# Patient Record
Sex: FEMALE | Race: WHITE | NOT HISPANIC OR LATINO | Employment: FULL TIME | URBAN - METROPOLITAN AREA
[De-identification: names, ages, dates, MRNs, and addresses within clinical notes are randomized per-mention and may not be internally consistent; named-entity substitution may affect disease eponyms.]

---

## 2017-02-02 ENCOUNTER — COMMUNICATION - HEALTHEAST (OUTPATIENT)
Dept: FAMILY MEDICINE | Facility: CLINIC | Age: 57
End: 2017-02-02

## 2017-02-02 DIAGNOSIS — E11.9 TYPE II DIABETES MELLITUS (H): ICD-10-CM

## 2017-11-06 ENCOUNTER — OFFICE VISIT - HEALTHEAST (OUTPATIENT)
Dept: INTERNAL MEDICINE | Facility: CLINIC | Age: 57
End: 2017-11-06

## 2017-11-06 DIAGNOSIS — Z76.89 ENCOUNTER TO ESTABLISH CARE: ICD-10-CM

## 2017-11-06 DIAGNOSIS — E11.9 DM (DIABETES MELLITUS) (H): ICD-10-CM

## 2017-11-06 DIAGNOSIS — E78.5 HYPERLIPIDEMIA: ICD-10-CM

## 2017-11-06 LAB
CHOLEST SERPL-MCNC: 227 MG/DL
FASTING STATUS PATIENT QL REPORTED: YES
HBA1C MFR BLD: >14 % (ref 3.5–6)
HDLC SERPL-MCNC: 46 MG/DL
LDLC SERPL CALC-MCNC: 150 MG/DL
TRIGL SERPL-MCNC: 156 MG/DL

## 2017-11-06 ASSESSMENT — MIFFLIN-ST. JEOR: SCORE: 1389.05

## 2017-12-29 ENCOUNTER — OFFICE VISIT - HEALTHEAST (OUTPATIENT)
Dept: ENDOCRINOLOGY | Facility: CLINIC | Age: 57
End: 2017-12-29

## 2017-12-29 DIAGNOSIS — E11.9 TYPE 2 DIABETES MELLITUS WITHOUT COMPLICATION (H): ICD-10-CM

## 2017-12-29 ASSESSMENT — MIFFLIN-ST. JEOR: SCORE: 1432.6

## 2018-01-03 ENCOUNTER — COMMUNICATION - HEALTHEAST (OUTPATIENT)
Dept: ADMINISTRATIVE | Facility: CLINIC | Age: 58
End: 2018-01-03

## 2018-01-03 DIAGNOSIS — E11.9 TYPE II DIABETES MELLITUS (H): ICD-10-CM

## 2018-01-03 DIAGNOSIS — E11.9 DM (DIABETES MELLITUS) (H): ICD-10-CM

## 2018-01-04 ENCOUNTER — AMBULATORY - HEALTHEAST (OUTPATIENT)
Dept: ENDOCRINOLOGY | Facility: CLINIC | Age: 58
End: 2018-01-04

## 2018-01-04 DIAGNOSIS — E11.9 TYPE 2 DIABETES MELLITUS WITHOUT COMPLICATION (H): ICD-10-CM

## 2018-01-18 ENCOUNTER — COMMUNICATION - HEALTHEAST (OUTPATIENT)
Dept: ENDOCRINOLOGY | Facility: CLINIC | Age: 58
End: 2018-01-18

## 2018-02-01 ENCOUNTER — COMMUNICATION - HEALTHEAST (OUTPATIENT)
Dept: INTERNAL MEDICINE | Facility: CLINIC | Age: 58
End: 2018-02-01

## 2018-02-01 ENCOUNTER — AMBULATORY - HEALTHEAST (OUTPATIENT)
Dept: ENDOCRINOLOGY | Facility: CLINIC | Age: 58
End: 2018-02-01

## 2018-02-01 ENCOUNTER — OFFICE VISIT - HEALTHEAST (OUTPATIENT)
Dept: INTERNAL MEDICINE | Facility: CLINIC | Age: 58
End: 2018-02-01

## 2018-02-01 DIAGNOSIS — Z12.31 VISIT FOR SCREENING MAMMOGRAM: ICD-10-CM

## 2018-02-01 DIAGNOSIS — M54.41 ACUTE RIGHT-SIDED LOW BACK PAIN WITH RIGHT-SIDED SCIATICA: ICD-10-CM

## 2018-02-01 DIAGNOSIS — E11.9 TYPE 2 DIABETES MELLITUS WITHOUT COMPLICATION (H): ICD-10-CM

## 2018-02-01 ASSESSMENT — MIFFLIN-ST. JEOR: SCORE: 1398.12

## 2018-02-05 ENCOUNTER — HOSPITAL ENCOUNTER (OUTPATIENT)
Dept: MAMMOGRAPHY | Facility: HOSPITAL | Age: 58
Discharge: HOME | End: 2018-02-05

## 2018-02-05 DIAGNOSIS — Z12.31 VISIT FOR SCREENING MAMMOGRAM: ICD-10-CM

## 2018-02-20 ENCOUNTER — OFFICE VISIT - HEALTHEAST (OUTPATIENT)
Dept: FAMILY MEDICINE | Facility: CLINIC | Age: 58
End: 2018-02-20

## 2018-02-20 DIAGNOSIS — J06.9 VIRAL URI WITH COUGH: ICD-10-CM

## 2018-02-20 DIAGNOSIS — J02.9 SORE THROAT: ICD-10-CM

## 2018-02-20 DIAGNOSIS — R05.9 COUGH: ICD-10-CM

## 2018-02-20 LAB — DEPRECATED S PYO AG THROAT QL EIA: NORMAL

## 2018-02-21 LAB — GROUP A STREP BY PCR: NORMAL

## 2018-03-07 ENCOUNTER — COMMUNICATION - HEALTHEAST (OUTPATIENT)
Dept: ENDOCRINOLOGY | Facility: CLINIC | Age: 58
End: 2018-03-07

## 2018-03-07 DIAGNOSIS — E11.9 TYPE 2 DIABETES MELLITUS WITHOUT COMPLICATION (H): ICD-10-CM

## 2018-03-12 ENCOUNTER — COMMUNICATION - HEALTHEAST (OUTPATIENT)
Dept: ENDOCRINOLOGY | Facility: CLINIC | Age: 58
End: 2018-03-12

## 2018-03-12 ENCOUNTER — OFFICE VISIT - HEALTHEAST (OUTPATIENT)
Dept: FAMILY MEDICINE | Facility: CLINIC | Age: 58
End: 2018-03-12

## 2018-03-12 DIAGNOSIS — E11.9 TYPE 2 DIABETES MELLITUS WITHOUT COMPLICATION (H): ICD-10-CM

## 2018-03-12 DIAGNOSIS — N30.00 ACUTE CYSTITIS WITHOUT HEMATURIA: ICD-10-CM

## 2018-03-12 DIAGNOSIS — R39.89 URINARY PROBLEM: ICD-10-CM

## 2018-03-12 LAB
ALBUMIN UR-MCNC: NEGATIVE MG/DL
APPEARANCE UR: ABNORMAL
BACTERIA #/AREA URNS HPF: ABNORMAL HPF
BILIRUB UR QL STRIP: NEGATIVE
COLOR UR AUTO: YELLOW
GLUCOSE UR STRIP-MCNC: NEGATIVE MG/DL
HGB UR QL STRIP: NEGATIVE
KETONES UR STRIP-MCNC: NEGATIVE MG/DL
LEUKOCYTE ESTERASE UR QL STRIP: ABNORMAL
NITRATE UR QL: NEGATIVE
PH UR STRIP: 5.5 [PH] (ref 5–8)
RBC #/AREA URNS AUTO: ABNORMAL HPF
SP GR UR STRIP: 1.02 (ref 1–1.03)
SQUAMOUS #/AREA URNS AUTO: ABNORMAL LPF
UROBILINOGEN UR STRIP-ACNC: ABNORMAL
WBC #/AREA URNS AUTO: ABNORMAL HPF

## 2018-03-14 LAB — BACTERIA SPEC CULT: ABNORMAL

## 2018-03-15 ENCOUNTER — AMBULATORY - HEALTHEAST (OUTPATIENT)
Dept: ENDOCRINOLOGY | Facility: CLINIC | Age: 58
End: 2018-03-15

## 2018-03-15 DIAGNOSIS — E11.9 TYPE 2 DIABETES MELLITUS WITHOUT COMPLICATION (H): ICD-10-CM

## 2018-04-12 ENCOUNTER — AMBULATORY - HEALTHEAST (OUTPATIENT)
Dept: LAB | Facility: CLINIC | Age: 58
End: 2018-04-12

## 2018-04-12 DIAGNOSIS — E11.9 TYPE 2 DIABETES MELLITUS WITHOUT COMPLICATION (H): ICD-10-CM

## 2018-04-12 LAB — HBA1C MFR BLD: 8.1 % (ref 3.5–6)

## 2018-04-19 ENCOUNTER — OFFICE VISIT - HEALTHEAST (OUTPATIENT)
Dept: ENDOCRINOLOGY | Facility: CLINIC | Age: 58
End: 2018-04-19

## 2018-04-19 ENCOUNTER — RECORDS - HEALTHEAST (OUTPATIENT)
Dept: ADMINISTRATIVE | Facility: OTHER | Age: 58
End: 2018-04-19

## 2018-04-19 DIAGNOSIS — E11.9 TYPE 2 DIABETES MELLITUS WITHOUT COMPLICATION (H): ICD-10-CM

## 2018-04-19 ASSESSMENT — MIFFLIN-ST. JEOR: SCORE: 1409.92

## 2018-06-06 ENCOUNTER — COMMUNICATION - HEALTHEAST (OUTPATIENT)
Dept: ENDOCRINOLOGY | Facility: CLINIC | Age: 58
End: 2018-06-06

## 2018-06-06 DIAGNOSIS — E11.9 TYPE 2 DIABETES MELLITUS WITHOUT COMPLICATION (H): ICD-10-CM

## 2018-06-15 ENCOUNTER — AMBULATORY - HEALTHEAST (OUTPATIENT)
Dept: ENDOCRINOLOGY | Facility: CLINIC | Age: 58
End: 2018-06-15

## 2018-06-15 DIAGNOSIS — E11.9 TYPE 2 DIABETES MELLITUS WITHOUT COMPLICATION (H): ICD-10-CM

## 2018-07-26 ENCOUNTER — COMMUNICATION - HEALTHEAST (OUTPATIENT)
Dept: TELEHEALTH | Facility: CLINIC | Age: 58
End: 2018-07-26

## 2018-07-26 ENCOUNTER — AMBULATORY - HEALTHEAST (OUTPATIENT)
Dept: LAB | Facility: CLINIC | Age: 58
End: 2018-07-26

## 2018-07-26 DIAGNOSIS — E11.9 TYPE 2 DIABETES MELLITUS WITHOUT COMPLICATION (H): ICD-10-CM

## 2018-07-26 LAB
ANION GAP SERPL CALCULATED.3IONS-SCNC: 7 MMOL/L (ref 5–18)
BUN SERPL-MCNC: 19 MG/DL (ref 8–22)
CALCIUM SERPL-MCNC: 9.5 MG/DL (ref 8.5–10.5)
CHLORIDE BLD-SCNC: 105 MMOL/L (ref 98–107)
CO2 SERPL-SCNC: 27 MMOL/L (ref 22–31)
CREAT SERPL-MCNC: 0.82 MG/DL (ref 0.6–1.1)
GFR SERPL CREATININE-BSD FRML MDRD: >60 ML/MIN/1.73M2
GLUCOSE BLD-MCNC: 110 MG/DL (ref 70–125)
HBA1C MFR BLD: 7.9 % (ref 3.5–6)
POTASSIUM BLD-SCNC: 4.7 MMOL/L (ref 3.5–5)
SODIUM SERPL-SCNC: 139 MMOL/L (ref 136–145)

## 2018-07-30 ENCOUNTER — OFFICE VISIT - HEALTHEAST (OUTPATIENT)
Dept: PODIATRY | Facility: CLINIC | Age: 58
End: 2018-07-30

## 2018-07-30 DIAGNOSIS — G58.9 MONONEURITIS: ICD-10-CM

## 2018-07-30 DIAGNOSIS — G58.9 COMPRESSION NEUROPATHY: ICD-10-CM

## 2018-07-30 DIAGNOSIS — G57.50 TARSAL TUNNEL SYNDROME, UNSPECIFIED LATERALITY: ICD-10-CM

## 2018-07-30 DIAGNOSIS — M72.2 PLANTAR FASCIAL FIBROMATOSIS OF LEFT FOOT: ICD-10-CM

## 2018-08-01 ENCOUNTER — OFFICE VISIT - HEALTHEAST (OUTPATIENT)
Dept: ENDOCRINOLOGY | Facility: CLINIC | Age: 58
End: 2018-08-01

## 2018-08-01 ENCOUNTER — RECORDS - HEALTHEAST (OUTPATIENT)
Dept: ADMINISTRATIVE | Facility: OTHER | Age: 58
End: 2018-08-01

## 2018-08-01 ENCOUNTER — AMBULATORY - HEALTHEAST (OUTPATIENT)
Dept: ENDOCRINOLOGY | Facility: CLINIC | Age: 58
End: 2018-08-01

## 2018-08-01 DIAGNOSIS — E11.9 TYPE 2 DIABETES MELLITUS WITHOUT COMPLICATION (H): ICD-10-CM

## 2018-08-01 ASSESSMENT — MIFFLIN-ST. JEOR: SCORE: 1405.38

## 2018-08-10 ENCOUNTER — RECORDS - HEALTHEAST (OUTPATIENT)
Dept: ADMINISTRATIVE | Facility: OTHER | Age: 58
End: 2018-08-10

## 2018-08-10 ENCOUNTER — HOSPITAL ENCOUNTER (OUTPATIENT)
Dept: PHYSICAL MEDICINE AND REHAB | Facility: CLINIC | Age: 58
Discharge: HOME | End: 2018-08-10
Attending: PODIATRIST

## 2018-08-10 DIAGNOSIS — G58.9 COMPRESSION NEUROPATHY: ICD-10-CM

## 2018-08-10 DIAGNOSIS — G58.9 MONONEURITIS: ICD-10-CM

## 2018-08-10 DIAGNOSIS — G57.50 TARSAL TUNNEL SYNDROME, UNSPECIFIED LATERALITY: ICD-10-CM

## 2018-08-21 ENCOUNTER — RECORDS - HEALTHEAST (OUTPATIENT)
Dept: ADMINISTRATIVE | Facility: OTHER | Age: 58
End: 2018-08-21

## 2018-08-21 ENCOUNTER — OFFICE VISIT - HEALTHEAST (OUTPATIENT)
Dept: PODIATRY | Facility: CLINIC | Age: 58
End: 2018-08-21

## 2018-08-21 DIAGNOSIS — G57.52 TARSAL TUNNEL SYNDROME OF LEFT SIDE: ICD-10-CM

## 2018-08-21 DIAGNOSIS — G58.9 COMPRESSION NEUROPATHY: ICD-10-CM

## 2018-08-28 ENCOUNTER — COMMUNICATION - HEALTHEAST (OUTPATIENT)
Dept: ENDOCRINOLOGY | Facility: CLINIC | Age: 58
End: 2018-08-28

## 2018-08-28 DIAGNOSIS — E11.9 TYPE 2 DIABETES MELLITUS WITHOUT COMPLICATION (H): ICD-10-CM

## 2018-08-29 ENCOUNTER — OFFICE VISIT - HEALTHEAST (OUTPATIENT)
Dept: FAMILY MEDICINE | Facility: CLINIC | Age: 58
End: 2018-08-29

## 2018-08-29 DIAGNOSIS — G62.9 POLYNEUROPATHY: ICD-10-CM

## 2018-08-29 DIAGNOSIS — Z01.818 PRE-OP EVALUATION: ICD-10-CM

## 2018-08-29 DIAGNOSIS — E11.9 TYPE 2 DIABETES MELLITUS WITHOUT COMPLICATION (H): ICD-10-CM

## 2018-08-29 LAB
ANION GAP SERPL CALCULATED.3IONS-SCNC: 10 MMOL/L (ref 5–18)
ATRIAL RATE - MUSE: 87 BPM
BASOPHILS # BLD AUTO: 0.1 THOU/UL (ref 0–0.2)
BASOPHILS NFR BLD AUTO: 1 % (ref 0–2)
BUN SERPL-MCNC: 23 MG/DL (ref 8–22)
CALCIUM SERPL-MCNC: 10.1 MG/DL (ref 8.5–10.5)
CHLORIDE BLD-SCNC: 104 MMOL/L (ref 98–107)
CO2 SERPL-SCNC: 26 MMOL/L (ref 22–31)
CREAT SERPL-MCNC: 0.85 MG/DL (ref 0.6–1.1)
DIASTOLIC BLOOD PRESSURE - MUSE: NORMAL MMHG
EOSINOPHIL # BLD AUTO: 0.3 THOU/UL (ref 0–0.4)
EOSINOPHIL NFR BLD AUTO: 3 % (ref 0–6)
ERYTHROCYTE [DISTWIDTH] IN BLOOD BY AUTOMATED COUNT: 11.1 % (ref 11–14.5)
GFR SERPL CREATININE-BSD FRML MDRD: >60 ML/MIN/1.73M2
GLUCOSE BLD-MCNC: 142 MG/DL (ref 70–125)
HCT VFR BLD AUTO: 36.2 % (ref 35–47)
HGB BLD-MCNC: 12.5 G/DL (ref 12–16)
INTERPRETATION ECG - MUSE: NORMAL
LYMPHOCYTES # BLD AUTO: 3.1 THOU/UL (ref 0.8–4.4)
LYMPHOCYTES NFR BLD AUTO: 32 % (ref 20–40)
MCH RBC QN AUTO: 30.2 PG (ref 27–34)
MCHC RBC AUTO-ENTMCNC: 34.6 G/DL (ref 32–36)
MCV RBC AUTO: 87 FL (ref 80–100)
MONOCYTES # BLD AUTO: 0.6 THOU/UL (ref 0–0.9)
MONOCYTES NFR BLD AUTO: 6 % (ref 2–10)
NEUTROPHILS # BLD AUTO: 5.6 THOU/UL (ref 2–7.7)
NEUTROPHILS NFR BLD AUTO: 58 % (ref 50–70)
P AXIS - MUSE: 59 DEGREES
PLATELET # BLD AUTO: 195 THOU/UL (ref 140–440)
PMV BLD AUTO: 9 FL (ref 7–10)
POTASSIUM BLD-SCNC: 4.3 MMOL/L (ref 3.5–5)
PR INTERVAL - MUSE: 148 MS
QRS DURATION - MUSE: 88 MS
QT - MUSE: 370 MS
QTC - MUSE: 445 MS
R AXIS - MUSE: 62 DEGREES
RBC # BLD AUTO: 4.15 MILL/UL (ref 3.8–5.4)
SODIUM SERPL-SCNC: 140 MMOL/L (ref 136–145)
SYSTOLIC BLOOD PRESSURE - MUSE: NORMAL MMHG
T AXIS - MUSE: 44 DEGREES
VENTRICULAR RATE- MUSE: 87 BPM
WBC: 9.6 THOU/UL (ref 4–11)

## 2018-09-03 ENCOUNTER — COMMUNICATION - HEALTHEAST (OUTPATIENT)
Dept: ENDOCRINOLOGY | Facility: CLINIC | Age: 58
End: 2018-09-03

## 2018-09-03 DIAGNOSIS — E11.9 TYPE 2 DIABETES MELLITUS WITHOUT COMPLICATION (H): ICD-10-CM

## 2018-09-18 ASSESSMENT — MIFFLIN-ST. JEOR: SCORE: 1394.15

## 2018-09-19 ENCOUNTER — ANESTHESIA - HEALTHEAST (OUTPATIENT)
Dept: SURGERY | Facility: AMBULATORY SURGERY CENTER | Age: 58
End: 2018-09-19

## 2018-09-21 ENCOUNTER — SURGERY - HEALTHEAST (OUTPATIENT)
Dept: SURGERY | Facility: AMBULATORY SURGERY CENTER | Age: 58
End: 2018-09-21

## 2018-09-21 ASSESSMENT — MIFFLIN-ST. JEOR: SCORE: 1394.15

## 2018-09-24 ENCOUNTER — COMMUNICATION - HEALTHEAST (OUTPATIENT)
Dept: ADMINISTRATIVE | Facility: CLINIC | Age: 58
End: 2018-09-24

## 2018-09-26 ENCOUNTER — OFFICE VISIT - HEALTHEAST (OUTPATIENT)
Dept: PODIATRY | Facility: CLINIC | Age: 58
End: 2018-09-26

## 2018-09-26 DIAGNOSIS — G58.9 MONONEURITIS: ICD-10-CM

## 2018-09-26 DIAGNOSIS — G58.9 COMPRESSION NEUROPATHY: ICD-10-CM

## 2018-09-26 DIAGNOSIS — G57.52 TARSAL TUNNEL SYNDROME OF LEFT SIDE: ICD-10-CM

## 2018-10-03 ENCOUNTER — OFFICE VISIT - HEALTHEAST (OUTPATIENT)
Dept: PODIATRY | Facility: CLINIC | Age: 58
End: 2018-10-03

## 2018-10-03 ENCOUNTER — RECORDS - HEALTHEAST (OUTPATIENT)
Dept: ADMINISTRATIVE | Facility: OTHER | Age: 58
End: 2018-10-03

## 2018-10-03 DIAGNOSIS — G57.52 TARSAL TUNNEL SYNDROME OF LEFT SIDE: ICD-10-CM

## 2018-10-03 DIAGNOSIS — G58.9 MONONEURITIS: ICD-10-CM

## 2018-10-03 DIAGNOSIS — G58.9 COMPRESSION NEUROPATHY: ICD-10-CM

## 2018-10-04 ENCOUNTER — COMMUNICATION - HEALTHEAST (OUTPATIENT)
Dept: VASCULAR SURGERY | Facility: CLINIC | Age: 58
End: 2018-10-04

## 2018-10-08 ENCOUNTER — AMBULATORY - HEALTHEAST (OUTPATIENT)
Dept: VASCULAR SURGERY | Facility: CLINIC | Age: 58
End: 2018-10-08

## 2018-10-10 ENCOUNTER — COMMUNICATION - HEALTHEAST (OUTPATIENT)
Dept: FAMILY MEDICINE | Facility: CLINIC | Age: 58
End: 2018-10-10

## 2018-10-10 ENCOUNTER — COMMUNICATION - HEALTHEAST (OUTPATIENT)
Dept: ADMINISTRATIVE | Facility: CLINIC | Age: 58
End: 2018-10-10

## 2018-10-10 ENCOUNTER — OFFICE VISIT - HEALTHEAST (OUTPATIENT)
Dept: PODIATRY | Facility: CLINIC | Age: 58
End: 2018-10-10

## 2018-10-10 DIAGNOSIS — G57.52 TARSAL TUNNEL SYNDROME OF LEFT SIDE: ICD-10-CM

## 2018-10-10 DIAGNOSIS — G57.91 MONONEURITIS OF LOWER LIMB, RIGHT: ICD-10-CM

## 2018-10-10 DIAGNOSIS — G58.9 COMPRESSION NEUROPATHY: ICD-10-CM

## 2018-10-15 ENCOUNTER — AMBULATORY - HEALTHEAST (OUTPATIENT)
Dept: PODIATRY | Facility: CLINIC | Age: 58
End: 2018-10-15

## 2018-10-22 ENCOUNTER — AMBULATORY - HEALTHEAST (OUTPATIENT)
Dept: PEDIATRICS | Facility: CLINIC | Age: 58
End: 2018-10-22

## 2018-10-23 ENCOUNTER — OFFICE VISIT - HEALTHEAST (OUTPATIENT)
Dept: FAMILY MEDICINE | Facility: CLINIC | Age: 58
End: 2018-10-23

## 2018-10-23 DIAGNOSIS — E11.40 DIABETIC NEUROPATHY (H): ICD-10-CM

## 2018-10-23 DIAGNOSIS — Z01.818 PRE-OP EXAM: ICD-10-CM

## 2018-10-23 LAB
ANION GAP SERPL CALCULATED.3IONS-SCNC: 10 MMOL/L (ref 5–18)
BASOPHILS # BLD AUTO: 0.1 THOU/UL (ref 0–0.2)
BASOPHILS NFR BLD AUTO: 1 % (ref 0–2)
BUN SERPL-MCNC: 18 MG/DL (ref 8–22)
CALCIUM SERPL-MCNC: 10.3 MG/DL (ref 8.5–10.5)
CHLORIDE BLD-SCNC: 99 MMOL/L (ref 98–107)
CO2 SERPL-SCNC: 27 MMOL/L (ref 22–31)
CREAT SERPL-MCNC: 0.86 MG/DL (ref 0.6–1.1)
EOSINOPHIL # BLD AUTO: 0.3 THOU/UL (ref 0–0.4)
EOSINOPHIL NFR BLD AUTO: 4 % (ref 0–6)
ERYTHROCYTE [DISTWIDTH] IN BLOOD BY AUTOMATED COUNT: 11.1 % (ref 11–14.5)
GFR SERPL CREATININE-BSD FRML MDRD: >60 ML/MIN/1.73M2
GLUCOSE BLD-MCNC: 329 MG/DL (ref 70–125)
HCT VFR BLD AUTO: 41.3 % (ref 35–47)
HGB BLD-MCNC: 14 G/DL (ref 12–16)
LYMPHOCYTES # BLD AUTO: 2.8 THOU/UL (ref 0.8–4.4)
LYMPHOCYTES NFR BLD AUTO: 35 % (ref 20–40)
MCH RBC QN AUTO: 29.9 PG (ref 27–34)
MCHC RBC AUTO-ENTMCNC: 33.9 G/DL (ref 32–36)
MCV RBC AUTO: 88 FL (ref 80–100)
MONOCYTES # BLD AUTO: 0.4 THOU/UL (ref 0–0.9)
MONOCYTES NFR BLD AUTO: 5 % (ref 2–10)
NEUTROPHILS # BLD AUTO: 4.5 THOU/UL (ref 2–7.7)
NEUTROPHILS NFR BLD AUTO: 56 % (ref 50–70)
PLATELET # BLD AUTO: 188 THOU/UL (ref 140–440)
PMV BLD AUTO: 8.8 FL (ref 7–10)
POTASSIUM BLD-SCNC: 5 MMOL/L (ref 3.5–5)
RBC # BLD AUTO: 4.68 MILL/UL (ref 3.8–5.4)
SODIUM SERPL-SCNC: 136 MMOL/L (ref 136–145)
WBC: 8.1 THOU/UL (ref 4–11)

## 2018-10-23 ASSESSMENT — MIFFLIN-ST. JEOR: SCORE: 1388.03

## 2018-10-25 ENCOUNTER — COMMUNICATION - HEALTHEAST (OUTPATIENT)
Dept: FAMILY MEDICINE | Facility: CLINIC | Age: 58
End: 2018-10-25

## 2018-10-26 ENCOUNTER — RECORDS - HEALTHEAST (OUTPATIENT)
Dept: ADMINISTRATIVE | Facility: OTHER | Age: 58
End: 2018-10-26

## 2018-10-30 ENCOUNTER — RECORDS - HEALTHEAST (OUTPATIENT)
Dept: ADMINISTRATIVE | Facility: OTHER | Age: 58
End: 2018-10-30

## 2018-11-02 ENCOUNTER — SURGERY - HEALTHEAST (OUTPATIENT)
Dept: SURGERY | Facility: CLINIC | Age: 58
End: 2018-11-02

## 2018-11-02 ENCOUNTER — ANESTHESIA - HEALTHEAST (OUTPATIENT)
Dept: SURGERY | Facility: CLINIC | Age: 58
End: 2018-11-02

## 2018-11-07 ENCOUNTER — AMBULATORY - HEALTHEAST (OUTPATIENT)
Dept: LAB | Facility: CLINIC | Age: 58
End: 2018-11-07

## 2018-11-07 DIAGNOSIS — E11.9 TYPE 2 DIABETES MELLITUS WITHOUT COMPLICATION (H): ICD-10-CM

## 2018-11-07 LAB
CREAT UR-MCNC: 94 MG/DL
HBA1C MFR BLD: 7.7 % (ref 3.5–6)
LDLC SERPL CALC-MCNC: 144 MG/DL
MICROALBUMIN UR-MCNC: <0.5 MG/DL (ref 0–1.99)
MICROALBUMIN/CREAT UR: NORMAL MG/G

## 2018-11-10 ENCOUNTER — RECORDS - HEALTHEAST (OUTPATIENT)
Dept: ADMINISTRATIVE | Facility: OTHER | Age: 58
End: 2018-11-10

## 2018-11-13 ENCOUNTER — OFFICE VISIT - HEALTHEAST (OUTPATIENT)
Dept: PODIATRY | Facility: CLINIC | Age: 58
End: 2018-11-13

## 2018-11-13 DIAGNOSIS — G58.9 COMPRESSION NEUROPATHY: ICD-10-CM

## 2018-11-13 DIAGNOSIS — G57.91 MONONEURITIS OF LOWER LIMB, RIGHT: ICD-10-CM

## 2018-11-13 DIAGNOSIS — G57.52 TARSAL TUNNEL SYNDROME OF LEFT SIDE: ICD-10-CM

## 2018-11-14 ENCOUNTER — OFFICE VISIT - HEALTHEAST (OUTPATIENT)
Dept: ENDOCRINOLOGY | Facility: CLINIC | Age: 58
End: 2018-11-14

## 2018-11-14 ENCOUNTER — AMBULATORY - HEALTHEAST (OUTPATIENT)
Dept: ENDOCRINOLOGY | Facility: CLINIC | Age: 58
End: 2018-11-14

## 2018-11-14 DIAGNOSIS — E11.9 TYPE 2 DIABETES MELLITUS WITHOUT COMPLICATION, WITH LONG-TERM CURRENT USE OF INSULIN (H): ICD-10-CM

## 2018-11-14 DIAGNOSIS — E11.9 TYPE 2 DIABETES MELLITUS WITHOUT COMPLICATION (H): ICD-10-CM

## 2018-11-14 DIAGNOSIS — Z79.4 TYPE 2 DIABETES MELLITUS WITHOUT COMPLICATION, WITH LONG-TERM CURRENT USE OF INSULIN (H): ICD-10-CM

## 2018-11-14 DIAGNOSIS — B00.9 HERPES SIMPLEX VIRUS INFECTION: ICD-10-CM

## 2018-11-14 ASSESSMENT — MIFFLIN-ST. JEOR: SCORE: 1391.66

## 2018-11-15 ENCOUNTER — COMMUNICATION - HEALTHEAST (OUTPATIENT)
Dept: ENDOCRINOLOGY | Facility: CLINIC | Age: 58
End: 2018-11-15

## 2018-11-15 DIAGNOSIS — E11.9 TYPE 2 DIABETES MELLITUS WITHOUT COMPLICATION (H): ICD-10-CM

## 2018-11-20 ENCOUNTER — OFFICE VISIT - HEALTHEAST (OUTPATIENT)
Dept: PODIATRY | Facility: CLINIC | Age: 58
End: 2018-11-20

## 2018-11-20 DIAGNOSIS — G58.9 COMPRESSION NEUROPATHY: ICD-10-CM

## 2018-11-20 DIAGNOSIS — G57.91 MONONEURITIS OF LOWER LIMB, RIGHT: ICD-10-CM

## 2018-11-20 DIAGNOSIS — G57.50 TARSAL TUNNEL SYNDROME, UNSPECIFIED LATERALITY: ICD-10-CM

## 2018-11-20 ASSESSMENT — MIFFLIN-ST. JEOR: SCORE: 1388.49

## 2018-11-27 ENCOUNTER — OFFICE VISIT - HEALTHEAST (OUTPATIENT)
Dept: PODIATRY | Facility: CLINIC | Age: 58
End: 2018-11-27

## 2018-11-27 DIAGNOSIS — G58.9 COMPRESSION NEUROPATHY: ICD-10-CM

## 2018-11-27 DIAGNOSIS — G57.91 MONONEURITIS OF LOWER LIMB, RIGHT: ICD-10-CM

## 2018-11-27 DIAGNOSIS — G57.50 TARSAL TUNNEL SYNDROME, UNSPECIFIED LATERALITY: ICD-10-CM

## 2018-11-27 ASSESSMENT — MIFFLIN-ST. JEOR: SCORE: 1388.49

## 2019-01-22 ENCOUNTER — COMMUNICATION - HEALTHEAST (OUTPATIENT)
Dept: ENDOCRINOLOGY | Facility: CLINIC | Age: 59
End: 2019-01-22

## 2019-01-22 DIAGNOSIS — E11.9 TYPE 2 DIABETES MELLITUS WITHOUT COMPLICATION (H): ICD-10-CM

## 2019-01-28 ENCOUNTER — COMMUNICATION - HEALTHEAST (OUTPATIENT)
Dept: ENDOCRINOLOGY | Facility: CLINIC | Age: 59
End: 2019-01-28

## 2019-01-28 DIAGNOSIS — B00.9 HERPES SIMPLEX VIRUS INFECTION: ICD-10-CM

## 2019-01-31 ENCOUNTER — COMMUNICATION - HEALTHEAST (OUTPATIENT)
Dept: ALLERGY | Facility: CLINIC | Age: 59
End: 2019-01-31

## 2019-02-04 ENCOUNTER — OFFICE VISIT - HEALTHEAST (OUTPATIENT)
Dept: FAMILY MEDICINE | Facility: CLINIC | Age: 59
End: 2019-02-04

## 2019-02-04 DIAGNOSIS — M54.40 ACUTE RIGHT-SIDED LOW BACK PAIN WITH SCIATICA, SCIATICA LATERALITY UNSPECIFIED: ICD-10-CM

## 2019-02-06 ENCOUNTER — OFFICE VISIT - HEALTHEAST (OUTPATIENT)
Dept: FAMILY MEDICINE | Facility: CLINIC | Age: 59
End: 2019-02-06

## 2019-02-06 DIAGNOSIS — M54.50 ACUTE RIGHT-SIDED LOW BACK PAIN WITHOUT SCIATICA: ICD-10-CM

## 2019-03-02 ENCOUNTER — RECORDS - HEALTHEAST (OUTPATIENT)
Dept: ADMINISTRATIVE | Facility: OTHER | Age: 59
End: 2019-03-02

## 2019-03-05 ENCOUNTER — COMMUNICATION - HEALTHEAST (OUTPATIENT)
Dept: ENDOCRINOLOGY | Facility: CLINIC | Age: 59
End: 2019-03-05

## 2019-04-01 ENCOUNTER — COMMUNICATION - HEALTHEAST (OUTPATIENT)
Dept: ENDOCRINOLOGY | Facility: CLINIC | Age: 59
End: 2019-04-01

## 2019-04-01 DIAGNOSIS — E11.9 TYPE 2 DIABETES MELLITUS WITHOUT COMPLICATION (H): ICD-10-CM

## 2019-04-08 ENCOUNTER — COMMUNICATION - HEALTHEAST (OUTPATIENT)
Dept: ENDOCRINOLOGY | Facility: CLINIC | Age: 59
End: 2019-04-08

## 2019-04-08 DIAGNOSIS — E11.9 TYPE 2 DIABETES MELLITUS WITHOUT COMPLICATION (H): ICD-10-CM

## 2019-04-09 ENCOUNTER — COMMUNICATION - HEALTHEAST (OUTPATIENT)
Dept: ENDOCRINOLOGY | Facility: CLINIC | Age: 59
End: 2019-04-09

## 2019-04-09 DIAGNOSIS — E11.9 TYPE 2 DIABETES MELLITUS WITHOUT COMPLICATION (H): ICD-10-CM

## 2019-05-15 ENCOUNTER — AMBULATORY - HEALTHEAST (OUTPATIENT)
Dept: LAB | Facility: CLINIC | Age: 59
End: 2019-05-15

## 2019-05-15 DIAGNOSIS — E11.9 TYPE 2 DIABETES MELLITUS WITHOUT COMPLICATION (H): ICD-10-CM

## 2019-05-15 LAB — HBA1C MFR BLD: 8.4 % (ref 3.5–6)

## 2019-05-22 ENCOUNTER — RECORDS - HEALTHEAST (OUTPATIENT)
Dept: ADMINISTRATIVE | Facility: OTHER | Age: 59
End: 2019-05-22

## 2019-05-28 ENCOUNTER — AMBULATORY - HEALTHEAST (OUTPATIENT)
Dept: ENDOCRINOLOGY | Facility: CLINIC | Age: 59
End: 2019-05-28

## 2019-05-28 ENCOUNTER — OFFICE VISIT - HEALTHEAST (OUTPATIENT)
Dept: ENDOCRINOLOGY | Facility: CLINIC | Age: 59
End: 2019-05-28

## 2019-05-28 DIAGNOSIS — R60.0 BILATERAL LOWER EXTREMITY EDEMA: ICD-10-CM

## 2019-05-28 DIAGNOSIS — Z79.4 TYPE 2 DIABETES MELLITUS WITHOUT COMPLICATION, WITH LONG-TERM CURRENT USE OF INSULIN (H): ICD-10-CM

## 2019-05-28 DIAGNOSIS — E11.9 TYPE 2 DIABETES MELLITUS WITHOUT COMPLICATION (H): ICD-10-CM

## 2019-05-28 DIAGNOSIS — E11.9 TYPE 2 DIABETES MELLITUS WITHOUT COMPLICATION, WITH LONG-TERM CURRENT USE OF INSULIN (H): ICD-10-CM

## 2019-05-28 ASSESSMENT — MIFFLIN-ST. JEOR: SCORE: 1421.15

## 2019-05-31 ENCOUNTER — OFFICE VISIT - HEALTHEAST (OUTPATIENT)
Dept: FAMILY MEDICINE | Facility: CLINIC | Age: 59
End: 2019-05-31

## 2019-05-31 DIAGNOSIS — S93.602A FOOT SPRAIN, LEFT, INITIAL ENCOUNTER: ICD-10-CM

## 2019-05-31 DIAGNOSIS — S99.922A FOOT INJURY, LEFT, INITIAL ENCOUNTER: ICD-10-CM

## 2019-06-04 ENCOUNTER — AMBULATORY - HEALTHEAST (OUTPATIENT)
Dept: PODIATRY | Facility: CLINIC | Age: 59
End: 2019-06-04

## 2019-06-04 ENCOUNTER — OFFICE VISIT - HEALTHEAST (OUTPATIENT)
Dept: PODIATRY | Facility: CLINIC | Age: 59
End: 2019-06-04

## 2019-06-04 ENCOUNTER — COMMUNICATION - HEALTHEAST (OUTPATIENT)
Dept: ADMINISTRATIVE | Facility: CLINIC | Age: 59
End: 2019-06-04

## 2019-06-04 DIAGNOSIS — Q74.2 ACCESSORY NAVICULAR BONE OF LEFT FOOT: ICD-10-CM

## 2019-06-04 DIAGNOSIS — M79.672 LEFT FOOT PAIN: ICD-10-CM

## 2019-06-18 ENCOUNTER — COMMUNICATION - HEALTHEAST (OUTPATIENT)
Dept: ENDOCRINOLOGY | Facility: CLINIC | Age: 59
End: 2019-06-18

## 2019-06-18 DIAGNOSIS — E11.9 TYPE 2 DIABETES MELLITUS WITHOUT COMPLICATION (H): ICD-10-CM

## 2019-07-01 ENCOUNTER — COMMUNICATION - HEALTHEAST (OUTPATIENT)
Dept: ENDOCRINOLOGY | Facility: CLINIC | Age: 59
End: 2019-07-01

## 2019-07-01 DIAGNOSIS — E11.9 TYPE 2 DIABETES MELLITUS WITHOUT COMPLICATION (H): ICD-10-CM

## 2019-09-14 ENCOUNTER — COMMUNICATION - HEALTHEAST (OUTPATIENT)
Dept: ENDOCRINOLOGY | Facility: CLINIC | Age: 59
End: 2019-09-14

## 2019-09-14 DIAGNOSIS — Z79.4 TYPE 2 DIABETES MELLITUS WITHOUT COMPLICATION, WITH LONG-TERM CURRENT USE OF INSULIN (H): ICD-10-CM

## 2019-09-14 DIAGNOSIS — E11.9 TYPE 2 DIABETES MELLITUS WITHOUT COMPLICATION, WITH LONG-TERM CURRENT USE OF INSULIN (H): ICD-10-CM

## 2019-09-24 ENCOUNTER — AMBULATORY - HEALTHEAST (OUTPATIENT)
Dept: LAB | Facility: CLINIC | Age: 59
End: 2019-09-24

## 2019-09-24 ENCOUNTER — HOSPITAL ENCOUNTER (OUTPATIENT)
Dept: LAB | Age: 59
Setting detail: SPECIMEN
Discharge: HOME | End: 2019-09-24

## 2019-09-24 DIAGNOSIS — Z79.4 TYPE 2 DIABETES MELLITUS WITHOUT COMPLICATION, WITH LONG-TERM CURRENT USE OF INSULIN (H): ICD-10-CM

## 2019-09-24 DIAGNOSIS — E11.9 TYPE 2 DIABETES MELLITUS WITHOUT COMPLICATION, WITH LONG-TERM CURRENT USE OF INSULIN (H): ICD-10-CM

## 2019-09-24 LAB
ANION GAP SERPL CALCULATED.3IONS-SCNC: 9 MMOL/L (ref 5–18)
BUN SERPL-MCNC: 16 MG/DL (ref 8–22)
CALCIUM SERPL-MCNC: 9.5 MG/DL (ref 8.5–10.5)
CHLORIDE BLD-SCNC: 103 MMOL/L (ref 98–107)
CO2 SERPL-SCNC: 24 MMOL/L (ref 22–31)
CREAT SERPL-MCNC: 1 MG/DL (ref 0.6–1.1)
GFR SERPL CREATININE-BSD FRML MDRD: 57 ML/MIN/1.73M2
GLUCOSE BLD-MCNC: 312 MG/DL (ref 70–125)
HBA1C MFR BLD: 8.6 % (ref 3.5–6)
POTASSIUM BLD-SCNC: 4.1 MMOL/L (ref 3.5–5)
SODIUM SERPL-SCNC: 136 MMOL/L (ref 136–145)

## 2019-10-01 ENCOUNTER — OFFICE VISIT - HEALTHEAST (OUTPATIENT)
Dept: ENDOCRINOLOGY | Facility: CLINIC | Age: 59
End: 2019-10-01

## 2019-10-01 DIAGNOSIS — Z79.4 TYPE 2 DIABETES MELLITUS WITHOUT COMPLICATION, WITH LONG-TERM CURRENT USE OF INSULIN (H): ICD-10-CM

## 2019-10-01 DIAGNOSIS — E11.9 TYPE 2 DIABETES MELLITUS WITHOUT COMPLICATION (H): ICD-10-CM

## 2019-10-01 DIAGNOSIS — E11.9 TYPE 2 DIABETES MELLITUS WITHOUT COMPLICATION, WITH LONG-TERM CURRENT USE OF INSULIN (H): ICD-10-CM

## 2019-10-01 ASSESSMENT — MIFFLIN-ST. JEOR: SCORE: 1421.15

## 2019-10-07 ENCOUNTER — COMMUNICATION - HEALTHEAST (OUTPATIENT)
Dept: ENDOCRINOLOGY | Facility: CLINIC | Age: 59
End: 2019-10-07

## 2019-10-07 DIAGNOSIS — E11.9 TYPE 2 DIABETES MELLITUS WITHOUT COMPLICATION (H): ICD-10-CM

## 2019-10-08 ENCOUNTER — RECORDS - HEALTHEAST (OUTPATIENT)
Dept: ADMINISTRATIVE | Facility: OTHER | Age: 59
End: 2019-10-08

## 2019-11-09 ENCOUNTER — COMMUNICATION - HEALTHEAST (OUTPATIENT)
Dept: ENDOCRINOLOGY | Facility: CLINIC | Age: 59
End: 2019-11-09

## 2019-11-09 DIAGNOSIS — E11.9 DM (DIABETES MELLITUS) (H): ICD-10-CM

## 2019-11-11 ENCOUNTER — COMMUNICATION - HEALTHEAST (OUTPATIENT)
Dept: ENDOCRINOLOGY | Facility: CLINIC | Age: 59
End: 2019-11-11

## 2019-11-11 DIAGNOSIS — B00.9 HERPES SIMPLEX VIRUS INFECTION: ICD-10-CM

## 2019-11-13 ENCOUNTER — COMMUNICATION - HEALTHEAST (OUTPATIENT)
Dept: ENDOCRINOLOGY | Facility: CLINIC | Age: 59
End: 2019-11-13

## 2019-11-13 DIAGNOSIS — B00.9 HERPES SIMPLEX VIRUS INFECTION: ICD-10-CM

## 2019-12-24 ENCOUNTER — COMMUNICATION - HEALTHEAST (OUTPATIENT)
Dept: FAMILY MEDICINE | Facility: CLINIC | Age: 59
End: 2019-12-24

## 2019-12-24 DIAGNOSIS — E11.9 TYPE 2 DIABETES MELLITUS WITHOUT COMPLICATION (H): ICD-10-CM

## 2019-12-27 ENCOUNTER — COMMUNICATION - HEALTHEAST (OUTPATIENT)
Dept: FAMILY MEDICINE | Facility: CLINIC | Age: 59
End: 2019-12-27

## 2020-01-27 ENCOUNTER — COMMUNICATION - HEALTHEAST (OUTPATIENT)
Dept: SCHEDULING | Facility: CLINIC | Age: 60
End: 2020-01-27

## 2020-01-31 ENCOUNTER — OFFICE VISIT - HEALTHEAST (OUTPATIENT)
Dept: FAMILY MEDICINE | Facility: CLINIC | Age: 60
End: 2020-01-31

## 2020-01-31 DIAGNOSIS — E11.9 TYPE II DIABETES MELLITUS (H): ICD-10-CM

## 2020-01-31 DIAGNOSIS — J18.1 LOBAR PNEUMONIA (H): ICD-10-CM

## 2020-01-31 DIAGNOSIS — R30.0 DYSURIA: ICD-10-CM

## 2020-01-31 DIAGNOSIS — E11.9 TYPE 2 DIABETES MELLITUS WITHOUT COMPLICATION (H): ICD-10-CM

## 2020-01-31 LAB
ALBUMIN UR-MCNC: NEGATIVE MG/DL
APPEARANCE UR: CLEAR
BACTERIA #/AREA URNS HPF: ABNORMAL HPF
BILIRUB UR QL STRIP: NEGATIVE
COLOR UR AUTO: YELLOW
GLUCOSE UR STRIP-MCNC: NEGATIVE MG/DL
HGB UR QL STRIP: ABNORMAL
KETONES UR STRIP-MCNC: NEGATIVE MG/DL
LEUKOCYTE ESTERASE UR QL STRIP: ABNORMAL
NITRATE UR QL: NEGATIVE
PH UR STRIP: 6.5 [PH] (ref 5–8)
RBC #/AREA URNS AUTO: ABNORMAL HPF
SP GR UR STRIP: 1.01 (ref 1–1.03)
SQUAMOUS #/AREA URNS AUTO: ABNORMAL LPF
UROBILINOGEN UR STRIP-ACNC: ABNORMAL
WBC #/AREA URNS AUTO: ABNORMAL HPF

## 2020-01-31 ASSESSMENT — MIFFLIN-ST. JEOR: SCORE: 1424.77

## 2020-02-03 LAB
BACTERIA SPEC CULT: ABNORMAL
BACTERIA SPEC CULT: ABNORMAL

## 2020-02-04 ENCOUNTER — OFFICE VISIT - HEALTHEAST (OUTPATIENT)
Dept: FAMILY MEDICINE | Facility: CLINIC | Age: 60
End: 2020-02-04

## 2020-02-04 DIAGNOSIS — N39.0 URINARY TRACT INFECTION WITH HEMATURIA, SITE UNSPECIFIED: ICD-10-CM

## 2020-02-04 DIAGNOSIS — R31.9 URINARY TRACT INFECTION WITH HEMATURIA, SITE UNSPECIFIED: ICD-10-CM

## 2020-02-04 DIAGNOSIS — R05.8 SPASMODIC COUGH: ICD-10-CM

## 2020-02-04 ASSESSMENT — MIFFLIN-ST. JEOR: SCORE: 1442.92

## 2020-02-19 ENCOUNTER — AMBULATORY - HEALTHEAST (OUTPATIENT)
Dept: ENDOCRINOLOGY | Facility: CLINIC | Age: 60
End: 2020-02-19

## 2020-02-19 DIAGNOSIS — E11.9 TYPE 2 DIABETES MELLITUS WITHOUT COMPLICATION (H): ICD-10-CM

## 2020-04-03 ENCOUNTER — COMMUNICATION - HEALTHEAST (OUTPATIENT)
Dept: ADMINISTRATIVE | Facility: CLINIC | Age: 60
End: 2020-04-03

## 2020-04-07 ENCOUNTER — AMBULATORY - HEALTHEAST (OUTPATIENT)
Dept: LAB | Facility: CLINIC | Age: 60
End: 2020-04-07

## 2020-04-07 DIAGNOSIS — E11.9 TYPE 2 DIABETES MELLITUS WITHOUT COMPLICATION (H): ICD-10-CM

## 2020-04-07 LAB
ANION GAP SERPL CALCULATED.3IONS-SCNC: 11 MMOL/L (ref 5–18)
BUN SERPL-MCNC: 20 MG/DL (ref 8–22)
CALCIUM SERPL-MCNC: 9 MG/DL (ref 8.5–10.5)
CHLORIDE BLD-SCNC: 101 MMOL/L (ref 98–107)
CO2 SERPL-SCNC: 25 MMOL/L (ref 22–31)
CREAT SERPL-MCNC: 0.94 MG/DL (ref 0.6–1.1)
CREAT UR-MCNC: 179.1 MG/DL
GFR SERPL CREATININE-BSD FRML MDRD: >60 ML/MIN/1.73M2
GLUCOSE BLD-MCNC: 244 MG/DL (ref 70–125)
HBA1C MFR BLD: 10.8 % (ref 3.5–6)
LDLC SERPL CALC-MCNC: 121 MG/DL
MICROALBUMIN UR-MCNC: 1.18 MG/DL (ref 0–1.99)
MICROALBUMIN/CREAT UR: 6.6 MG/G
POTASSIUM BLD-SCNC: 4.3 MMOL/L (ref 3.5–5)
SODIUM SERPL-SCNC: 137 MMOL/L (ref 136–145)

## 2020-04-14 ENCOUNTER — OFFICE VISIT - HEALTHEAST (OUTPATIENT)
Dept: ENDOCRINOLOGY | Facility: CLINIC | Age: 60
End: 2020-04-14

## 2020-04-14 DIAGNOSIS — Z79.4 TYPE 2 DIABETES MELLITUS WITHOUT COMPLICATION, WITH LONG-TERM CURRENT USE OF INSULIN (H): ICD-10-CM

## 2020-04-14 DIAGNOSIS — E11.9 TYPE 2 DIABETES MELLITUS WITHOUT COMPLICATION, WITH LONG-TERM CURRENT USE OF INSULIN (H): ICD-10-CM

## 2020-04-14 DIAGNOSIS — E11.9 TYPE 2 DIABETES MELLITUS WITHOUT COMPLICATION (H): ICD-10-CM

## 2020-04-15 ENCOUNTER — COMMUNICATION - HEALTHEAST (OUTPATIENT)
Dept: ENDOCRINOLOGY | Facility: CLINIC | Age: 60
End: 2020-04-15

## 2020-05-20 ENCOUNTER — COMMUNICATION - HEALTHEAST (OUTPATIENT)
Dept: ENDOCRINOLOGY | Facility: CLINIC | Age: 60
End: 2020-05-20

## 2020-08-07 ENCOUNTER — COMMUNICATION - HEALTHEAST (OUTPATIENT)
Dept: ENDOCRINOLOGY | Facility: CLINIC | Age: 60
End: 2020-08-07

## 2020-08-07 DIAGNOSIS — Z79.4 TYPE 2 DIABETES MELLITUS WITHOUT COMPLICATION, WITH LONG-TERM CURRENT USE OF INSULIN (H): ICD-10-CM

## 2020-08-07 DIAGNOSIS — E11.9 TYPE 2 DIABETES MELLITUS WITHOUT COMPLICATION, WITH LONG-TERM CURRENT USE OF INSULIN (H): ICD-10-CM

## 2020-08-10 RX ORDER — FLUCONAZOLE 150 MG/1
TABLET ORAL
Qty: 2 TABLET | Refills: 3 | Status: SHIPPED | OUTPATIENT
Start: 2020-08-10 | End: 2021-07-27

## 2020-08-17 ENCOUNTER — COMMUNICATION - HEALTHEAST (OUTPATIENT)
Dept: ENDOCRINOLOGY | Facility: CLINIC | Age: 60
End: 2020-08-17

## 2020-08-17 DIAGNOSIS — E11.9 TYPE 2 DIABETES MELLITUS WITHOUT COMPLICATION (H): ICD-10-CM

## 2020-08-18 ENCOUNTER — AMBULATORY - HEALTHEAST (OUTPATIENT)
Dept: ENDOCRINOLOGY | Facility: CLINIC | Age: 60
End: 2020-08-18

## 2020-08-18 DIAGNOSIS — Z79.4 TYPE 2 DIABETES MELLITUS WITHOUT COMPLICATION, WITH LONG-TERM CURRENT USE OF INSULIN (H): ICD-10-CM

## 2020-08-18 DIAGNOSIS — E11.9 TYPE 2 DIABETES MELLITUS WITHOUT COMPLICATION, WITH LONG-TERM CURRENT USE OF INSULIN (H): ICD-10-CM

## 2020-08-21 ENCOUNTER — HOSPITAL ENCOUNTER (OUTPATIENT)
Dept: MAMMOGRAPHY | Facility: CLINIC | Age: 60
Discharge: HOME | End: 2020-08-21

## 2020-08-21 DIAGNOSIS — Z12.31 VISIT FOR SCREENING MAMMOGRAM: ICD-10-CM

## 2020-08-23 ENCOUNTER — VIRTUAL VISIT (OUTPATIENT)
Dept: FAMILY MEDICINE | Facility: OTHER | Age: 60
End: 2020-08-23
Payer: COMMERCIAL

## 2020-08-23 PROCEDURE — 99421 OL DIG E/M SVC 5-10 MIN: CPT | Performed by: FAMILY MEDICINE

## 2020-08-24 ENCOUNTER — AMBULATORY - HEALTHEAST (OUTPATIENT)
Dept: FAMILY MEDICINE | Facility: CLINIC | Age: 60
End: 2020-08-24

## 2020-08-24 DIAGNOSIS — Z20.822 SUSPECTED COVID-19 VIRUS INFECTION: ICD-10-CM

## 2020-08-24 NOTE — PROGRESS NOTES
"Date: 2020 19:52:29  Clinician: Manolo Bond  Clinician NPI: [id number]  Patient: Zoe Alvarez  Patient : 1960  Patient Address: [address] [place] [address]  Patient Phone: [phone]  Visit Protocol: URI  Patient Summary:  Zoe is a 59 year old ( : 1960 ) female who initiated a Visit for COVID-19 (Coronavirus) evaluation and screening. When asked the question \"Please sign me up to receive news, health information and promotions from [employer].\", Zoe responded \"Yes\".    Zoe states her symptoms started today.   Her symptoms consist of diarrhea and vomiting. Zoe also feels feverish.   Symptom details   Temperature: Her current temperature is 99.9 degrees Fahrenheit.    Zoe denies having ear pain, headache, chills, malaise, wheezing, sore throat, teeth pain, ageusia, cough, nasal congestion, rhinitis, nausea, myalgias, anosmia, and facial pain or pressure. She also denies having recent facial or sinus surgery in the past 60 days and taking antibiotic medication in the past month. She is not experiencing dyspnea.    Pertinent COVID-19 (Coronavirus) information  In the past 14 days, Zoe has worked in a congregate living setting.   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 She does not live with a healthcare worker.   Zoe has not had a close contact with a laboratory-confirmed COVID-19 patient within 14 days of symptom onset.   Since 2019, Zoe and has not had upper respiratory infection or influenza-like illness. Has not been diagnosed with lab-confirmed COVID-19 test   Pertinent medical history  Zoe typically gets a yeast infection when she " takes antibiotics. She has used fluconazole (Diflucan) to treat previous yeast infections. 1 dose of fluconazole (Diflucan) has typically been sufficient for symptoms to resolve in the past.   Zoe needs a return to work/school note.   Weight: 195 lbs   Zoe does not smoke or use smokeless tobacco.   Weight: 195 lbs    MEDICATIONS: Lantus U-100 Insulin subcutaneous, Jardiance oral, ALLERGIES: amoxicillin, metformin  Clinician Response:  Dear Zoe,      Please treat with clear liquids, Tylenol - like a stomach ailment. If getting worse, go to an E R.     We can test to check for Covid.     Your symptoms show that you may have coronavirus (COVID-19).&nbsp;     This illness can cause fever, cough and trouble breathing. Many people get a mild case and get better on their own. Some people can get very sick.     What should I do?     We would like to test you for this virus.   1. Please call [phone] to schedule your visit. Explain that you were referred by [facility] to have a COVID-19 test. Be ready to share your [facility] visit ID number.  The following will serve as your written order for this COVID Test, ordered by me, for the indication of suspected COVID [Z20.828]: The test will be ordered in [employer], our electronic health record, after you are scheduled. It will show as ordered and authorized by Lorenzo Lopez MD.  Order: COVID-19 (Coronavirus) PCR for SYMPTOMATIC testing from [facility].      2. When it's time for your COVID test:  Stay at least 6 feet away from others. (If someone will drive you to your test, stay in the backseat, as far away from the  as you can.)   Cover your mouth and nose with a mask, tissue or washcloth.  Go straight to the testing site. Don't make any stops on the way there or back.      3.Starting now: Stay home and away from others (self-isolate) until:   You've had no fever---and no medicine that reduces fever---for one full day (24 hours). And...   Your other symptoms have gotten  "better. For example, your cough or breathing has improved. And...   At least 10 days have passed since your symptoms started.       During this time, don't leave the house except for testing or medical care.   Stay in your own room, even for meals. Use your own bathroom if you can.   Stay away from others in your home. No hugging, kissing or shaking hands. No visitors.  Don't go to work, school or anywhere else.    Clean \"high touch\" surfaces often (doorknobs, counters, handles, etc.). Use a household cleaning spray or wipes. You'll find a full list of  on the EPA website: www.epa.gov/pesticide-registration/list-n-disinfectants-use-against-sars-cov-2.   Cover your mouth and nose with a mask, tissue or washcloth to avoid spreading germs.  Wash your hands and face often. Use soap and water.  Caregivers in these groups are at risk for severe illness due to COVID-19:  o People 65 years and older  o People who live in a nursing home or long-term care facility  o People with chronic disease (lung, heart, cancer, diabetes, kidney, liver, immunologic)  o People who have a weakened immune system, including those who:   Are in cancer treatment  Take medicine that weakens the immune system, such as corticosteroids  Had a bone marrow or organ transplant  Have an immune deficiency  Have poorly controlled HIV or AIDS  Are obese (body mass index of 40 or higher)  Smoke regularly   o Caregivers should wear gloves while washing dishes, handling laundry and cleaning bedrooms and bathrooms.  o Use caution when washing and drying laundry: Don't shake dirty laundry, and use the warmest water setting that you can.  o For more tips, go to www.cdc.gov/coronavirus/2019-ncov/downloads/10Things.pdf.    4.Sign up for [employer]. We know it's scary to hear that you might have COVID-19. We want to track your symptoms to make sure you're okay over the next 2 weeks. Please look for an email from [employer]---this is a free, online " program that we'll use to keep in touch. To sign up, follow the link in the email. Learn more at [email or web address]  How can I take care of myself?   Get lots of rest. Drink extra fluids (unless a doctor has told you not to).   Take Tylenol (acetaminophen) for fever or pain. If you have liver or kidney problems, ask your family doctor if it's okay to take Tylenol.   Adults can take either:    650 mg (two 325 mg pills) every 4 to 6 hours, or...   1,000 mg (two 500 mg pills) every 8 hours as needed.    Note: Don't take more than 3,000 mg in one day. Acetaminophen is found in many medicines (both prescribed and over-the-counter medicines). Read all labels to be sure you don't take too much.   For children, check the Tylenol bottle for the right dose. The dose is based on the child's age or weight.    If you have other health problems (like cancer, heart failure, an organ transplant or severe kidney disease): Call your specialty clinic if you don't feel better in the next 2 days.       Know when to call 911. Emergency warning signs include:    Trouble breathing or shortness of breath Pain or pressure in the chest that doesn't go away Feeling confused like you haven't felt before, or not being able to wake up Bluish-colored lips or face.  Where can I get more information?   [facility] -- About COVID-19: [email or web address]   CDC -- What to Do If You're Sick: www.cdc.gov/coronavirus/2019-ncov/about/steps-when-sick.html   CDC -- Ending Home Isolation: www.cdc.gov/coronavirus/2019-ncov/hcp/disposition-in-home-patients.html   CDC -- Caring for Someone: www.cdc.gov/coronavirus/2019-ncov/if-you-are-sick/care-for-someone.html   [facility] -- Interim Guidance for Hospital Discharge to Home: www.health.[facility].mn.us/diseases/coronavirus/hcp/hospdischarge.pdf   [facility] clinical trials (COVID-19 research studies): clinicalaffairs.[facility]-clinical-trials    Below are the COVID-19  hotlines at the [place] Department of Health ([facility]). Interpreters are available.    For health questions: Call 561-003-9812 or 1-147.123.7577 (7 a.m. to 7 p.m.) For questions about schools and childcare: Call 132-323-0113 or 1-317.101.2670 (7 a.m. to 7 p.m.)    Diagnosis: Diarrhea, unspecified  Diagnosis ICD: R19.7

## 2020-08-25 ENCOUNTER — COMMUNICATION - HEALTHEAST (OUTPATIENT)
Dept: FAMILY MEDICINE | Facility: CLINIC | Age: 60
End: 2020-08-25

## 2020-08-26 ENCOUNTER — COMMUNICATION - HEALTHEAST (OUTPATIENT)
Dept: SCHEDULING | Facility: CLINIC | Age: 60
End: 2020-08-26

## 2020-09-11 ENCOUNTER — COMMUNICATION - HEALTHEAST (OUTPATIENT)
Dept: ENDOCRINOLOGY | Facility: CLINIC | Age: 60
End: 2020-09-11

## 2020-09-11 DIAGNOSIS — E11.9 TYPE 2 DIABETES MELLITUS WITHOUT COMPLICATION (H): ICD-10-CM

## 2020-10-13 ENCOUNTER — AMBULATORY - HEALTHEAST (OUTPATIENT)
Dept: LAB | Facility: CLINIC | Age: 60
End: 2020-10-13

## 2020-10-13 DIAGNOSIS — Z79.4 TYPE 2 DIABETES MELLITUS WITHOUT COMPLICATION, WITH LONG-TERM CURRENT USE OF INSULIN (H): ICD-10-CM

## 2020-10-13 DIAGNOSIS — E11.9 TYPE 2 DIABETES MELLITUS WITHOUT COMPLICATION, WITH LONG-TERM CURRENT USE OF INSULIN (H): ICD-10-CM

## 2020-10-13 LAB
ANION GAP SERPL CALCULATED.3IONS-SCNC: 8 MMOL/L (ref 5–18)
BUN SERPL-MCNC: 23 MG/DL (ref 8–22)
CALCIUM SERPL-MCNC: 9.1 MG/DL (ref 8.5–10.5)
CHLORIDE BLD-SCNC: 102 MMOL/L (ref 98–107)
CO2 SERPL-SCNC: 27 MMOL/L (ref 22–31)
CREAT SERPL-MCNC: 1.22 MG/DL (ref 0.6–1.1)
GFR SERPL CREATININE-BSD FRML MDRD: 45 ML/MIN/1.73M2
GLUCOSE BLD-MCNC: 297 MG/DL (ref 70–125)
HBA1C MFR BLD: 9.1 %
POTASSIUM BLD-SCNC: 4.5 MMOL/L (ref 3.5–5)
SODIUM SERPL-SCNC: 137 MMOL/L (ref 136–145)

## 2020-10-20 ENCOUNTER — OFFICE VISIT - HEALTHEAST (OUTPATIENT)
Dept: ENDOCRINOLOGY | Facility: CLINIC | Age: 60
End: 2020-10-20

## 2020-10-20 DIAGNOSIS — Z79.4 TYPE 2 DIABETES MELLITUS WITH OTHER DIABETIC KIDNEY COMPLICATION, WITH LONG-TERM CURRENT USE OF INSULIN (H): ICD-10-CM

## 2020-10-20 DIAGNOSIS — E11.29 TYPE 2 DIABETES MELLITUS WITH OTHER DIABETIC KIDNEY COMPLICATION, WITH LONG-TERM CURRENT USE OF INSULIN (H): ICD-10-CM

## 2020-10-21 ENCOUNTER — COMMUNICATION - HEALTHEAST (OUTPATIENT)
Dept: ENDOCRINOLOGY | Facility: CLINIC | Age: 60
End: 2020-10-21

## 2020-10-21 DIAGNOSIS — E11.9 TYPE 2 DIABETES MELLITUS WITHOUT COMPLICATION (H): ICD-10-CM

## 2020-11-20 ENCOUNTER — AMBULATORY - HEALTHEAST (OUTPATIENT)
Dept: LAB | Facility: CLINIC | Age: 60
End: 2020-11-20

## 2020-11-20 DIAGNOSIS — Z79.4 TYPE 2 DIABETES MELLITUS WITH OTHER DIABETIC KIDNEY COMPLICATION, WITH LONG-TERM CURRENT USE OF INSULIN (H): ICD-10-CM

## 2020-11-20 DIAGNOSIS — E11.29 TYPE 2 DIABETES MELLITUS WITH OTHER DIABETIC KIDNEY COMPLICATION, WITH LONG-TERM CURRENT USE OF INSULIN (H): ICD-10-CM

## 2020-11-20 LAB
ANION GAP SERPL CALCULATED.3IONS-SCNC: 9 MMOL/L (ref 5–18)
BUN SERPL-MCNC: 24 MG/DL (ref 8–22)
CALCIUM SERPL-MCNC: 9.8 MG/DL (ref 8.5–10.5)
CHLORIDE BLD-SCNC: 103 MMOL/L (ref 98–107)
CO2 SERPL-SCNC: 28 MMOL/L (ref 22–31)
CREAT SERPL-MCNC: 0.97 MG/DL (ref 0.6–1.1)
GFR SERPL CREATININE-BSD FRML MDRD: 59 ML/MIN/1.73M2
GLUCOSE BLD-MCNC: 245 MG/DL (ref 70–125)
POTASSIUM BLD-SCNC: 4.4 MMOL/L (ref 3.5–5)
SODIUM SERPL-SCNC: 140 MMOL/L (ref 136–145)

## 2020-11-24 ENCOUNTER — COMMUNICATION - HEALTHEAST (OUTPATIENT)
Dept: ENDOCRINOLOGY | Facility: CLINIC | Age: 60
End: 2020-11-24

## 2020-11-24 ENCOUNTER — OFFICE VISIT - HEALTHEAST (OUTPATIENT)
Dept: PODIATRY | Facility: CLINIC | Age: 60
End: 2020-11-24

## 2020-11-24 DIAGNOSIS — B00.9 HERPES SIMPLEX VIRUS INFECTION: ICD-10-CM

## 2020-11-24 DIAGNOSIS — G60.9 IDIOPATHIC PERIPHERAL NEUROPATHY: ICD-10-CM

## 2020-11-24 DIAGNOSIS — M79.671 PAIN IN BOTH FEET: ICD-10-CM

## 2020-11-24 DIAGNOSIS — M79.672 PAIN IN BOTH FEET: ICD-10-CM

## 2020-11-24 RX ORDER — VALACYCLOVIR HYDROCHLORIDE 1 G/1
TABLET, FILM COATED ORAL
Qty: 4 TABLET | Refills: 3 | Status: SHIPPED | OUTPATIENT
Start: 2020-11-24 | End: 2023-02-21

## 2020-11-24 ASSESSMENT — MIFFLIN-ST. JEOR: SCORE: 1442.92

## 2020-11-27 ENCOUNTER — HOSPITAL ENCOUNTER (OUTPATIENT)
Dept: MRI IMAGING | Facility: CLINIC | Age: 60
Discharge: HOME | End: 2020-11-27
Attending: PODIATRIST

## 2020-11-27 DIAGNOSIS — M79.671 PAIN IN BOTH FEET: ICD-10-CM

## 2020-11-27 DIAGNOSIS — M79.672 PAIN IN BOTH FEET: ICD-10-CM

## 2020-12-06 ENCOUNTER — VIRTUAL VISIT (OUTPATIENT)
Dept: FAMILY MEDICINE | Facility: OTHER | Age: 60
End: 2020-12-06

## 2020-12-06 ENCOUNTER — AMBULATORY - HEALTHEAST (OUTPATIENT)
Dept: FAMILY MEDICINE | Facility: CLINIC | Age: 60
End: 2020-12-06

## 2020-12-06 DIAGNOSIS — Z20.822 SUSPECTED 2019 NOVEL CORONAVIRUS INFECTION: ICD-10-CM

## 2020-12-06 NOTE — PROGRESS NOTES
"Date: 2020 07:55:54  Clinician: Radha Cavazos  Clinician NPI: [id number]  Patient: Zoe Alvarez  Patient : 1960  Patient Address: [address] [place] [id number]  Patient Phone: [phone]  Visit Protocol: URI  Patient Summary:  Zoe is a 60 year old ( : 1960 ) female who initiated a [facility] Visit for COVID-19 (Coronavirus) evaluation and screening. When asked the question \"Please sign me up to receive news, health information and promotions from [facility].\", Zoe responded \"No\".    Zoe states her symptoms started 1-2 days ago.   Her symptoms consist of a headache, myalgia, chills, and malaise. Zoe also feels feverish.   Symptom details     Temperature: Her current temperature is 101.0 degrees Fahrenheit. Zoe has had a temperature over 100 degrees Fahrenheit for 1-2 days.     Headache: She states the headache is moderate (4-6 on a 10 point pain scale).      Zoe denies having ear pain, wheezing, cough, nasal congestion, nausea, vomiting, rhinitis, facial pain or pressure, sore throat, teeth pain, ageusia, diarrhea, and anosmia. She also denies taking antibiotic medication in the past month, having recent facial or sinus surgery in the past 60 days, and having a sinus infection within the past year. She is not experiencing dyspnea.   Precipitating events  She has not recently been exposed to someone with influenza. Zoe has not been in close contact with any high risk individuals.   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 Additional job details as reported by the patient (free text): [place]   In the past 14 days, she has not lived in a congregate living setting.   Zoe has had a close contact with a laboratory-confirmed COVID-19 patient within 14 days of symptom " onset. She was not exposed at her work. She does not know when she was exposed to the laboratory-confirmed COVID-19 patient.   Additional information about contact with COVID-19 (Coronavirus) patient as reported by the patient (free text): My son at home    Since December 2019, Zoe has been tested for COVID-19 and has had upper respiratory infection (URI) or influenza-like illness.      Result of COVID-19 test: Negative     Date of her COVID-19 test: 06/06/2020     Date(s) of previous URI or influenza-like illness (free-text): January     Symptoms Zoe experienced during previous URI or influenza-like illness as reported by the patient (free-text): Cough trouble breathing        Pertinent medical history  Zoe has diabetes. She is not sure if her diabetes is in control.   Zoe typically gets a yeast infection when she takes antibiotics. She has used fluconazole (Diflucan) to treat previous yeast infections. 2 doses of fluconazole (Diflucan) has typically been needed for symptoms to resolve in the past.  She has not been told by her provider to avoid NSAIDs.   She denies having immunosuppressive conditions (e.g., chemotherapy, HIV, organ transplant, long-term use of steroids or other immunosuppressive medications, splenectomy). She does not have severe COPD and congestive heart failure. She does not have asthma.   Zoe needs a return to work/school note.   Weight: 198 lbs   Zoe does not smoke or use smokeless tobacco.   Weight: 198 lbs    MEDICATIONS: Lantus U-100 Insulin subcutaneous, Jardiance oral, ALLERGIES: metformin, amoxicillin  Clinician Response:  Dear Zoe,   Your symptoms show that you may have coronavirus (COVID-19). This illness can cause fever, cough and trouble breathing. Many people get a mild case and get better on their own. Some people can get very sick.  What should I do?  We would like to test you for this virus.   1. Please call [phone] to schedule your visit.  "Explain that [facility] were [facility] to have a COVID-19 test. Be ready to share your [facility] visit ID number.  * If you need to schedule in [facility] please call [phone] or for [place] employees please call [phone].  * If you need to schedule in the [place] area please call [phone]. [place] employees call [phone].  The following will serve as your written order for this COVID Test, ordered by me, for the indication of suspected COVID [Z20.828]: The test will be ordered in [employer], our electronic health record, after you are scheduled. It will show as ordered and authorized by Lorenzo Loepz MD.  Order: COVID-19 (Coronavirus) PCR for SYMPTOMATIC testing from [facility].   2. When it's time for your COVID test:  Stay at least 6 feet away from others. (If someone will drive you to your test, stay in the backseat, as far away from the  as you can.)   Cover your mouth and nose with a mask, tissue or washcloth.  Go straight to the testing site. Don't make any stops on the way there or back.      3.Starting now: Stay home and away from others (self-isolate) until:   You've had no fever---and no medicine that reduces fever---for one full day (24 hours). And...   Your other symptoms have gotten better. For example, your cough or breathing has improved. And...   At least 10 days have passed since your symptoms started.       During this time, don't leave the house except for testing or medical care.   Stay in your own room, even for meals. Use your own bathroom if you can.   Stay away from others in your home. No hugging, kissing or shaking hands. No visitors.  Don't go to work, school or anywhere else.    Clean \"high touch\" surfaces often (doorknobs, counters, handles, etc.). Use a household cleaning spray or wipes. You'll find a full list of  on the EPA website: www.epa.gov/pesticide-registration/list-n-disinfectants-use-against-sars-cov-2.   Cover your mouth and nose with a mask, tissue or " washcloth to avoid spreading germs.  Wash your hands and face often. Use soap and water.  Caregivers in these groups are at risk for severe illness due to COVID-19:  o People 65 years and older  o People who live in a nursing home or long-term care facility  o People with chronic disease (lung, heart, cancer, diabetes, kidney, liver, immunologic)  o People who have a weakened immune system, including those who:   Are in cancer treatment  Take medicine that weakens the immune system, such as corticosteroids  Had a bone marrow or organ transplant  Have an immune deficiency  Have poorly controlled HIV or AIDS  Are obese (body mass index of 40 or higher)  Smoke regularly   o Caregivers should wear gloves while washing dishes, handling laundry and cleaning bedrooms and bathrooms.  o Use caution when washing and drying laundry: Don't shake dirty laundry, and use the warmest water setting that you can.  o For more tips, go to www.cdc.gov/coronavirus/2019-ncov/downloads/10Things.pdf.    4.Sign up for [employer]. We know it's scary to hear that you might have COVID-19. We want to track your symptoms to make sure you're okay over the next 2 weeks. Please look for an email from [employer]---this is a free, online program that we'll use to keep in touch. To sign up, follow the link in the email. Learn more at [email or web address]  How can I take care of myself?   Get lots of rest. Drink extra fluids (unless a doctor has told you not to).   Take Tylenol (acetaminophen) for fever or pain. If you have liver or kidney problems, ask your family doctor if it's okay to take Tylenol.   Adults can take either:    650 mg (two 325 mg pills) every 4 to 6 hours, or...   1,000 mg (two 500 mg pills) every 8 hours as needed.    Note: Don't take more than 3,000 mg in one day. Acetaminophen is found in many medicines (both prescribed and over-the-counter medicines). Read all labels to be sure you don't take too much.   For  children, check the Tylenol bottle for the right dose. The dose is based on the child's age or weight.    If you have other health problems (like cancer, heart failure, an organ transplant or severe kidney disease): Call your specialty clinic if you don't feel better in the next 2 days.       Know when to call 911. Emergency warning signs include:    Trouble breathing or shortness of breath Pain or pressure in the chest that doesn't go away Feeling confused like you haven't felt before, or not being able to wake up Bluish-colored lips or face.  Where can I get more information?   [facility] -- About COVID-19: [email or web address]   CDC -- What to Do If You're Sick: www.cdc.gov/coronavirus/2019-ncov/about/steps-when-sick.html   [facility] -- Ending Home Isolation: www.cdc.gov/coronavirus/2019-ncov/hcp/disposition-in-home-patients.html   [facility] -- Caring for Someone: www.cdc.gov/coronavirus/2019-ncov/if-you-are-sick/care-for-someone.html   [facility] -- Interim Guidance for Hospital Discharge to Home: www.[facility].mn.us/diseases/coronavirus/hcp/hospdischarge.pdf   [facility] clinical trials (COVID-19 research studies): clinicalaffairs.[facility]-clinical-trials    Below are the COVID-19 hotlines at the [place] Department of Health ([facility]). Interpreters are available.    For health questions: Call 007-585-5074 or 1-527.653.3905 (7 a.m. to 7 p.m.) For questions about schools and childcare: Call 091-669-9697 or 1-734.486.8221 (7 a.m. to 7 p.m.)    COVID-19 (Coronavirus) General Information  Because there is currently no vaccine to prevent infection, the best way to protect yourself is to avoid being exposed to this virus. Common symptoms of COVID-19 include but are not limited to fever, cough, and shortness of breath. These symptoms appear 2-14 days after you are exposed to the virus that causes COVID-19. Click here for more information from the CDC on how to protect yourself.  If you are sick with  COVID-19 or suspect you are infected with the virus that causes COVID-19, follow the steps here from the CDC to help prevent the disease from spreading to people in your home and community.  Click here for general information from the CDC on testing.  If you develop any of these emergency warning signs for COVID-19, get medical attention immediately:     Trouble breathing    Persistent pain or pressure in the chest    New confusion or inability to arouse    Bluish lips or face      Call your doctor or clinic before going in. Call 911 if you have a medical emergency and notify the  you have or think you may have COVID-19.  For more detailed and up to date information on COVID-19 (Coronavirus), please visit the CDC website.   Diagnosis: Myalgia, unspecified site  Diagnosis ICD: M79.10

## 2020-12-07 ENCOUNTER — COMMUNICATION - HEALTHEAST (OUTPATIENT)
Dept: SCHEDULING | Facility: CLINIC | Age: 60
End: 2020-12-07

## 2020-12-11 ENCOUNTER — COMMUNICATION - HEALTHEAST (OUTPATIENT)
Dept: SCHEDULING | Facility: CLINIC | Age: 60
End: 2020-12-11

## 2020-12-11 ENCOUNTER — OFFICE VISIT - HEALTHEAST (OUTPATIENT)
Dept: FAMILY MEDICINE | Facility: CLINIC | Age: 60
End: 2020-12-11

## 2020-12-11 DIAGNOSIS — U07.1 INFECTION DUE TO 2019 NOVEL CORONAVIRUS: ICD-10-CM

## 2021-01-11 ENCOUNTER — OFFICE VISIT - HEALTHEAST (OUTPATIENT)
Dept: PODIATRY | Facility: CLINIC | Age: 61
End: 2021-01-11

## 2021-01-11 DIAGNOSIS — M76.72 PERONEAL TENDONITIS, LEFT: ICD-10-CM

## 2021-01-11 ASSESSMENT — MIFFLIN-ST. JEOR: SCORE: 1442.92

## 2021-01-25 ENCOUNTER — OFFICE VISIT - HEALTHEAST (OUTPATIENT)
Dept: FAMILY MEDICINE | Facility: CLINIC | Age: 61
End: 2021-01-25

## 2021-01-25 DIAGNOSIS — N30.00 ACUTE CYSTITIS WITHOUT HEMATURIA: ICD-10-CM

## 2021-03-12 ENCOUNTER — COMMUNICATION - HEALTHEAST (OUTPATIENT)
Dept: ENDOCRINOLOGY | Facility: CLINIC | Age: 61
End: 2021-03-12

## 2021-03-12 DIAGNOSIS — E11.9 TYPE 2 DIABETES MELLITUS WITHOUT COMPLICATION (H): ICD-10-CM

## 2021-03-12 DIAGNOSIS — E11.9 TYPE 2 DIABETES MELLITUS WITHOUT COMPLICATION, WITH LONG-TERM CURRENT USE OF INSULIN (H): ICD-10-CM

## 2021-03-12 DIAGNOSIS — Z79.4 TYPE 2 DIABETES MELLITUS WITHOUT COMPLICATION, WITH LONG-TERM CURRENT USE OF INSULIN (H): ICD-10-CM

## 2021-03-31 ENCOUNTER — COMMUNICATION - HEALTHEAST (OUTPATIENT)
Dept: FAMILY MEDICINE | Facility: CLINIC | Age: 61
End: 2021-03-31

## 2021-03-31 ENCOUNTER — OFFICE VISIT - HEALTHEAST (OUTPATIENT)
Dept: FAMILY MEDICINE | Facility: CLINIC | Age: 61
End: 2021-03-31

## 2021-03-31 DIAGNOSIS — M25.431 PAIN AND SWELLING OF RIGHT WRIST: ICD-10-CM

## 2021-03-31 DIAGNOSIS — M25.531 RIGHT WRIST PAIN: ICD-10-CM

## 2021-03-31 DIAGNOSIS — M25.531 PAIN AND SWELLING OF RIGHT WRIST: ICD-10-CM

## 2021-03-31 DIAGNOSIS — N18.31 STAGE 3A CHRONIC KIDNEY DISEASE (H): ICD-10-CM

## 2021-03-31 DIAGNOSIS — L03.90 CELLULITIS, UNSPECIFIED CELLULITIS SITE: ICD-10-CM

## 2021-03-31 DIAGNOSIS — E11.9 TYPE 2 DIABETES MELLITUS WITHOUT COMPLICATION, WITH LONG-TERM CURRENT USE OF INSULIN (H): ICD-10-CM

## 2021-03-31 DIAGNOSIS — Z79.4 TYPE 2 DIABETES MELLITUS WITHOUT COMPLICATION, WITH LONG-TERM CURRENT USE OF INSULIN (H): ICD-10-CM

## 2021-03-31 LAB
ANION GAP SERPL CALCULATED.3IONS-SCNC: 15 MMOL/L (ref 5–18)
BUN SERPL-MCNC: 18 MG/DL (ref 8–22)
C REACTIVE PROTEIN LHE: 1.4 MG/DL (ref 0–0.8)
CALCIUM SERPL-MCNC: 9.3 MG/DL (ref 8.5–10.5)
CHLORIDE BLD-SCNC: 102 MMOL/L (ref 98–107)
CO2 SERPL-SCNC: 22 MMOL/L (ref 22–31)
CREAT SERPL-MCNC: 0.77 MG/DL (ref 0.6–1.1)
ERYTHROCYTE [DISTWIDTH] IN BLOOD BY AUTOMATED COUNT: 12.4 % (ref 11–14.5)
ERYTHROCYTE [SEDIMENTATION RATE] IN BLOOD BY WESTERGREN METHOD: 23 MM/HR (ref 0–20)
GFR SERPL CREATININE-BSD FRML MDRD: >60 ML/MIN/1.73M2
GLUCOSE BLD-MCNC: 130 MG/DL (ref 70–125)
HBA1C MFR BLD: 9.7 %
HCT VFR BLD AUTO: 41.5 % (ref 35–47)
HGB BLD-MCNC: 13.9 G/DL (ref 12–16)
MCH RBC QN AUTO: 29.2 PG (ref 27–34)
MCHC RBC AUTO-ENTMCNC: 33.5 G/DL (ref 32–36)
MCV RBC AUTO: 87 FL (ref 80–100)
PLATELET # BLD AUTO: 208 THOU/UL (ref 140–440)
PMV BLD AUTO: 11.1 FL (ref 7–10)
POTASSIUM BLD-SCNC: 4 MMOL/L (ref 3.5–5)
RBC # BLD AUTO: 4.76 MILL/UL (ref 3.8–5.4)
RHEUMATOID FACT SERPL-ACNC: <15 IU/ML (ref 0–30)
SODIUM SERPL-SCNC: 139 MMOL/L (ref 136–145)
URATE SERPL-MCNC: 2.8 MG/DL (ref 2–7.5)
WBC: 8.3 THOU/UL (ref 4–11)

## 2021-04-01 ENCOUNTER — AMBULATORY - HEALTHEAST (OUTPATIENT)
Dept: ENDOCRINOLOGY | Facility: CLINIC | Age: 61
End: 2021-04-01

## 2021-04-01 ENCOUNTER — COMMUNICATION - HEALTHEAST (OUTPATIENT)
Dept: INTERNAL MEDICINE | Facility: CLINIC | Age: 61
End: 2021-04-01

## 2021-04-01 DIAGNOSIS — E11.9 TYPE 2 DIABETES MELLITUS WITHOUT COMPLICATION, WITH LONG-TERM CURRENT USE OF INSULIN (H): ICD-10-CM

## 2021-04-01 DIAGNOSIS — L03.113 CELLULITIS OF RIGHT UPPER EXTREMITY: ICD-10-CM

## 2021-04-01 DIAGNOSIS — Z79.4 TYPE 2 DIABETES MELLITUS WITHOUT COMPLICATION, WITH LONG-TERM CURRENT USE OF INSULIN (H): ICD-10-CM

## 2021-04-06 ENCOUNTER — COMMUNICATION - HEALTHEAST (OUTPATIENT)
Dept: FAMILY MEDICINE | Facility: CLINIC | Age: 61
End: 2021-04-06

## 2021-04-14 ENCOUNTER — AMBULATORY - HEALTHEAST (OUTPATIENT)
Dept: LAB | Facility: CLINIC | Age: 61
End: 2021-04-14

## 2021-04-14 DIAGNOSIS — Z79.4 TYPE 2 DIABETES MELLITUS WITHOUT COMPLICATION, WITH LONG-TERM CURRENT USE OF INSULIN (H): ICD-10-CM

## 2021-04-14 DIAGNOSIS — E11.9 TYPE 2 DIABETES MELLITUS WITHOUT COMPLICATION, WITH LONG-TERM CURRENT USE OF INSULIN (H): ICD-10-CM

## 2021-04-14 LAB
CREAT UR-MCNC: 43.4 MG/DL
LDLC SERPL CALC-MCNC: 112 MG/DL
MICROALBUMIN UR-MCNC: <0.5 MG/DL (ref 0–1.99)
MICROALBUMIN/CREAT UR: NORMAL MG/G{CREAT}

## 2021-04-21 ENCOUNTER — OFFICE VISIT - HEALTHEAST (OUTPATIENT)
Dept: ENDOCRINOLOGY | Facility: CLINIC | Age: 61
End: 2021-04-21

## 2021-04-21 DIAGNOSIS — E11.9 TYPE 2 DIABETES MELLITUS WITHOUT COMPLICATION, WITH LONG-TERM CURRENT USE OF INSULIN (H): ICD-10-CM

## 2021-04-21 DIAGNOSIS — Z79.4 TYPE 2 DIABETES MELLITUS WITHOUT COMPLICATION, WITH LONG-TERM CURRENT USE OF INSULIN (H): ICD-10-CM

## 2021-04-29 ENCOUNTER — COMMUNICATION - HEALTHEAST (OUTPATIENT)
Dept: ENDOCRINOLOGY | Facility: CLINIC | Age: 61
End: 2021-04-29

## 2021-04-29 ENCOUNTER — COMMUNICATION - HEALTHEAST (OUTPATIENT)
Dept: INFECTIOUS DISEASES | Facility: CLINIC | Age: 61
End: 2021-04-29

## 2021-04-29 ENCOUNTER — AMBULATORY - HEALTHEAST (OUTPATIENT)
Dept: ENDOCRINOLOGY | Facility: CLINIC | Age: 61
End: 2021-04-29

## 2021-04-29 DIAGNOSIS — Z79.4 TYPE 2 DIABETES MELLITUS WITHOUT COMPLICATION, WITH LONG-TERM CURRENT USE OF INSULIN (H): ICD-10-CM

## 2021-04-29 DIAGNOSIS — E11.9 TYPE 2 DIABETES MELLITUS WITHOUT COMPLICATION, WITH LONG-TERM CURRENT USE OF INSULIN (H): ICD-10-CM

## 2021-05-05 ENCOUNTER — COMMUNICATION - HEALTHEAST (OUTPATIENT)
Dept: ENDOCRINOLOGY | Facility: CLINIC | Age: 61
End: 2021-05-05

## 2021-05-05 DIAGNOSIS — E11.9 TYPE 2 DIABETES MELLITUS WITHOUT COMPLICATION (H): ICD-10-CM

## 2021-05-05 RX ORDER — INSULIN GLARGINE 100 [IU]/ML
INJECTION, SOLUTION SUBCUTANEOUS
Qty: 75 ML | Refills: 1 | Status: SHIPPED | OUTPATIENT
Start: 2021-05-05 | End: 2021-07-30

## 2021-05-29 ENCOUNTER — RECORDS - HEALTHEAST (OUTPATIENT)
Dept: ADMINISTRATIVE | Facility: CLINIC | Age: 61
End: 2021-05-29

## 2021-05-29 NOTE — PROGRESS NOTES
FOOT AND ANKLE SURGERY/PODIATRY Progress Note        ASSESSMENT:   Accessory bone left foot    HPI: Zoe Alvarez was seen again today complaining of pain on the outer portion of her left foot.  The patient stated that approximately 2 weeks ago she felt a pop on the outer portion of her left foot.  Since that time she has had severe pain and some mild swelling.  The patient stated it is very difficult for her to weight-bear.  She was seen by her primary care physician.  An x-ray was taken and she was told that she had an extra bone near the outer portion of her left foot.  This is the area of pain.  The pain is relieved with nonweightbearing.    Past Medical History:   Diagnosis Date     Arthritis      Diabetes mellitus (H)      History of transfusion     AFTER A MISCARRIAGE     Migraine      Neuropathy (H)        Past Surgical History:   Procedure Laterality Date     BLADDER SUSPENSION  2007     DILATION AND CURETTAGE OF UTERUS      SUCTION POST MISCARRIAGE     HYSTERECTOMY      1999     KNEE SURGERY       LAPAROSCOPIC APPENDECTOMY N/A 7/10/2014    Procedure: Laparoscopic Appendectomy;  Surgeon: Germain Howe MD;  Location: [facility];  Service:      GA APPENDECTOMY      Description: Appendectomy;  Recorded: 07/17/2014;  Comments: JULY 2014     GA TARSAL TUNNEL RELEASE Left 9/21/2018    Procedure: DELLON QUADRUPLE NERVE DECOMPRESSION EXCISION PLANTAR FIBROMA ALL LEFT FOOT;  Surgeon: Jules Mao DPM;  Location: [facility];  Service: Podiatry     GA TOTAL ABDOM HYSTERECTOMY      Description: Hysterectomy;  Recorded: 01/11/2012;       Allergies   Allergen Reactions     Amoxicillin Swelling and Itching     Metformin Other (See Comments)     GI Upset         Current Outpatient Medications:      blood glucose test strips, Use 1 each As Directed as needed. Test 2-3 daily (Patient taking differently: Use 1 each As Directed as needed. Test 1-2 daily   ), Disp: 100 strip, Rfl: 1     generic lancets  "(FINGERSTIX LANCETS), Use to check blood sugar three times daily. Dispense brand per patient's insurance at pharmacy discretion. (Patient taking differently: Use to check blood sugar 1-2x daily. Dispense brand per patient's insurance at pharmacy discretion.   ), Disp: 100 each, Rfl: 11     hydroCHLOROthiazide (HYDRODIURIL) 12.5 MG tablet, Take 1 tablet (12.5 mg total) by mouth daily., Disp: 30 tablet, Rfl: 5     HYDROcodone-acetaminophen 5-325 mg per tablet, Take 1-2 tablets by mouth every 6 (six) hours as needed for pain., Disp: 12 tablet, Rfl: 0     insulin glargine (BASAGLAR KWIKPEN U-100 INSULIN) 100 unit/mL (3 mL) pen, INJECT 35 units in the morning and 35 units in the evening., Disp: 75 mL, Rfl: 3     miscellaneous medical supply Misc, Trilock ankle support worn on left foot and ankle. ICD code S93.602A, Disp: 1 each, Rfl: 0     pen needle, diabetic (BD ULTRA-FINE TJ PEN NEEDLE) 32 gauge x 5/32\" Ndle, Use daily with insulin, Disp: 100 each, Rfl: 5     TRULICITY 0.75 mg/0.5 mL PnIj, INJECT 0.5 ML SUBCUTANEOUSLY EVERY 7 DAYS, Disp: 6 Syringe, Rfl: 0     valACYclovir (VALTREX) 1000 MG tablet, TAKE 2 TABS NOW AND 2 TABS IN 12 HOURS., Disp: 4 tablet, Rfl: 3    Family History   Problem Relation Age of Onset     Diabetes Mother      Breast cancer Maternal Grandmother        Social History     Socioeconomic History     Marital status:      Spouse name: Not on file     Number of children: 2     Years of education: Not on file     Highest education level: Not on file   Occupational History     Occupation: Retail store employee   Social Needs     Financial resource strain: Not on file     Food insecurity:     Worry: Not on file     Inability: Not on file     Transportation needs:     Medical: Not on file     Non-medical: Not on file   Tobacco Use     Smoking status: Former Smoker     Smokeless tobacco: Never Used   Substance and Sexual Activity     Alcohol use: Yes     Alcohol/week: 0.6 oz     Types: 1 Shots of " liquor per week     Comment: <1 per wk     Drug use: No     Sexual activity: Yes     Partners: Male     Birth control/protection: Surgical   Lifestyle     Physical activity:     Days per week: Not on file     Minutes per session: Not on file     Stress: Not on file   Relationships     Social connections:     Talks on phone: Not on file     Gets together: Not on file     Attends Faith service: Not on file     Active member of club or organization: Not on file     Attends meetings of clubs or organizations: Not on file     Relationship status: Not on file     Intimate partner violence:     Fear of current or ex partner: Not on file     Emotionally abused: Not on file     Physically abused: Not on file     Forced sexual activity: Not on file   Other Topics Concern     Not on file   Social History Narrative     Not on file       10 point Review of Systems is negative       Vitals:    06/04/19 1313   BP: 102/64   Pulse: 88   Temp: 98.5  F (36.9  C)       BMI= Body mass index is 31.94 kg/m .    OBJECTIVE:  General appearance: Patient is alert and fully cooperative with history & exam.  No sign of distress is noted during the visit.  Vascular: Dorsalis pedis and posterior tibial pulses are palpable. There is pedal hair growth bilaterally.  CFT < 3 sec from anterior tibial surface to distal digits bilaterally. There is no appreciable edema noted.  Dermatologic: Turgor and texture are within normal limits. No coloration or temperature changes. No primary or secondary lesions noted.  Neurologic: All epicritic and proprioceptive sensations are grossly intact bilaterally.  Musculoskeletal: All active and passive ankle, subtalar, midtarsal, and 1st MPJ range of motion are grossly intact without pain or crepitus, with the exception of the calcaneocuboid joint left foot. Manual muscle strength is within normal limits bilaterally. All dorsiflexors, plantarflexors, invertors, evertors are intact bilaterally. Tenderness present  to calcaneocuboid joint left foot on palpation. Tenderness to the calcaneal  cuboid joint left foot with range of motion. Calf is soft/non-tender without warmth/induration    Imaging:     Xr Foot Left 3 Or More Vws    Result Date: 5/31/2019  EXAM DATE:         05/31/2019 EXAM: X-RAY FOOT LEFT, MINIMUM 3 VIEWS LOCATION: [facility] DATE/TIME: 5/31/2019 1:30 PM INDICATION: Pain after injury COMPARISON: None. FINDINGS: Achilles calcaneal spurring. No evidence for acute fracture. Tiny accessory ossicle adjacent to the cuboid. Mild degenerative change first MTP joint.            TREATMENT:  I reviewed the patient's x-rays and inform the patient she has a symptomatic accessory ossicle just inferior to the cuboid.  I told the patient this may have been disrupted by some mild injury.  The patient was placed in a cam boot x3 weeks.  She was placed on ibuprofen 600 mg 1 tab 3 times daily.  The patient is to return to the clinic if her pain persist at which time I would recommend need new x-ray to be taken.        Jules Mao; KATIE  [facility] Foot & Ankle Surgery/Podiatry

## 2021-05-29 NOTE — PATIENT INSTRUCTIONS - HE
Split your Basaglar dosage and take 35 units in the morning and 35 units at night    Set an alarm so you remember to take your evening dosage.    Small high protein snack at bedtime

## 2021-05-29 NOTE — PROGRESS NOTES
[facility]  ENDOCRINOLOGY    Diabetes Note 5/29/2019    Zoe Alvarez, 1960, [id number]          Reason for visit      1. Type 2 diabetes mellitus without complication, with long-term current use of insulin (H)    2. Bilateral lower extremity edema    3. Type 2 diabetes mellitus without complication (H)        HPI     Zoe Alvarez is a very pleasant 58 y.o. old female who presents for follow up.  SUMMARY:  Zoe returns today in f/u for DM 2. She is apologetic for missing her last appointment. She reports that there has been a major upheaval at her job and that her actual position was eliminated, and that she was moved elsewhere.  This has been very stressful for her. Her A1c is now 8.4 and reflects some of the stress.  Her glucometer download shows an ave BG of 190 over the last 30 days. She has had no hypoglycemia.  68% of her readings were above range and 32% in range.  She has been testing daily.  Over the last 90 days, she was in range 48% of the time. The job change happened just about 30 days ago. She is taking Basaglar, 60 units daily, and Trulicity, 0.75 mg weekly.          Past Medical History     Patient Active Problem List   Diagnosis     Obesity (BMI 30-39.9)     Chronic venous insufficiency     Epidermal Inclusion Cyst     Hyperlipidemia     Vertigo     Ingrowing Toenail     Nicotine Dependence     Type 2 diabetes mellitus without complication, with long-term current use of insulin (H)     Eczema     RLQ abdominal pain     Tarsal tunnel syndrome of left side     Compression neuropathy of right lower extremity     Mononeuritis of left lower extremity     Plantar fascial fibromatosis of left foot     Mononeuritis of right lower extremity     Tarsal tunnel syndrome of right side     Foot pain, left     Bilateral lower extremity edema        Family History       family history includes Breast cancer in her maternal grandmother; Diabetes in her mother.    Social History      reports that  "she has quit smoking. She has never used smokeless tobacco. She reports that she drinks about 0.6 oz of alcohol per week. She reports that she does not use drugs.      Review of Systems     Patient has no polyuria or polydipsia, no chest pain, dyspnea or TIA's, no numbness, tingling or pain in extremities  Remainder negative except as noted in HPI.    Vital Signs     /70 (Patient Site: Right Arm, Patient Position: Sitting, Cuff Size: Adult Regular)   Pulse 84   Ht 5' 4.5\" (1.638 m)   Wt 189 lb 3.2 oz (85.8 kg)   BMI 31.97 kg/m    Wt Readings from Last 3 Encounters:   05/28/19 189 lb 3.2 oz (85.8 kg)   02/06/19 185 lb 6.4 oz (84.1 kg)   02/04/19 184 lb 8 oz (83.7 kg)       Physical Exam     Constitutional:  Well developed, Well nourished  HENT:  Normocephalic,   Neck: Thyroid normal, No lymph nodes, Supple  Eyes:  PERRL, Conjunctiva pink  Respiratory:  Normal breath sounds, No respiratory distress  Cardiovascular:  Normal heart rate, Normal rhythm, No murmurs  GI:  Bowel sounds normal, Soft, No tenderness  Musculoskeletal:  No gross deformity or lesions, normal dorsalis pedis pulses  Skin: No acanthosis nigricans, lipoatrophy or lipodystrophy  Neurologic:  Alert & oriented x 3, nonfocal  Psychiatric:  Affect, Mood, Insight appropriate  Diabetic foot exam: no ulcers, charcot's or high risk calluses, Normal monofilament exam          Assessment     1. Type 2 diabetes mellitus without complication, with long-term current use of insulin (H)    2. Bilateral lower extremity edema    3. Type 2 diabetes mellitus without complication (H)        Plan     We will split her Basaglar dosage to 35 units every 12 hours. She will set an alarm on her phone to help her remember.  She will continue on the Trulicity.  She is going to have a high protein snack before bed to help with her FBS.  She will f/u with me in 3 months. Time spent with pt today: 25 min with >50% spent in counseling and coordination of care.      Jayla" "LYNN Verababatunde HOLM Endocrinology  5/29/2019  7:31 AM        Lab Results     Hemoglobin A1c   Date Value Ref Range Status   05/15/2019 8.4 (H) 3.5 - 6.0 % Final   11/07/2018 7.7 (H) 3.5 - 6.0 % Final   07/26/2018 7.9 (H) 3.5 - 6.0 % Final   04/12/2018 8.1 (H) 3.5 - 6.0 % Final   11/06/2017 >14.0 (H) 3.5 - 6.0 % Final     Creatinine   Date Value Ref Range Status   10/23/2018 0.86 0.60 - 1.10 mg/dL Final   08/29/2018 0.85 0.60 - 1.10 mg/dL Final   07/26/2018 0.82 0.60 - 1.10 mg/dL Final     Microalbumin, Random Urine   Date Value Ref Range Status   11/07/2018 <0.50 0.00 - 1.99 mg/dL Final       Cholesterol   Date Value Ref Range Status   11/06/2017 227 (H) <=199 mg/dL Final     HDL Cholesterol   Date Value Ref Range Status   11/06/2017 46 (L) >=50 mg/dL Final     LDL Calculated   Date Value Ref Range Status   11/06/2017 150 (H) <=129 mg/dL Final     Triglycerides   Date Value Ref Range Status   11/06/2017 156 (H) <=149 mg/dL Final       Lab Results   Component Value Date    ALT 29 11/06/2017    AST 16 11/06/2017    ALKPHOS 100 11/06/2017    BILITOT 0.8 11/06/2017         Current Medications     Outpatient Medications Prior to Visit   Medication Sig Dispense Refill     blood glucose test strips Use 1 each As Directed as needed. Test 2-3 daily (Patient taking differently: Use 1 each As Directed as needed. Test 1-2 daily      ) 100 strip 1     generic lancets (FINGERSTIX LANCETS) Use to check blood sugar three times daily. Dispense brand per patient's insurance at pharmacy discretion. (Patient taking differently: Use to check blood sugar 1-2x daily. Dispense brand per patient's insurance at pharmacy discretion.      ) 100 each 11     pen needle, diabetic (BD ULTRA-FINE TJ PEN NEEDLE) 32 gauge x 5/32\" Ndle Use daily with insulin 100 each 5     TRULICITY 0.75 mg/0.5 mL PnIj INJECT 0.5 ML SUBCUTANEOUSLY EVERY 7 DAYS 6 Syringe 0     valACYclovir (VALTREX) 1000 MG tablet TAKE 2 TABS NOW AND 2 TABS IN 12 HOURS. 4 tablet 3     blood " glucose meter (GLUCOMETER) Use 1 each As Directed as needed. Dispense glucometer brand per patient's insurance at pharmacy discretion. 1 each 0     cyclobenzaprine (FLEXERIL) 10 MG tablet Take 1 tablet (10 mg total) by mouth 2 (two) times a day as needed for muscle spasms. 30 tablet 1     HYDROcodone-acetaminophen (NORCO )  mg per tablet Take 1 tablet by mouth every 6 (six) hours as needed for pain. 20 tablet 0     insulin glargine (BASAGLAR KWIKPEN U-100 INSULIN) 100 unit/mL (3 mL) pen INJECT 60 UNITS ONCE NIGHTLY AT BED TIME 60 mL 0     insulin syringe-needle U-100 1/2 mL 30 gauge x 5/16 Syrg Use twice daily with insulin as directed. 100 each 1     TRULICITY 0.75 mg/0.5 mL PnIj INJECT 0.75 MG UNDER THE SKIN EVERY 7 DAYS 6 Syringe 0     No facility-administered medications prior to visit.

## 2021-05-29 NOTE — TELEPHONE ENCOUNTER
Patient is calling about her RX that was ordered, her pharmacy doesn't have it at this time. Please send RX to [facility] in [place]

## 2021-05-31 ENCOUNTER — RECORDS - HEALTHEAST (OUTPATIENT)
Dept: ADMINISTRATIVE | Facility: CLINIC | Age: 61
End: 2021-05-31

## 2021-05-31 VITALS — HEIGHT: 64 IN | BODY MASS INDEX: 32.88 KG/M2 | WEIGHT: 192.6 LBS

## 2021-05-31 VITALS — HEIGHT: 64 IN | WEIGHT: 183 LBS | BODY MASS INDEX: 31.24 KG/M2

## 2021-06-01 VITALS — WEIGHT: 187.6 LBS | HEIGHT: 64 IN | BODY MASS INDEX: 32.03 KG/M2

## 2021-06-01 VITALS — BODY MASS INDEX: 31.92 KG/M2 | WEIGHT: 187.4 LBS

## 2021-06-01 VITALS — HEIGHT: 64 IN | WEIGHT: 185 LBS | BODY MASS INDEX: 31.58 KG/M2

## 2021-06-01 VITALS — BODY MASS INDEX: 31.51 KG/M2 | WEIGHT: 185 LBS

## 2021-06-01 VITALS — WEIGHT: 186.6 LBS | BODY MASS INDEX: 31.86 KG/M2 | HEIGHT: 64 IN

## 2021-06-01 NOTE — PROGRESS NOTES
[facility]  ENDOCRINOLOGY    Diabetes Note 10/2/2019    Zoe Alvarez, 1960, [id number]          Reason for visit      1. Type 2 diabetes mellitus without complication, with long-term current use of insulin (H)    2. Type 2 diabetes mellitus without complication (H)        HPI     Zoe Alvarez is a very pleasant 59 y.o. old female who presents for follow up.  SUMMARY:  Zoe returns today in f/u for DM 2.  Her current A1c is 8.6 and up from her last of 8.4.  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 She states that he likes to go out to eat a lot and that it is difficult because he doesn't like to eat alone. She is in a tough situation with her insulin right now because she is in between insurances.  She is almost out of Basaglar/Lantus. She has been taking 35 units twice daily.  She does have Trulicity, enough for the next two months, and she is pretty sure she will have her insurance by then. She will also need another Glucometer and strips when she gets her new insurance.  Her Glucometer download shows an Ave BG of 236 over the last 30 days, which demonstrates a significant loss of control for her. She spent 85% of the time above range and 15 % in range.     Blood glucose data:  Ave: 236, SD: 77    Past Medical History     Patient Active Problem List   Diagnosis     Obesity (BMI 30-39.9)     Chronic venous insufficiency     Epidermal Inclusion Cyst     Hyperlipidemia     Vertigo     Ingrowing Toenail     Nicotine Dependence     Type 2 diabetes mellitus without complication, with long-term current use of insulin (H)     Eczema     RLQ abdominal pain     Tarsal tunnel syndrome of left side     Compression neuropathy of right lower extremity     Mononeuritis of left lower extremity     Plantar fascial fibromatosis of left foot     Mononeuritis of right lower extremity     Tarsal tunnel syndrome of right side     Foot pain, left     Bilateral lower extremity edema     "    Family History       family history includes Breast cancer in her maternal grandmother; Diabetes in her mother.    Social History      reports that she has quit smoking. She has never used smokeless tobacco. She reports current alcohol use of about 1.0 standard drinks of alcohol per week. She reports that she does not use drugs.      Review of Systems     Patient has no polyuria or polydipsia, no chest pain, dyspnea or TIA's, no numbness, tingling or pain in extremities  Remainder negative except as noted in HPI.    Vital Signs     /70   Ht 5' 4.5\" (1.638 m)   Wt 189 lb 3.2 oz (85.8 kg)   BMI 31.97 kg/m    Wt Readings from Last 3 Encounters:   10/01/19 189 lb 3.2 oz (85.8 kg)   06/04/19 189 lb (85.7 kg)   05/31/19 189 lb (85.7 kg)       Physical Exam     Constitutional:  Well developed, Well nourished  HENT:  Normocephalic,   Neck: Thyroid normal, No lymph nodes, Supple  Eyes:  PERRL, Conjunctiva pink  Respiratory:  Normal breath sounds, No respiratory distress  Cardiovascular:  Normal heart rate, Normal rhythm, No murmurs  GI:  Bowel sounds normal, Soft, No tenderness  Musculoskeletal:  No gross deformity or lesions, normal dorsalis pedis pulses  Skin: No acanthosis nigricans, lipoatrophy or lipodystrophy  Neurologic:  Alert & oriented x 3, nonfocal  Psychiatric:  Affect, Mood, Insight appropriate      Assessment     1. Type 2 diabetes mellitus without complication, with long-term current use of insulin (H)    2. Type 2 diabetes mellitus without complication (H)        Plan     Zoe's control has worsened.  She will increase her Lantus dosage to 38 units twice daily once she gets her insurance back. I did give her a Savings Card to help with cost if she can use it.  She is going to get some NPH from [employer] to have on hand to use if she cannot get the Basaglar/Lantus.  She will let me know when her Insurance is valid and we will make some adjustments. She will f/u with me in 6 months in Clinic. " Time spent with pt today: 25 min with >50% spent in counseling and coordination of care.        Jayla HOLM Endocrinology  10/2/2019  12:25 PM        Lab Results     Hemoglobin A1c   Date Value Ref Range Status   09/24/2019 8.6 (H) 3.5 - 6.0 % Final   05/15/2019 8.4 (H) 3.5 - 6.0 % Final   11/07/2018 7.7 (H) 3.5 - 6.0 % Final   07/26/2018 7.9 (H) 3.5 - 6.0 % Final   04/12/2018 8.1 (H) 3.5 - 6.0 % Final     Creatinine   Date Value Ref Range Status   09/24/2019 1.00 0.60 - 1.10 mg/dL Final   10/23/2018 0.86 0.60 - 1.10 mg/dL Final   08/29/2018 0.85 0.60 - 1.10 mg/dL Final     Microalbumin, Random Urine   Date Value Ref Range Status   11/07/2018 <0.50 0.00 - 1.99 mg/dL Final       Cholesterol   Date Value Ref Range Status   11/06/2017 227 (H) <=199 mg/dL Final     HDL Cholesterol   Date Value Ref Range Status   11/06/2017 46 (L) >=50 mg/dL Final     LDL Calculated   Date Value Ref Range Status   11/06/2017 150 (H) <=129 mg/dL Final     Triglycerides   Date Value Ref Range Status   11/06/2017 156 (H) <=149 mg/dL Final       Lab Results   Component Value Date    ALT 29 11/06/2017    AST 16 11/06/2017    ALKPHOS 100 11/06/2017    BILITOT 0.8 11/06/2017         Current Medications     Outpatient Medications Prior to Visit   Medication Sig Dispense Refill     BASAGLAR KWIKPEN U-100 INSULIN 100 unit/mL (3 mL) pen INJECT 60 UNITS ONCE NIGHTLY AT BED TIME (Patient taking differently: Inject 35 units in the morning and 35 units in the evening.      ) 54 mL 0     blood glucose test strips Use 1 each As Directed as needed. Test 2-3 daily (Patient taking differently: Use 1 each As Directed as needed. Test 1-2 daily      ) 100 strip 1     generic lancets (FINGERSTIX LANCETS) Use to check blood sugar three times daily. Dispense brand per patient's insurance at pharmacy discretion. (Patient taking differently: Use to check blood sugar 1-2x daily. Dispense brand per patient's insurance at pharmacy discretion.      ) 100 each  "11     miscellaneous medical supply Misc Trilock ankle support worn on left foot and ankle. ICD code S93.602A 1 each 0     valACYclovir (VALTREX) 1000 MG tablet TAKE 2 TABS NOW AND 2 TABS IN 12 HOURS. 4 tablet 3     insulin glargine (BASAGLAR KWIKPEN U-100 INSULIN) 100 unit/mL (3 mL) pen INJECT 35 units in the morning and 35 units in the evening. 75 mL 3     pen needle, diabetic (BD ULTRA-FINE TJ PEN NEEDLE) 32 gauge x 5/32\" Ndle USE two times a day WITH INSULIN 200 each 0     TRULICITY 0.75 mg/0.5 mL PnIj INJECT 0.5 ML SUBCUTANEOUSLY EVERY 7 DAYS 6 Syringe 0     hydroCHLOROthiazide (HYDRODIURIL) 12.5 MG tablet TAKE 1 TABLET BY MOUTH EVERY DAY 90 tablet 0     HYDROcodone-acetaminophen 5-325 mg per tablet Take 1-2 tablets by mouth every 6 (six) hours as needed for pain. 12 tablet 0     TRULICITY 0.75 mg/0.5 mL PnIj INJECT 0.75 MG UNDER THE SKIN EVERY 7 DAYS 6 mL 0     No facility-administered medications prior to visit.            "

## 2021-06-02 VITALS — BODY MASS INDEX: 30.74 KG/M2 | HEIGHT: 65 IN | HEIGHT: 65 IN | BODY MASS INDEX: 30.74 KG/M2

## 2021-06-02 VITALS — BODY MASS INDEX: 31.18 KG/M2 | WEIGHT: 184.5 LBS

## 2021-06-02 VITALS — WEIGHT: 182 LBS | HEIGHT: 65 IN | BODY MASS INDEX: 30.32 KG/M2

## 2021-06-02 VITALS — HEIGHT: 65 IN | WEIGHT: 182.7 LBS | BODY MASS INDEX: 30.44 KG/M2

## 2021-06-02 VITALS — BODY MASS INDEX: 31.76 KG/M2 | WEIGHT: 185 LBS

## 2021-06-02 VITALS — WEIGHT: 185.9 LBS | BODY MASS INDEX: 31.66 KG/M2

## 2021-06-02 VITALS — WEIGHT: 181.9 LBS | BODY MASS INDEX: 30.31 KG/M2 | HEIGHT: 65 IN

## 2021-06-02 VITALS — HEIGHT: 65 IN | WEIGHT: 182 LBS | BODY MASS INDEX: 30.32 KG/M2

## 2021-06-02 VITALS — WEIGHT: 185 LBS | HEIGHT: 64 IN | BODY MASS INDEX: 31.58 KG/M2

## 2021-06-02 VITALS — WEIGHT: 185.4 LBS | BODY MASS INDEX: 31.33 KG/M2

## 2021-06-02 VITALS — BODY MASS INDEX: 31.05 KG/M2 | WEIGHT: 183.7 LBS

## 2021-06-03 VITALS
DIASTOLIC BLOOD PRESSURE: 70 MMHG | BODY MASS INDEX: 31.52 KG/M2 | WEIGHT: 189.2 LBS | SYSTOLIC BLOOD PRESSURE: 130 MMHG | HEIGHT: 65 IN

## 2021-06-03 VITALS — WEIGHT: 189.2 LBS | BODY MASS INDEX: 31.52 KG/M2 | HEIGHT: 65 IN

## 2021-06-03 VITALS — WEIGHT: 189 LBS | BODY MASS INDEX: 31.94 KG/M2

## 2021-06-04 VITALS
WEIGHT: 194 LBS | OXYGEN SATURATION: 98 % | DIASTOLIC BLOOD PRESSURE: 60 MMHG | BODY MASS INDEX: 32.32 KG/M2 | SYSTOLIC BLOOD PRESSURE: 120 MMHG | HEART RATE: 102 BPM | HEIGHT: 65 IN

## 2021-06-04 VITALS
DIASTOLIC BLOOD PRESSURE: 70 MMHG | SYSTOLIC BLOOD PRESSURE: 120 MMHG | BODY MASS INDEX: 31.65 KG/M2 | WEIGHT: 190 LBS | HEIGHT: 65 IN | TEMPERATURE: 98.2 F

## 2021-06-04 NOTE — TELEPHONE ENCOUNTER
Medication Question or Clarification  Who is calling: Pharmacy: [facility] [id number]  What medication are you calling about? (include dose and sig)   TRULICITY 0.75 mg/0.5 mL PnIj 6 Syringe 0 10/8/2019     Sig: INJECT 0.75 MG UNDER THE SKIN EVERY 7 DAYS    Sent to pharmacy as: Trulicity 0.75 mg/0.5 mL subcutaneous pen injector (dulaglutide)      Who prescribed the medication?: Jayla Hurst  What is your question/concern?: Pharmacy requesting alternative patient has new insurance they want a PA for this [facility] , [phone] ID # [id number] patient needs before 12/27 . Please advise   Pharmacy: [facility] [id number]  Okay to leave a detailed message?: No  Site CMT - Please call the pharmacy to obtain any additional needed information.

## 2021-06-04 NOTE — TELEPHONE ENCOUNTER
Prior Authorization Request  Who s requesting:  Pharmacy     Pharmacy Name and Location:   [facility] [id number] [facility] - [place], [place] - [address]    Medication Name:   dulaglutide (TRULICITY) 0.75 mg/0.5 mL PnIj 6 Syringe 2 12/24/2019     Sig - Route: Inject 0.75 mg under the skin every 7 days. - Subcutaneous      Insurance Plan: Pref 1  Insurance Member ID Number:  [id number]  Informed patient that prior authorizations can take up to 10 business days for response:   No  Okay to leave a detailed message: Yes

## 2021-06-04 NOTE — TELEPHONE ENCOUNTER
Central PA team  [phone]  Pool: [facility] ([id number])          PA has been initiated.       Thank you, your pharmacy medication authorization has been successfully submitted.    Your Tracking Number is [id number]    Check the status of past requests.   Response will be received via fax and may take up to 5-10 business days depending on plan

## 2021-06-04 NOTE — TELEPHONE ENCOUNTER
PA APPROVED:      [employer] Request #: [id number]  [employer] Tracking Number: [id number] Patient Name: Zoe Alvarez  Practitioner: Jayla Hermosillo NP  Contact Name: Cecy  Contact Phone: [phone]  Auth Status: Approved  Comments: [id number] sent to review Your request for authorization of Trulicity 0.75mg/0.5ml has been approved for 12 months effective 12/27/2019 through 12/27/2020. If continued use is required, please submit a new request for prior authorization with current clinical documentation prior to the end of this authorization.

## 2021-06-05 VITALS — OXYGEN SATURATION: 95 % | HEART RATE: 107 BPM | BODY MASS INDEX: 32.32 KG/M2 | WEIGHT: 194 LBS | HEIGHT: 65 IN

## 2021-06-05 VITALS — OXYGEN SATURATION: 97 % | HEIGHT: 65 IN | BODY MASS INDEX: 32.32 KG/M2 | HEART RATE: 98 BPM | WEIGHT: 194 LBS

## 2021-06-05 VITALS
BODY MASS INDEX: 32.92 KG/M2 | RESPIRATION RATE: 16 BRPM | WEIGHT: 194.8 LBS | SYSTOLIC BLOOD PRESSURE: 128 MMHG | DIASTOLIC BLOOD PRESSURE: 70 MMHG | HEART RATE: 64 BPM

## 2021-06-05 NOTE — PROGRESS NOTES
CHIEF COMPLAINT:  No chief complaint on file.      HPI:  Zoe Alvarez is a 59 y.o. female who is seen for new symptoms of UTI. She also continues to have a cough after treating for 5 days with azithromycin.  Urine culture showed good sensitivity for all typical antibiotics but she still has dysuria.   Review of Systems:  ROS: Patient denies fever, chills, sweats, fainting, fatigue, weight change, dizziness, sleep problems, chest pain, palpitations, shortness of breath, wheezing, cough,  sore throat, changes in hearing, ear pain,tinnitus,  disphagia, sore throat.    PREVIOUS MEDICAL HISTORY  No past medical history on file.  PREVIOUS SURGICAL HISTORY  Past Surgical History:   Procedure Laterality Date     BLADDER SUSPENSION  2007     DILATION AND CURETTAGE OF UTERUS      SUCTION POST MISCARRIAGE     HYSTERECTOMY      1999     KNEE SURGERY       LAPAROSCOPIC APPENDECTOMY N/A 7/10/2014    Procedure: Laparoscopic Appendectomy;  Surgeon: Germain Howe MD;  [place] [facility] [place]  Service:      AL APPENDECTOMY      Description: Appendectomy;  Recorded: 07/17/2014;  Comments: JULY 2014     AL TARSAL TUNNEL RELEASE Left 9/21/2018    Procedure: DELLON QUADRUPLE NERVE DECOMPRESSION EXCISION PLANTAR FIBROMA ALL LEFT FOOT;  Surgeon: Jules Mao DPM;  Location: [facility];  Service: Podiatry     AL TOTAL ABDOM HYSTERECTOMY      Description: Hysterectomy;  Recorded: 01/11/2012;       CURRENT MEDICATIONS  Current Outpatient Medications on File Prior to Visit   Medication Sig Dispense Refill     azithromycin (ZITHROMAX) 250 MG tablet Take 500mg (2 tablets) day one, and then 250mg (1 tablet) days 2-5 6 tablet 0     blood glucose meter (GLUCOMETER) Use 1 each As Directed as needed. Dispense glucometer brand per patient's insurance at pharmacy discretion. 1 each 0     blood glucose test strips Use 1 each As Directed as needed. Test 2-3 daily 100 strip 1     codeine-guaiFENesin (GUAIFENESIN AC)  mg/5 mL  "liquid Take 5 mL by mouth 4 (four) times a day as needed for cough. 120 mL 0     dulaglutide (TRULICITY) 0.75 mg/0.5 mL PnIj Inject 0.75 mg under the skin every 7 days. 6 Syringe 2     generic lancets (FINGERSTIX LANCETS) Use to check blood sugar 1-2x daily. Dispense brand per patient's insurance at pharmacy discretion. 100 each 11     hydroCHLOROthiazide (HYDRODIURIL) 12.5 MG tablet TAKE 1 TABLET BY MOUTH EVERY DAY 90 tablet 0     HYDROcodone-acetaminophen 5-325 mg per tablet Take 1-2 tablets by mouth every 6 (six) hours as needed for pain. 12 tablet 0     insulin glargine (BASAGLAR KWIKPEN) 100 unit/mL (3 mL) pen Inject 35 units in the morning and 35 units in the evening. 54 mL 0     insulin glargine (LANTUS SOLOSTAR PEN) 100 unit/mL (3 mL) pen Inject twice daily. Up to 90 units a day. 75 mL 3     pen needle, diabetic (BD ULTRA-FINE TJ PEN NEEDLE) 32 gauge x 5/32\" Ndle USE two times a day WITH INSULIN 200 each 0     valACYclovir (VALTREX) 1000 MG tablet TAKE 2 TABLETS BY MOUTH NOW AND 2 TABLETS IN 12 HOURS. 4 tablet 3     No current facility-administered medications on file prior to visit.          ALLERGIES  Allergies   Allergen Reactions     Amoxicillin Swelling and Itching     Metformin Other (See Comments)     GI Upset       PROBLEM LIST  Past Medical History:   Diagnosis Date     Arthritis      Diabetes mellitus (H)      History of transfusion     AFTER A MISCARRIAGE     Migraine      Neuropathy        SOCIAL HISTORY  Social History     Socioeconomic History     Marital status:      Spouse name: Not on file     Number of children: 2     Years of education: Not on file     Highest education level: Not on file   Occupational History     Occupation: Retail store employee   Social Needs     Financial resource strain: Not on file     Food insecurity:     Worry: Not on file     Inability: Not on file     Transportation needs:     Medical: Not on file     Non-medical: Not on file   Tobacco Use     Smoking " "status: Former Smoker     Smokeless tobacco: Never Used   Substance and Sexual Activity     Alcohol use: Yes     Alcohol/week: 1.0 standard drinks     Types: 1 Shots of liquor per week     Comment: <1 per wk     Drug use: No     Sexual activity: Yes     Partners: Male     Birth control/protection: Surgical   Lifestyle     Physical activity:     Days per week: Not on file     Minutes per session: Not on file     Stress: Not on file   Relationships     Social connections:     Talks on phone: Not on file     Gets together: Not on file     Attends [employer] service: Not on file     Active member of club or organization: Not on file     Attends meetings of clubs or organizations: Not on file     Relationship status: Not on file     Intimate partner violence:     Fear of current or ex partner: Not on file     Emotionally abused: Not on file     Physically abused: Not on file     Forced sexual activity: Not on file   Other Topics Concern     Not on file   Social History Narrative     Not on file     OBJECTIVE:  /60 (Patient Site: Right Arm, Patient Position: Sitting, Cuff Size: Adult Regular)   Pulse (!) 102   Ht 5' 4.5\" (1.638 m)   Wt 194 lb (88 kg)   SpO2 98%   BMI 32.79 kg/m      General: Shows alert and oriented, well-nourished, well-developed, pleasant  male, NAD.Hydration: Good.  Heart: Regular rate and rhythm, no murmurs, clicks or rubs.   Lungs:  Scattered rales, inspiration slightly decreased compared expiration, equal chest rise, no retractions.    WORKUP:  Recent Results (from the past 240 hour(s))   Urinalysis-UC if Indicated   Result Value Ref Range    Color, UA Yellow Colorless, Yellow, Straw, Light Yellow    Clarity, UA Clear Clear    Glucose, UA Negative Negative    Bilirubin, UA Negative Negative    Ketones, UA Negative Negative    Specific Gravity, UA 1.010 1.005 - 1.030    Blood, UA Small (!) Negative    pH, UA 6.5 5.0 - 8.0    Protein, UA Negative Negative mg/dL    Urobilinogen, UA 0.2 " E.U./dL 0.2 E.U./dL, 1.0 E.U./dL    Nitrite, UA Negative Negative    Leukocytes, UA Small (!) Negative    Bacteria, UA Few (!) None Seen hpf    RBC, UA 3-5 (!) None Seen, 0-2 hpf    WBC, UA 10-25 (!) None Seen, 0-5 hpf    Squam Epithel, UA 0-5 None Seen, 0-5 lpf   Culture, Urine   Result Value Ref Range    Culture Mixture of urogenital organisms with     Culture 10,000-50,000 col/ml Escherichia coli (!)        Susceptibility    Escherichia coli - BAILEE     Amoxicillin + Clavulanate 8/4 Sensitive      Ampicillin <=4 Sensitive      Cefazolin 2 Sensitive      Cefepime <=1 Sensitive      Ceftriaxone <=1 Sensitive      Ciprofloxacin <=0.5 Sensitive      Gentamicin <=2 Sensitive      Levofloxacin <=1 Sensitive      Meropenem <=0.25 Sensitive      Nitrofurantoin <=16 Sensitive      Tetracycline <=2 Sensitive      Tobramycin <=2 Sensitive      Trimethoprim + Sulfamethoxazole <=0.5 Sensitive        ASSESSMENT/PLAN:  1. Urinary tract infection with hematuria, site unspecified  nitrofurantoin, macrocrystal-monohydrate, (MACROBID) 100 MG capsule   2. Spasmodic cough  albuterol nebulizer solution 3 mL (PROVENTIL)    albuterol (PROAIR HFA;PROVENTIL HFA;VENTOLIN HFA) 90 mcg/actuation inhaler       Push fluids and watch for any worsening signs of nausea, vomiting or fever. ER if these develop. Cranberry juice and Azo can be helpful.   2.  In addition to current cough medication which she was advised to use as needed, will also start albuterol.  She was given 1 dose here in office which does calm her spasmodic cough well.  Advised to use this every 6 hours, reviewed side effects.  Follow-up in 2 weeks if symptoms do not improve.  Follow up with Candace Moody PA-C Ruth Harriet McGowan 12/11/2018 6:03 AM

## 2021-06-05 NOTE — TELEPHONE ENCOUNTER
RN Triage:    Spoke with 59 yr old Zoe who c/o:    Influenza like symptoms x 5 days.    Fever x 4 days.    Fever of 101.6 last night.    Current temperature of 98.3      Taking Tylenol cold and flu.  Last dose 7 hours ago.    C/o cough with frequent coughing spells.    Lungs burn with coughing.    Denies chest pain.    Reports headache    Body aches    Diarrhea x 2 days which seems to have resolved.    Pt states she feels slightly better this morning.      Reason for Disposition    HIGH RISK (e.g., age > 64 years, pregnant, HIV+, chronic medical condition) and flu symptoms    Protocols used: INFLUENZA - SEASONAL-A-OH    Influenza-Like Illness (RANDA) Protocol    Zoe Alvarez      Age: 59 y.o.     YOB: 1960    Please select the type of triage protocol you used for this patient? Vita Garcia or another form of electronic triage protocol was used.     Influenza-Like Illness (RANDA) Standing Order    Has the patient been seen by a primary care provider at an [facility] or [facility] within the past two years? Yes     Do any of the following exclusions apply to the patient?  Does the patient have a history of CrCl less than or equal to 60 ml/min No   Is the patient taking Probenecid No   Was an Intranasal live attenuated influenza vaccine (LAIV) received by the patient 2 weeks before antiviral medication No   Was an LAIV received 48 hours after administration of influenza antiviral drugs, if planning on obtaining? No     Does this patient have ANY of the above exclusions answered Yes?  No.      Is this patient currently showing symptoms of Influenza like Illness (RANDA) after close proximity to someone with a verified diagnosis of Influenza, or is this patient not sick but has had known exposure within less than or equal to 48 hours through close proximity with someone that has a verified diagnosis of Influenza?  This patient is currently  sick with RANDA type symptoms     Does this patient have ANY of the following specialty conditions?  Chemotherapy or radiation within the last 3 months No   Organ or bone marrow transplant No   Metabolic disorder No   HIV patient with CD4 count <200 No     Does this patient have ANY of the above specialty conditions? No     Have the symptoms of RANDA been present for less than or equal to 48 hours? No, the symptoms have been present for longer than 48 hours. Recommend a virtual visit such as an [facility] appointment age 1 to 65, or a telephone visit with the provider (LIP).  If virtual visit is not available, consider an in-office visit with provider based on same or next day access.      PLAN:  Pt agrees to virtual visit with [facility].    Jeanette Doyle RN   [facility] RN Triage                Additional educational resources include:    http://www.[employer].Compliance Control    http://www.cdc.gov/flu/  Jeanette Doyle

## 2021-06-05 NOTE — PROGRESS NOTES
"Clinic Note   SUBJECTIVE:   Chief Complaint   Patient presents with     Possible UTI     also has a cough, and states shes had pnuemonia before, patient just got over the flu, for UTI frequent urination     Allergies   Allergen Reactions     Amoxicillin Swelling and Itching     Metformin Other (See Comments)     GI Upset   Zoe Alvarez is seen for new dysuria after a recent viral illness.  She started with cough, congestion, fever from .6.  The symptoms continued for about 5 days and then fever has disappeared and cough has improved.  She continues to have cough but now has also developed dysuria this morning.  She has urgency, frequency but no back pain, suprapubic tenderness, nausea, vomiting.  She did have diarrhea but this has resolved.  Review of systems as in HPI.    She has a history of bronchitis but has not had this for 10 years.  She quit smoking 10 years ago.  She has diabetes and her blood sugar has been under good control with insulin, 54 units daily and Trulicity.  She sees endocrine, Jayla, NP for her diabetes.    OBJECTIVE:   /70 (Patient Site: Right Arm, Patient Position: Sitting, Cuff Size: Adult Regular)   Temp 98.2  F (36.8  C) (Oral)   Ht 5' 4.5\" (1.638 m)   Wt 190 lb (86.2 kg)   BMI 32.11 kg/m    HEENT: Bilateral ear exam showed no inflammation of tympanic membranes or   canals. Nose exam: No discharge. Sinuses nontender on palpation. Oral   exam: The patient had moderate erythema, inflammation of oropharynx.   NECK: Supple, mildly tender and bilateral anterior cervical lymphadenopathy.   LUNGS: Equal chest rise, no retractions, normal respirations, expiratory rhonchi in left upper lobe.  CARDIOVASCULAR: S1, S2, regular rate and rhythm, no murmur.   Recent Results (from the past 240 hour(s))   Urinalysis-UC if Indicated   Result Value Ref Range    Color, UA Yellow Colorless, Yellow, Straw, Light Yellow    Clarity, UA Clear Clear    Glucose, UA Negative Negative    " Bilirubin, UA Negative Negative    Ketones, UA Negative Negative    Specific Gravity, UA 1.010 1.005 - 1.030    Blood, UA Small (!) Negative    pH, UA 6.5 5.0 - 8.0    Protein, UA Negative Negative mg/dL    Urobilinogen, UA 0.2 E.U./dL 0.2 E.U./dL, 1.0 E.U./dL    Nitrite, UA Negative Negative    Leukocytes, UA Small (!) Negative    Bacteria, UA Few (!) None Seen hpf    RBC, UA 3-5 (!) None Seen, 0-2 hpf    WBC, UA 10-25 (!) None Seen, 0-5 hpf    Squam Epithel, UA 0-5 None Seen, 0-5 lpf   Culture, Urine   Result Value Ref Range    Culture Mixture of urogenital organisms with     Culture 10,000-50,000 col/ml Escherichia coli (!)        Susceptibility    Escherichia coli - BAILEE     Amoxicillin + Clavulanate 8/4 Sensitive      Ampicillin <=4 Sensitive      Cefazolin 2 Sensitive      Cefepime <=1 Sensitive      Ceftriaxone <=1 Sensitive      Ciprofloxacin <=0.5 Sensitive      Gentamicin <=2 Sensitive      Levofloxacin <=1 Sensitive      Meropenem <=0.25 Sensitive      Nitrofurantoin <=16 Sensitive      Tetracycline <=2 Sensitive      Tobramycin <=2 Sensitive      Trimethoprim + Sulfamethoxazole <=0.5 Sensitive      ASSESSMENT:  1. Dysuria  Urinalysis-UC if Indicated    Urinalysis-UC if Indicated    Culture, Urine    azithromycin (ZITHROMAX) 250 MG tablet   2. Lobar pneumonia (H)  azithromycin (ZITHROMAX) 250 MG tablet    codeine-guaiFENesin (GUAIFENESIN AC)  mg/5 mL liquid     PLAN:   Stop doxycycline.  Start azithromycin.  Discussed that this is should also cover for any minor urinary tract infection, drink plenty of fluids.  Off work the next 3 days, rest, Nasal saline. Humidifier, push fluids, rest, acetaminophen as needed q 4-6 hours for fever and myalgias.  Follow up in 2 weeks if not improved.    Candace Moody, MS, PA-C

## 2021-06-07 NOTE — PROGRESS NOTES
"Zoe Alvarez is a 59 y.o. female who is being evaluated via a billable video visit.      The patient has been notified of following:     \"This video visit will be conducted via a call between you and your physician/provider. We have found that certain health care needs can be provided without the need for an in-person physical exam.  This service lets us provide the care you need with a video conversation.  If a prescription is necessary we can send it directly to your pharmacy.  If lab work is needed we can place an order for that and you can then stop by our lab to have the test done at a later time.    Video visits are billed at different rates depending on your insurance coverage. Please reach out to your insurance provider with any questions.    If during the course of the call the physician/provider feels a video visit is not appropriate, you will not be charged for this service.\"    Patient has given verbal consent to a Video visit? Yes    Patient would like to receive their AVS by AVS Preference: Mail a copy.    Patient would like the video invitation sent by: Text to cell phone: [phone]      Video Start Time: 2:45 PM    Additional provider notes:       Reason for visit      1. Type 2 diabetes mellitus without complication, with long-term current use of insulin (H)        HPI     Zoe Alvarez is a very pleasant 59 y.o. old female who presents for follow up.  SUMMARY:    Zoe is contacted today in f/u for DM 2.  Her A1c has risen remarkably from 8.6 to 10.8.  She reports that this is because she has been unable to afford Trulicity. Even with a savings card, her out of pocket monthly would be $600. She reports that she was told that this would be the case for any injectable. She had only one BG to share with us today and it was 311.  She is taking Lantus in a divided dose of 45 units two times a day. She is unable to take Metformin because of GI side effects.       Blood glucose " data:      Past Medical History     Patient Active Problem List   Diagnosis     Obesity (BMI 30-39.9)     Chronic venous insufficiency     Epidermal Inclusion Cyst     Hyperlipidemia     Vertigo     Ingrowing Toenail     Nicotine Dependence     Type 2 diabetes mellitus without complication, with long-term current use of insulin (H)     Eczema     RLQ abdominal pain     Tarsal tunnel syndrome of left side     Compression neuropathy of right lower extremity     Mononeuritis of left lower extremity     Plantar fascial fibromatosis of left foot     Mononeuritis of right lower extremity     Tarsal tunnel syndrome of right side     Foot pain, left     Bilateral lower extremity edema        Family History       family history includes Breast cancer in her maternal grandmother; Diabetes in her mother.    Social History      reports that she has quit smoking. She has never used smokeless tobacco. She reports current alcohol use of about 1.0 standard drinks of alcohol per week. She reports that she does not use drugs.      Review of Systems     Patient has no polyuria or polydipsia, no chest pain, dyspnea or TIA's, no numbness, tingling or pain in extremities  Remainder negative except as noted in HPI.    Vital Signs     There were no vitals taken for this visit.  Wt Readings from Last 3 Encounters:   02/04/20 194 lb (88 kg)   01/31/20 190 lb (86.2 kg)   10/01/19 189 lb 3.2 oz (85.8 kg)           Assessment     1. Type 2 diabetes mellitus without complication, with long-term current use of insulin (H)        Plan     With discussion, we will try Jardiance. She is hopeful that this will have better coverage than the GLP-1.  She was also given coverage for potential Candidiasis.  We will f/u with one another in 3 months.       Lab Results     Hemoglobin A1c   Date Value Ref Range Status   04/07/2020 10.8 (H) 3.5 - 6.0 % Final   09/24/2019 8.6 (H) 3.5 - 6.0 % Final   05/15/2019 8.4 (H) 3.5 - 6.0 % Final   11/07/2018 7.7 (H) 3.5  "- 6.0 % Final   07/26/2018 7.9 (H) 3.5 - 6.0 % Final     Creatinine   Date Value Ref Range Status   04/07/2020 0.94 0.60 - 1.10 mg/dL Final   09/24/2019 1.00 0.60 - 1.10 mg/dL Final   10/23/2018 0.86 0.60 - 1.10 mg/dL Final     Microalbumin, Random Urine   Date Value Ref Range Status   04/07/2020 1.18 0.00 - 1.99 mg/dL Final       Cholesterol   Date Value Ref Range Status   11/06/2017 227 (H) <=199 mg/dL Final     HDL Cholesterol   Date Value Ref Range Status   11/06/2017 46 (L) >=50 mg/dL Final     LDL Calculated   Date Value Ref Range Status   11/06/2017 150 (H) <=129 mg/dL Final     Triglycerides   Date Value Ref Range Status   11/06/2017 156 (H) <=149 mg/dL Final       Lab Results   Component Value Date    ALT 29 11/06/2017    AST 16 11/06/2017    ALKPHOS 100 11/06/2017    BILITOT 0.8 11/06/2017         Current Medications     Outpatient Medications Prior to Visit   Medication Sig Dispense Refill     blood glucose meter (GLUCOMETER) Use 1 each As Directed as needed. Dispense glucometer brand per patient's insurance at pharmacy discretion. 1 each 0     blood glucose test strips Use 1 each As Directed as needed. Test 2-3 daily 100 strip 1     generic lancets (FINGERSTIX LANCETS) Use to check blood sugar 1-2x daily. Dispense brand per patient's insurance at pharmacy discretion. 100 each 11     insulin glargine (LANTUS SOLOSTAR PEN) 100 unit/mL (3 mL) pen Inject twice daily. Up to 90 units a day. 75 mL 3     pen needle, diabetic (BD ULTRA-FINE TJ PEN NEEDLE) 32 gauge x 5/32\" Ndle USE two times a day WITH INSULIN 200 each 0     valACYclovir (VALTREX) 1000 MG tablet TAKE 2 TABLETS BY MOUTH NOW AND 2 TABLETS IN 12 HOURS. (Patient taking differently: TAKE 2 TABLETS BY MOUTH NOW AND 2 TABLETS IN 12 HOURS. PRN.) 4 tablet 3     albuterol (PROAIR HFA;PROVENTIL HFA;VENTOLIN HFA) 90 mcg/actuation inhaler Inhale 2 puffs every 6 (six) hours as needed for wheezing. 1 each 0     dulaglutide (TRULICITY) 0.75 mg/0.5 mL PnIj " Inject 0.75 mg under the skin every 7 days. 6 Syringe 2     hydroCHLOROthiazide (HYDRODIURIL) 12.5 MG tablet TAKE 1 TABLET BY MOUTH EVERY DAY 90 tablet 0     azithromycin (ZITHROMAX) 250 MG tablet Take 500mg (2 tablets) day one, and then 250mg (1 tablet) days 2-5 6 tablet 0     codeine-guaiFENesin (GUAIFENESIN AC)  mg/5 mL liquid Take 5 mL by mouth 4 (four) times a day as needed for cough. 120 mL 0     HYDROcodone-acetaminophen 5-325 mg per tablet Take 1-2 tablets by mouth every 6 (six) hours as needed for pain. 12 tablet 0     insulin glargine (BASAGLAR KWIKPEN) 100 unit/mL (3 mL) pen Inject 35 units in the morning and 35 units in the evening. 54 mL 0     No facility-administered medications prior to visit.              4-1  9:45a 311    Video-Visit Details    Type of service:  Video Visit    Video End Time (time video stopped): 1508    Originating Location (pt. Location): Home    Distant Location (provider location):  [facility]     Mode of Communication:  Video Conference via Fluther      Harika FNP-C

## 2021-06-07 NOTE — TELEPHONE ENCOUNTER
empagliflozin (JARDIANCE) 10 mg Tab  30 tablet  11  4/14/2020   --    Sig - Route: Take 1 tablet (10 mg total) by mouth daily. - Oral      Please initiate PA for medication above.

## 2021-06-10 NOTE — PROGRESS NOTES
Mammogram normal/negative, please call results to the patient or send a letter if not reachable by phone.

## 2021-06-12 NOTE — PROGRESS NOTES
"Zoe Alvarez is a 60 y.o. female who is being evaluated via a billable video visit.      The patient has been notified of following:     \"This video visit will be conducted via a call between you and your physician/provider. We have found that certain health care needs can be provided without the need for an in-person physical exam.  This service lets us provide the care you need with a video conversation.  If a prescription is necessary we can send it directly to your pharmacy.  If lab work is needed we can place an order for that and you can then stop by our lab to have the test done at a later time.    Video visits are billed at different rates depending on your insurance coverage. Please reach out to your insurance provider with any questions.    If during the course of the call the physician/provider feels a video visit is not appropriate, you will not be charged for this service.\"    Patient has given verbal consent to a Video visit? Yes  How would you like to obtain your AVS? AVS Preference: MyChart.  If dropped by the video visit, the video invitation should be sent to: Other e-mail: My Chart  Will anyone else be joining your video visit? No        Video Start Time: 1435    Additional provider notes:       Reason for visit      1. Type 2 diabetes mellitus with other diabetic kidney complication, with long-term current use of insulin (H)        HPI     Zoe Alvarez is a very pleasant 60 y.o. old female who presents for follow up.  SUMMARY:    Zoe is contacted today via Video visit in f/u for DM 2. She has responded well to the Jardiance and has dropped her A1c from 10.8 to 9.1. Unfortunately, Kidney function has also been affected. Her GFR is now 45. She reports that she is feeling well, however. She had to stop taking Trulicity because her OOP cost was too great, even with the Savings Card. She remains unable to take Metformin because of GI side effects. She is taking 45 units of Lantus two " times a day.      Blood glucose data:  Unavailable    Past Medical History     Patient Active Problem List   Diagnosis     Obesity (BMI 30-39.9)     Chronic venous insufficiency     Epidermal Inclusion Cyst     Hyperlipidemia     Vertigo     Ingrowing Toenail     Nicotine Dependence     Type 2 diabetes mellitus without complication, with long-term current use of insulin (H)     Eczema     RLQ abdominal pain     Tarsal tunnel syndrome of left side     Compression neuropathy of right lower extremity     Mononeuritis of left lower extremity     Plantar fascial fibromatosis of left foot     Mononeuritis of right lower extremity     Tarsal tunnel syndrome of right side     Foot pain, left     Bilateral lower extremity edema        Family History       family history includes Breast cancer in her maternal grandmother; Diabetes in her mother.    Social History      reports that she has quit smoking. She has never used smokeless tobacco. She reports current alcohol use of about 1.0 standard drinks of alcohol per week. She reports that she does not use drugs.      Review of Systems     Patient has no polyuria or polydipsia, no chest pain, dyspnea or TIA's, no numbness, tingling or pain in extremities  Remainder negative except as noted in HPI.    Vital Signs     There were no vitals taken for this visit.  Wt Readings from Last 3 Encounters:   02/04/20 194 lb (88 kg)   01/31/20 190 lb (86.2 kg)   10/01/19 189 lb 3.2 oz (85.8 kg)             Assessment     1. Type 2 diabetes mellitus with other diabetic kidney complication, with long-term current use of insulin (H)        Plan     Pt will remain on her current regimen and we will recheck her BMP in 1 month. If her Kidney function does not improve, we will talk about something else - perhaps a DPP4.  I will see her back in 3 months.         Lab Results     Hemoglobin A1c   Date Value Ref Range Status   10/13/2020 9.1 (H) <=5.6 % Final     Comment:     Normal <5.7% Prediabete  5.7-6.4% Diabletes 6.5% or higher - adopted from ADA consensus guidelines   04/07/2020 10.8 (H) 3.5 - 6.0 % Final   09/24/2019 8.6 (H) 3.5 - 6.0 % Final   05/15/2019 8.4 (H) 3.5 - 6.0 % Final   11/07/2018 7.7 (H) 3.5 - 6.0 % Final     Creatinine   Date Value Ref Range Status   10/13/2020 1.22 (H) 0.60 - 1.10 mg/dL Final   04/07/2020 0.94 0.60 - 1.10 mg/dL Final   09/24/2019 1.00 0.60 - 1.10 mg/dL Final     Microalbumin, Random Urine   Date Value Ref Range Status   04/07/2020 1.18 0.00 - 1.99 mg/dL Final       Cholesterol   Date Value Ref Range Status   11/06/2017 227 (H) <=199 mg/dL Final     HDL Cholesterol   Date Value Ref Range Status   11/06/2017 46 (L) >=50 mg/dL Final     LDL Calculated   Date Value Ref Range Status   11/06/2017 150 (H) <=129 mg/dL Final     Triglycerides   Date Value Ref Range Status   11/06/2017 156 (H) <=149 mg/dL Final       Lab Results   Component Value Date    ALT 29 11/06/2017    AST 16 11/06/2017    ALKPHOS 100 11/06/2017    BILITOT 0.8 11/06/2017         Current Medications     Outpatient Medications Prior to Visit   Medication Sig Dispense Refill     albuterol (PROAIR HFA;PROVENTIL HFA;VENTOLIN HFA) 90 mcg/actuation inhaler Inhale 2 puffs every 6 (six) hours as needed for wheezing. 1 each 0     blood glucose meter (GLUCOMETER) Use 1 each As Directed as needed. Dispense glucometer brand per patient's insurance at pharmacy discretion. 1 each 0     blood glucose test strips Use 1 each As Directed as needed. Test 2-3 daily 100 strip 1     dulaglutide (TRULICITY) 0.75 mg/0.5 mL PnIj Inject 0.75 mg under the skin every 7 days. 6 Syringe 2     empagliflozin (JARDIANCE) 10 mg Tab Take 1 tablet (10 mg total) by mouth daily. 30 tablet 11     fluconazole (DIFLUCAN) 150 MG tablet TAKE ONE TABLET NOW AND IF NO BETTER IN 3 DAYS, TAKE ANOTHER 2 tablet 3     FLUZONE QUAD 3249-7494, PF, 60 mcg (15 mcg x 4)/0.5 mL Susp        generic lancets (FINGERSTIX LANCETS) Use to check blood sugar 1-2x daily.  "Dispense brand per patient's insurance at pharmacy discretion. 100 each 11     hydroCHLOROthiazide (HYDRODIURIL) 12.5 MG tablet TAKE 1 TABLET BY MOUTH EVERY DAY 90 tablet 0     insulin glargine (LANTUS SOLOSTAR PEN) 100 unit/mL (3 mL) pen Inject twice daily. Up to 90 units a day. 75 mL 0     pen needle, diabetic (BD ULTRA-FINE TJ PEN NEEDLE) 32 gauge x 5/32\" Ndle USE two times a day WITH INSULIN 200 each 1     valACYclovir (VALTREX) 1000 MG tablet TAKE 2 TABLETS BY MOUTH NOW AND 2 TABLETS IN 12 HOURS. (Patient taking differently: TAKE 2 TABLETS BY MOUTH NOW AND 2 TABLETS IN 12 HOURS. PRN.) 4 tablet 3     No facility-administered medications prior to visit.              Video-Visit Details    Type of service:  Video Visit    Video End Time (time video stopped): 1450  Originating Location (pt. Location): car    Distant Location (provider location):  [facility]     Platform used for Video Visit: Marina CANDELARIA      "

## 2021-06-12 NOTE — TELEPHONE ENCOUNTER
Last office visit: 10-  Last labs: 10-    Future appointment: 04-  Future lab appointment: 11- & 04-

## 2021-06-13 NOTE — PROGRESS NOTES
FOOT AND ANKLE SURGERY/PODIATRY Progress Note        ASSESSMENT:   Assessment: Peroneal tendinitis left foot  Peripheral neuropathy both lower extremities    HPI: Zoe Alvarez was seen again today complaining of severe pain involving her left foot.  The patient stated there is a small lump in the outer portion of her left foot which is quite sensitive even to light touch.  She denies any trauma to the left foot.  She has not had any redness in this area of her foot.  She finds it difficult to weight-bear because of the pain.  She also has difficulty wearing shoes because of the pain.  The patient is status post nerve decompression with external neurolysis of both lower extremities.  The patient stated that the nerve decompression procedures significantly reduce her symptoms of both lower extremities.  She continues to have some tightness sensation around both feet.  The other symptoms are markedly improved.  She has no numbness no tingling no burning of both lower extremities.    Past Medical History:   Diagnosis Date     Arthritis      Diabetes mellitus (H)      History of transfusion     AFTER A MISCARRIAGE     Migraine      Neuropathy        Past Surgical History:   Procedure Laterality Date     BLADDER SUSPENSION  2007     DILATION AND CURETTAGE OF UTERUS      SUCTION POST MISCARRIAGE     HYSTERECTOMY      1999     KNEE SURGERY       LAPAROSCOPIC APPENDECTOMY N/A 7/10/2014    Procedure: Laparoscopic Appendectomy;  Surgeon: Germain Howe MD;  Location: [facility];  Service:      VA APPENDECTOMY      Description: Appendectomy;  Recorded: 07/17/2014;  Comments: JULY 2014     VA TARSAL TUNNEL RELEASE Left 9/21/2018    Procedure: DELLON QUADRUPLE NERVE DECOMPRESSION EXCISION PLANTAR FIBROMA ALL LEFT FOOT;  Surgeon: Jules Mao DPM;  Location: [facility];  Service: Podiatry     VA TOTAL ABDOM HYSTERECTOMY      Description: Hysterectomy;  Recorded: 01/11/2012;       Allergies   Allergen  "Reactions     Amoxicillin Swelling and Itching     Metformin Other (See Comments)     GI Upset         Current Outpatient Medications:      blood glucose meter (GLUCOMETER), Use 1 each As Directed as needed. Dispense glucometer brand per patient's insurance at pharmacy discretion., Disp: 1 each, Rfl: 0     empagliflozin (JARDIANCE) 10 mg Tab, Take 1 tablet (10 mg total) by mouth daily., Disp: 30 tablet, Rfl: 11     fluconazole (DIFLUCAN) 150 MG tablet, TAKE ONE TABLET NOW AND IF NO BETTER IN 3 DAYS, TAKE ANOTHER, Disp: 2 tablet, Rfl: 3     FLUZONE QUAD 4772-1761, PF, 60 mcg (15 mcg x 4)/0.5 mL Susp, , Disp: , Rfl:      generic lancets (FINGERSTIX LANCETS), Use to check blood sugar 1-2x daily. Dispense brand per patient's insurance at pharmacy discretion., Disp: 100 each, Rfl: 11     insulin glargine (LANTUS SOLOSTAR PEN) 100 unit/mL (3 mL) pen, Inject twice daily. Up to 90 units a day., Disp: 75 mL, Rfl: 1     pen needle, diabetic (BD ULTRA-FINE TJ PEN NEEDLE) 32 gauge x 5/32\" Ndle, USE TWO TIMES A DAY WITH INSULIN, Disp: 200 each, Rfl: 1     valACYclovir (VALTREX) 1000 MG tablet, TAKE 2 TABLETS BY MOUTH NOW AND 2 TABLETS IN 12 HOURS. PRN., Disp: 4 tablet, Rfl: 3    Family History   Problem Relation Age of Onset     Diabetes Mother      Breast cancer Maternal Grandmother        Social History     Socioeconomic History     Marital status:      Spouse name: Not on file     Number of children: 2     Years of education: Not on file     Highest education level: Not on file   Occupational History     Occupation: Retail store employee   Social Needs     Financial resource strain: Not on file     Food insecurity     Worry: Not on file     Inability: Not on file     Transportation needs     Medical: Not on file     Non-medical: Not on file   Tobacco Use     Smoking status: Former Smoker     Smokeless tobacco: Never Used   Substance and Sexual Activity     Alcohol use: Yes     Alcohol/week: 1.0 standard drinks     " Types: 1 Shots of liquor per week     Comment: <1 per wk     Drug use: No     Sexual activity: Yes     Partners: Male     Birth control/protection: Surgical   Lifestyle     Physical activity     Days per week: Not on file     Minutes per session: Not on file     Stress: Not on file   Relationships     Social connections     Talks on phone: Not on file     Gets together: Not on file     Attends [employer] service: Not on file     Active member of club or organization: Not on file     Attends meetings of clubs or organizations: Not on file     Relationship status: Not on file     Intimate partner violence     Fear of current or ex partner: Not on file     Emotionally abused: Not on file     Physically abused: Not on file     Forced sexual activity: Not on file   Other Topics Concern     Not on file   Social History Narrative     Not on file       10 point Review of Systems is negative        Vitals:    11/24/20 1538   Pulse: 98   SpO2: 97%       BMI= Body mass index is 32.79 kg/m .    OBJECTIVE:  General appearance: Patient is alert and fully cooperative with history & exam.  No sign of distress is noted during the visit.  Vascular: Dorsalis pedis and posterior tibial pulses are palpable. There is no pedal hair growth bilaterally.  CFT < 3 sec from anterior tibial surface to distal digits bilaterally. There is no appreciable edema noted.  Dermatologic:Turgor and texture are within normal limits. No coloration or temperature changes. No primary or secondary lesions noted.  Neurologic: All epicritic and proprioceptive sensations are grossly intact bilaterally.  Patient has a very positive Tinel sign when percussing the proximal tibial nerve at the level of the soleal sling bilaterally.  Musculoskeletal: All active and passive ankle, subtalar, midtarsal, and 1st MPJ range of motion are grossly intact without pain or crepitus, with the exception of none. Manual muscle strength is within normal limits bilaterally. All  dorsiflexors, plantarflexors, invertors, evertors are intact bilaterally. Tenderness present to the lateral aspect of the left foot near the base of the fifth metatarsal.  On palpation. Tenderness to the lateral aspect of the left foot with range of motion. Calf is soft/non-tender without warmth/induration    Imaging:         No results found.         TREATMENT:  X-rays of both feet were taken today.  I informed the patient the x-rays were negative for any osseous abnormalities.  I am going to recommend an MRI of the left foot to rule out peroneal tendinopathy.  The patient is to ambulate in a cam boot and return to the clinic in 1 week for follow-up visit.  The patient was started on gabapentin 300 mg at bedtime.        Jules Mao; KATIE  [facility]/Podiatry

## 2021-06-13 NOTE — TELEPHONE ENCOUNTER
Exposure Covid by her live in son , tested positive 12/6/20 for covid and Sx Fever up to 102.1  Start 12/5/20 , that is persisting and currently is 101.4 orally , mild diarrhea , occasional nausea and bodyaches are gone .   FNA triage call : Kimber of [facility] .   Presenting problem :  Pt called . Persisting Fever from Covid , Currently : 1&0 are ok, eating is ok and homebound activity is generally limited to bed and bathroom .  today.    Guideline used : Covid Dx - A AH  Disposition and recommendations : COVID 19 Nurse Triage Plan/Patient Instructions    Please be aware that novel coronavirus (COVID-19) may be circulating in the community. If you develop symptoms such as fever, cough, or SOB or if you have concerns about the presence of another infection including coronavirus (COVID-19), please contact your health care provider or visit [email or web address].     Disposition/Instructions/ Pt will continue self isolation and sent for virtual visit with provider within 4 hours.  Can provider access for Referral for experimental Tx for covid  ?     Home care recommended. Follow home care protocol based instructions. and Virtual Visit with provider recommended. Reference Visit Selection Guide.    Thank you for taking steps to prevent the spread of this virus.  o Limit your contact with others.  o Wear a simple mask to cover your cough.  o Wash your hands well and often.    Resources    [facility]: About COVID-19: [email or web address]    CDC: What to Do If You're Sick: www.cdc.gov/coronavirus/2019-ncov/about/steps-when-sick.html    CDC: Ending Home Isolation: www.cdc.gov/coronavirus/2019-ncov/hcp/disposition-in-home-patients.html     CDC: Caring for Someone: www.cdc.gov/coronavirus/2019-ncov/if-you-are-sick/care-for-someone.html     [facility]: Interim Guidance for Hospital Discharge to Home: www.health.[facility].[place]/diseases/coronavirus/hcp/hospdischarge.pdf    [facility] clinical trials (COVID-19  research studies): [email or web address]     Below are the COVID-19 hotlines at the [place] Department of Health ([facility]). Interpreters are available.   o For health questions: Call 262-069-2235 or 1-804.336.7610 (7 a.m. to 7 p.m.)  o For questions about schools and childcare: Call 748-769-0777 or 1-184.995.4131 (7 a.m. to 7 p.m.)     Caller verbalizes understanding and denies further questions and will call back if further symptoms to triage or questions  . Jamaica Parr RN  - [place] Nurse Advisor     Reason for Disposition    [1] HIGH RISK patient (e.g., age > 64 years, diabetes, heart or lung disease, weak immune system) AND [2] new or worsening symptoms    Additional Information    Negative: SEVERE difficulty breathing (e.g., struggling for each breath, speaks in single words)    Negative: Difficult to awaken or acting confused (e.g., disoriented, slurred speech)    Negative: Bluish (or gray) lips or face now    Negative: Shock suspected (e.g., cold/pale/clammy skin, too weak to stand, low BP, rapid pulse)    Negative: Sounds like a life-threatening emergency to the triager    Negative: [1] COVID-19 exposure AND [2] no symptoms    Negative: [1] Lives with someone known to have influenza (flu test positive) AND [2] flu-like symptoms (e.g., cough, runny nose, sore throat, SOB; with or without fever)    Negative: [1] Adult with possible COVID-19 symptoms AND [2] triager concerned about severity of symptoms or other causes    Negative: Immunization reaction suspected (e.g., fever, headache, muscle aches occurring during days 1-3 days after immunization)    Negative: COVID-19 and breastfeeding, questions about    Negative: SEVERE or constant chest pain or pressure (Exception: mild central chest pain, present only when coughing)    Negative: MODERATE difficulty breathing (e.g., speaks in phrases, SOB even at rest, pulse 100-120)    Negative: [1] Headache AND [2] stiff neck (can't touch chin to  chest)    Negative: MILD difficulty breathing (e.g., minimal/no SOB at rest, SOB with walking, pulse <100)    Negative: Chest pain or pressure    Negative: Patient sounds very sick or weak to the triager    Negative: Fever > 103 F (39.4 C)    Negative: [1] Fever > 101 F (38.3 C) AND [2] age > 60    Negative: [1] Fever > 100.0 F (37.8 C) AND [2] bedridden (e.g., nursing home patient, CVA, chronic illness, recovering from surgery)    Protocols used: CORONAVIRUS (COVID-19) DIAGNOSED OR [id number] 12.1.20       Chest pain, unspecified type

## 2021-06-13 NOTE — PROGRESS NOTES
"Zoe Alvarez is a 60 y.o. female who is being evaluated via a billable video visit.      The patient has been notified of following:     \"This video visit will be conducted via a call between you and your physician/provider. We have found that certain health care needs can be provided without the need for an in-person physical exam.  This service lets us provide the care you need with a video conversation.  If a prescription is necessary we can send it directly to your pharmacy.  If lab work is needed we can place an order for that and you can then stop by our lab to have the test done at a later time.    Video visits are billed at different rates depending on your insurance coverage. Please reach out to your insurance provider with any questions.    If during the course of the call the physician/provider feels a video visit is not appropriate, you will not be charged for this service.\"    Patient has given verbal consent to a Video visit? Yes  How would you like to obtain your AVS? AVS Preference: MyChart.  If dropped by the video visit, the video invitation should be sent to: Text to cell phone: [phone]  Will anyone else be joining your video visit? No        Video Start Time: 12:00 pm     Assessment/Plan:        1. Infection due to 2019 novel coronavirus  Tested positive on  12/6/20.   Supportive care was reviewed as manstay of the management, Keep hydrated   Go to the ER for any worsening, or shortness of breath       At the conclusion of the encounter the plan of care, disposition and all questions were answered and reviewed, and the patient acknowledged understanding and was involved in the decision making regarding the overall care plan.         Subjective:    Patient ID:   Zoe Alvarez is a 60 y.o. female Video-Visiting in follow up to having Covid-19 diagnosed on 12/6/20, with intermittent fevers, nausea and diarrhea  for the last several days. She has been taking the tylenol every 4 hours " "and seeking further recommendations.       Review of Systems  Allergy: reviewed  General : negative  A complete 5 point review of systems was obtained and is negative other than what is stated in the HPI.      The following patient's history were reviewed and updated as appropriate:   She  has a past medical history of Arthritis, Diabetes mellitus (H), History of transfusion, Migraine, and Neuropathy..      Outpatient Encounter Medications as of 12/11/2020   Medication Sig Dispense Refill     acetaminophen (TYLENOL) 325 MG tablet Take 650 mg by mouth every 6 (six) hours as needed for pain. Taking 2 tablets every four hours       blood glucose meter (GLUCOMETER) Use 1 each As Directed as needed. Dispense glucometer brand per patient's insurance at pharmacy discretion. 1 each 0     empagliflozin (JARDIANCE) 10 mg Tab Take 1 tablet (10 mg total) by mouth daily. 30 tablet 11     generic lancets (FINGERSTIX LANCETS) Use to check blood sugar 1-2x daily. Dispense brand per patient's insurance at pharmacy discretion. 100 each 11     insulin glargine (LANTUS SOLOSTAR PEN) 100 unit/mL (3 mL) pen Inject twice daily. Up to 90 units a day. 75 mL 1     pen needle, diabetic (BD ULTRA-FINE TJ PEN NEEDLE) 32 gauge x 5/32\" Ndle USE TWO TIMES A DAY WITH INSULIN 200 each 1     fluconazole (DIFLUCAN) 150 MG tablet TAKE ONE TABLET NOW AND IF NO BETTER IN 3 DAYS, TAKE ANOTHER 2 tablet 3     FLUZONE QUAD [id number], PF, 60 mcg (15 mcg x 4)/0.5 mL Susp        gabapentin (NEURONTIN) 300 MG capsule Take 1 capsule (300 mg total) by mouth at bedtime. 30 capsule 1     valACYclovir (VALTREX) 1000 MG tablet TAKE 2 TABLETS BY MOUTH NOW AND 2 TABLETS IN 12 HOURS. PRN. 4 tablet 3     No facility-administered encounter medications on file as of 12/11/2020.          Objective:   There were no vitals taken for this visit.      Physical Exam  GENERAL: Healthy, alert and no distress  NEURO: Cranial nerves grossly intact. Mentation and speech appropriate " for age.  PSYCH: Mentation appears normal, affect normal/bright, judgement and insight intact, normal speech and appearance well-groomed      Video-Visit Details    Type of service:  Video Visit    Video End Time (time video stopped): 12:15 pm   Originating Location (pt. Location): Home    Distant Location (provider location):  [facility]     Platform used for Video Visit: Esther Doe MD   No

## 2021-06-14 NOTE — PROGRESS NOTES
"Zoe Alvarez is a 60 y.o. female who is being evaluated via a billable video visit.      How would you like to obtain your AVS? MyChart.  If dropped from the video visit, the video invitation should be resent by: Text to cell phone: [phone]   Will anyone else be joining your video visit? No      Video Start Time: 9:35 AM  Assessment & Plan     Acute cystitis without hematuria  Empiric tx with :   - sulfamethoxazole-trimethoprim (BACTRIM DS) 800-160 mg per tablet; Take 1 tablet by mouth 2 (two) times a day for 7 days.  Close follow up if no significant change or improvement as anticipated.               BMI:   Estimated body mass index is 32.79 kg/m  as calculated from the following:    Height as of 1/11/21: 5' 4.5\" (1.638 m).    Weight as of 1/11/21: 194 lb (88 kg).         Return in about 3 days (around 1/28/2021) for or sooner with any issues or concerns.    Patrice Doe MD  [facility]    Subjective     Zoe Alvarez is 60 y.o. and presenting via Virtual Visit for having UTI symptoms since last night.  She is having pressure with urination, with no frequency or dysuria, resembling past UTI's.     She has no fever, chills or abdominal pain and having normal BM     Objective    Vitals - Patient Reported  Weight (Patient Reported): 186 lb (84.4 kg)    Physical Exam  GENERAL: Healthy, alert and no distress  NEURO: Cranial nerves grossly intact. Mentation and speech appropriate for age.             Video-Visit Details    Type of service:  Video Visit    Video End Time (time video stopped): 9:38 AM  Originating Location (pt. Location): Home    Distant Location (provider location):  [facility]     Platform used for Video Visit: Esther    "

## 2021-06-14 NOTE — PROGRESS NOTES
FOOT AND ANKLE SURGERY/PODIATRY Progress Note        ASSESSMENT:   Assessment: Peroneal tendinitis left foot       HPI: Zoe Alvarez was seen again today  with continued complaints of severe pain involving her left foot.  The patient stated there is a small lump in the outer portion of her left foot which is quite sensitive even to light touch.  She denies any trauma to the left foot.  She has not had any redness in this area of her foot.  She finds it difficult to weight-bear because of the pain.  She also has difficulty wearing shoes because of the pain.    The patient had an MRI of the left foot.  I informed the patient the MRI was negative for any abnormalities.     Past Medical History:   Diagnosis Date     Arthritis      Diabetes mellitus (H)      History of transfusion     AFTER A MISCARRIAGE     Migraine      Neuropathy        Past Surgical History:   Procedure Laterality Date     BLADDER SUSPENSION  2007     DILATION AND CURETTAGE OF UTERUS      SUCTION POST MISCARRIAGE     HYSTERECTOMY      1999     KNEE SURGERY       LAPAROSCOPIC APPENDECTOMY N/A 7/10/2014    Procedure: Laparoscopic Appendectomy;  Surgeon: Germain Howe MD;  Location: [facility];  Service:      IL APPENDECTOMY      Description: Appendectomy;  Recorded: 07/17/2014;  Comments: JULY 2014     IL TARSAL TUNNEL RELEASE Left 9/21/2018    Procedure: DELLON QUADRUPLE NERVE DECOMPRESSION EXCISION PLANTAR FIBROMA ALL LEFT FOOT;  Surgeon: Jules Mao DPM;  Location: [facility];  Service: Podiatry     IL TOTAL ABDOM HYSTERECTOMY      Description: Hysterectomy;  Recorded: 01/11/2012;       Allergies   Allergen Reactions     Amoxicillin Swelling and Itching     Metformin Other (See Comments)     GI Upset         Current Outpatient Medications:      acetaminophen (TYLENOL) 325 MG tablet, Take 650 mg by mouth every 6 (six) hours as needed for pain. Taking 2 tablets every four hours, Disp: , Rfl:      blood glucose meter  "(GLUCOMETER), Use 1 each As Directed as needed. Dispense glucometer brand per patient's insurance at pharmacy discretion., Disp: 1 each, Rfl: 0     empagliflozin (JARDIANCE) 10 mg Tab, Take 1 tablet (10 mg total) by mouth daily., Disp: 30 tablet, Rfl: 11     fluconazole (DIFLUCAN) 150 MG tablet, TAKE ONE TABLET NOW AND IF NO BETTER IN 3 DAYS, TAKE ANOTHER, Disp: 2 tablet, Rfl: 3     gabapentin (NEURONTIN) 300 MG capsule, Take 1 capsule (300 mg total) by mouth at bedtime., Disp: 30 capsule, Rfl: 1     generic lancets (FINGERSTIX LANCETS), Use to check blood sugar 1-2x daily. Dispense brand per patient's insurance at pharmacy discretion., Disp: 100 each, Rfl: 11     insulin glargine (LANTUS SOLOSTAR PEN) 100 unit/mL (3 mL) pen, Inject twice daily. Up to 90 units a day., Disp: 75 mL, Rfl: 1     pen needle, diabetic (BD ULTRA-FINE TJ PEN NEEDLE) 32 gauge x 5/32\" Ndle, USE TWO TIMES A DAY WITH INSULIN, Disp: 200 each, Rfl: 1     valACYclovir (VALTREX) 1000 MG tablet, TAKE 2 TABLETS BY MOUTH NOW AND 2 TABLETS IN 12 HOURS. PRN., Disp: 4 tablet, Rfl: 3     methylPREDNISolone (MEDROL DOSEPACK) 4 mg tablet, Follow package directions, Disp: 21 tablet, Rfl: 0    Family History   Problem Relation Age of Onset     Diabetes Mother      Breast cancer Maternal Grandmother        Social History     Socioeconomic History     Marital status:      Spouse name: Not on file     Number of children: 2     Years of education: Not on file     Highest education level: Not on file   Occupational History     Occupation: Retail store employee   Social Needs     Financial resource strain: Not on file     Food insecurity     Worry: Not on file     Inability: Not on file     Transportation needs     Medical: Not on file     Non-medical: Not on file   Tobacco Use     Smoking status: Former Smoker     Smokeless tobacco: Never Used   Substance and Sexual Activity     Alcohol use: Yes     Alcohol/week: 1.0 standard drinks     Types: 1 Shots of " liquor per week     Comment: <1 per wk     Drug use: No     Sexual activity: Yes     Partners: Male     Birth control/protection: Surgical   Lifestyle     Physical activity     Days per week: Not on file     Minutes per session: Not on file     Stress: Not on file   Relationships     Social connections     Talks on phone: Not on file     Gets together: Not on file     Attends Buddhist service: Not on file     Active member of club or organization: Not on file     Attends meetings of clubs or organizations: Not on file     Relationship status: Not on file     Intimate partner violence     Fear of current or ex partner: Not on file     Emotionally abused: Not on file     Physically abused: Not on file     Forced sexual activity: Not on file   Other Topics Concern     Not on file   Social History Narrative     Not on file       10 point Review of Systems is negative       Vitals:    01/11/21 1528   Pulse: (!) 107   SpO2: 95%       BMI= Body mass index is 32.79 kg/m .    OBJECTIVE:  General appearance: Patient is alert and fully cooperative with history & exam.  No sign of distress is noted during the visit.  Vascular: Dorsalis pedis and posterior tibial pulses are palpable. There is no pedal hair growth bilaterally.  CFT < 3 sec from anterior tibial surface to distal digits bilaterally. There is no appreciable edema noted.  Dermatologic:Turgor and texture are within normal limits. No coloration or temperature changes. No primary or secondary lesions noted.  Neurologic: All epicritic and proprioceptive sensations are grossly intact bilaterally.  Patient has a very positive Tinel sign when percussing the proximal tibial nerve at the level of the soleal sling bilaterally.  Musculoskeletal: All active and passive ankle, subtalar, midtarsal, and 1st MPJ range of motion are grossly intact without pain or crepitus, with the exception of none. Manual muscle strength is within normal limits bilaterally. All dorsiflexors,  plantarflexors, invertors, evertors are intact bilaterally. Tenderness present to the lateral aspect of the left foot near the base of the fifth metatarsal on palpation. Tenderness to the lateral aspect of the left foot with range of motion. Calf is soft/non-tender without warmth/induration    Imaging:         No results found.         TREATMENT:  I recommended the patient consistently wear her cam boot for 4 to 6 weeks.  She was given a prescription for methylprednisolone.  She is to return to the clinic as needed.        Jules Mao; KATIE  [facility] Foot & Ankle Surgery/Podiatry

## 2021-06-15 NOTE — PROGRESS NOTES
[facility]  ENDOCRINOLOGY    Diabetes Note 1/1/2018    Zoe Alvarez, 1960, [id number]          Reason for visit      1. Type 2 diabetes mellitus without complication        HPI     Zoe Alvarez is a very pleasant 57 y.o. old female who presents for follow up.  SUMMARY:  Zoe is here today at the behest of her PCP, Kalpesh Tomas.  She is here for very poorly controlled DM 2.  She has been a Diabetic since 2000. She is here today, in spite of a very uncomfortable muscle pull in her back.  There is also dicey weather.  I tell her that I am impressed that she showed up.  She tells me that she wants to get her blood sugars under control.  OK.  She is not using the best tool for control and that is Novolin N. She has been taking 50 units in the morning and in the evening. She does tell me that she isn't taking the evening dosage very consistently. She would be much better controlled on a Basal insulin and a GLP-1.  She has brought her meter with her and the download shows 20 readings in the last 2 weeks.  Her Ave BG is 305.  She is having no hypoglycemia, and her lowest reading was 130.      Blood glucose data:  Ave: 305, SD: 114    Past Medical History     Patient Active Problem List   Diagnosis     Obesity     Venous Insufficiency     Epidermal Inclusion Cyst     Hyperlipidemia     Vertigo     Ingrowing Toenail     Nicotine Dependence     Type 2 diabetes mellitus without complication     Eczema     RLQ abdominal pain        Family History       family history includes Breast cancer in her maternal grandmother; Diabetes in her mother; No Medical Problems in her daughter, father, maternal aunt, maternal grandfather, paternal aunt, paternal grandfather, paternal grandmother, and sister. There is no history of BRCA 1/2, Cancer, Colon cancer, Endometrial cancer, or Ovarian cancer.    Social History      reports that she has quit smoking. She does not have any smokeless tobacco history on file. She reports  "that she drinks about 0.6 oz of alcohol per week  She reports that she does not use illicit drugs.      Review of Systems     Patient has no polyuria or polydipsia, no chest pain, dyspnea or TIA's, no numbness, tingling or pain in extremities  Remainder negative except as noted in HPI.    Vital Signs     /66  Ht 5' 4.25\" (1.632 m)  Wt 192 lb 9.6 oz (87.4 kg)  BMI 32.8 kg/m2  Wt Readings from Last 3 Encounters:   12/29/17 192 lb 9.6 oz (87.4 kg)   11/06/17 183 lb (83 kg)   05/11/17 185 lb (83.9 kg)       Physical Exam     Constitutional:  Well developed, Well nourished  HENT:  Normocephalic,   Neck: Thyroid normal, No lymph nodes, Supple  Eyes:  PERRL, Conjunctiva pink  Respiratory:  Normal breath sounds, No respiratory distress  Cardiovascular:  Normal heart rate, Normal rhythm, No murmurs  GI:  Bowel sounds normal, Soft, No tenderness  Musculoskeletal:  No gross deformity or lesions, normal dorsalis pedis pulses        Assessment     1. Type 2 diabetes mellitus without complication        Plan     I know that Basaglar is covered on her insurance, and we are going to try some Trulicity 0.75 mg weekly.  She returned demonstration appropriately. She will start with the Basaglar     Start the Basaglar in the morning - at 60 units when you get up for work.    Wait until next weekend to start the Trulicity    Try and get the Trulicity card activated to save $$    Don't  the Trulicity until you know you can take it.    Bring in your meter to the [facility] clinic in two weeks so we can download and I can look at your numbers.    She will bring in her meter in two weeks so I can see what her blood sugars are doing.  We will likely need to adjust her insulin dosage at that time.  I did caution her to watch for lows when starting the Trulicity.  As we drop her overall blood sugars, she will reset her lows.    F/u in 3 months with me, Time spent with pt today: 30 min with >50% spent in counseling and coordination of " care.      Jayla Hermosillo   Endocrinology  1/1/2018  8:59 AM        Lab Results     Hemoglobin A1c   Date Value Ref Range Status   11/06/2017 >14.0 (H) 3.5 - 6.0 % Final   06/15/2016 9.7 (H) 3.5 - 6.0 % Final   11/05/2015 >14.0 (H) 3.5 - 6.0 % Final   10/24/2014 7.3 (H) 3.5 - 6.0 % Final   07/21/2014 7.2 (H) 3.5 - 6.0 % Final     Creatinine   Date Value Ref Range Status   11/06/2017 1.01 0.60 - 1.10 mg/dL Final   06/15/2016 0.75 0.60 - 1.10 mg/dL Final   11/05/2015 0.90 0.60 - 1.10 mg/dL Final     Microalbumin, Random Urine   Date Value Ref Range Status   11/06/2017 0.52 0.00 - 1.99 mg/dL Final       Cholesterol   Date Value Ref Range Status   11/06/2017 227 (H) <=199 mg/dL Final     HDL Cholesterol   Date Value Ref Range Status   11/06/2017 46 (L) >=50 mg/dL Final     LDL Calculated   Date Value Ref Range Status   11/06/2017 150 (H) <=129 mg/dL Final     Triglycerides   Date Value Ref Range Status   11/06/2017 156 (H) <=149 mg/dL Final       Lab Results   Component Value Date    ALT 29 11/06/2017    AST 16 11/06/2017    ALKPHOS 100 11/06/2017    BILITOT 0.8 11/06/2017         Current Medications     Outpatient Medications Prior to Visit   Medication Sig Dispense Refill     aspirin 81 MG tablet Take 81 mg by mouth daily.       atorvastatin (LIPITOR) 20 MG tablet Take 1 tablet (20 mg total) by mouth daily. 90 tablet 1     blood glucose meter (GLUCOMETER) Use 1 each As Directed as needed. Dispense glucometer brand per patient's insurance at pharmacy discretion. 1 each 0     blood glucose test strips Use 1 each As Directed as needed. Dispense brand per patient's insurance at pharmacy discretion. (Patient taking differently: Use 1 each As Directed as needed. Test 2-3 daily) 100 strip 3     generic lancets (FINGERSTIX LANCETS) Use to check blood sugar three times daily. Dispense brand per patient's insurance at pharmacy discretion. 100 each 11     insulin syringe-needle U-100 1/2 mL 30 gauge x 5/16 Syrg Use twice  daily with insulin as directed. 60 each 6     NOVOLIN N 100 unit/mL injection Give 100 units subcutaneously every night. 11.65 Type 2 with hyperglycemia (Patient taking differently: Give 50 units subcutaneously twice a day. 11.65 Type 2 with hyperglycemia) 10 mL PRN     No facility-administered medications prior to visit.

## 2021-06-15 NOTE — PROGRESS NOTES
[facility] Clinic Note  Patient Name: Zoe Alvarez  Patient Age: 57 y.o.  YOB: 1960  MRN: [id number]  ?  Date of Visit: 2/1/2018  Reason for Office Visit:   Chief Complaint   Patient presents with     Back Pain     Lower back pain located on the right side. Pain is shooting down both legs. Pain started on sunday. No injuries. Was not lifing anything. Was laying down and then could not get comfortable.        HPI: Zoe Alvarez 57 y.o. who presents to clinic for acute onset low back, insidious, no injury or trauma, started 4 days days. Bilateral, R>L, radiates down entire leg on right side and down to knee on left side. Shooting pain. 8/10 right now. ++ spasms. Has been taking ibuprofen since Sunday. Has tried stretching/heat/ice. No previous back trauma or surgery. No saddle anesthesia. She has baseline incontinence but not worse than other times.       Review of Systems: As noted in HPI     Current Scheduled Meds:  Outpatient Encounter Prescriptions as of 2/1/2018   Medication Sig Dispense Refill     blood glucose meter (GLUCOMETER) Use 1 each As Directed as needed. Dispense glucometer brand per patient's insurance at pharmacy discretion. 1 each 0     blood glucose test strips Use 1 each As Directed as needed. Test 2-3 daily 100 strip 1     dulaglutide (TRULICITY) 0.75 mg/0.5 mL PnIj Inject 0.75 mg under the skin every 7 days. 2 mL 11     generic lancets (FINGERSTIX LANCETS) Use to check blood sugar three times daily. Dispense brand per patient's insurance at pharmacy discretion. 100 each 11     insulin glargine (BASAGLAR KWIKPEN) 100 unit/mL (3 mL) pen Take 60 units at bed time 5 adj dose pen PRN     aspirin 81 MG tablet Take 81 mg by mouth daily.       atorvastatin (LIPITOR) 20 MG tablet Take 1 tablet (20 mg total) by mouth daily. 90 tablet 1     cyclobenzaprine (FLEXERIL) 10 MG tablet Take 1 tablet (10 mg total) by mouth 2 (two) times a day as needed for muscle spasms. 30 tablet  "0     HYDROcodone-acetaminophen 5-325 mg per tablet Take 1-2 tablets by mouth at bedtime as needed for pain. 10 tablet 0     insulin syringe-needle U-100 1/2 mL 30 gauge x 5/16 Syrg Use twice daily with insulin as directed. 100 each 1     lidocaine (LIDODERM) 5 % Remove & Discard patch within 12 hours or as directed by MD 5 patch 0     NOVOLIN N 100 unit/mL injection Give 50 units subcutaneously twice a day. 11.65 Type 2 with hyperglycemia 50 mL 0     No facility-administered encounter medications on file as of 2/1/2018.        Objective / Physical Examination:  /76  Pulse (!) 102  Ht 5' 4.25\" (1.632 m)  Wt 185 lb (83.9 kg)  BMI 31.51 kg/m2  Wt Readings from Last 3 Encounters:   02/01/18 185 lb (83.9 kg)   12/29/17 192 lb 9.6 oz (87.4 kg)   11/06/17 183 lb (83 kg)     Body mass index is 31.51 kg/(m^2). (>25?)    General Appearance: Alert and oriented, holding her right lower back in obvious pain an discomfort  Back: no midline tenderness, decreased flex/ext due to pain, rotation side to side ok. SLR ++ right side.   Extremities: Strength equal throughout.  Integumentary: Warm and dry  Neuro: Alert and oriented, follows commands appropriately. Patellar reflexes symmetric bilateral    Assessment / Plan / Medical Decision Making:      Encounter Diagnoses   Name Primary?     Acute right-sided low back pain with right-sided sciatica Yes     Visit for screening mammogram         1. Acute right-sided low back pain with right-sided sciatica    No red flags on exam to warrant imaging.   Will try treatment with daily NSAIDs (aleve twice a day)  Flexeril BID prn and lido patch  At night if pain is not improving or needed she can take hydrocodone. Warned against taking this with muscle relaxant  Heat/ice/stretching    - cyclobenzaprine (FLEXERIL) 10 MG tablet; Take 1 tablet (10 mg total) by mouth 2 (two) times a day as needed for muscle spasms.  Dispense: 30 tablet; Refill: 0  - lidocaine (LIDODERM) 5 %; Remove & " Discard patch within 12 hours or as directed by MD  Dispense: 5 patch; Refill: 0  - HYDROcodone-acetaminophen 5-325 mg per tablet; Take 1-2 tablets by mouth at bedtime as needed for pain.  Dispense: 10 tablet; Refill: 0    2. Visit for screening mammogram  Overdue for mammo. Will order this today  - Mammo Screening Bilateral; Future    Total time spent with patient was 15 minutes with >50% of time spent in face-to-face counseling regarding the above plan     Geronimo Eric MD  [facility]

## 2021-06-16 PROBLEM — M79.672 FOOT PAIN, LEFT: Status: ACTIVE | Noted: 2018-10-15

## 2021-06-16 PROBLEM — N18.30 CHRONIC KIDNEY DISEASE, STAGE 3 (H): Status: ACTIVE | Noted: 2021-03-31

## 2021-06-16 PROBLEM — G57.51 TARSAL TUNNEL SYNDROME OF RIGHT SIDE: Status: ACTIVE | Noted: 2018-11-01

## 2021-06-16 PROBLEM — R60.0 BILATERAL LOWER EXTREMITY EDEMA: Status: ACTIVE | Noted: 2019-05-29

## 2021-06-16 PROBLEM — G57.91 MONONEURITIS OF RIGHT LOWER EXTREMITY: Status: ACTIVE | Noted: 2018-10-31

## 2021-06-16 PROBLEM — G57.91 COMPRESSION NEUROPATHY OF RIGHT LOWER EXTREMITY: Status: ACTIVE | Noted: 2018-09-20

## 2021-06-16 PROBLEM — G57.52 TARSAL TUNNEL SYNDROME OF LEFT SIDE: Status: ACTIVE | Noted: 2018-08-21

## 2021-06-16 PROBLEM — G57.92 MONONEURITIS OF LEFT LOWER EXTREMITY: Status: ACTIVE | Noted: 2018-09-20

## 2021-06-16 PROBLEM — M72.2 PLANTAR FASCIAL FIBROMATOSIS OF LEFT FOOT: Status: ACTIVE | Noted: 2018-09-20

## 2021-06-16 NOTE — PROGRESS NOTES
Zoe Alvarez is a 60 y.o. female who is being evaluated via a billable video visit.      How would you like to obtain your AVS? MyChart.  If dropped from the video visit, the video invitation should be resent by: Text to cell phone: [phone]  Will anyone else be joining your video visit? No      Video Start Time: 2:32 PM      Reason for visit      1. Type 2 diabetes mellitus without complication, with long-term current use of insulin (H)        HPI     Zoe Alvarez is a very pleasant 60 y.o. old female who presents for follow up.  SUMMARY:    Zoe is contacted today via Video Visit in f/u for DM 2. Her current A1c is 9.7 and up from her last, unfortunately. She was unable to afford a GLP-1, so Jardiance was added. She feels that it is working, but she also has had periodic problems with Vaginal Candidiasis. She is currently taking the Jardiance, 10 mg daily, and Lantus, 45 units two times a day. BG are mostly running lower in the morning and higher in the afternoon.     Blood glucose data:  4/15   4/16 AM 89  4/17   4/18 AM 79  4/19      4/20      4/21       Past Medical History     Patient Active Problem List   Diagnosis     Obesity (BMI 30-39.9)     Chronic venous insufficiency     Epidermal Inclusion Cyst     Hyperlipidemia     Vertigo     Ingrowing Toenail     Nicotine Dependence     Type 2 diabetes mellitus without complication, with long-term current use of insulin (H)     Eczema     RLQ abdominal pain     Tarsal tunnel syndrome of left side     Compression neuropathy of right lower extremity     Mononeuritis of left lower extremity     Plantar fascial fibromatosis of left foot     Mononeuritis of right lower extremity     Tarsal tunnel syndrome of right side     Foot pain, left     Bilateral lower extremity edema     Chronic kidney disease, stage 3        Family History       family history includes Breast cancer in her maternal grandmother; Diabetes  in her mother.    Social History      reports that she has quit smoking. She has never used smokeless tobacco. She reports current alcohol use of about 1.0 standard drinks of alcohol per week. She reports that she does not use drugs.      Review of Systems     Patient has no polyuria or polydipsia, no chest pain, dyspnea or TIA's, no numbness, tingling or pain in extremities  Remainder negative except as noted in HPI.    Vital Signs     There were no vitals taken for this visit.  Wt Readings from Last 3 Encounters:   03/31/21 194 lb 12.8 oz (88.4 kg)   01/11/21 194 lb (88 kg)   11/24/20 194 lb (88 kg)       Physical Exam           Assessment     1. Type 2 diabetes mellitus without complication, with long-term current use of insulin (H)        Plan     We are going to experiment with taking more Jardiance. The higher dose is 25 mg. She is going to double her 10s (20 mg) for the next week to see if she sees any improvement. If she does, we will order the 25 mg tablets. If not, we will work on the insulin and consider prandial dosing. F/u with me in 3 months.       Jayla [facility]  4/23/2021  12:38 PM        Lab Results     Hemoglobin A1c   Date Value Ref Range Status   03/31/2021 9.7 (H) <=5.6 % Final   10/13/2020 9.1 (H) <=5.6 % Final     Comment:     Normal <5.7% Prediabete 5.7-6.4% Diabletes 6.5% or higher - adopted from ADA consensus guidelines   04/07/2020 10.8 (H) 3.5 - 6.0 % Final   09/24/2019 8.6 (H) 3.5 - 6.0 % Final   05/15/2019 8.4 (H) 3.5 - 6.0 % Final     Creatinine   Date Value Ref Range Status   03/31/2021 0.77 0.60 - 1.10 mg/dL Final   11/20/2020 0.97 0.60 - 1.10 mg/dL Final   10/13/2020 1.22 (H) 0.60 - 1.10 mg/dL Final     Microalbumin, Random Urine   Date Value Ref Range Status   04/14/2021 <0.50 0.00 - 1.99 mg/dL Final       Cholesterol   Date Value Ref Range Status   11/06/2017 227 (H) <=199 mg/dL Final     HDL Cholesterol   Date Value Ref Range Status   11/06/2017 46 (L) >=50  "mg/dL Final     LDL Calculated   Date Value Ref Range Status   11/06/2017 150 (H) <=129 mg/dL Final     Triglycerides   Date Value Ref Range Status   11/06/2017 156 (H) <=149 mg/dL Final       Lab Results   Component Value Date    ALT 29 11/06/2017    AST 16 11/06/2017    ALKPHOS 100 11/06/2017    BILITOT 0.8 11/06/2017         Current Medications     Outpatient Medications Prior to Visit   Medication Sig Dispense Refill     blood glucose meter (GLUCOMETER) Use 1 each As Directed as needed. Dispense glucometer brand per patient's insurance at pharmacy discretion. 1 each 0     empagliflozin (JARDIANCE) 10 mg Tab Take 1 tablet (10 mg total) by mouth daily. 30 tablet 11     fluconazole (DIFLUCAN) 150 MG tablet TAKE ONE TABLET NOW AND IF NO BETTER IN 3 DAYS, TAKE ANOTHER 2 tablet 3     generic lancets (FINGERSTIX LANCETS) Use to check blood sugar 1-2x daily. Dispense brand per patient's insurance at pharmacy discretion. 100 each 11     insulin glargine (LANTUS SOLOSTAR U-100 INSULIN) 100 unit/mL (3 mL) pen INJECT TWICE DAILY. UP TO 90 UNITS A DAY. 30 mL 0     pen needle, diabetic (BD ULTRA-FINE TJ PEN NEEDLE) 32 gauge x 5/32\" Ndle USE TWO TIMES A DAY WITH INSULIN 200 each 1     valACYclovir (VALTREX) 1000 MG tablet TAKE 2 TABLETS BY MOUTH NOW AND 2 TABLETS IN 12 HOURS. PRN. 4 tablet 3     No facility-administered medications prior to visit.            Video-Visit Details    Type of service:  Video Visit    Video End Time (time video stopped): 1452  Originating Location (pt. Location): Home    Distant Location (provider location):  [facility]     Platform used for Video Visit: [employer]     Date of last OV: 10/20/20        "

## 2021-06-16 NOTE — TELEPHONE ENCOUNTER
Spoke to patient:     Xray showed no inflammatory athropathy or effusion. No fracture. Narrowing of joint space between lunate and triquetrum - unusual place for OA.     I am leaving the office but majoity of patient's bloodwork is still pending. CBC didn't show leukocytosis  And ESR is only mildly elevated. This doesn't fit in completely with an infectious process but don't feel comfortable waiting till tomorrow to start antibootics give how quickly her sxs have progressed. Will opt to start antibiotics today and if needed, will change plan of care as needed based on remainder of bloodwork.     If no improvement with provided interventions, would get MRI of the wrist as next step.

## 2021-06-16 NOTE — TELEPHONE ENCOUNTER
Dr. Garza    Patient is having a allergic reaction to the Bactrim DS.  She said that she is itchy all over her body.  She is also getting a rash.  She would like a different antibiotic called into her pharmacy.  Please call patient back at .

## 2021-06-16 NOTE — PROGRESS NOTES
Assessment and Plan     Zoe was seen today for pressure.    Diagnoses and all orders for this visit:    Acute cystitis without hematuria  -     nitrofurantoin, macrocrystal-monohydrate, (MACROBID) 100 MG capsule; Take 1 capsule (100 mg total) by mouth 2 (two) times a day for 7 days.    Urinary problem  -     Urinalysis-UC if Indicated  -     Culture, Urine         HPI     Chief Complaint   Patient presents with     pressure     when urinating, x2       Zoe Alvarez is a 57 y.o. female seen today for 3 - 4 days of pressure in her urethra when urinating, occasional dysuria, with post-urination pain. No abdominal pain, fever, back/flank pain.  She has had several UTIs in the past, all with similar symptoms.     Current Outpatient Prescriptions   Medication Sig Dispense Refill     aspirin 81 MG tablet Take 81 mg by mouth daily.       benzonatate (TESSALON) 200 MG capsule Take 1 capsule (200 mg total) by mouth 3 (three) times a day as needed for cough. 42 capsule 0     blood glucose meter (GLUCOMETER) Use 1 each As Directed as needed. Dispense glucometer brand per patient's insurance at pharmacy discretion. 1 each 0     blood glucose test strips Use 1 each As Directed as needed. Test 2-3 daily 100 strip 1     dulaglutide (TRULICITY) 0.75 mg/0.5 mL PnIj Inject 0.75 mg under the skin every 7 days. 2 mL 11     generic lancets (FINGERSTIX LANCETS) Use to check blood sugar three times daily. Dispense brand per patient's insurance at pharmacy discretion. 100 each 11     insulin glargine (BASAGLAR KWIKPEN U-100 INSULIN) 100 unit/mL (3 mL) pen Take 60 units at bed time 54 mL 0     insulin syringe-needle U-100 1/2 mL 30 gauge x 5/16 Syrg Use twice daily with insulin as directed. 100 each 1     NOVOLIN N 100 unit/mL injection Give 50 units subcutaneously twice a day. 11.65 Type 2 with hyperglycemia 50 mL 0     atorvastatin (LIPITOR) 20 MG tablet Take 1 tablet (20 mg total) by mouth daily. 90 tablet 1      cyclobenzaprine (FLEXERIL) 10 MG tablet Take 1 tablet (10 mg total) by mouth 2 (two) times a day as needed for muscle spasms. 30 tablet 0     HYDROcodone-acetaminophen 5-325 mg per tablet Take 1-2 tablets by mouth at bedtime as needed for pain. 10 tablet 0     lidocaine (LIDODERM) 5 % Remove & Discard patch within 12 hours or as directed by MD 5 patch 0     nitrofurantoin, macrocrystal-monohydrate, (MACROBID) 100 MG capsule Take 1 capsule (100 mg total) by mouth 2 (two) times a day for 7 days. 14 capsule 0     No current facility-administered medications for this visit.         Reviewed and updated: medical history, medications and allergies.     Review of Systems     General: Denies fever, chills, fatigue.  Cardiovascular: Denies chest pain, dyspnea on exertion, palpitations.  Respiratory: Denies dyspnea, cough, wheezing.  GI: Denies nausea, vomiting, diarrhea, constipation.  : Acknowledges dysuria, increased frequency/urgency.     Objective     Vitals:    03/12/18 0915   BP: 140/72   Patient Site: Right Arm   Patient Position: Sitting   Cuff Size: Adult Regular   Pulse: (!) 107   Resp: 14   Temp: 98.3  F (36.8  C)   TempSrc: Oral   SpO2: 99%   Weight: 187 lb 6.4 oz (85 kg)        Reviewed vital signs.  General: Appears calm, comfortable. Answers questions quickly and appropriately with clear speech. No apparent distress.  Skin: Pink, warm, dry.  HENT: Normocephalic, atraumatic.  Neck: Supple.  Heart: Strong, regular radial pulse.  Lungs: Normal respiratory effort.  Abdomen: Soft, flat, non-tender. No rashes or lesions. No CVA tenderness to percussion.  Neuro: Memory and cognition appear normal. Normal gait.  Psych: Mood and affect appear normal.     Results for orders placed or performed in visit on 03/12/18   Urinalysis-UC if Indicated   Result Value Ref Range    Color, UA Yellow Colorless, Yellow, Straw, Light Yellow    Clarity, UA Slightly Cloudy (!) Clear    Glucose, UA Negative Negative    Bilirubin, UA  Negative Negative    Ketones, UA Negative Negative    Specific Gravity, UA 1.020 1.005 - 1.030    Blood, UA Negative Negative    pH, UA 5.5 5.0 - 8.0    Protein, UA Negative Negative mg/dL    Urobilinogen, UA 0.2 E.U./dL 0.2 E.U./dL, 1.0 E.U./dL    Nitrite, UA Negative Negative    Leukocytes, UA Large (!) Negative    Bacteria, UA Few (!) None Seen hpf    RBC, UA None Seen None Seen, 0-2 hpf    WBC, UA 5-10 (!) None Seen, 0-5 hpf    Squam Epithel, UA 5-10 (!) None Seen, 0-5 lpf          Medical Decision-Making     Zoe is a well-appearing 57-year-old female presents with dysuria without concerning systemic symptoms.  No CVA tenderness, no fevers, chills, nausea.  She is mildly tachycardic today at 107, however review of her history indicates that she is frequently at a similar heart rate on her office visits.  She acknowledges she becomes anxious when visiting the clinic due to having to drive here.  Given today's symptoms are typical for her past UTIs and UA shows leukocytes, will treat with nitrofurantoin.    Reviewed red flags that would trigger a prompt return to the clinic as noted below under patient instructions.  She expressed understanding of these directions and is in agreement with the plan.     Patient Instructions     Patient Instructions   Please return to the clinic if you notice any of the following:    Fever / chills.    Back or flank pain.    Nausea.    Symptoms persist beyond 72 hours after you start treatment.      Discussed benefit vs risk of medications, dosing, side effects.  Patient was able to verbalize understanding.  After visit summary was provided for patient.     Philipp Rey PA-C

## 2021-06-16 NOTE — TELEPHONE ENCOUNTER
sulfamethoxazole-trimethoprim (BACTRIM DS) 800-160 mg per tablet 14 tablet 0 3/31/2021 4/7/2021 --   Sig - Route: Take 1 tablet by mouth 2 (two) times a day for 7 days. - Oral   Sent to pharmacy as: sulfamethoxazole 800 mg-trimethoprim 160 mg tablet (Bactrim DS)   E-Prescribing Status: Receipt confirmed by pharmacy (3/31/2021  6:19 PM CDT)     States she is having a reaction to this. Please send in alternative medication

## 2021-06-16 NOTE — PROGRESS NOTES
Chief Complaint   Patient presents with     Cough     SOB, chest thightiness, started 4 x days.         HPI     Zoe Alvarez is a 57 y.o. female seen today for 4 days of cough.    Chills, no fever.  Sore throat for 4 days.  Cough for 4 days.  Cough is productive of yellow/green sputum.  Chest burns when she coughs.  Runny nose and nasal congestion present.  No myalgias.  No headache.     Current Outpatient Prescriptions   Medication Sig Dispense Refill     blood glucose meter (GLUCOMETER) Use 1 each As Directed as needed. Dispense glucometer brand per patient's insurance at pharmacy discretion. 1 each 0     blood glucose test strips Use 1 each As Directed as needed. Test 2-3 daily 100 strip 1     dulaglutide (TRULICITY) 0.75 mg/0.5 mL PnIj Inject 0.75 mg under the skin every 7 days. 2 mL 11     generic lancets (FINGERSTIX LANCETS) Use to check blood sugar three times daily. Dispense brand per patient's insurance at pharmacy discretion. 100 each 11     insulin glargine (BASAGLAR KWIKPEN) 100 unit/mL (3 mL) pen Take 60 units at bed time 5 adj dose pen PRN     insulin syringe-needle U-100 1/2 mL 30 gauge x 5/16 Syrg Use twice daily with insulin as directed. 100 each 1     aspirin 81 MG tablet Take 81 mg by mouth daily.       atorvastatin (LIPITOR) 20 MG tablet Take 1 tablet (20 mg total) by mouth daily. 90 tablet 1     benzonatate (TESSALON) 200 MG capsule Take 1 capsule (200 mg total) by mouth 3 (three) times a day as needed for cough. 42 capsule 0     cyclobenzaprine (FLEXERIL) 10 MG tablet Take 1 tablet (10 mg total) by mouth 2 (two) times a day as needed for muscle spasms. 30 tablet 0     HYDROcodone-acetaminophen 5-325 mg per tablet Take 1-2 tablets by mouth at bedtime as needed for pain. 10 tablet 0     lidocaine (LIDODERM) 5 % Remove & Discard patch within 12 hours or as directed by MD 5 patch 0     NOVOLIN N 100 unit/mL injection Give 50 units subcutaneously twice a day. 11.65 Type 2 with hyperglycemia  50 mL 0     No current facility-administered medications for this visit.         Reviewed and updated: medical history, medications and allergies.     Review of Systems     General: Subjective fever and chills.  Cardiovascular: Denies dyspnea on exertion, palpitations.  Respiratory: 4 days of cough.  Denies dyspnea, wheezing.  GI: Denies nausea, vomiting, diarrhea, constipation.  : Denies dysuria, polyuria.     Objective     Vitals:    02/20/18 0947   BP: 132/78   Pulse: (!) 113   Resp: 14   Temp: 98.5  F (36.9  C)   TempSrc: Oral   SpO2: 96%   Weight: 185 lb (83.9 kg)        Reviewed vital signs.  General: Appears calm, comfortable. Answers questions quickly and appropriately with clear speech. No apparent distress.  Skin: Pink, warm, dry.  HENT: Normocephalic, atraumatic. TMs clear bilaterally. No lymphadenitis.  Posterior oropharynx is mildly erythematous, without exudate.  Tonsils 2+ bilaterally.  Uvula midline.  Neck: Supple, without lymphadenitis or thyromegaly.  Heart: Regular rate and rhythm, clear S1/S2 without murmur, rub, or gallop.  Lungs: Clear and equal bilaterally. Normal respiratory effort.  Neuro: Memory and cognition appear normal. Normal gait.  Psych: Mood and affect appear normal.    Results for orders placed or performed in visit on 02/20/18   Rapid Strep A Screen-Throat   Result Value Ref Range    Rapid Strep A Antigen No Group A Strep detected, presumptive negative No Group A Strep detected, presumptive negative          Medical Decision-Making     Zoe is a well-appearing 57-year-old female presents with 4 days of cough, subjective fever and chills.  No headache, high fevers, or myalgias.  Lung exam is benign.  No increased work of breathing.  She is not febrile.  Appearance is nontoxic.  No history or exam findings concerning for pneumonia or PE.  Reviewed symptomatic management of URI including over-the-counter management of nasal congestion.  Prescribed benzonatate for  cough.    Reviewed red flags that would trigger a prompt return to the clinic as noted below under patient instructions.  She expressed understanding of these directions and is in agreement with the plan.     Assessment and Plan     Zoe was seen today for cough.    Diagnoses and all orders for this visit:    Viral URI with cough    Sore throat  -     Rapid Strep A Screen-Throat  -     Group A Strep, RNA Direct Detection, Throat    Cough  -     benzonatate (TESSALON) 200 MG capsule; Take 1 capsule (200 mg total) by mouth 3 (three) times a day as needed for cough.        Patient Instructions   Please return to the clinic if you notice any of the following:    Fever / chills.    Difficulty breathing.    Cough that gets suddenly worse.      A humidifier in your bedroom can be very helpful in reducing the morning nasal pain and severe congestion.    You can use saline nasal spray throughout the day for comfort and to help relieve nasal congestion and dryness.    Fluticasone nasal spray (Flonase) or other steroid nasal sprays can be very helpful with congestion. Use up to twice per day, 1 spray in each nostril. Before using the steroid nasal spray, clean your nose by using the nasal saline spray, waiting 30 - 60 seconds, and blowing your nose. Repeat if needed. Then use the steroid spray. This will help remove as much mucus as possible, maximizing the effectiveness of the steroid spray.        Discussed benefit vs risk of medications, dosing, side effects.  Patient was able to verbalize understanding.  After visit summary was provided for patient.     Philipp Rey PA-C

## 2021-06-16 NOTE — PROGRESS NOTES
Progress Note     ASSESSMENT/PLAN:    1. Right wrist pain  XR Wrist Right 3 or More VWS    XR Wrist Right 3 or More VWS   2. Pain and swelling of right wrist  HM2(CBC w/o Differential)    C-Reactive Protein (CRP)    Sedimentation Rate    Rheumatoid Factor Quant    Basic Metabolic Panel    Uric Acid    XR Wrist Right 3 or More VWS    XR Wrist Right 3 or More VWS   3. Stage 3a chronic kidney disease     4. Type 2 diabetes mellitus without complication, with long-term current use of insulin (H)  Glycosylated Hemoglobin A1c     On exam, patient has exquisite tenderness at the right ventral ulnar aspect of the wrist with light palpation.  The area is erythematous, swollen and warm to the touch.  There is no streaking of the skin.  Patient has decreased  strength of the right hand 2/2 pain.  There is no pain of the forearm or the olecranon.  Patient's pain does not radiate anywhere else.  DDx: Cellulitis vs gout (curious place for gout flare) vs autoimmune process vs RF (although would be bilateral) vs fracture.  We will get some baseline blood work to rule out inflammatory etiology and gout.  We will get baseline CBC and BMP.  We will also get an x-ray to rule out acute bony fracture.  If blood work and x-ray are negative, should treat with antibiotics for presumed cellulitis.  We will follow up closely in 2 weeks.  If patient starts to have fevers, chills, rapid spreading of the erythema/swelling, decreased sensation/numbness in the hand, she should go to the ED.  - XR Wrist Right 3 or More VWS; Future  - HM2(CBC w/o Differential)  - C-Reactive Protein (CRP)  - Sedimentation Rate  - Rheumatoid Factor Quant  - Basic Metabolic Panel  - Uric Acid     Type 2 diabetes mellitus without complication, with long-term current use of insulin (H)  Lab Results   Component Value Date    HGBA1C 9.1 (H) 10/13/2020   Due for recheck of her HbA1c.  Has been taking her insulin inconsistently at home.  She is not sure what the blood  sugars have been doing over the last 2 days.  - Glycosylated Hemoglobin A1c      There are no discontinued medications.        Return in about 2 weeks (around 4/14/2021) for Wrist pain follow up .    CHIEF COMPLAINT:  Chief Complaint   Patient presents with     Wrist Pain     right wrist pain x 2 days. She states no injury that she is aware of. Pain started in the wrist joint and has worsened with pain/edema since onset. She was given a wrist brace by her daughter in law and she states that has helped a little       HISTORY OF PRESENT ILLNESS:  Zoe Alvarez is a 60 y.o. female w/PMHx of HLD, DM 2, obesity, vertigo who presents today for right-sided wrist pain.    About 2 weeks ago, patient woke up with a painful right wrist.  Pain is mostly along the ulnar aspect of the wrist.  Has since then gotten progressively worse.  There is no redness, swelling and pain with movement.  If she tries to move her pinky finger, the pain is a 10/10 in her wrist.  At rest, the pain is 6/10.  She denies any trauma to the area, did not sustain any cuts/bumps, no pet scratches and no previous similar episodes.  Patient denies any inherent muscle weakness.  Has tried Tylenol, ibuprofen and ice for the wrist without any improvement.  Has never had anything like this before.  No history of gout.  Not a very heavy drinker.    REVIEW OF SYSTEMS:   All other systems are negative.      TOBACCO USE:  Social History     Tobacco Use   Smoking Status Former Smoker   Smokeless Tobacco Never Used       MEDICATIONS:  Current Outpatient Medications   Medication Sig Dispense Refill     blood glucose meter (GLUCOMETER) Use 1 each As Directed as needed. Dispense glucometer brand per patient's insurance at pharmacy discretion. 1 each 0     empagliflozin (JARDIANCE) 10 mg Tab Take 1 tablet (10 mg total) by mouth daily. 30 tablet 11     fluconazole (DIFLUCAN) 150 MG tablet TAKE ONE TABLET NOW AND IF NO BETTER IN 3 DAYS, TAKE ANOTHER 2 tablet 3      "generic lancets (FINGERSTIX LANCETS) Use to check blood sugar 1-2x daily. Dispense brand per patient's insurance at pharmacy discretion. 100 each 11     insulin glargine (LANTUS SOLOSTAR U-100 INSULIN) 100 unit/mL (3 mL) pen INJECT TWICE DAILY. UP TO 90 UNITS A DAY. 30 mL 0     pen needle, diabetic (BD ULTRA-FINE TJ PEN NEEDLE) 32 gauge x 5/32\" Ndle USE TWO TIMES A DAY WITH INSULIN 200 each 1     valACYclovir (VALTREX) 1000 MG tablet TAKE 2 TABLETS BY MOUTH NOW AND 2 TABLETS IN 12 HOURS. PRN. 4 tablet 3     No current facility-administered medications for this visit.          Immunization History   Administered Date(s) Administered     INFLUENZA,SEASONAL QUAD, PF, =/> 6months 11/04/2019, 09/24/2020     Pneumo Conj 13-V (2010&after) 07/09/2011     Pneumo Polysac 23-V 04/06/2007     Td, adult adsorbed, PF 06/20/2005     Tdap 09/13/2011         VITALS:  Vitals:    03/31/21 1408   BP: 128/70   Patient Site: Left Arm   Patient Position: Sitting   Cuff Size: Adult Regular   Pulse: 64   Resp: 16   Weight: 194 lb 12.8 oz (88.4 kg)     Wt Readings from Last 3 Encounters:   03/31/21 194 lb 12.8 oz (88.4 kg)   01/11/21 194 lb (88 kg)   11/24/20 194 lb (88 kg)     Body mass index is 32.92 kg/m .      PHYSICAL EXAM:  Constitutional:  Reveals a pleasant female, NAD at rest.  Vitals:  Per nursing notes.  MSK/skin:  On inspection, patient has erythema, swelling of the ulnar aspect of the wrist ventrally but spreads a little bit to the thenar eminence.  Active wrist ROM is intact the patient is hesitant to go through ROM 2/2 pain.  Sensation is intact in all of her fingers.   strength of the right hand is mildly decreased 2/2 pain.  Patient has reproduction of symptoms in her wrist with active use of her pinky finger.  Exquisite pinpoint tenderness with light palpation of the medial aspect of the wrist.  The area is warm and nonfluctuant.  There is no drainage or evidence of abscess or cyst.  There is no streaking of the skin. "  There is no pain with axial loading of the fingers.  Unable to perform Phalen's 2/2 pain.  Psychiatric:  Mood appropriate, memory intact.      No

## 2021-06-16 NOTE — TELEPHONE ENCOUNTER
----- Message from Val Garza DO sent at 4/5/2021  9:21 PM CDT -----  Please call patient rather than sending message via Comic Reply    - How is her hand feeling? Is she still having pain and redness or did the antibiotics help?     - no evidence of autoimmune process on the rest of her bloodwork     - her diabetes has gotten a bit worse, she needs to remember to take her insulin regularly.

## 2021-06-16 NOTE — PATIENT INSTRUCTIONS - HE
Double your Jardiance for the next week    We will see what your BG are looking like and either increase it to 25 mg daily, or decide to do something else    Keep your Insulin doses as they are.    We will consider Januvia if we can't improve your BG with the Jardiance.

## 2021-06-16 NOTE — TELEPHONE ENCOUNTER
Patient calling back in regards to message below.    She reports hand is doing well. No redness, no swelling, no pain.  Antibiotics seem to be working well.    She's glad there's no autoimmune issues in the bloodwork.    She reports she is seeing her endocrinologist on 4/21/21.  She will continue taking her insulin regularly and discuss control with endo when she sees them.

## 2021-06-16 NOTE — PROGRESS NOTES
Date of last OV: 10/20/20  Reason for Visit: DM    Lab 3/31/21  A1C: 9.7  CREATININE: 0.77  Lab 4/14/21  LDL: 112  Microalbumin: <0.50    Blood Glucose Log:

## 2021-06-17 NOTE — PATIENT INSTRUCTIONS - HE
Patient Instructions by Isidro Douglas DO at 5/31/2019 12:50 PM     Author: Isidro Douglas DO Service: -- Author Type: Physician    Filed: 5/31/2019  2:01 PM Encounter Date: 5/31/2019 Status: Addendum    : Isidro Douglas DO (Physician)    Related Notes: Original Note by Isidro Douglsa DO (Physician) filed at 5/31/2019  2:01 PM       See hand out for advice also. Your podiatry appointment is for 1 PM next Tuesday, 06/04/19 in [place].     Patient Education     Foot Sprain    A sprain is a stretching or tearing of the ligaments that hold a joint together. There are no broken bones. Sprains generally take from 3-6 weeks to heal. A sprain may be treated with a splint, walking cast, or special boot. Mild sprains may not need any additional support.  Home care  The following guidelines will help you care for your injury at home:    Keep your leg elevated when sitting or lying down. This is very important during the first 48 hours to reduce swelling. Stay off the injured foot as much as possible until you can walk on it without pain. If needed, you may use crutches during the first week for this purpose. Crutches can be rented at many pharmacies or surgical/orthopedic supply stores.    You may be given a cast shoe to wear to prevent movement in your foot. If not, you can use a sandal or any shoe that does not put pressure on the injured area until the swelling and pain go away. If using a sandal, be careful not to hit your foot against anything, since another injury could make the sprain worse.    Apply an ice pack over the injured area for 15 to 20 minutes every 3 to 6 hours. You should do this for the first 24 to 48 hours. You can make an ice pack by filling a plastic bag that seals at the top with ice cubes and then wrapping it with a thin towel. Continue to use ice packs for relief of pain and swelling as needed. As the ice melts, avoid getting any wrap, splint, or cast wet. After 48 hours, apply heat from  a warm shower or bath for 20 minutes several times daily. Alternating ice and heat may also be helpful.    You may use over-the-counter pain medicine to control pain, unless another medicine was prescribed. If you have chronic liver or kidney disease or ever had a stomach ulcer or GI bleeding, talk with your healthcare provider before using these medicines.    If you were given a splint or cast, keep it dry. Bathe with your splint or cast well out of the water, protected with 2 large plastic bags, rubber-banded at the top end. If a fiberglass splint or cast gets wet, you can dry it with a hair dryer.    You may return to sports after healing, when you can run without pain.  Follow-up care  Follow up with your healthcare provider as directed. Sometimes fractures dont show up on the first X-ray. Bruises and sprains can sometimes hurt as much as a fracture. These injuries can take time to heal completely. If your symptoms dont improve or they get worse, talk with your healthcare provider. You may need a repeat X-ray.  When to seek medical advice  Call your healthcare provider right away if any of these occur:    The plaster cast or splint gets wet or soft    The fiberglass cast or splint gets wet and does not dry for 24 hours    Pain or swelling increases, or redness appears    A bad odor comes from within the cast    Fever of 100.4 F (38 C) or above lasting for 24 to 48 hours    Toes on the injured foot become cold, blue, numb, or tingly  Date Last Reviewed: 11/20/2015 2000-2017 [employer]. [address] [place] [address], [place] [id number]. All rights reserved. This information is not intended as a substitute for professional medical care. Always follow your healthcare professional's instructions.

## 2021-06-17 NOTE — TELEPHONE ENCOUNTER
----- Message from Jayla Hermosillo NP sent at 4/21/2021  2:54 PM CDT -----  Please call pt in 1 week for BG - we are experimenting with doubling her Jardiance.

## 2021-06-17 NOTE — TELEPHONE ENCOUNTER
Telephone Encounter by Latonia Aguirre at 4/17/2020  7:15 AM     Author: Latonia Aguirre Service: -- Author Type: --    Filed: 4/17/2020  7:17 AM Encounter Date: 4/15/2020 Status: Signed    : Latonia Aguirre APPROVED:    Approval start date: 04/14/2020  Approval end date:  04/14/2021    Pharmacy has been notified of approval and will contact patient when medication is ready for pickup.

## 2021-06-17 NOTE — TELEPHONE ENCOUNTER
5/14: 157 a.m,  5/13: 176 am  5/12: 416 p. (does not recall what she ate, but was not feeling well that day).203 a.m.  5/11: 124 a.m.   5/10: 188 p.m. 167 a.m.  5/9: 196 p.m.  5/8: 188 p.m., 120 a.m,  5/7: 177 p.m. 228 afternoon, 154 a.m.   The pt started Jardiance 25 mg 1 week ago. I informed that these numbers look pretty good and I will call if any changed need to be made. Pt understands.

## 2021-06-17 NOTE — TELEPHONE ENCOUNTER
I think that the experiment worked mostly - there are two 300+ readings that are just too high. Perhaps with the extra 5 mg things will be better. We can also consider adding insulin at dinner. I will send in the Jardiance and please have her get us BG in another two weeks.

## 2021-06-17 NOTE — TELEPHONE ENCOUNTER
BG Lo/22: 92 am, 143 L, 288 D,  : 178 am, 216 D  : 151 a.m., 330 L, 221 D  : 204 a.m., 343 D  : 203 a.m, 117 D  : 135 a.m., 124 L, 224 D  : 148 a.m., 168 L  Pt does not have any more test strips so she does not have any more numbers. The test strips will be delivered to her home today. I informed the pt I will review with Annelise and contact her back.

## 2021-06-17 NOTE — PROGRESS NOTES
"[facility]  ENDOCRINOLOGY    Diabetes Note 4/22/2018    Zoe Alvarez, 1960, [id number]          Reason for visit      1. Type 2 diabetes mellitus without complication        HPI     Zoe Alvarez is a very pleasant 57 y.o. old female who presents for follow up.  SUMMARY:  Zoe returns today in f/u for DM 2.  Her last A1c was > 14, and has dropped now, to 8.1.  She is over the moon with happiness about it.  She is taking Trulicity, 0.75 mg weekly, and Basaglar 60 units daily.  She notes that she has been eating smaller portions and more veggies. She works at [employer] and walks for 7 hours a day.  She does endorse some neuropathy, but doesn't want to take anything for it.  She states that her  takes it for his neuropathy and that it makes him crabby (?).      Blood glucose data:  Unavailable    Past Medical History     Patient Active Problem List   Diagnosis     Obesity     Venous Insufficiency     Epidermal Inclusion Cyst     Hyperlipidemia     Vertigo     Ingrowing Toenail     Nicotine Dependence     Type 2 diabetes mellitus without complication     Eczema     RLQ abdominal pain        Family History       family history includes Breast cancer in her maternal grandmother; Diabetes in her mother.    Social History      reports that she has quit smoking. She has never used smokeless tobacco. She reports that she drinks about 0.6 oz of alcohol per week  She reports that she does not use illicit drugs.      Review of Systems     Patient has no polyuria or polydipsia, no chest pain, dyspnea or TIA's, no numbness, tingling or pain in extremities  Remainder negative except as noted in HPI.    Vital Signs     /54  Ht 5' 4.25\" (1.632 m)  Wt 187 lb 9.6 oz (85.1 kg)  BMI 31.95 kg/m2  Wt Readings from Last 3 Encounters:   04/19/18 187 lb 9.6 oz (85.1 kg)   03/12/18 187 lb 6.4 oz (85 kg)   02/20/18 185 lb (83.9 kg)       Physical Exam     Constitutional:  Well developed, Well nourished  HENT:  " Normocephalic,   Neck: Thyroid normal, No lymph nodes, Supple  Eyes:  PERRL, Conjunctiva pink  Respiratory:  Normal breath sounds, No respiratory distress  Cardiovascular:  Normal heart rate, Normal rhythm, No murmurs  GI:  Bowel sounds normal, Soft, No tenderness  Musculoskeletal:  No gross deformity or lesions, normal dorsalis pedis pulses  Skin: No acanthosis nigricans, lipoatrophy or lipodystrophy  Neurologic:  Alert & oriented x 3, nonfocal  Psychiatric:  Affect, Mood, Insight appropriate      Assessment     1. Type 2 diabetes mellitus without complication        Plan       Clearly the current regimen is working.  She will continue with the Trulicity and the Basaglar.  She will also continue with her new eating pattern.  She will f/u with me in 3 months.  Time spent with pt today: 25 min with >50% spent in counseling and coordination of care.      Jayla HOLM Endocrinology  4/22/2018  1:58 PM        Lab Results     Hemoglobin A1c   Date Value Ref Range Status   04/12/2018 8.1 (H) 3.5 - 6.0 % Final   11/06/2017 >14.0 (H) 3.5 - 6.0 % Final   06/15/2016 9.7 (H) 3.5 - 6.0 % Final   11/05/2015 >14.0 (H) 3.5 - 6.0 % Final   10/24/2014 7.3 (H) 3.5 - 6.0 % Final     Creatinine   Date Value Ref Range Status   11/06/2017 1.01 0.60 - 1.10 mg/dL Final   06/15/2016 0.75 0.60 - 1.10 mg/dL Final   11/05/2015 0.90 0.60 - 1.10 mg/dL Final     Microalbumin, Random Urine   Date Value Ref Range Status   11/06/2017 0.52 0.00 - 1.99 mg/dL Final       Cholesterol   Date Value Ref Range Status   11/06/2017 227 (H) <=199 mg/dL Final     HDL Cholesterol   Date Value Ref Range Status   11/06/2017 46 (L) >=50 mg/dL Final     LDL Calculated   Date Value Ref Range Status   11/06/2017 150 (H) <=129 mg/dL Final     Triglycerides   Date Value Ref Range Status   11/06/2017 156 (H) <=149 mg/dL Final       Lab Results   Component Value Date    ALT 29 11/06/2017    AST 16 11/06/2017    ALKPHOS 100 11/06/2017    BILITOT 0.8 11/06/2017          Current Medications     Outpatient Medications Prior to Visit   Medication Sig Dispense Refill     blood glucose meter (GLUCOMETER) Use 1 each As Directed as needed. Dispense glucometer brand per patient's insurance at pharmacy discretion. 1 each 0     blood glucose test strips Use 1 each As Directed as needed. Test 2-3 daily 100 strip 1     dulaglutide (TRULICITY) 0.75 mg/0.5 mL PnIj Inject 0.75 mg under the skin every 7 days. 12 Syringe 0     generic lancets (FINGERSTIX LANCETS) Use to check blood sugar three times daily. Dispense brand per patient's insurance at pharmacy discretion. 100 each 11     insulin glargine (BASAGLAR KWIKPEN U-100 INSULIN) 100 unit/mL (3 mL) pen Take 60 units at bed time (Patient taking differently: Take 60 units in the morning) 54 mL 0     insulin syringe-needle U-100 1/2 mL 30 gauge x 5/16 Syrg Use twice daily with insulin as directed. 100 each 1     lidocaine (LIDODERM) 5 % Remove & Discard patch within 12 hours or as directed by MD 5 patch 0     aspirin 81 MG tablet Take 81 mg by mouth daily.       atorvastatin (LIPITOR) 20 MG tablet Take 1 tablet (20 mg total) by mouth daily. 90 tablet 1     benzonatate (TESSALON) 200 MG capsule Take 1 capsule (200 mg total) by mouth 3 (three) times a day as needed for cough. 42 capsule 0     cyclobenzaprine (FLEXERIL) 10 MG tablet Take 1 tablet (10 mg total) by mouth 2 (two) times a day as needed for muscle spasms. 30 tablet 0     HYDROcodone-acetaminophen 5-325 mg per tablet Take 1-2 tablets by mouth at bedtime as needed for pain. 10 tablet 0     NOVOLIN N 100 unit/mL injection Give 50 units subcutaneously twice a day. 11.65 Type 2 with hyperglycemia 50 mL 0     No facility-administered medications prior to visit.

## 2021-06-17 NOTE — TELEPHONE ENCOUNTER
empagliflozin (JARDIANCE) 25 mg Tab tablet 90 tablet 3 4/29/2021  --   Sig - Route: Take 1 tablet (25 mg total) by mouth daily. - Oral   Sent to pharmacy as: empagliflozin 25 mg tablet (Jardiance)

## 2021-06-17 NOTE — TELEPHONE ENCOUNTER
Disregard the below. The medication dose was changed to Jardiance 25 mg daily. I have contacted the pharmacy and the 25 mg dose requires a prior authorization. Please submit.

## 2021-06-17 NOTE — TELEPHONE ENCOUNTER
Telephone Encounter by Amber Beth at 5/3/2021 10:33 AM     Author: Amber Beth Service: -- Author Type: --    Filed: 5/3/2021 10:34 AM Encounter Date: 4/29/2021 Status: Signed    : Amber Beth       [facility] team  [phone]  Pool: [facility] ([id number])          PA has been initiated.           Response will be received via fax and may take up to 5-10 business days depending on plan

## 2021-06-17 NOTE — TELEPHONE ENCOUNTER
Telephone Encounter by Amber Beth at 5/5/2021  9:26 AM     Author: Amber Beth Service: -- Author Type: --    Filed: 5/5/2021  9:27 AM Encounter Date: 4/29/2021 Status: Signed    : Amber Beth APPROVED:    Approval start date: 5/1/2021  Approval end date:  5/1/2022    Pharmacy has been notified of approval and will contact patient when medication is ready for pickup.

## 2021-06-17 NOTE — TELEPHONE ENCOUNTER
Telephone Encounter by Latonia Aguirre at 4/15/2020  9:57 AM     Author: Latonia Aguirre Service: -- Author Type: --    Filed: 4/15/2020  9:59 AM Encounter Date: 4/15/2020 Status: Signed    : Latonia Aguirre       [facility] team  [phone]  Pool: [facility] ([id number])          PA has been initiated.       PA form completed and faxed insurance via Cover My Meds     Key:  [id number]     Medication:  JARDIANCE 10 MG    Insurance:  PREFERRED ONE        Response will be received via fax and may take up to 5-10 business days depending on plan

## 2021-06-18 NOTE — LETTER
Letter by Patrice Doe MD at      Author: Patrice Doe MD Service: -- Author Type: --    Filed:  Encounter Date: 2/6/2019 Status: (Other)       February 6, 2019     Patient: Zoe Alvarez   YOB: 1960   Date of Visit: 2/6/2019       To Whom It May Concern:      It is my medical opinion that Zoe Alvarez may return to work on 2/11/19, given her back pain has been resolved.       If you have any questions or concerns, please don't hesitate to call.    Sincerely,        Electronically signed by Patrice Doe MD

## 2021-06-18 NOTE — LETTER
Letter by Candace Moody PA-C at      Author: Candace Moody PA-C Service: -- Author Type: --    Filed:  Encounter Date: 2/4/2019 Status: (Other)       February 4, 2019     Patient: Zoe Alvarez   YOB: 1960   Date of Visit: 2/4/2019       To Whom it May Concern:    Zoe Alvarez was seen in my clinic on 2/4/2019.   She will need to be off work from 2/4/19 to 2/6/19.      If you have any questions or concerns, please don't hesitate to call.    Sincerely,         Electronically signed by Candace Moody PA-C

## 2021-06-19 NOTE — LETTER
Letter by Isidro Douglas DO at      Author: Isidro Douglas DO Service: -- Author Type: --    Filed:  Encounter Date: 5/31/2019 Status: (Other)         May 31, 2019     Patient: Zoe Alvarez   YOB: 1960   Date of Visit: 5/31/2019       Date of Injury:  05/31/2019  Employer:  [employer]    WORK STATUS  Work Related:  Yes  Work Status:  Unable to return to work from 05/31/2019 through 06/04/2019.    Other restrictions:  Minimize weight bearing on left foot until seen by podiatry.    DISCHARGE  Diagnosis: Left foot sprain  Medications:  OTC naproxen, Norco 5/325 1-2 orally every 6 hours as needed for pain relief.  Follow-Up Appt:  With podiatry on 06/04/2019    Narcotic medication may cause drowsiness      TREATMENT PLAN  See clinic after visit summary for treatment advice for foot sprain.      Expected Full Duty Date:  To be determined    I understand my treatment and care plan.      Patient Signature:  _________________________________      Provider Signature:  ________________________________  Date:  05/31/2019  Isidro Douglas DO

## 2021-06-19 NOTE — LETTER
Letter by Jules Mao DPM at      Author: Jules Mao DPM Service: -- Author Type: --    Filed:  Encounter Date: 6/4/2019 Status: (Other)         June 4, 2019     Patient: Zoe Alvarez   YOB: 1960   Date of Visit: 6/4/2019       To Whom It May Concern:    It is my medical opinion that Zoe Alvarez may return to work on June 6,2019.    If you have any questions or concerns, please don't hesitate to call.    Sincerely,        Electronically signed by Jules Mao DPM

## 2021-06-19 NOTE — PROGRESS NOTES
Patient presents at the request of Dr. Kapoor for bilateral lower extremity EMG.  She has a history of diabetes mellitus.  She is lower extremity numbness tingling pain with burning sensation in the toes up to the ankles right equal to left.  She also has a cyst in the left lower extremity in the plantar aspect of the foot surgery pending.    EMG/NCS  results: Please see scanned full report.    Comment NCS: Abnormal study  1.  Low amplitude bilateral lower extremity sural SNAPs absent right superficial peroneal SNAP.  2.  Generalized borderline low amplitude of the lower extremity CMAPs with the exception of the right peroneal CMAP which has normal amplitude.  Borderline/slow conduction velocities the lower extremity CMAPs.  3.  Preferentially slowed bilateral tibial CMAP conduction velocities with borderline amplitudes.  3.  Normal right radial SNAP and ulnar CMAP.    Comment EMG: Normal study  1.  Normal needle EMG bilateral lower extremities.    Interpretation: Abnormal study    1.  Electrodiagnostic findings are consistent with a length dependent sensorimotor polyneuropathy, predominantly axonal.      2.  I cannot completely exclude a concomitant mild bilateral tibial neuropathy at the tarsal tunnel.  Difficult to confirm or exclude given the polyneuropathy findings.    3.  There is no electrodiagnostic evidence of lumbosacral radiculopathy  or peroneal neuropathy in the lateral lower extremities.    The testing was completed in its entirety by the physician.      It was our pleasure caring for your patient today, if there any questions or concerns please do not hesitate to contact us.

## 2021-06-19 NOTE — PROGRESS NOTES
[facility]  ENDOCRINOLOGY    Diabetes Note 8/3/2018    Zoe Alvarez, 1960, [id number]          Reason for visit      1. Type 2 diabetes mellitus without complication (H)        HPI     Zoe Alvarez is a very pleasant 57 y.o. old female who presents for follow up.  SUMMARY:  Zoe is here today in f/u for DM 2.  Her recent A1c has improved from 8.1 to 7.9.  She works at [employer] for a living and states that the recent remodeling has been very stressful for her.  They have also recently put a new roof on their house and that was stressful also.  She is going on vacation to [facility] soon though, and is looking forward to it.  She is supposed to have foot surgery soon and was told that she would have improvement in her neuropathy afterwards.  She is currently taking Basaglar, 60 units daily, and Trulicity 0.75 mg weekly.  She has brought in her meter download and it shows an ave BG of 162 over the last 30 days.  61% of her readings were within range.  She has had some hypoglycemia, but did not need any assistance for this.  The preponderance of these episodes happened while at work. She walks a lot there.      Blood glucose data:  Ave: 162, SD: 75    Past Medical History     Patient Active Problem List   Diagnosis     Obesity     Venous Insufficiency     Epidermal Inclusion Cyst     Hyperlipidemia     Vertigo     Ingrowing Toenail     Nicotine Dependence     Type 2 diabetes mellitus without complication (H)     Eczema     RLQ abdominal pain        Family History       family history includes Breast cancer in her maternal grandmother; Diabetes in her mother.    Social History      reports that she has quit smoking. She has never used smokeless tobacco. She reports that she drinks about 0.6 oz of alcohol per week  She reports that she does not use illicit drugs.      Review of Systems     Patient has no polyuria or polydipsia, no chest pain, dyspnea or TIA's, no numbness, tingling or pain in  "extremities  Remainder negative except as noted in HPI.    Vital Signs     /66 (Patient Site: Right Arm, Patient Position: Sitting, Cuff Size: Adult Regular)  Pulse 80  Ht 5' 4.25\" (1.632 m)  Wt 186 lb 9.6 oz (84.6 kg)  BMI 31.78 kg/m2  Wt Readings from Last 3 Encounters:   08/01/18 186 lb 9.6 oz (84.6 kg)   04/19/18 187 lb 9.6 oz (85.1 kg)   03/12/18 187 lb 6.4 oz (85 kg)       Physical Exam     Constitutional:  Well developed, Well nourished  HENT:  Normocephalic,   Neck: Thyroid normal, No lymph nodes, Supple  Eyes:  PERRL, Conjunctiva pink  Respiratory:  Normal breath sounds, No respiratory distress  Cardiovascular:  Normal heart rate, Normal rhythm, No murmurs  GI:  Bowel sounds normal, Soft, No tenderness  Musculoskeletal:  No gross deformity or lesions, normal dorsalis pedis pulses  Skin: No acanthosis nigricans, lipoatrophy or lipodystrophy  Neurologic:  Alert & oriented x 3, nonfocal  Psychiatric:  Affect, Mood, Insight appropriate  Diabetic foot exam: Being followed by Podiatry    Assessment     1. Type 2 diabetes mellitus without complication (H)        Plan       Zoe has been doing a good job with her blood sugars.  She has a goal to get them lower, and will no doubt achieve this because she wants to do so.  No refills needed today.  She will f/u with me in 3 months. Time spent with pt today: 25 min with >50% spent in counseling and coordination of care.      Jayla HOLM Endocrinology  8/3/2018  10:20 AM      Lab Results     Hemoglobin A1c   Date Value Ref Range Status   07/26/2018 7.9 (H) 3.5 - 6.0 % Final   04/12/2018 8.1 (H) 3.5 - 6.0 % Final   11/06/2017 >14.0 (H) 3.5 - 6.0 % Final   06/15/2016 9.7 (H) 3.5 - 6.0 % Final   11/05/2015 >14.0 (H) 3.5 - 6.0 % Final     Creatinine   Date Value Ref Range Status   07/26/2018 0.82 0.60 - 1.10 mg/dL Final   11/06/2017 1.01 0.60 - 1.10 mg/dL Final   06/15/2016 0.75 0.60 - 1.10 mg/dL Final     Microalbumin, Random Urine   Date Value Ref " "Range Status   11/06/2017 0.52 0.00 - 1.99 mg/dL Final       Cholesterol   Date Value Ref Range Status   11/06/2017 227 (H) <=199 mg/dL Final     HDL Cholesterol   Date Value Ref Range Status   11/06/2017 46 (L) >=50 mg/dL Final     LDL Calculated   Date Value Ref Range Status   11/06/2017 150 (H) <=129 mg/dL Final     Triglycerides   Date Value Ref Range Status   11/06/2017 156 (H) <=149 mg/dL Final       Lab Results   Component Value Date    ALT 29 11/06/2017    AST 16 11/06/2017    ALKPHOS 100 11/06/2017    BILITOT 0.8 11/06/2017         Current Medications     Outpatient Medications Prior to Visit   Medication Sig Dispense Refill     BASAGLAR KWIKPEN U-100 INSULIN 100 unit/mL (3 mL) pen INJECT 60 UNITS ONCE NIGHTY AT BED TIME 60 mL 0     blood glucose meter (GLUCOMETER) Use 1 each As Directed as needed. Dispense glucometer brand per patient's insurance at pharmacy discretion. 1 each 0     blood glucose test strips Use 1 each As Directed as needed. Test 2-3 daily 100 strip 1     generic lancets (FINGERSTIX LANCETS) Use to check blood sugar three times daily. Dispense brand per patient's insurance at pharmacy discretion. 100 each 11     insulin syringe-needle U-100 1/2 mL 30 gauge x 5/16 Syrg Use twice daily with insulin as directed. 100 each 1     lidocaine (LIDODERM) 5 % Remove & Discard patch within 12 hours or as directed by MD 5 patch 0     pen needle, diabetic (BD ULTRA-FINE TJ PEN NEEDLE) 32 gauge x 5/32\" Ndle Use daily with insulin 100 each 5     TRULICITY 0.75 mg/0.5 mL PnIj INJECT 0.5 ML SUBCUTANEOUSLY EVERY 7 DAYS 6 Syringe 0     No facility-administered medications prior to visit.            "

## 2021-06-19 NOTE — PROGRESS NOTES
Admission History & Physical  Zoe Alvarez, 1960, [id number]    [facility]  Geronimo Eric MD, [phone]    Extended Emergency Contact Information  Primary Emergency Contact: Kyaw Alvarez  Address: [address] [id number] [place]  Home Phone: [phone]  Work Phone: [phone]  Relation: Spouse     Assessment and Plan:   Assessment: Compression neuropathy, tarsal tunnel, mononeuritis, plantar fibroma left foot  Plan: I have recommended surgical excision of the plantar fibroma and a Dellon quadruple nerve decompression of both lower extremities.  Active Problems:    * No active hospital problems. *      Chief Complaint:  Painful lump left foot.  Severe numbness, tingling, shooting pains both lower extremities     HPI:    Zoe Alvarez is a 57 y.o. old female presented to the clinic today complaining of a severely painful lump on the bottom of her left foot.  She has had this for several months.  The patient stated she finds it difficult to weight-bear because of the pain.  She also complained of severe numbness, burning, shooting pains both lower extremities.  She stated that the symptoms get worse at night. She finds it difficult to sleep.  Her primary care physician had recommended gabapentin but the patient refused to take any additional medications.  She is very frustrated at the inability to alleviate the symptoms.  History is provided by patient    Medical History  Active Ambulatory (Non-Hospital) Problems    Diagnosis     RLQ abdominal pain     Obesity     Venous Insufficiency     Epidermal Inclusion Cyst     Hyperlipidemia     Vertigo     Ingrowing Toenail     Nicotine Dependence     Type 2 diabetes mellitus without complication (H)     Eczema     Past Medical History:   Diagnosis Date     Arthritis      Diabetes mellitus (H)      Migraine      Neuropathy (H)      Patient Active Problem List    Diagnosis Date Noted     RLQ abdominal  pain 07/09/2014     Obesity      Venous Insufficiency      Epidermal Inclusion Cyst      Hyperlipidemia      Vertigo      Ingrowing Toenail      Nicotine Dependence      Type 2 diabetes mellitus without complication (H)      Eczema      Surgical History  She  has a past surgical history that includes pr total abdom hysterectomy; Hysterectomy; Bladder suspension (2007); Laparoscopic appendectomy (N/A, 7/10/2014); and pr appendectomy.   Past Surgical History:   Procedure Laterality Date     BLADDER SUSPENSION  2007     HYSTERECTOMY      1999     LAPAROSCOPIC APPENDECTOMY N/A 7/10/2014    Procedure: Laparoscopic Appendectomy;  Surgeon: Germain Howe MD;  Location: [facility] [place];  Service:      RI APPENDECTOMY      Description: Appendectomy;  Recorded: 07/17/2014;  Comments: JULY 2014     RI TOTAL ABDOM HYSTERECTOMY      Description: Hysterectomy;  Recorded: 01/11/2012;    Social History  Reviewed, and she  reports that she has quit smoking. She has never used smokeless tobacco. She reports that she drinks about 0.6 oz of alcohol per week  She reports that she does not use illicit drugs.  Social History   Substance Use Topics     Smoking status: Former Smoker     Smokeless tobacco: Never Used     Alcohol use 0.6 oz/week     1 Shots of liquor per week      Comment: <1 per wk      Allergies  Allergies   Allergen Reactions     Amoxicillin Swelling and Itching     Metformin     Family History  Reviewed, and family history includes Breast cancer in her maternal grandmother; Diabetes in her mother.   Psychosocial Needs  Social History     Social History Narrative     Additional psychosocial needs reviewed per nursing assessment.       Prior to Admission Medications     (Not in a hospital admission)        Review of Systems - Negative     /68  Pulse (!) 58  Temp 98.6  F (37  C)  SpO2 96%    Objective findings: General: The patient is alert and in no acute distress      Integument: Nails bilateral feet are normal  growth and color.  Skin  bilateral feet warm and intact.        Vascular: DP and PT pulses +2/4 bilateral feet.  Capillary refill less than 2 seconds bilateral feet.     Neurologic: Negative clonus, negative Babinski bilaterally.  There is a very pronounced positive Tinel sign when percussing the common, superficial, deep peroneal nerves as well as the tarsal tunnel bilateral lower extremities.  The patient also had an extremely positive Tinel sign when percussing the posterior tibial nerve at the level of the soleal sling.      Musculoskeletal: Range of motion within normal limits bilateral feet.  Muscle power +5/5 bilaterally in all compartments.  There is a large firm palpable subcutaneous mass within the central band of the plantar fascia left foot.  There is pain on palpation of this lesion.        Assessment: Plantar fibroma left foot, compression neuropathy, tarsal tunnel, mononeuritis both lower extremities        Plan: I informed the patient that she would require surgical excision of the painful plantar fibroma left foot.  I also mentioned to the patient she would be a good candidate for nerve decompression with external neural lysis of multiple nerves of the both lower extremities in an attempt to alleviate her symptoms of her severe neuropathy.  She was told these procedures would be done on an outpatient basis.  The patient has been sent to neurology for EMGs and nerve conduction studies.  Patient is to return to the clinic in 1 week to discuss the findings of the EMGs.

## 2021-06-20 NOTE — ANESTHESIA CARE TRANSFER NOTE
Last vitals:   Vitals:    09/21/18 1046   BP: 157/78   Pulse: 92   Resp: 16   Temp: 37.1  C (98.7  F)   SpO2: 100%     Patient's level of consciousness is drowsy  Spontaneous respirations: yes  Maintains airway independently: yes  Dentition unchanged: yes  Oropharynx: oropharynx clear of all foreign objects    QCDR Measures:  ASA# 20 - Surgical Safety Checklist: WHO surgical safety checklist completed prior to induction  PQRS# 430 - Adult PONV Prevention: 4558F - Pt received => 2 anti-emetic agents (different classes) preop & intraop  ASA# 8 - Peds PONV Prevention: NA - Not pediatric patient, not GA or 2 or more risk factors NOT present  PQRS# 424 - Tiffany-op Temp Management: 4559F - At least one body temp DOCUMENTED => 35.5C or 95.9F within required timeframe  PQRS# 426 - PACU Transfer Protocol: - Transfer of care checklist used  ASA# 14 - Acute Post-op Pain: ASA14B - Patient did NOT experience pain >= 7 out of 10

## 2021-06-20 NOTE — PROGRESS NOTES
Subjective findings: The patient return to the clinic today for postop visit #1, 1 week status post Dellon quadruple nerve decompression of the left lower extremity.  The patient is in good spirits she had no complaints.      Objective findings: The dressings were removed and the wound margins are well coaptated and maintained.  There is moderate edema noted left foot.  There is no erythema or cellulitis noted.  Neurovascular status is intact.  Vital signs are stable.  Muscle power and range of motion within normal limits left lower extremity      Assessment: Compression  neuropathy, tarsal tunnel, mononeuritis all left lower extremity      Plan: A sterile dressing was applied to the left lower extremity today.  The patient was placed in a postop shoe today.  She is to return to the clinic in 1 week for postop visit #2

## 2021-06-20 NOTE — PROGRESS NOTES
Subjective findings: The patient return to the clinic today for postop visit #3, 3 weeks status post Dellon quadruple nerve decompression of the left lower extremity.  The patient is in good spirits she had no complaints.  The patient stated she feels markedly improved.  Much less pain.  Much less numbness noted.      Objective findings: The dressings were removed and the wound margins are well healed.  There is moderate edema noted left foot.  There is no erythema or cellulitis noted.  Neurovascular status is intact.  Vital signs are stable.  Muscle power and range of motion within normal limits left lower extremity      Assessment: Compression neuropathy, tarsal tunnel, mononeuritis all left lower extremity       Plan: All remaining sutures were removed today.  I informed the patient that she is not improving without any signs of complication.  She may now begin wearing normal shoe gear and bathing in a normal manner.  She has been referred to physical therapy for aqua therapy.

## 2021-06-20 NOTE — PROGRESS NOTES
Subjective findings: The patient return to the clinic today for postop visit #2, 2 weeks status post Dellon quadruple nerve decompression of the left lower extremity.  The patient is in good spirits she had no complaints.       Objective findings: The dressings were removed and the wound margins are well coaptated and maintained.  There is moderate edema noted left foot.  There is no erythema or cellulitis noted.  Neurovascular status is intact.  Vital signs are stable.  Muscle power and range of motion within normal limits left lower extremity      Assessment: Compression neuropathy, tarsal tunnel, mononeuritis all left lower extremity       Plan: A sterile dressing was applied to the left lower extremity today.  Some Steri-Strips were removed today. She is to return to the clinic in 1 week for postop visit #3 at which time the sutures will be removed.

## 2021-06-20 NOTE — PROGRESS NOTES
Subjective findings: The patient return to the clinic today to discuss the findings of her EMGs.  I informed the patient that she does have an abnormal study.  The findings were consistent with length dependent sensorimotor polyneuropathy both lower extremities.  I informed the patient when I correlate her clinical findings with her EMGs I feel she would benefit from a nerve decompression with external neurolysis of multiple nerves of both lower extremities.  I also informed the patient she would need nerve decompression with external neurolysis of the proximal posterior tibial nerve at the level of the soleal sling.  The patient was told that these procedures are performed on an outpatient basis under general anesthesia. I informed the patient that she would require surgical excision of the painful plantar fibroma left foot as well.  Patient was told the procedures would be done on an outpatient basis under local anesthesia with IV sedation.  She was told the procedure will take approximately 90 minutes to perform.  She would then be discharged and able to weight-bear and ambulate.  The patient was asked to go n.p.o. at midnight prior to the procedure and she was asked to see her primary care physician for preoperative consultation.  The patient was given a written list of potential postoperative complications with the procedure.

## 2021-06-20 NOTE — LETTER
Letter by Candace Moody PA-C at      Author: Candace Moody PA-C Service: -- Author Type: --    Filed:  Encounter Date: 8/25/2020 Status: (Other)         Zoe Alvarez  [address] [facility] [address]             August 25, 2020         Dear Ms. Alvarez,    Below are the results from your recent visit:    Resulted Orders   Mammo Screening Bilateral    Narrative    BILATERAL FULL FIELD DIGITAL SCREENING MAMMOGRAM    Performed on: 8/21/20.      Compared to: 02/05/2018 Mammo Screening Bilateral, 07/02/2014 Mammo   Screening Bilateral, 10/18/2011 Mammo Screening Bilateral, 04/20/2007   Mammo Screening Bilateral, and 07/18/2005 Mammo Screening Bilateral      Findings: The breasts have scattered areas of fibroglandular densities.   There is no radiographic evidence of malignancy. This study was evaluated   with the assistance of Computer-Aided Detection. Continue routine   screening mammogram as recommended.    ACR BI-RADS Category 1: Negative        Mammogram normal/negative    Please call with questions or contact us using [employer].    Sincerely,        Electronically signed by Candace Moody PA-C

## 2021-06-20 NOTE — PROGRESS NOTES
Surgery/Procedure: nerve decompression with external neuolysis of the poroximal posterior tibila nerve at the level of the soleal sling left lower extremity    Special Equipment: to be determined     Location: [facility]     Date: 11/2/2018    Time: 10am     Surgeon: Dr. Mao    Assist: no    Length of Surgery: 60 min    OR Confirmed/ :  Yes amanda rice on 10/08/18    Orders In:  Yes    Provider Team Notified:  Yes    Entered on [email or web address] / [employer] Calendar:  Yes    Post Op: 11/14 and 11/20/18    Pre-op Instructions Reviewed with Nurse:  Rosemary GOLDEN

## 2021-06-20 NOTE — ANESTHESIA PREPROCEDURE EVALUATION
Anesthesia Evaluation      Patient summary reviewed   No history of anesthetic complications     Airway   Mallampati: II  Neck ROM: full   Pulmonary     breath sounds clear to auscultation  (+) a smoker (former)  (-) COPD, asthma, sleep apnea, rhonchi, wheezes, rales, stridor                         Cardiovascular   Exercise tolerance: > or = 4 METS  (+) , hypercholesterolemia,     (-) past MI, CAD, murmur  ECG reviewed (8/29/18: NSR, 87 bpm)  Rhythm: regular  Rate: normal,    no murmur      Neuro/Psych    (+) neuromuscular disease (peripheral neuropathy),      Comments: Migraines      Endo/Other    (+) diabetes mellitus type 2 poorly controlled, arthritis, obesity (BMI 31.74),      GI/Hepatic/Renal    (-) GERD, impaired hepatic function, renal disease     Other findings: Labs 8/29/18:  Na 140, K 4.3, Cl 104, BUN 23, Cr 0.85  Hgb A1c 7.9      Dental    (+) poor dentition    Comment: Multiple chipped and broken teeth, but no loose, partial, removable or dentures.                       Anesthesia Plan  Planned anesthetic: general LMA  Increased risk of PONV and will plan for dexamethasone, zofran and low-dose propofol gtt (25-50 mcg/kg/min).    ASA 3   Induction: intravenous   Anesthetic plan and risks discussed with: patient and spouse  Anesthesia plan special considerations: antiemetics,   Post-op plan: routine recovery

## 2021-06-20 NOTE — ANESTHESIA POSTPROCEDURE EVALUATION
Patient: Zoe RAHMAN QUADRUPLE NERVE DECOMPRESSION EXCISION PLANTAR FIBROMA ALL LEFT FOOT  Anesthesia type: general    Patient location: PACU  Last vitals:   Vitals:    09/21/18 1150   BP:    Pulse: 77   Resp:    Temp:    SpO2: 98%     Post vital signs: stable  Level of consciousness: awake and responds to simple questions  Post-anesthesia pain: pain controlled  Post-anesthesia nausea and vomiting: no  Pulmonary: unassisted, return to baseline  Cardiovascular: stable and blood pressure at baseline  Hydration: adequate  Anesthetic events: no    QCDR Measures:  ASA# 11 - Tiffany-op Cardiac Arrest: ASA11B - Patient did NOT experience unanticipated cardiac arrest  ASA# 12 - Tiffany-op Mortality Rate: ASA12B - Patient did NOT die  ASA# 13 - PACU Re-Intubation Rate: ASA13B - Patient did NOT require a new airway mgmt  ASA# 10 - Composite Anes Safety: ASA10A - No serious adverse event    Additional Notes:

## 2021-06-20 NOTE — PROGRESS NOTES
Preoperative Exam    Scheduled Procedure:  nerve decompression with external neurolysis of multiple nerves   Surgery Date:  9/21  Surgery Location: [facility], fax [phone]    Surgeon:  Dr. Mao    Assessment/Plan:     1. Pre-op evaluation  - Electrocardiogram Perform and Read  - Basic Metabolic Panel  - HM1(CBC and Differential)  - HM1 (CBC with Diff)    2. Polyneuropathy (H)    3. Type 2 diabetes mellitus without complication (H)      Surgical Procedure Risk: Low (reported cardiac risk generally < 1%)  Have you had prior anesthesia?: Yes  Have you or any family members had a previous anesthesia reaction:  No  Do you or any family members have a history of a clotting or bleeding disorder?: No  Cardiac Risk Assessment: no increased risk for major cardiac complications    Patient approved for surgery with general or local anesthesia.        Functional Status: Independent  Patient plans to recover at home with family.     Subjective:      Zoe Alvarez is a 57 y.o. female presenting for a preoperative consultation, with a history of severely painful lump on the bottom of her feet for the past several months, finding it difficult to weight-bear because of the pain, with associated numbness, burning and shooting pain sensation which tends to be worse at night; found to have positive findings on her EMG consistent with sensorimotor polyneuropathy; undergoing deformation surgery/procedure      All other systems reviewed and are negative, other than those listed in the HPI.    Pertinent History  Do you have difficulty breathing or chest pain after walking up a flight of stairs: No  History of obstructive sleep apnea: No  Steroid use in the last 6 months: No  Frequent Aspirin/NSAID use: No  Prior Blood Transfusion: around 35 years ago when had a miscarriage   Prior Blood Transfusion Reaction: No  If for some reason prior to, during or after the procedure, if it is medically indicated, would you be  "willing to have a blood transfusion?:  There is no transfusion refusal.    Current Outpatient Prescriptions   Medication Sig Dispense Refill     BASAGLAR KWIKPEN U-100 INSULIN 100 unit/mL (3 mL) pen INJECT 60 UNITS ONCE NIGHTY AT BED TIME 60 mL 0     blood glucose meter (GLUCOMETER) Use 1 each As Directed as needed. Dispense glucometer brand per patient's insurance at pharmacy discretion. 1 each 0     blood glucose test strips Use 1 each As Directed as needed. Test 2-3 daily 100 strip 1     generic lancets (FINGERSTIX LANCETS) Use to check blood sugar three times daily. Dispense brand per patient's insurance at pharmacy discretion. 100 each 11     pen needle, diabetic (BD ULTRA-FINE TJ PEN NEEDLE) 32 gauge x 5/32\" Ndle Use daily with insulin 100 each 5     TRULICITY 0.75 mg/0.5 mL PnIj INJECT 0.5 ML SUBCUTANEOUSLY EVERY 7 DAYS 6 Syringe 0     insulin syringe-needle U-100 1/2 mL 30 gauge x 5/16 Syrg Use twice daily with insulin as directed. 100 each 1     lidocaine (LIDODERM) 5 % Remove & Discard patch within 12 hours or as directed by MD 5 patch 0     No current facility-administered medications for this visit.         Allergies   Allergen Reactions     Amoxicillin Swelling and Itching     Metformin        Patient Active Problem List   Diagnosis     Obesity     Venous Insufficiency     Epidermal Inclusion Cyst     Hyperlipidemia     Vertigo     Ingrowing Toenail     Nicotine Dependence     Type 2 diabetes mellitus without complication (H)     Eczema     RLQ abdominal pain     Compression neuropathy     Tarsal tunnel syndrome of left side       Past Medical History:   Diagnosis Date     Arthritis      Diabetes mellitus (H)      Migraine      Neuropathy (H)        Past Surgical History:   Procedure Laterality Date     BLADDER SUSPENSION  2007     HYSTERECTOMY      1999     KNEE SURGERY       LAPAROSCOPIC APPENDECTOMY N/A 7/10/2014    Procedure: Laparoscopic Appendectomy;  Surgeon: Germain Howe MD;  Location: [facility];  Service:      OH APPENDECTOMY      Description: Appendectomy;  Recorded: 07/17/2014;  Comments: JULY 2014     OH TOTAL ABDOM HYSTERECTOMY      Description: Hysterectomy;  Recorded: 01/11/2012;       Social History     Social History     Marital status:      Spouse name: N/A     Number of children: N/A     Years of education: N/A     Occupational History     Not on file.     Social History Main Topics     Smoking status: Former Smoker     Smokeless tobacco: Never Used     Alcohol use 0.6 oz/week     1 Shots of liquor per week      Comment: <1 per wk     Drug use: No     Sexual activity: Yes     Birth control/ protection: Surgical     Other Topics Concern     Not on file     Social History Narrative             Objective:     Vitals:    08/29/18 1424   BP: 130/72   Pulse: 93   Temp: 98.5  F (36.9  C)   TempSrc: Oral   SpO2: 98%   Weight: 185 lb 14.4 oz (84.3 kg)         Physical Exam:  General Appearance:    Alert, well hydrated, no distress,    Eyes:    PERRL, conjunctiva/corneas clear,    Throat:   Lips, mucosa, and tongue normal; teeth and gums normal   Neck:   Supple, symmetrical, trachea midline, no adenopathy;        thyroid:  No enlargement/tenderness/nodules; no carotid    bruit or JVD   Lungs:     Clear to auscultation bilaterally, respirations unlabored   Heart:    Regular rate and rhythm, S1 and S2 normal, no murmur, rub   or gallop   Abdomen:     Soft, non-tender, normal bowel sounds, no rebound or guarding, no masses, no organomegaly   Extremities:   Extremities normal, atraumatic, no cyanosis or edema   Skin:   Skin color, texture, turgor normal, no rashes or lesions        There are no Patient Instructions on file for this visit.      Labs:  Recent Results (from the past 168 hour(s))   Electrocardiogram Perform and Read    Collection Time: 08/29/18  2:39 PM   Result Value Ref Range    SYSTOLIC BLOOD PRESSURE  mmHg    DIASTOLIC BLOOD PRESSURE  mmHg    VENTRICULAR RATE 87 BPM     ATRIAL RATE 87 BPM    P-R INTERVAL 148 ms    QRS DURATION 88 ms    Q-T INTERVAL 370 ms    QTC CALCULATION (BEZET) 445 ms    P Axis 59 degrees    R AXIS 62 degrees    T AXIS 44 degrees    MUSE DIAGNOSIS       Normal sinus rhythm  Normal ECG  When compared with ECG of 24-MAY-2014 22:41,  No significant change was found  Confirmed by STEPHANIE SMITH MD LOC: ([id number]) on 8/29/2018 3:41:41 PM     Basic Metabolic Panel    Collection Time: 08/29/18  2:50 PM   Result Value Ref Range    Sodium 140 136 - 145 mmol/L    Potassium 4.3 3.5 - 5.0 mmol/L    Chloride 104 98 - 107 mmol/L    CO2 26 22 - 31 mmol/L    Anion Gap, Calculation 10 5 - 18 mmol/L    Glucose 142 (H) 70 - 125 mg/dL    Calcium 10.1 8.5 - 10.5 mg/dL    BUN 23 (H) 8 - 22 mg/dL    Creatinine 0.85 0.60 - 1.10 mg/dL    GFR MDRD Af Amer >60 >60 mL/min/1.73m2    GFR MDRD Non Af Amer >60 >60 mL/min/1.73m2   HM1 (CBC with Diff)    Collection Time: 08/29/18  2:50 PM   Result Value Ref Range    WBC 9.6 4.0 - 11.0 thou/uL    RBC 4.15 3.80 - 5.40 mill/uL    Hemoglobin 12.5 12.0 - 16.0 g/dL    Hematocrit 36.2 35.0 - 47.0 %    MCV 87 80 - 100 fL    MCH 30.2 27.0 - 34.0 pg    MCHC 34.6 32.0 - 36.0 g/dL    RDW 11.1 11.0 - 14.5 %    Platelets 195 140 - 440 thou/uL    MPV 9.0 7.0 - 10.0 fL    Neutrophils % 58 50 - 70 %    Lymphocytes % 32 20 - 40 %    Monocytes % 6 2 - 10 %    Eosinophils % 3 0 - 6 %    Basophils % 1 0 - 2 %    Neutrophils Absolute 5.6 2.0 - 7.7 thou/uL    Lymphocytes Absolute 3.1 0.8 - 4.4 thou/uL    Monocytes Absolute 0.6 0.0 - 0.9 thou/uL    Eosinophils Absolute 0.3 0.0 - 0.4 thou/uL    Basophils Absolute 0.1 0.0 - 0.2 thou/uL       Immunization History   Administered Date(s) Administered     Pneumo Conj 13-V (2010&after) 07/09/2011     Tdap 09/13/2011           Electronically signed by Patrice Doe MD 08/29/18 2:18 PM

## 2021-06-21 NOTE — PROGRESS NOTES
HPI:  Zoe Alvarez is a 58 y.o. female who is seen for new patient and pre-op appointments.  She had decompression of her left foot on 9/21/18 for her bilateral diabetic neuropathies of her feet.  She will be having her right foot surgery on 11/2/18 with the same doctor, , podiatrist at [facility].  Her current symptoms on her right foot are diminished because she is not working.  When she is on her feet all day at [employer], by the end of the day she has numbness tingling and burning pain.  Her diabetes is under good control, her last A1c was:   Lab Results   Component Value Date    HGBA1C 7.9 (H) 07/26/2018     She was first diagnosed her A1c was above 12.  She has been working with an endocrinologist, Dr. Sanchez, has made some adjustments in insulin and now has very well controlled diabetes.  She does not always watch out for carbohydrates in her diet.  She has a friend who has a insulin pump and has talked to her about these.  She decided that she is not interested in getting this type of device.  Prior to her diagnosis she had a lot of urinary tract infections, now she is under good control for this concern.  Does not have kidney disease.  Her blood pressure is normal she is not on ACE inhibitor.  She is also not on a statin.  Chief Complaint   Patient presents with     Pre-op Exam     R leg neuropathy. 11/2/18 @ [facility]     Establish Care     ROS: Patient denies fever, chills, sweats, fainting, fatigue, weight change, dizziness, sleep problems, chest pain, palpitations, shortness of breath, wheezing, cough,  sore throat, changes in hearing, ear pain,tinnitus,  disphagia, sore throat, globus, changes in vision, eye pain eye redness, acid reflux, nausea, vomiting, diarrhea, constipation, black or bloody stools,  Dysuria, frequency, urinary incontinence, nocturia, hematuria, back pain,joint pain, bone pain, muscle cramps,edema, weakness, numbness, tingling of extremities, rash,  itching, skin changes, swollen lymph nodes, thirst, increased urination, breast lumps, breast pain, nipple discharge, memory difficulties, anxiety, mood swings.    Lab Results   Component Value Date    HGBA1C 7.9 (H) 07/26/2018    HGBA1C 8.1 (H) 04/12/2018    HGBA1C >14.0 (H) 11/06/2017     Lab Results   Component Value Date    MICROALBUR 0.52 11/06/2017    LDLCALC 150 (H) 11/06/2017    CREATININE 0.85 08/29/2018     Patient Active Problem List   Diagnosis     Obesity     Venous Insufficiency     Epidermal Inclusion Cyst     Hyperlipidemia     Vertigo     Ingrowing Toenail     Nicotine Dependence     Type 2 diabetes mellitus without complication (H)     Eczema     RLQ abdominal pain     Compression neuropathy     Tarsal tunnel syndrome of left side     Compression neuropathy of left lower extremity     Mononeuritis of left lower extremity     Plantar fascial fibromatosis of left foot     Family History   Problem Relation Age of Onset     Diabetes Mother      Breast cancer Maternal Grandmother      Social History     Social History     Marital status:      Spouse name: N/A     Number of children: 2     Years of education: N/A     Occupational History     Retail store employee      Social History Main Topics     Smoking status: Former Smoker     Smokeless tobacco: Never Used     Alcohol use 0.6 oz/week     1 Shots of liquor per week      Comment: <1 per wk     Drug use: No     Sexual activity: Yes     Partners: Male     Birth control/ protection: Surgical     Other Topics Concern     None     Social History Narrative     Past Surgical History:   Procedure Laterality Date     BLADDER SUSPENSION  2007     DILATION AND CURETTAGE OF UTERUS      SUCTION POST MISCARRIAGE     HYSTERECTOMY      1999     KNEE SURGERY       LAPAROSCOPIC APPENDECTOMY N/A 7/10/2014    Procedure: Laparoscopic Appendectomy;  Surgeon: Germain Howe MD;  Location: [facility];  Service:      MO APPENDECTOMY      Description:  "Appendectomy;  Recorded: 2014;  Comments: 2014     MT TARSAL TUNNEL RELEASE Left 2018    Procedure: DELLON QUADRUPLE NERVE DECOMPRESSION EXCISION PLANTAR FIBROMA ALL LEFT FOOT;  Surgeon: Jules Mao DPM;  Location: [facility];  Service: Podiatry     MT TOTAL ABDOM HYSTERECTOMY      Description: Hysterectomy;  Recorded: 2012;     Current Outpatient Prescriptions on File Prior to Visit   Medication Sig Dispense Refill     BASAGLAR KWIKPEN U-100 INSULIN 100 unit/mL (3 mL) pen INJECT 60 UNITS ONCE NIGHTLY AT BED TIME (Patient taking differently: INJECT 60 UNITS ONCE EACH MORNING) 60 mL 0     blood glucose meter (GLUCOMETER) Use 1 each As Directed as needed. Dispense glucometer brand per patient's insurance at pharmacy discretion. 1 each 0     blood glucose test strips Use 1 each As Directed as needed. Test 2-3 daily 100 strip 1     generic lancets (FINGERSTIX LANCETS) Use to check blood sugar three times daily. Dispense brand per patient's insurance at pharmacy discretion. 100 each 11     insulin syringe-needle U-100 1/2 mL 30 gauge x 5/16 Syrg Use twice daily with insulin as directed. 100 each 1     pen needle, diabetic (BD ULTRA-FINE TJ PEN NEEDLE) 32 gauge x 5/32\" Ndle Use daily with insulin 100 each 5     TRULICITY 0.75 mg/0.5 mL PnIj INJECT 0.5 ML SUBCUTANEOUSLY EVERY 7 DAYS 6 Syringe 0     [DISCONTINUED] HYDROcodone-acetaminophen (NORCO )  mg per tablet Take 1 tablet by mouth every 6 (six) hours as needed for pain. (Patient not taking: Reported on 10/23/2018) 10 tablet 0     No current facility-administered medications on file prior to visit.      Allergies   Allergen Reactions     Amoxicillin Swelling and Itching     Metformin Other (See Comments)     GI Upset     OB History      Para Term  AB Living    2 2 2       SAB TAB Ectopic Multiple Live Births                I have reviewed the patient's medical history in detail and updated the computerized " patient record.  OBJECTIVE:  Wt Readings from Last 3 Encounters:   10/23/18 181 lb 14.4 oz (82.5 kg)   10/10/18 185 lb (83.9 kg)   09/21/18 185 lb (83.9 kg)     Temp Readings from Last 3 Encounters:   10/23/18 98.7  F (37.1  C) (Oral)   10/10/18 98.9  F (37.2  C)   09/26/18 98.4  F (36.9  C) (Temporal)     BP Readings from Last 3 Encounters:   10/23/18 124/64   10/10/18 124/80   10/03/18 122/78     Pulse Readings from Last 3 Encounters:   10/23/18 100   10/10/18 (!) 116   10/03/18 (!) 115     Body mass index is 30.74 kg/(m^2).     Alert, cooperative, well-hydrated. Appears well.  Eyes: Pupils equal, round, reactive to light.  HEENT: Sclera white, nares patent, MMM, TM's pearly bilaterally  Neck: supple, without lymphadenopathy, Thyroid freely movable and without hypotrophy or nodularity.   Lungs: Clear to auscultation. No retractions, no increased work of respiration, equal chest rise.   Heart: Regular rate and rhythm, no murmurs, clicks,   Gallops.  Abdomen: Soft, bowel sounds in 4 quadrants with no tenderness to palpation, no organomegaly or masses, no aortic or renal bruits.  Extremities: no tenderness to palpation of gastrocnemius, bilaterally.  Skin: no increased warmth, edema, or erythema of lower legs bilaterally.  Back: No cervical, thoracic or lumbar tenderness to spinous processes or musculature.  Neuro::pupils equal and reactive to light bilaterally, CN II - XII grossly intact. No focal motor/sensory deficits. DTR 2/4 all 4 extremities. Muscle Strength 5/5 all extremities, Rhomberg negative    Predictors of intubation difficulty:   Morbid obesity? no   Anatomically abnormal facies? no   Prominent incisors? no   Receding mandible? no   Short, thick neck? no   Neck range of motion: normal   Dentition: No chipped, loose, or missing teeth.    Labs:  Hospital Outpatient Visit on 09/21/2018   Component Date Value     Glucose, POC 09/21/2018 137*     Glucose, POC 09/21/2018 104    Physical on 08/29/2018    Component Date Value     VENTRICULAR RATE 08/29/2018 87      ATRIAL RATE 08/29/2018 87      P-R INTERVAL 08/29/2018 148      QRS DURATION 08/29/2018 88      Q-T INTERVAL 08/29/2018 370      QTC CALCULATION (BEZET) 08/29/2018 445      P [place] 08/29/2018 59      R AXIS 08/29/2018 62      T AXIS 08/29/2018 44      MUSE DIAGNOSIS 08/29/2018                      Value:Normal sinus rhythm  Normal ECG  When compared with ECG of 24-MAY-2014 22:41,  No significant change was found  Confirmed by STEPHANIE SMITH MD LOC: ([id number]) on 8/29/2018 3:41:41 PM       Sodium 08/29/2018 140      Potassium 08/29/2018 4.3      Chloride 08/29/2018 104      CO2 08/29/2018 26      Anion Gap, Calculation 08/29/2018 10      Glucose 08/29/2018 142*     Calcium 08/29/2018 10.1      BUN 08/29/2018 23*     Creatinine 08/29/2018 0.85      GFR MDRD Af Amer 08/29/2018 >60      GFR MDRD Non Af Amer 08/29/2018 >60      WBC 08/29/2018 9.6      RBC 08/29/2018 4.15      Hemoglobin 08/29/2018 12.5      Hematocrit 08/29/2018 36.2      MCV 08/29/2018 87      MCH 08/29/2018 30.2      MCHC 08/29/2018 34.6      RDW 08/29/2018 11.1      Platelets 08/29/2018 195      MPV 08/29/2018 9.0      Neutrophils % 08/29/2018 58      Lymphocytes % 08/29/2018 32      Monocytes % 08/29/2018 6      Eosinophils % 08/29/2018 3      Basophils % 08/29/2018 1      Neutrophils Absolute 08/29/2018 5.6      Lymphocytes Absolute 08/29/2018 3.1      Monocytes Absolute 08/29/2018 0.6      Eosinophils Absolute 08/29/2018 0.3      Basophils Absolute 08/29/2018 0.1    Lab on 07/26/2018   Component Date Value     Hemoglobin A1c 07/26/2018 7.9*     Sodium 07/26/2018 139      Potassium 07/26/2018 4.7      Chloride 07/26/2018 105      CO2 07/26/2018 27      Anion Gap, Calculation 07/26/2018 7      Glucose 07/26/2018 110      Calcium 07/26/2018 9.5      BUN 07/26/2018 19      Creatinine 07/26/2018 0.82      GFR MDRD Af Amer 07/26/2018 >60      GFR MDRD Non Af Amer 07/26/2018 >60       ASSESMENT/PLAN:  1. Pre-op exam  Basic Metabolic Panel    HM1(CBC and Differential)    HM1 (CBC with Diff)   2. Diabetic neuropathy (H)       BMP, CBC follow up as planned with surgery.  Candace Moody, MS, PA-C 10/23/18     1. Preoperative workup as follows hemoglobin, hematocrit, electrolytes, creatinine, glucose.  2. Change in medication regimen before surgery: none, continue medication regimen including morning of surgery, with sip of water.  3. Prophylaxis for cardiac events with perioperative beta-blockers: not indicated.  4. Invasive hemodynamic monitoring perioperatively: not indicated.  5. Deep vein thrombosis prophylaxis postoperatively:regimen to be chosen by surgical team.  6. Surveillance for postoperative MI with ECG immediately postoperatively and on postoperative days 1 and 2 AND troponin levels 24 hours postoperatively and on day 4 or hospital discharge (whichever comes first): not indicated.  7. Other measures: Consultation with Dr. Etienne for in-hospital postoperative management of Diabetes, nerve decompression of right foot.

## 2021-06-21 NOTE — PROGRESS NOTES
Subjective findings: The patient return to the clinic today for postop visit #2, 2 weeks status Dellon quadruple nerve decompression right lower extremity.  The patient in good spirits and she had no complaints.  Patient stated she has greater sensation on the bottom of her right foot.     Objective findings: The dressings were removed and wound margins are well coaptated and maintained.  There is minimal edema noted.  There is no erythema or cellulitis noted.  Neurovascular status is intact and vital signs are stable.     Assessment: Tarsal tunnel, compression neuropathy right lower extremity     Plan: Applied a sterile redress.  The patient was instructed to keep the wound clean and dry.  She is to return to the clinic in1 weeks for final postop visit.

## 2021-06-21 NOTE — PROGRESS NOTES
[facility]  ENDOCRINOLOGY    Diabetes Note 11/15/2018    Zoe Alvarez, 1960, [id number]          Reason for visit      1. Type 2 diabetes mellitus without complication, with long-term current use of insulin (H)    2. Herpes simplex virus infection        HPI     Zoe Alvarez is a very pleasant 58 y.o. old female who presents for follow up.  SUMMARY:  Zoe returns today in f/u for DM 2.  Her current A1c is down even further to 7.7 from 7.9.  She is trying!  She notes that she has recently had surgery on her other foot (R) and states that all went well.  She sent me her Glucometer download and it is very telling.  Her 30 day Ave shows clearly that she had something going on that wasn't going on in the previous two months.  30 days :222, 90 days: 164.  She reports that her pain has improved significantly, and she is now taking her pain meds only at night  She continues with Trulicity 0.75 mg weekly, and 60 units of Basaglar daily.  She has come a long way from the A1c of a year ago that was 14.  She is feeling really good about this.         Past Medical History     Patient Active Problem List   Diagnosis     Obesity (BMI 30-39.9)     Chronic venous insufficiency     Epidermal Inclusion Cyst     Hyperlipidemia     Vertigo     Ingrowing Toenail     Nicotine Dependence     Type 2 diabetes mellitus without complication, with long-term current use of insulin (H)     Eczema     RLQ abdominal pain     Compression neuropathy     Tarsal tunnel syndrome of left side     Compression neuropathy of right lower extremity     Mononeuritis of left lower extremity     Plantar fascial fibromatosis of left foot     Mononeuritis of right lower extremity     Tarsal tunnel syndrome of right side     Foot pain, left        Family History       family history includes Breast cancer in her maternal grandmother; Diabetes in her mother.    Social History      reports that she has quit smoking. she has never used smokeless  "tobacco. She reports that she drinks about 0.6 oz of alcohol per week. She reports that she does not use drugs.      Review of Systems     Patient has no polyuria or polydipsia, no chest pain, dyspnea or TIA's, no numbness, tingling or pain in extremities  Remainder negative except as noted in HPI.    Vital Signs     /62 (Patient Site: Right Arm, Patient Position: Sitting, Cuff Size: Adult Regular)   Pulse 68   Ht 5' 4.5\" (1.638 m)   Wt 182 lb 11.2 oz (82.9 kg)   BMI 30.88 kg/m    Wt Readings from Last 3 Encounters:   11/14/18 182 lb 11.2 oz (82.9 kg)   11/02/18 183 lb 11.2 oz (83.3 kg)   10/23/18 181 lb 14.4 oz (82.5 kg)       Physical Exam     Constitutional:  Well developed, Well nourished  HENT:  Normocephalic,   Neck: Thyroid normal, No lymph nodes, Supple  Eyes:  PERRL, Conjunctiva pink  Respiratory:  Normal breath sounds, No respiratory distress  Cardiovascular:  Normal heart rate, Normal rhythm, No murmurs  GI:  Bowel sounds normal, Soft, No tenderness  Musculoskeletal:  No gross deformity or lesions, normal dorsalis pedis pulses  Skin: No acanthosis nigricans, lipoatrophy or lipodystrophy  Neurologic:  Alert & oriented x 3, nonfocal  Psychiatric:  Affect, Mood, Insight appropriate  Diabetic foot exam: Followed by Podiatry at present    Assessment     1. Type 2 diabetes mellitus without complication, with long-term current use of insulin (H)    2. Herpes simplex virus infection        Plan     Pt's control has improved on her current regimen.  No changes warranted today.  She will f/u with me in 3 months or sooner if need be.  Time spent with pt today: 25 min with >50% spent in counseling and coordination of care.      Jayla HOLM Endocrinology  11/15/2018  1:47 PM      Lab Results     Hemoglobin A1c   Date Value Ref Range Status   11/07/2018 7.7 (H) 3.5 - 6.0 % Final   07/26/2018 7.9 (H) 3.5 - 6.0 % Final   04/12/2018 8.1 (H) 3.5 - 6.0 % Final   11/06/2017 >14.0 (H) 3.5 - 6.0 % Final " "  06/15/2016 9.7 (H) 3.5 - 6.0 % Final     Creatinine   Date Value Ref Range Status   10/23/2018 0.86 0.60 - 1.10 mg/dL Final   08/29/2018 0.85 0.60 - 1.10 mg/dL Final   07/26/2018 0.82 0.60 - 1.10 mg/dL Final     Microalbumin, Random Urine   Date Value Ref Range Status   11/07/2018 <0.50 0.00 - 1.99 mg/dL Final       Cholesterol   Date Value Ref Range Status   11/06/2017 227 (H) <=199 mg/dL Final     HDL Cholesterol   Date Value Ref Range Status   11/06/2017 46 (L) >=50 mg/dL Final     LDL Calculated   Date Value Ref Range Status   11/06/2017 150 (H) <=129 mg/dL Final     Triglycerides   Date Value Ref Range Status   11/06/2017 156 (H) <=149 mg/dL Final       Lab Results   Component Value Date    ALT 29 11/06/2017    AST 16 11/06/2017    ALKPHOS 100 11/06/2017    BILITOT 0.8 11/06/2017         Current Medications     Outpatient Medications Prior to Visit   Medication Sig Dispense Refill     BASAGLAR KWIKPEN U-100 INSULIN 100 unit/mL (3 mL) pen INJECT 60 UNITS ONCE NIGHTLY AT BED TIME (Patient taking differently: INJECT 60 UNITS ONCE EACH MORNING) 60 mL 0     blood glucose meter (GLUCOMETER) Use 1 each As Directed as needed. Dispense glucometer brand per patient's insurance at pharmacy discretion. 1 each 0     blood glucose test strips Use 1 each As Directed as needed. Test 2-3 daily 100 strip 1     generic lancets (FINGERSTIX LANCETS) Use to check blood sugar three times daily. Dispense brand per patient's insurance at pharmacy discretion. 100 each 11     HYDROcodone-acetaminophen (NORCO )  mg per tablet Take 1 tablet by mouth every 6 (six) hours as needed for pain. 20 tablet 0     insulin syringe-needle U-100 1/2 mL 30 gauge x 5/16 Syrg Use twice daily with insulin as directed. 100 each 1     pen needle, diabetic (BD ULTRA-FINE TJ PEN NEEDLE) 32 gauge x 5/32\" Ndle Use daily with insulin 100 each 5     TRULICITY 0.75 mg/0.5 mL PnIj INJECT 0.5 ML SUBCUTANEOUSLY EVERY 7 DAYS 6 Syringe 0     No " facility-administered medications prior to visit.

## 2021-06-21 NOTE — PROGRESS NOTES
Subjective findings: The patient return to the clinic today for postop visit #3, 3 weeks status Dellon quadruple nerve decompression right lower extremity.  The patient in good spirits and she had no complaints.  Patient stated she has greater sensation on the bottom of her right foot.     Objective findings: The dressings were removed and wound margins are well coaptated and maintained.  There is minimal edema noted.  There is no erythema or cellulitis noted.  Neurovascular status is intact and vital signs are stable.     Assessment: Tarsal tunnel, compression neuropathy right lower extremity     Plan: The patient may now begin to bathe.  I recommended the patient return to work in 1 month.  She is to return to the clinic in 1 month for final postop check.

## 2021-06-21 NOTE — ANESTHESIA CARE TRANSFER NOTE
Last vitals:   Vitals:    11/02/18 1253   BP: 141/65   Pulse: (!) 106   Resp: 14   Temp: 37.3  C (99.2  F)   SpO2: 100%     Patient's level of consciousness is drowsy  Spontaneous respirations: yes  Maintains airway independently: yes  Dentition unchanged: yes  Oropharynx: oropharynx clear of all foreign objects    QCDR Measures:  ASA# 20 - Surgical Safety Checklist: WHO surgical safety checklist completed prior to induction  PQRS# 430 - Adult PONV Prevention: 4558F - Pt received => 2 anti-emetic agents (different classes) preop & intraop  ASA# 8 - Peds PONV Prevention: NA - Not pediatric patient, not GA or 2 or more risk factors NOT present  PQRS# 424 - Tifafny-op Temp Management: 4559F - At least one body temp DOCUMENTED => 35.5C or 95.9F within required timeframe  PQRS# 426 - PACU Transfer Protocol: - Transfer of care checklist used  ASA# 14 - Acute Post-op Pain: ASA14B - Patient did NOT experience pain >= 7 out of 10

## 2021-06-21 NOTE — PROGRESS NOTES
Subjective findings: The patient return to the clinic today for postop visit #1, 1 week status Dellon quadruple nerve decompression right lower extremity.  The patient in good spirits and she had no complaints.     Objective findings: The dressings were removed and wound margins are well coaptated and maintained.  There is minimal edema noted.  There is no erythema or cellulitis noted.  Neurovascular status is intact and vital signs are stable.     Assessment: Tarsal tunnel, compression neuropathy right lower extremity     Plan: Applied a sterile redress.  The patient was instructed to keep the wound clean and dry.  She is to return to the clinic in 2 weeks for postop visit #2 at which time the sutures will be removed.

## 2021-06-21 NOTE — ANESTHESIA POSTPROCEDURE EVALUATION
Patient: Zoe Apple quadruple nerve decompression right lower extremity   Anesthesia type: general    Patient location: PACU  Last vitals:   Vitals:    11/02/18 1330   BP: 136/73   Pulse: (!) 101   Resp: 12   Temp: 36.6  C (97.8  F)   SpO2: 100%     Post vital signs: stable  Level of consciousness: awake and responds to simple questions  Post-anesthesia pain: pain controlled  Post-anesthesia nausea and vomiting: no  Pulmonary: unassisted, return to baseline  Cardiovascular: stable and blood pressure at baseline  Hydration: adequate  Anesthetic events: no    QCDR Measures:  ASA# 11 - Tiffany-op Cardiac Arrest: ASA11B - Patient did NOT experience unanticipated cardiac arrest  ASA# 12 - Tiffany-op Mortality Rate: ASA12B - Patient did NOT die  ASA# 13 - PACU Re-Intubation Rate: ASA13B - Patient did NOT require a new airway mgmt  ASA# 10 - Composite Anes Safety: ASA10A - No serious adverse event    Additional Notes: doing well, feeling well, no nausea, minimal pain

## 2021-06-23 NOTE — PROGRESS NOTES
HPI:  Zoe Alvarez is a 58 y.o. female who is seen for   Chief Complaint   Patient presents with     back pain     mid back to buttock on right side started on 2/3/2019- no injury   Zoe Alvarez has new right-sided lumbar back pain that radiates to right hip area since yesterday.  Pain is 8 out of 10, worse with bending forward.  She has been having spasms at night which make it difficult to sleep.  Over the weekend she went ice fishing with her , denies any known strain to her back, did not hold the pole for hours or stand or walk for hours.  Mostly with sitting.  States that ice house was warm.  Review of systems negative for foot drop, weakness of lower extremities, loss of bowel or bladder control, fever, rash, dysur      Lab Results   Component Value Date    HGBA1C 7.7 (H) 11/07/2018    HGBA1C 7.9 (H) 07/26/2018    HGBA1C 8.1 (H) 04/12/2018     Lab Results   Component Value Date    MICROALBUR <0.50 11/07/2018    LDLCALC 150 (H) 11/06/2017    CREATININE 0.86 10/23/2018     Patient Active Problem List   Diagnosis     Obesity (BMI 30-39.9)     Chronic venous insufficiency     Epidermal Inclusion Cyst     Hyperlipidemia     Vertigo     Ingrowing Toenail     Nicotine Dependence     Type 2 diabetes mellitus without complication, with long-term current use of insulin (H)     Eczema     RLQ abdominal pain     Compression neuropathy     Tarsal tunnel syndrome of left side     Compression neuropathy of right lower extremity     Mononeuritis of left lower extremity     Plantar fascial fibromatosis of left foot     Mononeuritis of right lower extremity     Tarsal tunnel syndrome of right side     Foot pain, left     Family History   Problem Relation Age of Onset     Diabetes Mother      Breast cancer Maternal Grandmother      Social History     Socioeconomic History     Marital status:      Spouse name: None     Number of children: 2     Years of education: None     Highest education level: None    Social Needs     Financial resource strain: None     Food insecurity - worry: None     Food insecurity - inability: None     Transportation needs - medical: None     Transportation needs - non-medical: None   Occupational History     Occupation: Retail store employee   Tobacco Use     Smoking status: Former Smoker     Smokeless tobacco: Never Used   Substance and Sexual Activity     Alcohol use: Yes     Alcohol/week: 0.6 oz     Types: 1 Shots of liquor per week     Comment: <1 per wk     Drug use: No     Sexual activity: Yes     Partners: Male     Birth control/protection: Surgical   Other Topics Concern     None   Social History Narrative     None     Past Surgical History:   Procedure Laterality Date     BLADDER SUSPENSION  2007     DILATION AND CURETTAGE OF UTERUS      SUCTION POST MISCARRIAGE     HYSTERECTOMY      1999     KNEE SURGERY       LAPAROSCOPIC APPENDECTOMY N/A 7/10/2014    Procedure: Laparoscopic Appendectomy;  Surgeon: Germain Howe MD;  Location: [facility];  Service:      OK APPENDECTOMY      Description: Appendectomy;  Recorded: 07/17/2014;  Comments: JULY 2014     OK TARSAL TUNNEL RELEASE Left 9/21/2018    Procedure: DELLON QUADRUPLE NERVE DECOMPRESSION EXCISION PLANTAR FIBROMA ALL LEFT FOOT;  Surgeon: Jules Mao DPM;  Location[facility];  Service: Podiatry     OK TOTAL ABDOM HYSTERECTOMY      Description: Hysterectomy;  Recorded: 01/11/2012;     Current Outpatient Medications on File Prior to Visit   Medication Sig Dispense Refill     BASAGLAR KWIKPEN U-100 INSULIN 100 unit/mL (3 mL) pen INJECT 60 UNITS ONCE NIGHTLY AT BED TIME 54 mL 1     blood glucose meter (GLUCOMETER) Use 1 each As Directed as needed. Dispense glucometer brand per patient's insurance at pharmacy discretion. 1 each 0     blood glucose test strips Use 1 each As Directed as needed. Test 2-3 daily 100 strip 1     generic lancets (FINGERSTIX LANCETS) Use to check blood sugar three times daily. Dispense  "brand per patient's insurance at pharmacy discretion. 100 each 11     insulin syringe-needle U-100 1/2 mL 30 gauge x  Syrg Use twice daily with insulin as directed. 100 each 1     pen needle, diabetic (BD ULTRA-FINE TJ PEN NEEDLE) 32 gauge x \" Ndle Use daily with insulin 100 each 5     TRULICITY 0.75 mg/0.5 mL PnIj INJECT 0.5 ML SUBCUTANEOUSLY EVERY 7 DAYS 6 Syringe 0     TRULICITY 0.75 mg/0.5 mL PnIj INJECT 0.75 MG UNDER THE SKIN EVERY 7 DAYS. 6 mL 0     HYDROcodone-acetaminophen (NORCO )  mg per tablet Take 1 tablet by mouth every 6 (six) hours as needed for pain. 20 tablet 0     valACYclovir (VALTREX) 1000 MG tablet TAKE 2 TABS NOW AND 2 TABS IN 12 HOURS. 4 tablet 3     No current facility-administered medications on file prior to visit.      Allergies   Allergen Reactions     Amoxicillin Swelling and Itching     Metformin Other (See Comments)     GI Upset     OB History      Para Term  AB Living    2 2 2          SAB TAB Ectopic Multiple Live Births                     I have reviewed the patient's medical history in detail and updated the computerized patient record.  OBJECTIVE:  Wt Readings from Last 3 Encounters:   19 185 lb 6.4 oz (84.1 kg)   19 184 lb 8 oz (83.7 kg)   18 182 lb (82.6 kg)     Temp Readings from Last 3 Encounters:   19 98.7  F (37.1  C) (Oral)   18 98.8  F (37.1  C)   18 97.8  F (36.6  C) (Temporal)     BP Readings from Last 3 Encounters:   19 128/68   19 122/76   18 110/78     Pulse Readings from Last 3 Encounters:   19 97   19 76   18 (!) 108     Body mass index is 31.18 kg/m .     Alert, cooperative, well-hydrated. Appears well.  Eyes: Pupils equal, round, reactive to light.  HEENT: Sclera white, nares patent, MMM, TM's pearly bilaterally  Neck: supple, without lymphadenopathy, Thyroid freely movable and without hypotrophy or nodularity.   Lungs: Clear to auscultation. No " retractions, no increased work of respiration, equal chest rise.   Heart: Regular rate and rhythm, no murmurs, clicks,   Gallops.    Extremities: normal bilateral leg lift. DTR 2/4 bilateral lower extremities.  Skin: no increased warmth, edema, or erythema of lower legs bilaterally.  Back: No cervical, thoracic or lumbar tenderness to spinous processes. Tenderness at right lumbar musculature.    Labs:  Lab on 11/07/2018   Component Date Value     Hemoglobin A1c 11/07/2018 7.7*     Direct LDL 11/07/2018 144*     Microalbumin, Random Uri* 11/07/2018 <0.50      Creatinine, Urine 11/07/2018 94.0      Microalbumin/Creatinine * 11/07/2018       ASSESMENT/PLAN:  1. Acute right-sided low back pain with sciatica, sciatica laterality unspecified  ketorolac injection 30 mg (TORADOL)   Rest, elevation, Ice 20 minutes with light towel to protect the skin every 1-2 hours of the next 48 hours. 600-800 mg Ibuprofen with food three times daily for 2 weeks. Use acetaminophen if ibuprofen causes stomach upset or acid reflux. Acetaminophen 1000 mg up to three times daily for 2 weeks. Do not drink alcohol is using chronic pain medication at these doses.  Follow up with Candace Moody PA-C   if symptoms persist or worsen, difficulty walking or worsening pain.    Candace Moody, MS, PALisandroC 02/09/19

## 2021-06-23 NOTE — PROGRESS NOTES
"Assessment/Plan:             1. Acute right-sided low back pain without sciatica  Exam findings were discussed and assurance given.   Patient may continue on the current management.   Consider physical therapy if not better as anticipated.   Back stretching exercises were reviewed and instructed.            Subjective:    Patient ID:   Zoe Alvarez is a 58 y.o. female comes in follow up of right sided low back pain with an onset on 2/3/19 without injury.  She states that woke up with the pain and was evaluated to be musculoskeletal improving on the NSAIDs and muscle relaxer.   She feels better today, but the pain still there for about 25%.     She has no additional concerns today      Review of Systems  General : negative  Respiratory : no cough, shortness of breath, or wheezing  Cardiovascular : no chest pain or dyspnea on exertion  Gastrointestinal : no abdominal pain, change in bowel habits, or black or bloody stools  Genito-Urinary :  no dysuria, trouble voiding, or hematuria  Musculoskeletal : See HPI   Neurological : negative  Dermatological : negative    Allergy: reviewed    The following patient's history were reviewed and updated as appropriate:   She  has a past medical history of Arthritis, Diabetes mellitus (H), History of transfusion, Migraine, and Neuropathy (H)..      Outpatient Encounter Medications as of 2/6/2019   Medication Sig Dispense Refill     BASAGLHERNANDEZ TONEYIKPEN U-100 INSULIN 100 unit/mL (3 mL) pen INJECT 60 UNITS ONCE NIGHTLY AT BED TIME 54 mL 1     cyclobenzaprine (FLEXERIL) 10 MG tablet Take 1 tablet (10 mg total) by mouth 2 (two) times a day as needed for muscle spasms. 30 tablet 1     pen needle, diabetic (BD ULTRA-FINE TJ PEN NEEDLE) 32 gauge x 5/32\" Ndle Use daily with insulin 100 each 5     TRULICITY 0.75 mg/0.5 mL PnIj INJECT 0.5 ML SUBCUTANEOUSLY EVERY 7 DAYS 6 Syringe 0     TRULICITY 0.75 mg/0.5 mL PnIj INJECT 0.75 MG UNDER THE SKIN EVERY 7 DAYS. 6 mL 0     valACYclovir " (VALTREX) 1000 MG tablet TAKE 2 TABS NOW AND 2 TABS IN 12 HOURS. 4 tablet 3     blood glucose meter (GLUCOMETER) Use 1 each As Directed as needed. Dispense glucometer brand per patient's insurance at pharmacy discretion. 1 each 0     blood glucose test strips Use 1 each As Directed as needed. Test 2-3 daily 100 strip 1     generic lancets (FINGERSTIX LANCETS) Use to check blood sugar three times daily. Dispense brand per patient's insurance at pharmacy discretion. 100 each 11     HYDROcodone-acetaminophen (NORCO )  mg per tablet Take 1 tablet by mouth every 6 (six) hours as needed for pain. 20 tablet 0     insulin syringe-needle U-100 1/2 mL 30 gauge x 5/16 Syrg Use twice daily with insulin as directed. 100 each 1     No facility-administered encounter medications on file as of 2/6/2019.          Objective:   /68 (Patient Site: Right Arm, Patient Position: Sitting, Cuff Size: Adult Regular)   Pulse 97   Wt 185 lb 6.4 oz (84.1 kg)   SpO2 98%   BMI 31.33 kg/m        Physical Exam  General Appearance:    Alert, well hydrated, no distress,    Lungs:     Clear to auscultation bilaterally, respirations unlabored   Heart:    Regular rate and rhythm, S1 and S2 normal, no murmur, rub   or gallop   Abdomen:     Soft, non-tender, normal bowel sounds, no rebound or guarding, no masses, no organomegaly   Back/Extremities:   Back : normal to inspection, no vertebral tenderness. full range of motion. palpable tenderness to the right lower back musculature. Extremities normal, atraumatic, no cyanosis or edema   Skin:   Skin color, texture, turgor normal, no rashes or lesions

## 2021-06-23 NOTE — PATIENT INSTRUCTIONS - HE
Rest, elevation, Ice 20 minutes with light towel to protect the skin every 1-2 hours of the next 48 hours. 600-800 mg Ibuprofen with food three times daily for 2 weeks. Use acetaminophen if ibuprofen causes stomach upset or acid reflux. Acetaminophen 1000 mg up to three times daily for 2 weeks. Do not drink alcohol is using chronic pain medication at these doses.  Follow up if symptoms persist or worsen, difficulty walking or worsening pain.

## 2021-06-25 NOTE — PROGRESS NOTES
Progress Notes by Marquise Tomas at 11/6/2017  8:20 AM     Author: Marquise Tomas Service: -- Author Type: Nurse Practitioner    Filed: 11/6/2017  9:43 AM Encounter Date: 11/6/2017 Status: Signed    : Marquise Tomas [facility]    Date of Service: 11/6/2017  Primary Provider: Marquise Tomas CNP    Patient Care Team:  Marquise Tomas CNP as PCP - General (Nurse Practitioner)     ______________________________________________________________________     Patient's Pharmacy:    [facility] [address] [facility] [employer] - [place], [place] - [address]  [address] [facility] [address]  [place] [address]  Phone: [phone] Fax: [phone]    [facility] [address] [place] [address]  Phone: [phone] Fax: [phone]     Patient's Insurance:    Payor: MEDICA / Plan: MEDICA / Product Type: PPO/POS/FFS /     ______________________________________________________________________    Assessment:    1. Encounter to establish care    2. DM (diabetes mellitus)    3. Hyperlipidemia       ______________________________________________________________________        Plan:  Patient Instructions   1. Labs ordered at this visit:  - Glycosylated Hemoglobin A1c  - Comprehensive Metabolic Panel  - HM2(CBC w/o Differential)  - Lipid Cascade FASTING  - Microalbumin, Random Urine    2. Medications prescribed/refilled today:  - blood glucose test strips; Use 1 each As Directed as needed. Dispense brand per patient's insurance at pharmacy discretion.  Dispense: 100 strip; Refill: 3  - insulin syringe-needle U-100 1/2 mL 30 gauge x 5/16 Syrg; Use twice daily with insulin as directed.  Dispense: 60 each; Refill: 6  - atorvastatin (LIPITOR) 20 MG tablet; Take 1 tablet (20 mg total) by mouth daily.  Dispense: 90 tablet; Refill: 1    3. Referral placed at this visit:  - Ambulatory referral to Endocrinology    4. Continue  current medications    5. Completed DMV Form for driving      ______________________________________________________________________     Zoe Alvarez is 57 y.o. female who comes in today for:    Chief Complaint   Patient presents with   ? Establish Care     Will need to have Blood work for DM.        Patient Active Problem List   Diagnosis   ? Obesity   ? Venous Insufficiency   ? Epidermal Inclusion Cyst   ? Hyperlipidemia   ? Vertigo   ? Ingrowing Toenail   ? Cellulitis Of The Foot   ? Nicotine Dependence   ? Type 2 Diabetes Mellitus   ? Eczema   ? RLQ abdominal pain   ? Acute appendicitis     Past Medical History:   Diagnosis Date   ? Arthritis    ? Diabetes mellitus    ? Migraine    ? Neuropathy      Past Surgical History:   Procedure Laterality Date   ? BLADDER SUSPENSION  2007   ? HYSTERECTOMY      1999   ? LAPAROSCOPIC APPENDECTOMY N/A 7/10/2014    Procedure: Laparoscopic Appendectomy;  Surgeon: Germain Howe MD;  [place] [facility] [place]  Service:    ? [place] APPENDECTOMY      Description: Appendectomy;  Recorded: 07/17/2014;  Comments: JULY 2014   ? NJ TOTAL ABDOM HYSTERECTOMY      Description: Hysterectomy;  Recorded: 01/11/2012;     Allergies   Allergen Reactions   ? Amoxicillin Swelling and Itching   ? Metformin       Current Outpatient Prescriptions   Medication Sig   ? blood glucose meter (GLUCOMETER) Use 1 each As Directed as needed. Dispense glucometer brand per patient's insurance at pharmacy discretion.   ? generic lancets (FINGERSTIX LANCETS) Use to check blood sugar three times daily. Dispense brand per patient's insurance at pharmacy discretion.   ? insulin syringe-needle U-100 1/2 mL 30 gauge x 5/16 Syrg Use twice daily with insulin as directed.   ? NOVOLIN N 100 unit/mL injection Give 100 units subcutaneously every night. 11.65 Type 2 with hyperglycemia   ? aspirin 81 MG tablet Take 81 mg by mouth daily.   ? atorvastatin (LIPITOR) 20 MG tablet Take 1 tablet (20 mg total) by mouth daily.  "  ? blood glucose test strips Use 1 each As Directed as needed. Dispense brand per patient's insurance at pharmacy discretion.     Social History     Social History   ? Marital status:      Spouse name: N/A   ? Number of children: N/A   ? Years of education: N/A     Occupational History   ? Not on file.     Social History Main Topics   ? Smoking status: Former Smoker   ? Smokeless tobacco: Not on file   ? Alcohol use 0.6 oz/week     1 Shots of liquor per week      Comment: <1 per wk   ? Drug use: No   ? Sexual activity: Yes     Birth control/ protection: Surgical     Other Topics Concern   ? Not on file     Social History Narrative     ______________________________________________________________________     History of present illness: Zoe Alvarez is a pleasant 57 y.o. female with a PMH pertinent for T2 DM on insulin, venous insufficiency  -mild, HLD, obesity, and peripheral neuropathy -mild/intermittent who presents in clinic today for establish care, routine lab testing, and medication refills.  She is doing fairly well and has not had any changes to her health in the last year.  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 She does have some diabetic neuropathy in her feet (tingling in feet and burning in ankle bones occasionally).  She has stopped the ASA and atorvastatin some time ago (\"maybe a few weeks after prescribed\").  She had experienced some GI issues she thinks with the atorvastatin, but is willing to retry this.  She has had a flu shot about 3 weeks ago.  No longer gets PAP smears as has had hysterectomy in the past.  She is due for a diabetic opthalmologic eye exam and will make the appointment to have this done.  She reports her last examination was last year.    Review of systems:   10 point review of systems is negative unless noted in the HPI.    ______________________________________________________________________    Wt Readings from Last 3 Encounters: " "  11/06/17 183 lb (83 kg)   05/11/17 185 lb (83.9 kg)   06/15/16 181 lb (82.1 kg)     BP Readings from Last 3 Encounters:   11/06/17 138/72   05/11/17 143/77   06/27/16 130/58     /72  Pulse (!) 104  Ht 5' 4.25\" (1.632 m)  Wt 183 lb (83 kg)  BMI 31.17 kg/m2     Physical Exam:  General Appearance: Alert, cooperative, no distress, appears stated age  Head: Normocephalic, without obvious abnormality, atraumatic  Eyes: PERRL, conjunctiva/corneas clear, EOM's intact, wears glasses  Ears: Normal TM's and external ear canals, both ears  Nose: Nares normal, septum midline,mucosa normal, no drainage  Throat: Lips, mucosa, and tongue normal; teeth and gums normal, no erythema, exudate, or thrush  Neck: Supple, symmetrical, trachea midline, no adenopathy;  thyroid: not enlarged, symmetric, no tenderness/mass/nodules  Back: Symmetric, no curvature, ROM normal, no CVA tenderness  Lungs: Clear to auscultation bilaterally, respirations unlabored  Heart: Regular rate and rhythm, S1 and S2 normal, no murmur, rub, or gallop  Abdomen: Soft, non-tender, bowel sounds active all four quadrants, no masses, no organomegaly  Musculoskeletal: Normal range of motion. No joint swelling or deformity.   Extremities: Extremities normal, atraumatic, no cyanosis or edema  Foot exam - bilateral normal; no swelling, tenderness or skin or vascular lesions. Color and temperature is normal. Sensation is intact. Peripheral pulses are palpable. Toenails are normal.  Sensory exam of the feet is normal, tested with the monofilament. Good pulses, no lesions or ulcers, good peripheral pulses.  Skin: Skin color, texture, turgor normal, no rashes or lesions  Lymph nodes: Cervical & supraclavicular nodes normal  Neurologic: She is alert & oriented x 3. She has normal gait.   Psychiatric: She has a normal mood and affect.       Marquise Tomas, [facility]  Internal Medicine  [facility]     Return in about 3 months (around 2/6/2018).        "

## 2021-06-27 NOTE — PROGRESS NOTES
"Progress Notes by Isidro Douglas DO at 5/31/2019 12:50 PM     Author: Isidro Douglas DO Service: -- Author Type: Physician    Filed: 5/31/2019  3:41 PM Encounter Date: 5/31/2019 Status: Signed    : Isidro Douglas DO (Physician)       Chief Complaint   Patient presents with   ? Foot Pain     left     History of Present Illness: Rooming staff notes reviewed. Patient was walking while at work, when she felt something \"pop\" in her left foot.  She immediately had significant discomfort in her proximal/lateral foot area over the cuboid bone area especially.  She has discomfort with foot movement in nearly any direction, but especially with dorsal flexion of left foot.  She has discomfort with weightbearing also, and does have some crutches at home to use if needed.  Patient had surgery on her left foot last September for a Dellon quadruple nerve decompression procedure.  Patient states she had recovered and benefited from this procedure, until the acute discomfort in left foot today.  Her job does require a lot of standing as a .      Review of systems: See history of present illness, all others negative.     Current Outpatient Medications   Medication Sig Dispense Refill   ? blood glucose test strips Use 1 each As Directed as needed. Test 2-3 daily (Patient taking differently: Use 1 each As Directed as needed. Test 1-2 daily      ) 100 strip 1   ? generic lancets (FINGERSTIX LANCETS) Use to check blood sugar three times daily. Dispense brand per patient's insurance at pharmacy discretion. (Patient taking differently: Use to check blood sugar 1-2x daily. Dispense brand per patient's insurance at pharmacy discretion.      ) 100 each 11   ? hydroCHLOROthiazide (HYDRODIURIL) 12.5 MG tablet Take 1 tablet (12.5 mg total) by mouth daily. 30 tablet 5   ? insulin glargine (BASAGLAR KWIKPEN U-100 INSULIN) 100 unit/mL (3 mL) pen INJECT 35 units in the morning and 35 units in the evening. 75 mL 3   ? pen needle, " "diabetic (BD ULTRA-FINE TJ PEN NEEDLE) 32 gauge x 5/32\" Ndle Use daily with insulin 100 each 5   ? TRULICITY 0.75 mg/0.5 mL PnIj INJECT 0.5 ML SUBCUTANEOUSLY EVERY 7 DAYS 6 Syringe 0   ? HYDROcodone-acetaminophen 5-325 mg per tablet Take 1-2 tablets by mouth every 6 (six) hours as needed for pain. 12 tablet 0   ? miscellaneous medical supply Misc Trilock ankle support worn on left foot and ankle. ICD code S93.602A 1 each 0   ? valACYclovir (VALTREX) 1000 MG tablet TAKE 2 TABS NOW AND 2 TABS IN 12 HOURS. 4 tablet 3     No current facility-administered medications for this visit.      Past Medical History:   Diagnosis Date   ? Arthritis    ? Diabetes mellitus (H)    ? History of transfusion     AFTER A MISCARRIAGE   ? Migraine    ? Neuropathy (H)       Past Surgical History:   Procedure Laterality Date   ? BLADDER SUSPENSION  2007   ? DILATION AND CURETTAGE OF UTERUS      SUCTION POST MISCARRIAGE   ? HYSTERECTOMY      1999   ? KNEE SURGERY     ? LAPAROSCOPIC APPENDECTOMY N/A 7/10/2014    Procedure: Laparoscopic Appendectomy;  Surgeon: Germain Howe MD;  Location: [facility] [place];  Service:    ? ID APPENDECTOMY      Description: Appendectomy;  Recorded: 07/17/2014;  Comments: JULY 2014   ? ID TARSAL TUNNEL RELEASE Left 9/21/2018    Procedure: DELLON QUADRUPLE NERVE DECOMPRESSION EXCISION PLANTAR FIBROMA ALL LEFT FOOT;  Surgeon: Jules Mao DPM;  Location: [facility];  Service: Podiatry   ? ID TOTAL ABDOM HYSTERECTOMY      Description: Hysterectomy;  Recorded: 01/11/2012;      Social History     Tobacco Use   ? Smoking status: Former Smoker   ? Smokeless tobacco: Never Used   Substance Use Topics   ? Alcohol use: Yes     Alcohol/week: 0.6 oz     Types: 1 Shots of liquor per week     Comment: <1 per wk   ? Drug use: No        Family History   Problem Relation Age of Onset   ? Diabetes Mother    ? Breast cancer Maternal Grandmother        Vitals:    05/31/19 1256 05/31/19 1259   BP: (!) 157/95 158/84 "   Patient Site: Right Arm Right Arm   Patient Position: Sitting Sitting   Cuff Size: Adult Large Adult Regular   Pulse: (!) 115 (!) 111   Resp: 18    Temp: 98.2  F (36.8  C)    TempSrc: Oral    SpO2: 97%    Weight: 189 lb (85.7 kg)        EXAM:   General: Vital signs reviewed. Patient is in some distress due to left foot discomfort, which I suspect is the reason for her increased heart rate. Breathing is non labored appearing. Patient is alert and oriented x 3.   Left foot exam is notable for mild edema without discoloration over the dorsal left cuboid region.  She is very tender over this area, and to a lesser extent going distally from here over her dorsal foot.  She has increased discomfort over the left cuboid region with active dorsal flexion of foot, and with passive inversion and eversion of foot.  I do not detect any ligament laxity with anterior and posterior distraction of the foot at the ankle.  I do not palpate any bony abnormality.    X-ray study reviewed by me with patient at time of exam, with no fracture noted to by me or any other acute abnormality.  I reviewed the radiologist report with patient also at time of exam, which noted an Achilles calcaneal spur, a very small accessory ossicle near the cuboid, and mild degenerative changes at the first MTP joint.  No acute fracture was noted.    I spoke with support staff in the podiatry department, and was able to arrange an appointment for patient with Dr. Mao.  Assessment/Plan   1. Foot injury, left, initial encounter  XR Foot Left 3 or More VWS    XR Foot Left 3 or More VWS   2. Foot sprain, left, initial encounter  Ankle support    HYDROcodone-acetaminophen 5-325 mg per tablet    miscellaneous medical supply Misc       Patient Instructions   See hand out for advice also. Your podiatry appointment is for 1 PM next Tuesday, 06/04/19 in [place].     Patient Education     Foot Sprain    A sprain is a stretching or tearing of the ligaments that hold  a joint together. There are no broken bones. Sprains generally take from 3-6 weeks to heal. A sprain may be treated with a splint, walking cast, or special boot. Mild sprains may not need any additional support.  Home care  The following guidelines will help you care for your injury at home:    Keep your leg elevated when sitting or lying down. This is very important during the first 48 hours to reduce swelling. Stay off the injured foot as much as possible until you can walk on it without pain. If needed, you may use crutches during the first week for this purpose. Crutches can be rented at many pharmacies or surgical/orthopedic supply stores.    You may be given a cast shoe to wear to prevent movement in your foot. If not, you can use a sandal or any shoe that does not put pressure on the injured area until the swelling and pain go away. If using a sandal, be careful not to hit your foot against anything, since another injury could make the sprain worse.    Apply an ice pack over the injured area for 15 to 20 minutes every 3 to 6 hours. You should do this for the first 24 to 48 hours. You can make an ice pack by filling a plastic bag that seals at the top with ice cubes and then wrapping it with a thin towel. Continue to use ice packs for relief of pain and swelling as needed. As the ice melts, avoid getting any wrap, splint, or cast wet. After 48 hours, apply heat from a warm shower or bath for 20 minutes several times daily. Alternating ice and heat may also be helpful.    You may use over-the-counter pain medicine to control pain, unless another medicine was prescribed. If you have chronic liver or kidney disease or ever had a stomach ulcer or GI bleeding, talk with your healthcare provider before using these medicines.    If you were given a splint or cast, keep it dry. Bathe with your splint or cast well out of the water, protected with 2 large plastic bags, rubber-banded at the top end. If a fiberglass  splint or cast gets wet, you can dry it with a hair dryer.    You may return to sports after healing, when you can run without pain.  Follow-up care  Follow up with your healthcare provider as directed. Sometimes fractures dont show up on the first X-ray. Bruises and sprains can sometimes hurt as much as a fracture. These injuries can take time to heal completely. If your symptoms dont improve or they get worse, talk with your healthcare provider. You may need a repeat X-ray.  When to seek medical advice  Call your healthcare provider right away if any of these occur:    The plaster cast or splint gets wet or soft    The fiberglass cast or splint gets wet and does not dry for 24 hours    Pain or swelling increases, or redness appears    A bad odor comes from within the cast    Fever of 100.4 F (38 C) or above lasting for 24 to 48 hours    Toes on the injured foot become cold, blue, numb, or tingly  Date Last Reviewed: 11/20/2015 2000-2017 [employer]. [address] [place] [address], [place] [address]. All rights reserved. This information is not intended as a substitute for professional medical care. Always follow your healthcare professional's instructions.              Isidro Douglas,

## 2021-07-01 ENCOUNTER — COMMUNICATION - HEALTHEAST (OUTPATIENT)
Dept: FAMILY MEDICINE | Facility: CLINIC | Age: 61
End: 2021-07-01

## 2021-07-02 ENCOUNTER — COMMUNICATION - HEALTHEAST (OUTPATIENT)
Dept: SCHEDULING | Facility: CLINIC | Age: 61
End: 2021-07-02

## 2021-07-03 NOTE — ADDENDUM NOTE
Addendum Note by Porsche Davis RN at 4/14/2020  3:00 PM     Author: Porsche Davis RN Service: -- Author Type: Registered Nurse    Filed: 4/22/2020 10:11 AM Encounter Date: 4/14/2020 Status: Signed    : Porsche Davis RN (Registered Nurse)    Addended by: PORSCHE DAVIS on: 4/22/2020 10:11 AM        Modules accepted: Orders

## 2021-07-03 NOTE — ANESTHESIA PREPROCEDURE EVALUATION
Anesthesia Preprocedure Evaluation by Christina Tomas MD at 11/2/2018  9:53 AM     Author: Christina Tomas MD Service: -- Author Type: Physician    Filed: 11/2/2018 11:14 AM Date of Service: 11/2/2018  9:53 AM Status: Addendum    : Christina Tomas MD (Physician)    Related Notes: Original Note by Christina Tomas MD (Physician) filed at 11/2/2018 10:10 AM       Anesthesia Evaluation      No history of anesthetic complications (Contralateral decompression 9/2018 w/ out issues)     Airway   Mallampati: II  Neck ROM: full   Pulmonary - negative ROS    breath sounds clear to auscultation                         Cardiovascular   Exercise tolerance: > or = 4 METS  (+) , hypercholesterolemia,     (-) hypertension  Rhythm: regular  Rate: normal,         Neuro/Psych      Comments: Tarsal tunnel syndrome, s/f R sided decompression      Endo/Other    (+) diabetes mellitus type 2,      GI/Hepatic/Renal - negative ROS   (-) GERD          Dental    (+) chipped and poor dentition                           Anesthesia Plan  Planned anesthetic: general LMA  LMA 4  Pre-op tylenol 1g, gabapentin 300mg, oxycodone 5mg  Ketamine 40mg post-induction, Toradol 15mg if okay per surgeon   Decadron 4, Zofran 4 for antiemesis  ASA 2     Anesthetic plan and risks discussed with: patient  Anesthesia plan special considerations: antiemetics,   Post-op plan: routine recovery

## 2021-07-03 NOTE — ADDENDUM NOTE
Addendum Note by Magaly Melgar DPM at 10/30/2018  4:27 PM     Author: Magaly Melgar DPM Service: -- Author Type: Physician    Filed: 10/30/2018  4:27 PM Encounter Date: 10/10/2018 Status: Signed    : Magaly Melgar DPM (Physician)    Addended by: MAGALY MELGAR on: 10/30/2018 04:27 PM        Modules accepted: Orders, SmartSet

## 2021-07-03 NOTE — ADDENDUM NOTE
Addendum Note by Magaly Melgar DPM at 10/10/2018  5:08 PM     Author: Magaly Melgar DPM Service: -- Author Type: Physician    Filed: 10/10/2018  5:08 PM Encounter Date: 10/10/2018 Status: Signed    : Magaly Melgar DPM (Physician)    Addended by: MAGALY MELGAR on: 10/10/2018 05:08 PM        Modules accepted: Miguel

## 2021-07-04 NOTE — TELEPHONE ENCOUNTER
Telephone Encounter by Jeanette Kebede RN at 7/2/2021  7:54 PM     Author: Jeanette Kebede RN Service: -- Author Type: Registered Nurse    Filed: 7/2/2021  8:16 PM Encounter Date: 7/2/2021 Status: Signed    : Jeanette Kebede RN (Registered Nurse)       Pt is calling.    Spoke with Dr dickson today. Had visit.  Scripts were supposed to be sent in today and the pharmacy does not have them.  Was supposed to call in Zoloft and another med for anxiety.   Note is not finished in the chart. No meds sent in.  I advised her that I can call the on call physician, but there may not be much that can be done since he didn't finish his note.  I advised her that I would give her a call back.    8:03pm-Call to on call physician, Dr Snider. She stated that since the note is not done, there isn't anything that we can do. She will need to address it with him on Monday.  8:12pm-call back to the patient. I advised her that per the on call, Dr Snider, we are unable to send anything in now, since there is no visit note. Per my supervisor, JOANNE Jenkins, the other option was to do another virtual visit through urgent care over the weekend or tonight. She stated that she will wait until next week, as she already did an E-Visit, then online wouldn't work, so she did a telephone call. Very frustrated. Doesn't want to have to do another one.  I advised her that I would send an urgent message to the care team for Monday but to call back earlier with any further concerns. She verbalized understanding.    Reason for Disposition  ? [1] Prescription not at pharmacy AND [2] was prescribed by PCP recently    Additional Information  ? Negative: Drug overdose and triager unable to answer question  ? Negative: Caller requesting information unrelated to medicine  ? Negative: Caller requesting a prescription for Strep throat and has a positive culture result  ? Negative: Rash while taking a medication or within 3 days of stopping it  ? Negative:  "Immunization reaction suspected  ? Negative: [1] Asthma and [2] having symptoms of asthma (cough, wheezing, etc.)  ? Negative: [1] Influenza symptoms AND [2] anti-viral med prescription request, such as Tamiflu  ? Negative: [1] Symptom of illness (e.g., headache, abdominal pain, earache, vomiting) AND [2] more than mild  ? Negative: MORE THAN A DOUBLE DOSE of a prescription or over-the-counter (OTC) drug  ? Negative: [1] DOUBLE DOSE (an extra dose or lesser amount) of over-the-counter (OTC) drug AND [2] any symptoms (e.g., dizziness, nausea, pain, sleepiness)  ? Negative: [1] DOUBLE DOSE (an extra dose or lesser amount) of prescription drug AND [2] any symptoms (e.g., dizziness, nausea, pain, sleepiness)  ? Negative: Took another person's prescription drug  ? Negative: [1] DOUBLE DOSE (an extra dose or lesser amount) of prescription drug AND [2] NO symptoms (Exception: a double dose of antibiotics)  ? Negative: Diabetes drug error or overdose (e.g., took wrong type of insulin or took extra dose)  ? Negative: [1] Request for URGENT new prescription or refill of \"essential\" medication (i.e., likelihood of harm to patient if not taken) AND [2] triager unable to fill per unit policy    Protocols used: MEDICATION QUESTION CALL-A-    Jeanette Kebede RN   [facility] Nurse Advisor  07/02/21 at 7:55 PM         "

## 2021-07-04 NOTE — TELEPHONE ENCOUNTER
Telephone Encounter by Carissa Patrick LPN at 7/1/2021  1:38 PM     Author: Carissa Patrick LPN Service: -- Author Type: Licensed Nurse    Filed: 7/1/2021  1:39 PM Encounter Date: 7/1/2021 Status: Signed    : Carissa Patrick LPN (Licensed Nurse)       Called and relayed message to patient.  Patient does not feel unsafe at this time. She was teary and stated she just needs help. She is scheduled for a video visit tomorrow with Dr. Nicholas.

## 2021-07-04 NOTE — ADDENDUM NOTE
Addendum Note by Sultana Hensley MA at 4/29/2021 11:26 AM     Author: Sultana Hensley MA Service: -- Author Type: Medical Assistant    Filed: 4/29/2021 11:26 AM Encounter Date: 4/29/2021 Status: Signed    : Sultana Hensley MA (Medical Assistant)    Addended by: SULTANA HENSLEY on: 4/29/2021 11:26 AM        Modules accepted: Level of Service, SmartSet

## 2021-07-04 NOTE — TELEPHONE ENCOUNTER
Telephone Encounter by Ifeanyi Nicholas MD at 7/1/2021 12:04 PM     Author: Ifeanyi Nicholas MD Service: -- Author Type: Physician    Filed: 7/1/2021 12:07 PM Encounter Date: 7/1/2021 Status: Signed    : Ifeanyi Nicholas MD (Physician)       Please phone patient.  Request for e-visit with Dr Garza was noted today.  Please advise that Dr Garza is not in the clinic today.  In reviewing her PHQ-9, I would recommend that she either be evaluated via a virtual or in-person visit rather than an e-visit, so that discussion could be had regarding the various treatment options, potential side effects, and appropriate expectations for improvement.  It looks like Dr Garza may have an opening tomorrow, otherwise I certainly have open appointments tomorrow, if patient would like. Please also advise that it patient feels unsafe currently, that she be evaluated in the ER.    Thanks,  Ifeanyi Nicholas MD

## 2021-07-04 NOTE — TELEPHONE ENCOUNTER
Telephone Encounter by Ifeanyi Nicholas MD at 7/3/2021  8:18 AM     Author: Ifeanyi Nicholas MD Service: -- Author Type: Physician    Filed: 7/3/2021  8:19 AM Encounter Date: 7/2/2021 Status: Signed    : Ifeanyi Nicholas MD (Physician)       Prescriptions sent to pharmacy and note sent to patient informing her of this.   Ifeanyi Nicholas MD

## 2021-07-13 ENCOUNTER — RECORDS - HEALTHEAST (OUTPATIENT)
Dept: ADMINISTRATIVE | Facility: CLINIC | Age: 61
End: 2021-07-13

## 2021-07-15 NOTE — PROGRESS NOTES
Zoe is a 60 year old who is being evaluated via a billable video visit.      How would you like to obtain your AVS? [employer]   MYCHART VIDEO VIST - WILL DO DOXIMITY ONLY IF MYCHART DOESN'T WORK  If the video visit is dropped, the invitation should be resent by: Text to cell phone: [phone]  Will anyone else be joining your video visit? No      Video Start Time: 1335  Video-Visit Details  [facility] ENDOCRINOLOGY    Diabetes Note 7/27/2021    Zoe Alvarez, 1960, [id number]          Reason for visit      1. Type 2 diabetes mellitus without complication, with long-term current use of insulin (H)        HPI     Zoe Alvarez is a very pleasant 60 year old old female who presents for follow up.  SUMMARY:  Zoe is seen via video visit to follow up on DM2. Her A1c from 7/16/21 was 9.2, down slightly from 9.7 on 3/31/21. She is currently taking Lantus 45 units in the morning and 45 units in the evening as well as Jardiance 25 mg daily.    TODAY:  She is feeling less stressed recently, after being seen for depression and started an antidepressant. She does report having some lows in the mornings, which she is able to self-correct. She is symptomatic in the 80s but not when blood glucose is > 100. She reports that she has stopped eating bedtime snacks, which usually consisted of chips, nachos with cheese, etc., and since that time she has had some lows. She is tolerating Jardiance well with no recent yeast infections.  Blood glucose data:  6-day Avg. 161 range: ; see below    Past Medical History     Patient Active Problem List   Diagnosis     Obesity (BMI 30-39.9)     Chronic venous insufficiency     Epidermal Inclusion Cyst     Hyperlipidemia     Vertigo     Ingrowing Toenail     Nicotine Dependence     Type 2 diabetes mellitus without complication, with long-term current use of insulin (H)     Eczema     RLQ abdominal pain     Tarsal tunnel syndrome of left side     Compression  neuropathy of right lower extremity     Mononeuritis of left lower extremity     Plantar fascial fibromatosis of left foot     Mononeuritis of right lower extremity     Tarsal tunnel syndrome of right side     Foot pain, left     Bilateral lower extremity edema     Chronic kidney disease, stage 3        Family History       family history includes Breast Cancer in her maternal grandmother; Diabetes in her mother.    Social History      reports that she has quit smoking. She has never used smokeless tobacco. She reports current alcohol use of about 1.0 standard drinks of alcohol per week. She reports that she does not use drugs.      Review of Systems      ROS: 10 point ROS neg other than the symptoms noted above in the HPI.    Vital Signs     There were no vitals taken for this visit.  Wt Readings from Last 3 Encounters:   03/31/21 88.4 kg (194 lb 12.8 oz)   01/11/21 88 kg (194 lb)   11/24/20 88 kg (194 lb)       Physical Exam     GENERAL: Healthy, alert and no distress  EYES: Eyes grossly normal to inspection.  No discharge or erythema, or obvious scleral/conjunctival abnormalities.  RESP: No audible wheeze, cough, or visible cyanosis.  No visible retractions or increased work of breathing.    SKIN: Visible skin clear. No significant rash, abnormal pigmentation or lesions.  NEURO: Cranial nerves grossly intact.  Mentation and speech appropriate for age.  PSYCH: Mentation appears normal, affect normal/bright, judgement and insight intact, normal speech and appearance well-groomed.        Assessment     1. Type 2 diabetes mellitus without complication, with long-term current use of insulin (H)        Plan   Zoe's glycemic control is slightly improved. No indication for changes in medications. Patient was encouraged to eat a protein snack at bedtime, such as cheese, nuts, or peanut butter to prevent early morning low blood glucose.  Follow up in 6 months, sooner if needed.    Jayla Law RN, APRN student,  [facility] Endocrinology  7/27/2021  1:42 PM    Jayla is an NP student under my supervision  I agree with the aforementioned diabetes plan.  aJyla Hermosillo NP  [facility] Endocrinology  7/28/2021  6:38 AM        Lab Results     Microalbumin Urine mg/dL   Date Value Ref Range Status   04/14/2021 <0.50 0.00 - 1.99 mg/dL Final       Cholesterol   Date Value Ref Range Status   11/06/2017 227 (H) <=199 mg/dL Final     Direct Measure HDL   Date Value Ref Range Status   11/06/2017 46 (L) >=50 mg/dL Final     Triglycerides   Date Value Ref Range Status   11/06/2017 156 (H) <=149 mg/dL Final     LDL Cholesterol Direct   Date Value Ref Range Status   04/14/2021 112 <=129 mg/dl Final     Hemoglobin A1C   Date Value Ref Range Status   07/16/2021 9.2 (H) 0.0 - 5.6 % Final     Comment:     Normal <5.7%   Prediabetes 5.7-6.4%    Diabetes 6.5% or higher     Note: Adopted from ADA consensus guidelines.   03/31/2021 9.7 (H) <=5.6 % Final   10/13/2020 9.1 (H) <=5.6 % Final     Comment:     Normal <5.7% Prediabete 5.7-6.4% Diabletes 6.5% or higher - adopted from ADA consensus guidelines           Current Medications     Outpatient Medications Prior to Visit   Medication Sig Dispense Refill     blood glucose meter (GLUCOMETER) [BLOOD GLUCOSE METER (GLUCOMETER)] Use 1 each As Directed as needed. Dispense glucometer brand per patient's insurance at pharmacy discretion. 1 each 0     empagliflozin (JARDIANCE) 25 mg Tab tablet [EMPAGLIFLOZIN (JARDIANCE) 25 MG TAB TABLET] Take 1 tablet (25 mg total) by mouth daily. 90 tablet 3     generic lancets (FINGERSTIX LANCETS) [GENERIC LANCETS (FINGERSTIX LANCETS)] Use to check blood sugar 1-2x daily. Dispense brand per patient's insurance at pharmacy discretion. 100 each 11     hydrOXYzine HCL (ATARAX) 25 MG tablet [HYDROXYZINE HCL (ATARAX) 25 MG TABLET] Take 1 tablet (25 mg total) by mouth 3 (three) times a day as needed for anxiety. 60 tablet 1     LANTUS SOLOSTAR U-100 INSULIN  "100 unit/mL (3 mL) pen [LANTUS SOLOSTAR U-100 INSULIN 100 UNIT/ML (3 ML) PEN] INJECT TWICE DAILY. UP TO 90 UNITS A DAY. 75 mL 1     pen needle, diabetic (BD ULTRA-FINE TJ PEN NEEDLE) 32 gauge x \" Ndle [PEN NEEDLE, DIABETIC (BD ULTRA-FINE TJ PEN NEEDLE) 32 GAUGE X \" NDLE] USE TWO TIMES A DAY WITH INSULIN 200 each 1     sertraline (ZOLOFT) 25 MG tablet [SERTRALINE (ZOLOFT) 25 MG TABLET] 1/2 tablet by mouth once daily x 6 days then 1 tablet by mouth once daily. 30 tablet 2     valACYclovir (VALTREX) 1000 MG tablet [VALACYCLOVIR (VALTREX) 1000 MG TABLET] TAKE 2 TABLETS BY MOUTH NOW AND 2 TABLETS IN 12 HOURS. PRN. 4 tablet 3     fluconazole (DIFLUCAN) 150 MG tablet [FLUCONAZOLE (DIFLUCAN) 150 MG TABLET] TAKE ONE TABLET NOW AND IF NO BETTER IN 3 DAYS, TAKE ANOTHER 2 tablet 3     No facility-administered medications prior to visit.           Type of service:  Video Visit    Video End Time: 1355    Originating Location (pt. Location): Home    Distant Location (provider location):  [facility]     Platform used for Video Visit: AmWell    Date of last OV: 21  Reason for Visit: DM      Blood Glucose Lo/27        7   6              AM 85      AM 72        AM 80        "

## 2021-07-16 ENCOUNTER — LAB (OUTPATIENT)
Dept: LAB | Facility: CLINIC | Age: 61
End: 2021-07-16
Payer: COMMERCIAL

## 2021-07-16 DIAGNOSIS — E11.9 TYPE 2 DIABETES MELLITUS WITHOUT COMPLICATION, WITH LONG-TERM CURRENT USE OF INSULIN (H): ICD-10-CM

## 2021-07-16 DIAGNOSIS — Z79.4 TYPE 2 DIABETES MELLITUS WITHOUT COMPLICATION, WITH LONG-TERM CURRENT USE OF INSULIN (H): ICD-10-CM

## 2021-07-16 LAB — HBA1C MFR BLD: 9.2 % (ref 0–5.6)

## 2021-07-16 PROCEDURE — 36415 COLL VENOUS BLD VENIPUNCTURE: CPT

## 2021-07-16 PROCEDURE — 83036 HEMOGLOBIN GLYCOSYLATED A1C: CPT

## 2021-07-21 ENCOUNTER — RECORDS - HEALTHEAST (OUTPATIENT)
Dept: ADMINISTRATIVE | Facility: CLINIC | Age: 61
End: 2021-07-21

## 2021-07-22 ENCOUNTER — RECORDS - HEALTHEAST (OUTPATIENT)
Dept: FAMILY MEDICINE | Facility: CLINIC | Age: 61
End: 2021-07-22

## 2021-07-22 DIAGNOSIS — Z00.00 ROUTINE GENERAL MEDICAL EXAMINATION AT A HEALTH CARE FACILITY: ICD-10-CM

## 2021-07-25 DIAGNOSIS — F33.1 MODERATE EPISODE OF RECURRENT MAJOR DEPRESSIVE DISORDER (H): ICD-10-CM

## 2021-07-27 ENCOUNTER — VIRTUAL VISIT (OUTPATIENT)
Dept: ENDOCRINOLOGY | Facility: CLINIC | Age: 61
End: 2021-07-27
Payer: COMMERCIAL

## 2021-07-27 DIAGNOSIS — E11.9 TYPE 2 DIABETES MELLITUS WITHOUT COMPLICATION, WITH LONG-TERM CURRENT USE OF INSULIN (H): Primary | ICD-10-CM

## 2021-07-27 DIAGNOSIS — Z79.4 TYPE 2 DIABETES MELLITUS WITHOUT COMPLICATION, WITH LONG-TERM CURRENT USE OF INSULIN (H): Primary | ICD-10-CM

## 2021-07-27 PROCEDURE — 99213 OFFICE O/P EST LOW 20 MIN: CPT | Mod: 95 | Performed by: NURSE PRACTITIONER

## 2021-07-27 NOTE — Clinical Note
7/27/2021         RE: Zoe Alvarez  [address] [address]        Dear Colleague,    Thank you for referring your patient, Zoe Alvarez, to the [facility]. Please see a copy of my visit note below.    Zoe is a 60 year old who is being evaluated via a billable video visit.      How would you like to obtain your AVS? MyChart   MYCHART VIDEO VIST - WILL DO DOXIMITY ONLY IF MYCHART DOESN'T WORK  If the video visit is dropped, the invitation should be resent by: Text to cell phone: [phone]  Will anyone else be joining your video visit? No  {If patient encounters technical issues they should call [phone] :[id number]}    Video Start Time: 1335  Video-Visit Details  [facility] ENDOCRINOLOGY    Diabetes Note 7/27/2021    Zoe Alvarez, 1960, [id number]          Reason for visit      1. Type 2 diabetes mellitus without complication, with long-term current use of insulin (H)        HPI     Zoe Alvarez is a very pleasant 60 year old old female who presents for follow up.  SUMMARY:  Zoe is seen via video visit to follow up on DM2. Her A1c from 7/16/21 was 9.2, down slightly from 9.7 on 3/31/21. She is currently taking Lantus 45 units in the morning and 45 units in the evening as well as Jardiance 25 mg daily.    TODAY:  She is feeling less stressed recently, after being seen for depression and started an antidepressant. She does report having some lows in the mornings, which she is able to self-correct. She is symptomatic in the 80s but not when blood glucose is > 100. She reports that she has stopped eating bedtime snacks, which usually consisted of chips, nachos with cheese, etc., and since that time she has had some lows. She is tolerating Jardiance well with no recent yeast infections.  Blood glucose data:  6-day Avg. 161 range: ; see below    Past Medical History     Patient Active Problem List   Diagnosis     Obesity (BMI 30-39.9)      Chronic venous insufficiency     Epidermal Inclusion Cyst     Hyperlipidemia     Vertigo     Ingrowing Toenail     Nicotine Dependence     Type 2 diabetes mellitus without complication, with long-term current use of insulin (H)     Eczema     RLQ abdominal pain     Tarsal tunnel syndrome of left side     Compression neuropathy of right lower extremity     Mononeuritis of left lower extremity     Plantar fascial fibromatosis of left foot     Mononeuritis of right lower extremity     Tarsal tunnel syndrome of right side     Foot pain, left     Bilateral lower extremity edema     Chronic kidney disease, stage 3        Family History       family history includes Breast Cancer in her maternal grandmother; Diabetes in her mother.    Social History      reports that she has quit smoking. She has never used smokeless tobacco. She reports current alcohol use of about 1.0 standard drinks of alcohol per week. She reports that she does not use drugs.      Review of Systems      ROS: 10 point ROS neg other than the symptoms noted above in the HPI.    Vital Signs     There were no vitals taken for this visit.  Wt Readings from Last 3 Encounters:   03/31/21 88.4 kg (194 lb 12.8 oz)   01/11/21 88 kg (194 lb)   11/24/20 88 kg (194 lb)       Physical Exam     GENERAL: Healthy, alert and no distress  EYES: Eyes grossly normal to inspection.  No discharge or erythema, or obvious scleral/conjunctival abnormalities.  RESP: No audible wheeze, cough, or visible cyanosis.  No visible retractions or increased work of breathing.    SKIN: Visible skin clear. No significant rash, abnormal pigmentation or lesions.  NEURO: Cranial nerves grossly intact.  Mentation and speech appropriate for age.  PSYCH: Mentation appears normal, affect normal/bright, judgement and insight intact, normal speech and appearance well-groomed.        Assessment     1. Type 2 diabetes mellitus without complication, with long-term current use of insulin (H)         Plan   Zoe's glycemic control is slightly improved. No indication for changes in medications. Patient was encouraged to eat a protein snack at bedtime, such as cheese, nuts, or peanut butter to prevent early morning low blood glucose.  Follow up in 6 months, sooner if needed.    Jayla Law RN, APRN student, [facility] Endocrinology  7/27/2021  1:42 PM      Lab Results     Microalbumin Urine mg/dL   Date Value Ref Range Status   04/14/2021 <0.50 0.00 - 1.99 mg/dL Final       Cholesterol   Date Value Ref Range Status   11/06/2017 227 (H) <=199 mg/dL Final     Direct Measure HDL   Date Value Ref Range Status   11/06/2017 46 (L) >=50 mg/dL Final     Triglycerides   Date Value Ref Range Status   11/06/2017 156 (H) <=149 mg/dL Final     LDL Cholesterol Direct   Date Value Ref Range Status   04/14/2021 112 <=129 mg/dl Final     Hemoglobin A1C   Date Value Ref Range Status   07/16/2021 9.2 (H) 0.0 - 5.6 % Final     Comment:     Normal <5.7%   Prediabetes 5.7-6.4%    Diabetes 6.5% or higher     Note: Adopted from ADA consensus guidelines.   03/31/2021 9.7 (H) <=5.6 % Final   10/13/2020 9.1 (H) <=5.6 % Final     Comment:     Normal <5.7% Prediabete 5.7-6.4% Diabletes 6.5% or higher - adopted from ADA consensus guidelines           Current Medications     Outpatient Medications Prior to Visit   Medication Sig Dispense Refill     blood glucose meter (GLUCOMETER) [BLOOD GLUCOSE METER (GLUCOMETER)] Use 1 each As Directed as needed. Dispense glucometer brand per patient's insurance at pharmacy discretion. 1 each 0     empagliflozin (JARDIANCE) 25 mg Tab tablet [EMPAGLIFLOZIN (JARDIANCE) 25 MG TAB TABLET] Take 1 tablet (25 mg total) by mouth daily. 90 tablet 3     generic lancets (FINGERSTIX LANCETS) [GENERIC LANCETS (FINGERSTIX LANCETS)] Use to check blood sugar 1-2x daily. Dispense brand per patient's insurance at pharmacy discretion. 100 each 11     hydrOXYzine HCL (ATARAX) 25 MG tablet  "[HYDROXYZINE HCL (ATARAX) 25 MG TABLET] Take 1 tablet (25 mg total) by mouth 3 (three) times a day as needed for anxiety. 60 tablet 1     LANTUS SOLOSTAR U-100 INSULIN 100 unit/mL (3 mL) pen [LANTUS SOLOSTAR U-100 INSULIN 100 UNIT/ML (3 ML) PEN] INJECT TWICE DAILY. UP TO 90 UNITS A DAY. 75 mL 1     pen needle, diabetic (BD ULTRA-FINE JT PEN NEEDLE) 32 gauge x 5\" Ndle [PEN NEEDLE, DIABETIC (BD ULTRA-FINE TJ PEN NEEDLE) 32 GAUGE X 5\" NDLE] USE TWO TIMES A DAY WITH INSULIN 200 each 1     sertraline (ZOLOFT) 25 MG tablet [SERTRALINE (ZOLOFT) 25 MG TABLET] 1/2 tablet by mouth once daily x 6 days then 1 tablet by mouth once daily. 30 tablet 2     valACYclovir (VALTREX) 1000 MG tablet [VALACYCLOVIR (VALTREX) 1000 MG TABLET] TAKE 2 TABLETS BY MOUTH NOW AND 2 TABLETS IN 12 HOURS. PRN. 4 tablet 3     fluconazole (DIFLUCAN) 150 MG tablet [FLUCONAZOLE (DIFLUCAN) 150 MG TABLET] TAKE ONE TABLET NOW AND IF NO BETTER IN 3 DAYS, TAKE ANOTHER 2 tablet 3     No facility-administered medications prior to visit.           Type of service:  Video Visit    Video End Time:{video visit start/end time for provider to select:[id number]}    Originating Location (pt. Location): {video visit patient location:925891::\"Home\"}    Distant Location (provider location):  [facility]     Platform used for Video Visit: {Virtual Visit Platforms:680266::\"Cloudacc\"}    Date of last OV: 21  Reason for Visit: DM      Blood Glucose Lo/27        7   6              AM 85      AM 72        AM 80            Again, thank you for allowing me to participate in the care of your patient.        Sincerely,        Jayla Hermosillo NP  "

## 2021-07-28 RX ORDER — SERTRALINE HYDROCHLORIDE 25 MG/1
TABLET, FILM COATED ORAL
Qty: 30 TABLET | Refills: 2 | OUTPATIENT
Start: 2021-07-28

## 2021-07-30 DIAGNOSIS — E11.9 TYPE 2 DIABETES MELLITUS WITHOUT COMPLICATION (H): ICD-10-CM

## 2021-07-30 RX ORDER — INSULIN GLARGINE 100 [IU]/ML
INJECTION, SOLUTION SUBCUTANEOUS
Qty: 90 ML | Refills: 1 | Status: SHIPPED | OUTPATIENT
Start: 2021-07-30 | End: 2022-04-04

## 2021-08-15 ENCOUNTER — HEALTH MAINTENANCE LETTER (OUTPATIENT)
Age: 61
End: 2021-08-15

## 2021-08-24 NOTE — PROGRESS NOTES
"    Assessment & Plan     Moderate episode of recurrent major depressive disorder (H)  Insomnia, unspecified type  HANS (generalized anxiety disorder)  As noted below, scoring high on PHQ-9.  Recently had several of her family members passed away unexpectedly.  Having daily breakthrough symptoms.  Feels that the Zoloft helps \"a little bit\" but daily functioning is very difficult.  Sleep is erratic.  We will have her increase her Zoloft to 50 mg x 2 weeks.  If only partial resolution of symptoms, go ahead and go up to 75 mg.  We will add trazodone nightly to help with sleep.  We will follow up with her in 1 month.  No active SI/HI today.  Home environment is safe.  - sertraline (ZOLOFT) 25 MG tablet  Dispense: 180 tablet; Refill: 0  - traZODone (DESYREL) 50 MG tablet  Dispense: 60 tablet; Refill: 0    S/p total abdominal hysterectomy  Back in 1999, patient underwent total abdominal hysterectomy for menorrhagia and fibroids.  However, there is no recorded Pap smear in 2011 that peds LAMBERTO.  Unsure however this is possible if patient has had a KATHARINA.  I unfortunately do not have the original records of the procedure done at [facility].  On chart review, there is a note from 2014 for patient's appendectomy that notes that they see ovaries on both sides as well as the fallopian tubes but no mention of the cervix.  If patient, in fact does have a cervix, she should get a Pap smear.  I will have her come back at a future visit and we can do a bimanual exam to assess if there is a cervix there and subsequent Pap smear if needed.  Patient amenable to the plan.    51 minutes spent on the date of the encounter doing chart review, history and exam, documentation and further activities per the note    Return in about 1 month (around 9/25/2021) for Mood f/u .    DO SAMANTHA [facility] TERRI Enriquez is a 60 year old who presents for the following health issues: MDD, HANS    HPI     Patient " "with history of obesity, type 2 diabetes and CKD stage III who presents today for wound concerns.    He is to have \"really bad anxiety and depression\" many years ago but then \"snapped out of it\" and never needed medications.  However, about 2 months ago, she found out that 3 of her family members unexpectedly passed away from medical conditions unrelated to Covid.  Has been very difficult for her to regular her mood since then.  Has been experiencing anhedonia, hard to get up to go to work, is emotionally labile, sleep is erratic.  Is only getting about 5 hours sleep at night.  Has gained weight because she is overeating for emotional purposes, memory/concentration are poor.  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  Is interested in medications.        Social History     Tobacco Use     Smoking status: Former Smoker     Smokeless tobacco: Never Used   Substance Use Topics     Alcohol use: Yes     Alcohol/week: 1.0 standard drinks     Comment: Alcoholic Drinks/day: <1 per wk     Drug use: No     PHQ 7/1/2021   PHQ-9 Total Score 16   Q9: Thoughts of better off dead/self-harm past 2 weeks Several days       How many servings of fruits and vegetables do you eat daily?  2-3    On average, how many sweetened beverages do you drink each day (Examples: soda, juice, sweet tea, etc.  Do NOT count diet or artificially sweetened beverages)?   1    How many days per week do you exercise enough to make your heart beat faster? 3 or less    How many minutes a day do you exercise enough to make your heart beat faster? 9 or less    How many days per week do you miss taking your medication? 0    Review of Systems   As per HPI         Objective    /66 (BP Location: Right arm, Patient Position: Sitting, Cuff Size: Adult Regular)   Wt 89.8 kg (198 lb)   Breastfeeding No   BMI 33.46 kg/m    Body mass index is 33.46 kg/m .     Physical Exam   GENERAL: healthy, alert and no distress  PSYCH: teary eyed, " anxious, good insight.

## 2021-08-25 ENCOUNTER — OFFICE VISIT (OUTPATIENT)
Dept: FAMILY MEDICINE | Facility: CLINIC | Age: 61
End: 2021-08-25
Payer: COMMERCIAL

## 2021-08-25 VITALS — DIASTOLIC BLOOD PRESSURE: 66 MMHG | BODY MASS INDEX: 33.46 KG/M2 | WEIGHT: 198 LBS | SYSTOLIC BLOOD PRESSURE: 126 MMHG

## 2021-08-25 DIAGNOSIS — Z90.710 HISTORY OF TOTAL ABDOMINAL HYSTERECTOMY: ICD-10-CM

## 2021-08-25 DIAGNOSIS — F33.1 MODERATE EPISODE OF RECURRENT MAJOR DEPRESSIVE DISORDER (H): Primary | ICD-10-CM

## 2021-08-25 DIAGNOSIS — F41.1 GAD (GENERALIZED ANXIETY DISORDER): ICD-10-CM

## 2021-08-25 DIAGNOSIS — G47.00 INSOMNIA, UNSPECIFIED TYPE: ICD-10-CM

## 2021-08-25 PROCEDURE — 99215 OFFICE O/P EST HI 40 MIN: CPT | Performed by: STUDENT IN AN ORGANIZED HEALTH CARE EDUCATION/TRAINING PROGRAM

## 2021-08-25 RX ORDER — SERTRALINE HYDROCHLORIDE 25 MG/1
50 TABLET, FILM COATED ORAL DAILY
Qty: 180 TABLET | Refills: 0 | Status: SHIPPED | OUTPATIENT
Start: 2021-08-25 | End: 2021-10-06

## 2021-08-25 RX ORDER — TRAZODONE HYDROCHLORIDE 50 MG/1
50 TABLET, FILM COATED ORAL AT BEDTIME
Qty: 60 TABLET | Refills: 0 | Status: SHIPPED | OUTPATIENT
Start: 2021-08-25 | End: 2021-10-06

## 2021-10-06 ENCOUNTER — OFFICE VISIT (OUTPATIENT)
Dept: FAMILY MEDICINE | Facility: CLINIC | Age: 61
End: 2021-10-06
Payer: COMMERCIAL

## 2021-10-06 VITALS
DIASTOLIC BLOOD PRESSURE: 66 MMHG | SYSTOLIC BLOOD PRESSURE: 104 MMHG | HEART RATE: 94 BPM | BODY MASS INDEX: 32.87 KG/M2 | OXYGEN SATURATION: 92 % | WEIGHT: 194.5 LBS

## 2021-10-06 DIAGNOSIS — Z13.220 SCREENING FOR HYPERLIPIDEMIA: ICD-10-CM

## 2021-10-06 DIAGNOSIS — Z12.31 VISIT FOR SCREENING MAMMOGRAM: ICD-10-CM

## 2021-10-06 DIAGNOSIS — F41.1 GAD (GENERALIZED ANXIETY DISORDER): ICD-10-CM

## 2021-10-06 DIAGNOSIS — Z23 NEED FOR TD VACCINE: ICD-10-CM

## 2021-10-06 DIAGNOSIS — B37.9 CANDIDA INFECTION: ICD-10-CM

## 2021-10-06 DIAGNOSIS — Z79.4 TYPE 2 DIABETES MELLITUS WITHOUT COMPLICATION, WITH LONG-TERM CURRENT USE OF INSULIN (H): ICD-10-CM

## 2021-10-06 DIAGNOSIS — G47.00 INSOMNIA, UNSPECIFIED TYPE: ICD-10-CM

## 2021-10-06 DIAGNOSIS — F33.1 MODERATE EPISODE OF RECURRENT MAJOR DEPRESSIVE DISORDER (H): Primary | ICD-10-CM

## 2021-10-06 DIAGNOSIS — E11.9 TYPE 2 DIABETES MELLITUS WITHOUT COMPLICATION, WITH LONG-TERM CURRENT USE OF INSULIN (H): ICD-10-CM

## 2021-10-06 DIAGNOSIS — Z23 NEEDS FLU SHOT: ICD-10-CM

## 2021-10-06 PROCEDURE — 90472 IMMUNIZATION ADMIN EACH ADD: CPT | Performed by: STUDENT IN AN ORGANIZED HEALTH CARE EDUCATION/TRAINING PROGRAM

## 2021-10-06 PROCEDURE — 90662 IIV NO PRSV INCREASED AG IM: CPT | Performed by: STUDENT IN AN ORGANIZED HEALTH CARE EDUCATION/TRAINING PROGRAM

## 2021-10-06 PROCEDURE — 90471 IMMUNIZATION ADMIN: CPT | Performed by: STUDENT IN AN ORGANIZED HEALTH CARE EDUCATION/TRAINING PROGRAM

## 2021-10-06 PROCEDURE — 99214 OFFICE O/P EST MOD 30 MIN: CPT | Mod: 25 | Performed by: STUDENT IN AN ORGANIZED HEALTH CARE EDUCATION/TRAINING PROGRAM

## 2021-10-06 PROCEDURE — 90714 TD VACC NO PRESV 7 YRS+ IM: CPT | Performed by: STUDENT IN AN ORGANIZED HEALTH CARE EDUCATION/TRAINING PROGRAM

## 2021-10-06 RX ORDER — SERTRALINE HYDROCHLORIDE 100 MG/1
100 TABLET, FILM COATED ORAL DAILY
Qty: 60 TABLET | Refills: 0 | Status: SHIPPED | OUTPATIENT
Start: 2021-10-06 | End: 2021-11-10

## 2021-10-06 RX ORDER — TRAZODONE HYDROCHLORIDE 50 MG/1
50 TABLET, FILM COATED ORAL AT BEDTIME
Qty: 60 TABLET | Refills: 0 | Status: SHIPPED | OUTPATIENT
Start: 2021-10-06 | End: 2021-11-10

## 2021-10-06 RX ORDER — CLOTRIMAZOLE 1 %
CREAM (GRAM) TOPICAL 2 TIMES DAILY
Qty: 35 G | Refills: 0 | Status: SHIPPED | OUTPATIENT
Start: 2021-10-06 | End: 2022-02-18

## 2021-10-06 ASSESSMENT — ANXIETY QUESTIONNAIRES
7. FEELING AFRAID AS IF SOMETHING AWFUL MIGHT HAPPEN: NOT AT ALL
GAD7 TOTAL SCORE: 3
7. FEELING AFRAID AS IF SOMETHING AWFUL MIGHT HAPPEN: NOT AT ALL
6. BECOMING EASILY ANNOYED OR IRRITABLE: SEVERAL DAYS
4. TROUBLE RELAXING: SEVERAL DAYS
8. IF YOU CHECKED OFF ANY PROBLEMS, HOW DIFFICULT HAVE THESE MADE IT FOR YOU TO DO YOUR WORK, TAKE CARE OF THINGS AT HOME, OR GET ALONG WITH OTHER PEOPLE?: NOT DIFFICULT AT ALL
5. BEING SO RESTLESS THAT IT IS HARD TO SIT STILL: NOT AT ALL
GAD7 TOTAL SCORE: 3
GAD7 TOTAL SCORE: 3
2. NOT BEING ABLE TO STOP OR CONTROL WORRYING: NOT AT ALL
3. WORRYING TOO MUCH ABOUT DIFFERENT THINGS: NOT AT ALL
1. FEELING NERVOUS, ANXIOUS, OR ON EDGE: SEVERAL DAYS

## 2021-10-06 ASSESSMENT — PATIENT HEALTH QUESTIONNAIRE - PHQ9
SUM OF ALL RESPONSES TO PHQ QUESTIONS 1-9: 2
SUM OF ALL RESPONSES TO PHQ QUESTIONS 1-9: 2
10. IF YOU CHECKED OFF ANY PROBLEMS, HOW DIFFICULT HAVE THESE PROBLEMS MADE IT FOR YOU TO DO YOUR WORK, TAKE CARE OF THINGS AT HOME, OR GET ALONG WITH OTHER PEOPLE: NOT DIFFICULT AT ALL

## 2021-10-06 NOTE — PROGRESS NOTES
"    Assessment & Plan   Problem List Items Addressed This Visit        Endocrine    Type 2 diabetes mellitus without complication, with long-term current use of insulin (H)      Other Visit Diagnoses     Moderate episode of recurrent major depressive disorder (H)    -  Primary    Relevant Medications    traZODone (DESYREL) 50 MG tablet    sertraline (ZOLOFT) 100 MG tablet    HANS (generalized anxiety disorder)        Relevant Medications    traZODone (DESYREL) 50 MG tablet    sertraline (ZOLOFT) 100 MG tablet    Screening for hyperlipidemia        Relevant Orders    Lipid panel reflex to direct LDL Fasting    Insomnia, unspecified type        Relevant Medications    traZODone (DESYREL) 50 MG tablet    Needs flu shot        Relevant Orders    [place] FLU VACCINE, INCREASED ANTIGEN, PRESV FREE ([id number]) (Completed)    Visit for screening mammogram        Relevant Orders    *MA Screening Digital Bilateral    Need for Td vaccine        Relevant Orders    TD (ADULT, 7+) PRESERVE FREE (Completed)          Today, patient feels much better on Zoloft 75 mg.  PHQ-9 score is 2 and HANS-7 score is 3.  Is still having some breakthrough symptoms.  She is getting up several times throughout the night and having panic attack about 1 panic attack a week.  Has taken a few vacations and work has been less stressful which has been helpful.  She does not have any significant side effects from the Zoloft and no increased suicidality.  \"Good\" but there is room for \"improvement\".  Is using trazodone as needed and not nightly.  When she does use the trazodone, it is helpful for her to sleep.  Today,  Given continued breakthrough symptoms and weekly panic attacks will increase Zoloft to 100 mg and follow-up in 1 month.  We will keep the trazodone the same.  Did offer therapy but she declined.    Inguinal canal candidal rash  Noted in the left inguinal canal.  Will do clotrimazole in the area and see if that will help.  Patient will reach out to me " if no improvement with the cream     S/p total abdominal hysterectomy  Back in 1999, patient underwent total abdominal hysterectomy for menorrhagia and fibroids.  However, there is a recorded Pap smear in 2011 that's NILM.  Unsure however this is possible if patient has had a KATHARINA.  Did an internal exam today and I didn't palpate any cervix. Most likely doesn't need pap smear going forward.     Has appt with her endocrinologist next month for DMII. Sugars are doing better.     32 minutes spent on the date of the encounter doing chart review, history and exam, documentation and further activities per the note    Return in about 1 month (around 11/6/2021) for bina .    Val Garza DO  [facility]    Jason Enriquez is a 61 year old who presents for the following health issues: mood.     She was started on Zoloft last time for significant breakthrough anxiety/depression.  Is here for follow-up.      Social History     Tobacco Use     Smoking status: Former Smoker     Smokeless tobacco: Never Used   Substance Use Topics     Alcohol use: Yes     Alcohol/week: 1.0 standard drinks     Comment: Alcoholic Drinks/day: <1 per wk     Drug use: No     PHQ 7/1/2021 10/6/2021   PHQ-9 Total Score 16 2   Q9: Thoughts of better off dead/self-harm past 2 weeks Several days Not at all     HANS-7 SCORE 10/6/2021   Total Score 3 (minimal anxiety)   Total Score 3       Review of Systems   As per HPI       Objective    /66 (BP Location: Right arm, Patient Position: Sitting, Cuff Size: Adult Regular)   Pulse 94   Wt 88.2 kg (194 lb 8 oz)   SpO2 92%   BMI 32.87 kg/m    Body mass index is 32.87 kg/m .  Physical Exam   GENERAL: healthy, alert and no distress, looks anxious  NECK: no adenopathy, no asymmetry, masses, or scars and thyroid normal to palpation  RESP: lungs clear to auscultation - no rales, rhonchi or wheezes  CV: regular rate and rhythm, normal S1 S2, no S3 or S4, no murmur, click or rub,  no peripheral edema and peripheral pulses strong   (female): normal female external genitalia, normal urethral meatus, vaginal mucosa, internal exam-was unable to palpate a cervix.  No abnormal discharge.  Candidal infection of the left inguinal canal.  MS: no gross musculoskeletal defects noted, no edema

## 2021-10-07 ASSESSMENT — PATIENT HEALTH QUESTIONNAIRE - PHQ9: SUM OF ALL RESPONSES TO PHQ QUESTIONS 1-9: 2

## 2021-10-07 ASSESSMENT — ANXIETY QUESTIONNAIRES: GAD7 TOTAL SCORE: 3

## 2021-10-22 DIAGNOSIS — F41.1 GAD (GENERALIZED ANXIETY DISORDER): ICD-10-CM

## 2021-10-22 DIAGNOSIS — F33.1 MODERATE EPISODE OF RECURRENT MAJOR DEPRESSIVE DISORDER (H): ICD-10-CM

## 2021-10-22 DIAGNOSIS — G47.00 INSOMNIA, UNSPECIFIED TYPE: ICD-10-CM

## 2021-10-25 ENCOUNTER — LAB (OUTPATIENT)
Dept: LAB | Facility: CLINIC | Age: 61
End: 2021-10-25
Payer: COMMERCIAL

## 2021-10-25 DIAGNOSIS — E11.9 TYPE 2 DIABETES MELLITUS WITHOUT COMPLICATION, WITH LONG-TERM CURRENT USE OF INSULIN (H): ICD-10-CM

## 2021-10-25 DIAGNOSIS — Z11.59 NEED FOR HEPATITIS C SCREENING TEST: ICD-10-CM

## 2021-10-25 DIAGNOSIS — Z79.4 TYPE 2 DIABETES MELLITUS WITHOUT COMPLICATION, WITH LONG-TERM CURRENT USE OF INSULIN (H): ICD-10-CM

## 2021-10-25 DIAGNOSIS — Z13.220 SCREENING FOR HYPERLIPIDEMIA: ICD-10-CM

## 2021-10-25 DIAGNOSIS — Z11.4 SCREENING FOR HIV (HUMAN IMMUNODEFICIENCY VIRUS): ICD-10-CM

## 2021-10-25 LAB
ANION GAP SERPL CALCULATED.3IONS-SCNC: 13 MMOL/L (ref 5–18)
BUN SERPL-MCNC: 18 MG/DL (ref 8–22)
CALCIUM SERPL-MCNC: 9.8 MG/DL (ref 8.5–10.5)
CHLORIDE BLD-SCNC: 100 MMOL/L (ref 98–107)
CHOLEST SERPL-MCNC: 196 MG/DL
CO2 SERPL-SCNC: 25 MMOL/L (ref 22–31)
CREAT SERPL-MCNC: 0.94 MG/DL (ref 0.6–1.1)
FASTING STATUS PATIENT QL REPORTED: NO
GFR SERPL CREATININE-BSD FRML MDRD: 66 ML/MIN/1.73M2
GLUCOSE BLD-MCNC: 310 MG/DL (ref 70–125)
HBA1C MFR BLD: 9.2 % (ref 0–5.6)
HDLC SERPL-MCNC: 39 MG/DL
HIV 1+2 AB+HIV1 P24 AG SERPL QL IA: NEGATIVE
LDLC SERPL CALC-MCNC: 112 MG/DL
POTASSIUM BLD-SCNC: 4.1 MMOL/L (ref 3.5–5)
SODIUM SERPL-SCNC: 138 MMOL/L (ref 136–145)
TRIGL SERPL-MCNC: 225 MG/DL

## 2021-10-25 PROCEDURE — 86803 HEPATITIS C AB TEST: CPT

## 2021-10-25 PROCEDURE — 80061 LIPID PANEL: CPT

## 2021-10-25 PROCEDURE — 36415 COLL VENOUS BLD VENIPUNCTURE: CPT

## 2021-10-25 PROCEDURE — 80048 BASIC METABOLIC PNL TOTAL CA: CPT

## 2021-10-25 PROCEDURE — 83036 HEMOGLOBIN GLYCOSYLATED A1C: CPT

## 2021-10-25 PROCEDURE — 87389 HIV-1 AG W/HIV-1&-2 AB AG IA: CPT

## 2021-10-26 DIAGNOSIS — E78.2 MIXED HYPERLIPIDEMIA: Primary | ICD-10-CM

## 2021-10-26 LAB — HCV AB SERPL QL IA: NONREACTIVE

## 2021-10-26 RX ORDER — ATORVASTATIN CALCIUM 40 MG/1
40 TABLET, FILM COATED ORAL DAILY
Qty: 90 TABLET | Refills: 1 | Status: SHIPPED | OUTPATIENT
Start: 2021-10-26 | End: 2022-11-29

## 2021-10-26 NOTE — PROGRESS NOTES
"Zoe is a 61 year old who is being evaluated via a billable video visit.      How would you like to obtain your AVS? MyChart  If the video visit is dropped, the invitation should be resent by: Text to cell phone: [phone]  Will anyone else be joining your video visit? No      Video Start Time: 0830  [facility] ENDOCRINOLOGY    Diabetes Note 11/3/2021    Zoe Alvarez, 1960, [id number]          Reason for visit      1. Type 2 diabetes mellitus without complication, with long-term current use of insulin (H)        HPI     Zoe Alvarez is a very pleasant 61 year old old female who presents for follow up.  SUMMARY:    Zoe is contacted today in follow-up for DM 2. Her current A1c is 9.2 and the same as her last. She is reporting that she is in \"lock down\" at work. 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 She has been there for a week and has 11 more days to go. Interestingly enough, she is reporting that her BG have been really good while there. She is eating balanced meals without fast food options. She notes that she doesn't like to cook for herself and makes use of \"the easy way out\". She reports that when at home if her Grandson wants Ramen noodles for supper, that is what they will eat. She is currently taking Jardiance, 25 mg daily, and Lantus, 45 units BID. She notes that her BG this morning was 120.     Blood glucose data:      Past Medical History     Patient Active Problem List   Diagnosis     Obesity (BMI 30-39.9)     Chronic venous insufficiency     Epidermal Inclusion Cyst     Hyperlipidemia     Vertigo     Ingrowing Toenail     Nicotine Dependence     Type 2 diabetes mellitus without complication, with long-term current use of insulin (H)     Eczema     RLQ abdominal pain     Tarsal tunnel syndrome of left side     Compression neuropathy of right lower extremity     Mononeuritis of left lower extremity     Plantar fascial fibromatosis of left foot     Mononeuritis of right " lower extremity     Tarsal tunnel syndrome of right side     Foot pain, left     Bilateral lower extremity edema     Chronic kidney disease, stage 3 (H)        Family History       family history includes Breast Cancer in her maternal grandmother; Diabetes in her mother.    Social History      reports that she has quit smoking. She has never used smokeless tobacco. She reports current alcohol use of about 1.0 standard drinks of alcohol per week. She reports that she does not use drugs.      Review of Systems     Patient has no polyuria or polydipsia, no chest pain, dyspnea or TIA's, no numbness, tingling or pain in extremities  Remainder negative except as noted in HPI.    Vital Signs     There were no vitals taken for this visit.  Wt Readings from Last 3 Encounters:   10/06/21 88.2 kg (194 lb 8 oz)   08/25/21 89.8 kg (198 lb)   03/31/21 88.4 kg (194 lb 12.8 oz)       Physical Exam     Constitutional:  Well developed, Well nourished  HENT:  Normocephalic,   Neck: normal in appearance  Eyes:  PERRL, Conjunctiva pink  Respiratory:  No respiratory distress  Skin: No acanthosis nigricans, lipoatrophy or lipodystrophy  Neurologic:  Alert & oriented x 3, nonfocal  Psychiatric:  Affect, Mood, Insight appropriate          Assessment     1. Type 2 diabetes mellitus without complication, with long-term current use of insulin (H)        Plan     Encouraged to consider making a vat of something on the weekend, and then put it in the freezer for ease of use during the week. She is a little surprised that just the change in her eating habits has made such an impact on her BG. Hopefully the momentum will continue after lock down and she will be able to maintain this. F/u with me in 3 months.    Jayla Hermosillo NP  [facility]  11/3/2021  8:35 AM        Lab Results     Microalbumin Urine mg/dL   Date Value Ref Range Status   04/14/2021 <0.50 0.00 - 1.99 mg/dL Final       Cholesterol   Date Value Ref Range Status   10/25/2021  "196 <=199 mg/dL Final     Direct Measure HDL   Date Value Ref Range Status   10/25/2021 39 (L) >=50 mg/dL Final     Comment:     HDL Cholesterol Reference Range:     0-2 years:   No reference ranges established for patients under 2 years old  [facility].    2-8 years:  Greater than 45 mg/dL     18 years and older:   Female: Greater than or equal to 50 mg/dL   Male:   Greater than or equal to 40 mg/dL     Triglycerides   Date Value Ref Range Status   10/25/2021 225 (H) <=149 mg/dL Final             Current Medications     Outpatient Medications Prior to Visit   Medication Sig Dispense Refill     atorvastatin (LIPITOR) 40 MG tablet Take 1 tablet (40 mg) by mouth daily 90 tablet 1     blood glucose meter (GLUCOMETER) [BLOOD GLUCOSE METER (GLUCOMETER)] Use 1 each As Directed as needed. Dispense glucometer brand per patient's insurance at pharmacy discretion. 1 each 0     clotrimazole (LOTRIMIN) 1 % external cream Apply topically 2 times daily For 7 days 35 g 0     empagliflozin (JARDIANCE) 25 mg Tab tablet [EMPAGLIFLOZIN (JARDIANCE) 25 MG TAB TABLET] Take 1 tablet (25 mg total) by mouth daily. 90 tablet 3     generic lancets (FINGERSTIX LANCETS) [GENERIC LANCETS (FINGERSTIX LANCETS)] Use to check blood sugar 1-2x daily. Dispense brand per patient's insurance at pharmacy discretion. 100 each 11     LANTUS SOLOSTAR 100 UNIT/ML soln INJECT TWICE DAILY. UP TO 90 UNITS A DAY. 90 mL 1     pen needle, diabetic (BD ULTRA-FINE TJ PEN NEEDLE) 32 gauge x 5/32\" Ndle [PEN NEEDLE, DIABETIC (BD ULTRA-FINE TJ PEN NEEDLE) 32 GAUGE X 5/32\" NDLE] USE TWO TIMES A DAY WITH INSULIN 200 each 1     sertraline (ZOLOFT) 100 MG tablet Take 1 tablet (100 mg) by mouth daily 60 tablet 0     traZODone (DESYREL) 50 MG tablet Take 1 tablet (50 mg) by mouth At Bedtime 60 tablet 0     valACYclovir (VALTREX) 1000 MG tablet [VALACYCLOVIR (VALTREX) 1000 MG TABLET] TAKE 2 TABLETS BY MOUTH NOW AND 2 TABLETS IN 12 HOURS. " PRN. 4 tablet 3     No facility-administered medications prior to visit.           Video-Visit Details    Type of service:  Video Visit    Video End Time: 0850    Originating Location (pt. Location): Other work    Distant Location (provider location):  [facility]     Platform used for Video Visit: Marina    Date of last OV: 7/27/21  Reason for Visit: DM    Blood Glucose Log:  Doesn't have BG log with her.

## 2021-10-27 NOTE — TELEPHONE ENCOUNTER
"Routing refill request to provider for review/approval because:  Early refill requested.    Last Written Prescription Date:  10/6/21  Last Fill Quantity: 60,  # refills: 0   Last office visit provider:  10/6/21     Requested Prescriptions   Pending Prescriptions Disp Refills     traZODone (DESYREL) 50 MG tablet 60 tablet 0     Sig: Take 1 tablet (50 mg) by mouth At Bedtime       Serotonin Modulators Passed - 10/25/2021  3:30 PM        Passed - Recent (12 mo) or future (30 days) visit within the authorizing provider's specialty     Patient has had an office visit with the authorizing provider or a provider within the authorizing providers department within the previous 12 mos or has a future within next 30 days. See \"Patient Info\" tab in inbasket, or \"Choose Columns\" in Meds & Orders section of the refill encounter.              Passed - Medication is active on med list        Passed - Patient is age 18 or older        Passed - No active pregnancy on record        Passed - No positive pregnancy test in past 12 months             Aldo Momin RN 10/27/21 7:46 AM  "

## 2021-10-27 NOTE — TELEPHONE ENCOUNTER
Left message for patient to call back regarding refill. When patient calls back please let her know this was refilled on October 6th. Is there a reason why they requesting an early fill?

## 2021-11-03 ENCOUNTER — VIRTUAL VISIT (OUTPATIENT)
Dept: ENDOCRINOLOGY | Facility: CLINIC | Age: 61
End: 2021-11-03
Payer: COMMERCIAL

## 2021-11-03 DIAGNOSIS — E11.9 TYPE 2 DIABETES MELLITUS WITHOUT COMPLICATION, WITH LONG-TERM CURRENT USE OF INSULIN (H): Primary | ICD-10-CM

## 2021-11-03 DIAGNOSIS — Z79.4 TYPE 2 DIABETES MELLITUS WITHOUT COMPLICATION, WITH LONG-TERM CURRENT USE OF INSULIN (H): Primary | ICD-10-CM

## 2021-11-03 PROCEDURE — 99213 OFFICE O/P EST LOW 20 MIN: CPT | Mod: 95 | Performed by: NURSE PRACTITIONER

## 2021-11-04 ENCOUNTER — MYC MEDICAL ADVICE (OUTPATIENT)
Dept: FAMILY MEDICINE | Facility: CLINIC | Age: 61
End: 2021-11-04

## 2021-11-10 ENCOUNTER — VIRTUAL VISIT (OUTPATIENT)
Dept: FAMILY MEDICINE | Facility: CLINIC | Age: 61
End: 2021-11-10
Payer: COMMERCIAL

## 2021-11-10 DIAGNOSIS — G47.00 INSOMNIA, UNSPECIFIED TYPE: ICD-10-CM

## 2021-11-10 DIAGNOSIS — F33.1 MODERATE EPISODE OF RECURRENT MAJOR DEPRESSIVE DISORDER (H): ICD-10-CM

## 2021-11-10 DIAGNOSIS — F41.1 GAD (GENERALIZED ANXIETY DISORDER): ICD-10-CM

## 2021-11-10 PROCEDURE — 96127 BRIEF EMOTIONAL/BEHAV ASSMT: CPT | Performed by: STUDENT IN AN ORGANIZED HEALTH CARE EDUCATION/TRAINING PROGRAM

## 2021-11-10 PROCEDURE — 99214 OFFICE O/P EST MOD 30 MIN: CPT | Mod: 95 | Performed by: STUDENT IN AN ORGANIZED HEALTH CARE EDUCATION/TRAINING PROGRAM

## 2021-11-10 RX ORDER — TRAZODONE HYDROCHLORIDE 50 MG/1
50 TABLET, FILM COATED ORAL AT BEDTIME
Qty: 60 TABLET | Refills: 0 | OUTPATIENT
Start: 2021-11-10

## 2021-11-10 RX ORDER — SERTRALINE HYDROCHLORIDE 100 MG/1
100 TABLET, FILM COATED ORAL DAILY
Qty: 90 TABLET | Refills: 0 | Status: SHIPPED | OUTPATIENT
Start: 2021-11-30 | End: 2022-03-28

## 2021-11-10 RX ORDER — TRAZODONE HYDROCHLORIDE 50 MG/1
50 TABLET, FILM COATED ORAL AT BEDTIME
Qty: 60 TABLET | Refills: 0 | Status: SHIPPED | OUTPATIENT
Start: 2021-12-10 | End: 2022-03-02

## 2021-11-10 ASSESSMENT — ANXIETY QUESTIONNAIRES
5. BEING SO RESTLESS THAT IT IS HARD TO SIT STILL: NOT AT ALL
3. WORRYING TOO MUCH ABOUT DIFFERENT THINGS: NOT AT ALL
IF YOU CHECKED OFF ANY PROBLEMS ON THIS QUESTIONNAIRE, HOW DIFFICULT HAVE THESE PROBLEMS MADE IT FOR YOU TO DO YOUR WORK, TAKE CARE OF THINGS AT HOME, OR GET ALONG WITH OTHER PEOPLE: NOT DIFFICULT AT ALL
GAD7 TOTAL SCORE: 1
2. NOT BEING ABLE TO STOP OR CONTROL WORRYING: NOT AT ALL
1. FEELING NERVOUS, ANXIOUS, OR ON EDGE: NOT AT ALL
7. FEELING AFRAID AS IF SOMETHING AWFUL MIGHT HAPPEN: NOT AT ALL
6. BECOMING EASILY ANNOYED OR IRRITABLE: SEVERAL DAYS

## 2021-11-10 ASSESSMENT — PATIENT HEALTH QUESTIONNAIRE - PHQ9: 5. POOR APPETITE OR OVEREATING: NOT AT ALL

## 2021-11-10 NOTE — PROGRESS NOTES
"Zoe is a 61 year old who is being evaluated via a billable video visit.      How would you like to obtain your AVS? MyChart  If the video visit is dropped, the invitation should be resent by: Text to cell phone: [phone]  Will anyone else be joining your video visit? No      Video Start Time: 12:11 PM    Assessment & Plan   Problem List Items Addressed This Visit     None      Visit Diagnoses     Moderate episode of recurrent major depressive disorder (H)        Relevant Medications    sertraline (ZOLOFT) 100 MG tablet (Start on 11/30/2021)    traZODone (DESYREL) 50 MG tablet (Start on 12/10/2021)    HANS (generalized anxiety disorder)        Relevant Medications    sertraline (ZOLOFT) 100 MG tablet (Start on 11/30/2021)    traZODone (DESYREL) 50 MG tablet (Start on 12/10/2021)    Insomnia, unspecified type        Relevant Medications    traZODone (DESYREL) 50 MG tablet (Start on 12/10/2021)         Current medication regimen of Zoloft 100 mg and trazodone 25 mg are working really well for her.  She feels that this is \"the right dose of medication\".  Feels \"not depressed at all\".  Has a much more positive outlook on life and her sleep is doing much better with the trazodone.  Is not having any breakdowns.  Concentration and memory are doing well.  No side effects from the medications.  Is not interested in augmentation of therapy.  Is very happy with the outcome.  We will leave her on the meds as they are.  We will see her back in 3 months.    Qualifies for Covid booster.  She has it scheduled next Monday.    31 minutes spent on the date of the encounter doing chart review, history and exam, documentation and further activities per the note    No follow-ups on file.    Val Garza DO  [facility]    Subjective   Zoe is a 61 year old who presents for the following health issues: mood     Have been seen each other for a few visits for medication management for her " mood.    Social History     Tobacco Use     Smoking status: Former Smoker     Smokeless tobacco: Never Used   Substance Use Topics     Alcohol use: Yes     Alcohol/week: 1.0 standard drink     Comment: Alcoholic Drinks/day: <1 per wk     Drug use: No     PHQ 7/1/2021 10/6/2021 11/10/2021   PHQ-9 Total Score 16 2 0   Q9: Thoughts of better off dead/self-harm past 2 weeks Several days Not at all Not at all     HANS-7 SCORE 10/6/2021 11/10/2021   Total Score 3 (minimal anxiety) -   Total Score 3 1     Last PHQ-9 11/10/2021   1.  Little interest or pleasure in doing things 0   2.  Feeling down, depressed, or hopeless 0   3.  Trouble falling or staying asleep, or sleeping too much 0   4.  Feeling tired or having little energy 0   5.  Poor appetite or overeating 0   6.  Feeling bad about yourself 0   7.  Trouble concentrating 0   8.  Moving slowly or restless 0   Q9: Thoughts of better off dead/self-harm past 2 weeks 0   PHQ-9 Total Score 0   Difficulty at work, home, or with people Not difficult at all     HANS-7  11/10/2021   1. Feeling nervous, anxious, or on edge 0   2. Not being able to stop or control worrying 0   3. Worrying too much about different things 0   4. Trouble relaxing 0   5. Being so restless that it is hard to sit still 0   6. Becoming easily annoyed or irritable 1   7. Feeling afraid, as if something awful might happen 0   HANS-7 Total Score 1   If you checked any problems, how difficult have they made it for you to do your work, take care of things at home, or get along with other people? Not difficult at all         Review of Systems   As per HPI       Objective       Vitals:  No vitals were obtained today due to virtual visit.    Physical Exam   GENERAL: Healthy, alert and no distress  EYES: Eyes grossly normal to inspection.  No discharge or erythema, or obvious scleral/conjunctival abnormalities.  RESP: No audible wheeze, cough, or visible cyanosis.  No visible retractions or increased work of  breathing.    SKIN: Visible skin clear. No significant rash, abnormal pigmentation or lesions.  NEURO: Cranial nerves grossly intact.  Mentation and speech appropriate for age.  PSYCH: Mentation appears normal, affect normal/bright, judgement and insight intact, normal speech and appearance well-groomed.       Video-Visit Details    Type of service:  Video Visit    Video End Time:12:19 PM    Originating Location (pt. Location): Home    Distant Location (provider location):  [facility]     Platform used for Video Visit: SnapLayout

## 2021-11-11 ASSESSMENT — ANXIETY QUESTIONNAIRES: GAD7 TOTAL SCORE: 1

## 2021-11-11 ASSESSMENT — PATIENT HEALTH QUESTIONNAIRE - PHQ9: SUM OF ALL RESPONSES TO PHQ QUESTIONS 1-9: 0

## 2021-12-16 DIAGNOSIS — E11.9 TYPE 2 DIABETES MELLITUS WITHOUT COMPLICATION (H): ICD-10-CM

## 2021-12-17 RX ORDER — PEN NEEDLE, DIABETIC 32GX 5/32"
NEEDLE, DISPOSABLE MISCELLANEOUS
Qty: 100 EACH | Refills: 3 | Status: SHIPPED | OUTPATIENT
Start: 2021-12-17 | End: 2022-12-08

## 2022-01-25 ENCOUNTER — TELEPHONE (OUTPATIENT)
Dept: ENDOCRINOLOGY | Facility: CLINIC | Age: 62
End: 2022-01-25
Payer: COMMERCIAL

## 2022-01-25 NOTE — TELEPHONE ENCOUNTER
The following [employer] message sent:      Anjali Enriquez,    I see that you have a virtual appointment with Annelise Hermosillo CNP on 2/01/22; we would greatly appreciate it, if you could send us 7 days of your blood sugar readings for the week leading up to your video visit.. This way Annelise can review and have them accessible for your appointment. You can send them through [employer].     If you prefer, please have them ready and I can take them verbally over the phone.    Please call me at [phone] if you have any questions or concerns.     Thank you,    Lorrie PENALOZA, Medical Assistant for  Annelise Hermosillo NP  Endocrinology  [phone]

## 2022-01-25 NOTE — PROGRESS NOTES
Lynne is a 61 year old who is being evaluated via a billable video visit.      How would you like to obtain your AVS? MyChart  If the video visit is dropped, the invitation should be resent by: Text to cell phone: [phone]  Will anyone else be joining your video visit? No      Video Start Time: 0800      [facility] ENDOCRINOLOGY  Diabetes Note 2/1/2022    Zoe Alvarez, 1960, [id number]          Reason for visit      1. Type 2 diabetes mellitus without complication, with long-term current use of insulin (H)        HPI     Zoe Alvarez is a very pleasant 61 year old old female who presents for follow up.  SUMMARY:    Lynne is contacted today via Video Visit in f/u for DM 2. Her A1c is currently 10.6 and up significantly from her last at 9.2. She has been taking Jardiance 25 mg daily and 45 units of Lantus BID. She has not been able to test her BG recently because of the loss of what equaled a full paycheck because of absences from work because of COVID and the loss of her mother.  Cost of medication is a big factor in our choices for treatment. She reports that the Jardiance has gone up to $50 a month.       Blood glucose data:      Past Medical History     Patient Active Problem List   Diagnosis     Obesity (BMI 30-39.9)     Chronic venous insufficiency     Epidermal Inclusion Cyst     Hyperlipidemia     Vertigo     Ingrowing Toenail     Nicotine Dependence     Type 2 diabetes mellitus without complication, with long-term current use of insulin (H)     Eczema     RLQ abdominal pain     Tarsal tunnel syndrome of left side     Compression neuropathy of right lower extremity     Mononeuritis of left lower extremity     Plantar fascial fibromatosis of left foot     Mononeuritis of right lower extremity     Tarsal tunnel syndrome of right side     Foot pain, left     Bilateral lower extremity edema     Chronic kidney disease, stage 3 (H)        Family History       family history includes Breast  Cancer in her maternal grandmother; Diabetes in her mother.    Social History      reports that she has quit smoking. She has never used smokeless tobacco. She reports current alcohol use of about 1.0 standard drink of alcohol per week. She reports that she does not use drugs.      Review of Systems     Patient has no polyuria or polydipsia, no chest pain, dyspnea or TIA's, no numbness, tingling or pain in extremities  Remainder negative except as noted in HPI.      Answers for HPI/ROS submitted by the patient on 2/1/2022  General Symptoms: Yes  Skin Symptoms: No  HENT Symptoms: No  EYE SYMPTOMS: Yes  HEART SYMPTOMS: No  LUNG SYMPTOMS: No  INTESTINAL SYMPTOMS: No  URINARY SYMPTOMS: No  GYNECOLOGIC SYMPTOMS: No  BREAST SYMPTOMS: No  SKELETAL SYMPTOMS: Yes  BLOOD SYMPTOMS: No  NERVOUS SYSTEM SYMPTOMS: Yes  MENTAL HEALTH SYMPTOMS: Yes  Fever: No  Loss of appetite: No  Weight loss: No  Weight gain: Yes  Fatigue: Yes  Night sweats: Yes  Chills: No  Increased stress: Yes  Excessive hunger: No  Excessive thirst: No  Feeling hot or cold when others believe the temperature is normal: No  Loss of height: No  Post-operative complications: No  Surgical site pain: No  Hallucinations: No  Change in or Loss of Energy: No  Hyperactivity: No  Confusion: No  Eye pain: No  Vision loss: No  Dry eyes: No  Watery eyes: No  Eye bulging: No  Double vision: No  Flashing of lights: Yes  Spots: Yes  Floaters: Yes  Redness: No  Crossed eyes: No  Tunnel Vision: No  Yellowing of eyes: No  Eye irritation: No  Back pain: Yes  Neck pain: Yes  Swollen joints: No  Joint pain: No  Bone pain: No  Muscle cramps: Yes  Muscle weakness: No  Joint stiffness: No  Bone fracture: No  Trouble with coordination: No  Dizziness or trouble with balance: Yes  Fainting or black-out spells: No  Memory loss: No  Headache: Yes  Seizures: No  Speech problems: No  Tingling: No  Tremor: No  Weakness: No  Difficulty walking: No  Paralysis: No  Numbness: No  Nervous or  Anxious: No  Depression: Yes  Trouble sleeping: Yes  Trouble thinking or concentrating: No  Mood changes: No  Panic attacks: No        Vital Signs     There were no vitals taken for this visit.  Wt Readings from Last 3 Encounters:   10/06/21 88.2 kg (194 lb 8 oz)   08/25/21 89.8 kg (198 lb)   03/31/21 88.4 kg (194 lb 12.8 oz)       Physical Exam     Constitutional:  Well developed, Well nourished  HENT:  Normocephalic,   Neck: normal in appearance  Eyes:  PERRL, Conjunctiva pink  Respiratory:  No respiratory distress  Skin: No acanthosis nigricans, lipoatrophy or lipodystrophy  Neurologic:  Alert & oriented x 3, nonfocal  Psychiatric:  Affect, Mood, Insight appropriate          Assessment     1. Type 2 diabetes mellitus without complication, with long-term current use of insulin (H)        Plan     Will add Glipizide to her medication regimen. This is fairly inexpensive. Encouraged to find some time to do things that bring her wilfrido and help with her stress. She will f/u with me in 3 months.        Jayla Hermosillo NP   Endocrinology  2/1/2022  9:14 AM          Lab Results     Microalbumin Urine mg/dL   Date Value Ref Range Status   04/14/2021 <0.50 0.00 - 1.99 mg/dL Final       Cholesterol   Date Value Ref Range Status   10/25/2021 196 <=199 mg/dL Final     Direct Measure HDL   Date Value Ref Range Status   10/25/2021 39 (L) >=50 mg/dL Final     Comment:     HDL Cholesterol Reference Range:     0-2 years:   No reference ranges established for patients under 2 years old  [facility] for lipid analytes.    2-8 years:  Greater than 45 mg/dL     18 years and older:   Female: Greater than or equal to 50 mg/dL   Male:   Greater than or equal to 40 mg/dL     Triglycerides   Date Value Ref Range Status   10/25/2021 225 (H) <=149 mg/dL Final             Current Medications     Outpatient Medications Prior to Visit   Medication Sig Dispense Refill     atorvastatin (LIPITOR) 40 MG tablet Take 1 tablet (40 mg) by  mouth daily 90 tablet 1     BD PEN NEEDLE TJ 2ND GEN 32G X 4 MM miscellaneous USE TWO TIMES A DAY WITH INSULIN 100 each 3     blood glucose meter (GLUCOMETER) [BLOOD GLUCOSE METER (GLUCOMETER)] Use 1 each As Directed as needed. Dispense glucometer brand per patient's insurance at pharmacy discretion. 1 each 0     clotrimazole (LOTRIMIN) 1 % external cream Apply topically 2 times daily For 7 days 35 g 0     empagliflozin (JARDIANCE) 25 mg Tab tablet [EMPAGLIFLOZIN (JARDIANCE) 25 MG TAB TABLET] Take 1 tablet (25 mg total) by mouth daily. 90 tablet 3     generic lancets (FINGERSTIX LANCETS) [GENERIC LANCETS (FINGERSTIX LANCETS)] Use to check blood sugar 1-2x daily. Dispense brand per patient's insurance at pharmacy discretion. 100 each 11     LANTUS SOLOSTAR 100 UNIT/ML soln INJECT TWICE DAILY. UP TO 90 UNITS A DAY. 90 mL 1     sertraline (ZOLOFT) 100 MG tablet Take 1 tablet (100 mg) by mouth daily 90 tablet 0     traZODone (DESYREL) 50 MG tablet Take 1 tablet (50 mg) by mouth At Bedtime 60 tablet 0     valACYclovir (VALTREX) 1000 MG tablet [VALACYCLOVIR (VALTREX) 1000 MG TABLET] TAKE 2 TABLETS BY MOUTH NOW AND 2 TABLETS IN 12 HOURS. PRN. 4 tablet 3     No facility-administered medications prior to visit.               Vital Signs     There were no vitals taken for this visit.  Wt Readings from Last 3 Encounters:   10/06/21 88.2 kg (194 lb 8 oz)   08/25/21 89.8 kg (198 lb)   03/31/21 88.4 kg (194 lb 12.8 oz)       Physical Exam     Constitutional:  Well developed, Well nourished  HENT:  Normocephalic,   Neck: normal in appearance  Eyes:  PERRL, Conjunctiva pink  Respiratory:  No respiratory distress  Skin: No acanthosis nigricans, lipoatrophy or lipodystrophy  Neurologic:  Alert & oriented x 3, nonfocal  Psychiatric:  Affect, Mood, Insight appropriate          Assessment     1. Type 2 diabetes mellitus without complication, with long-term current use of insulin (H)        Plan       Jayla Hermosillo NP  [facility]  Endocrinology  2/1/2022  9:11 AM         Lab Results     Microalbumin Urine mg/dL   Date Value Ref Range Status   04/14/2021 <0.50 0.00 - 1.99 mg/dL Final       Cholesterol   Date Value Ref Range Status   10/25/2021 196 <=199 mg/dL Final     Direct Measure HDL   Date Value Ref Range Status   10/25/2021 39 (L) >=50 mg/dL Final     Comment:     HDL Cholesterol Reference Range:     0-2 years:   No reference ranges established for patients under 2 years old  at [employer] for lipid analytes.    2-8 years:  Greater than 45 mg/dL     18 years and older:   Female: Greater than or equal to 50 mg/dL   Male:   Greater than or equal to 40 mg/dL     Triglycerides   Date Value Ref Range Status   10/25/2021 225 (H) <=149 mg/dL Final             Current Medications     Outpatient Medications Prior to Visit   Medication Sig Dispense Refill     atorvastatin (LIPITOR) 40 MG tablet Take 1 tablet (40 mg) by mouth daily 90 tablet 1     BD PEN NEEDLE TJ 2ND GEN 32G X 4 MM miscellaneous USE TWO TIMES A DAY WITH INSULIN 100 each 3     blood glucose meter (GLUCOMETER) [BLOOD GLUCOSE METER (GLUCOMETER)] Use 1 each As Directed as needed. Dispense glucometer brand per patient's insurance at pharmacy discretion. 1 each 0     clotrimazole (LOTRIMIN) 1 % external cream Apply topically 2 times daily For 7 days 35 g 0     empagliflozin (JARDIANCE) 25 mg Tab tablet [EMPAGLIFLOZIN (JARDIANCE) 25 MG TAB TABLET] Take 1 tablet (25 mg total) by mouth daily. 90 tablet 3     generic lancets (FINGERSTIX LANCETS) [GENERIC LANCETS (FINGERSTIX LANCETS)] Use to check blood sugar 1-2x daily. Dispense brand per patient's insurance at pharmacy discretion. 100 each 11     LANTUS SOLOSTAR 100 UNIT/ML soln INJECT TWICE DAILY. UP TO 90 UNITS A DAY. 90 mL 1     sertraline (ZOLOFT) 100 MG tablet Take 1 tablet (100 mg) by mouth daily 90 tablet 0     traZODone (DESYREL) 50 MG tablet Take 1 tablet (50 mg) by mouth At Bedtime 60 tablet 0     valACYclovir  (VALTREX) 1000 MG tablet [VALACYCLOVIR (VALTREX) 1000 MG TABLET] TAKE 2 TABLETS BY MOUTH NOW AND 2 TABLETS IN 12 HOURS. PRN. 4 tablet 3     No facility-administered medications prior to visit.               Video-Visit Details    Type of service:  Video Visit    Video End Time: 0820      Originating Location (pt. Location): Home    [facility]     Platform used for Video Visit: [employer]      Date of last OV: 11/03/21  Reason for Visit: DM    Patient ran out of strips last week and has not been testing.  She will not be able to get any strips until next week due to financial issues.    She states she has been very stressed as she recently lost her mother and then she got sick herself.

## 2022-01-26 ENCOUNTER — LAB (OUTPATIENT)
Dept: LAB | Facility: CLINIC | Age: 62
End: 2022-01-26
Payer: COMMERCIAL

## 2022-01-26 DIAGNOSIS — N18.30 CHRONIC KIDNEY DISEASE, STAGE 3 (H): Primary | ICD-10-CM

## 2022-01-26 DIAGNOSIS — Z79.4 TYPE 2 DIABETES MELLITUS WITHOUT COMPLICATION, WITH LONG-TERM CURRENT USE OF INSULIN (H): ICD-10-CM

## 2022-01-26 DIAGNOSIS — E11.9 TYPE 2 DIABETES MELLITUS WITHOUT COMPLICATION, WITH LONG-TERM CURRENT USE OF INSULIN (H): ICD-10-CM

## 2022-01-26 LAB
HBA1C MFR BLD: 10.6 % (ref 0–5.6)
HGB BLD-MCNC: 13.8 G/DL (ref 11.7–15.7)

## 2022-01-26 PROCEDURE — 85018 HEMOGLOBIN: CPT

## 2022-01-26 PROCEDURE — 83036 HEMOGLOBIN GLYCOSYLATED A1C: CPT

## 2022-01-26 PROCEDURE — 36415 COLL VENOUS BLD VENIPUNCTURE: CPT

## 2022-02-01 ENCOUNTER — VIRTUAL VISIT (OUTPATIENT)
Dept: ENDOCRINOLOGY | Facility: CLINIC | Age: 62
End: 2022-02-01
Payer: COMMERCIAL

## 2022-02-01 DIAGNOSIS — Z79.4 TYPE 2 DIABETES MELLITUS WITHOUT COMPLICATION, WITH LONG-TERM CURRENT USE OF INSULIN (H): Primary | ICD-10-CM

## 2022-02-01 DIAGNOSIS — E11.9 TYPE 2 DIABETES MELLITUS WITHOUT COMPLICATION, WITH LONG-TERM CURRENT USE OF INSULIN (H): Primary | ICD-10-CM

## 2022-02-01 PROCEDURE — 99213 OFFICE O/P EST LOW 20 MIN: CPT | Mod: 95 | Performed by: NURSE PRACTITIONER

## 2022-02-01 RX ORDER — GLIPIZIDE 10 MG/1
10 TABLET ORAL
Qty: 60 TABLET | Refills: 5 | Status: SHIPPED | OUTPATIENT
Start: 2022-02-01 | End: 2022-05-23

## 2022-02-01 ASSESSMENT — ENCOUNTER SYMPTOMS
ALTERED TEMPERATURE REGULATION: 0
DOUBLE VISION: 0
SEIZURES: 0
ARTHRALGIAS: 0
EYE REDNESS: 0
BACK PAIN: 1
POLYDIPSIA: 0
MUSCLE CRAMPS: 1
CHILLS: 0
POLYPHAGIA: 0
INSOMNIA: 1
PARALYSIS: 0
DEPRESSION: 1
WEIGHT GAIN: 1
STIFFNESS: 0
WEAKNESS: 0
NECK PAIN: 1
EYE WATERING: 0
INCREASED ENERGY: 0
NERVOUS/ANXIOUS: 0
WEIGHT LOSS: 0
FATIGUE: 1
FEVER: 0
TREMORS: 0
MUSCLE WEAKNESS: 0
MEMORY LOSS: 0
DISTURBANCES IN COORDINATION: 0
PANIC: 0
EYE IRRITATION: 0
TINGLING: 0
SPEECH CHANGE: 0
LOSS OF CONSCIOUSNESS: 0
HALLUCINATIONS: 0
DECREASED APPETITE: 0
JOINT SWELLING: 0
NIGHT SWEATS: 1
DIZZINESS: 1
EYE PAIN: 0
DECREASED CONCENTRATION: 0
HEADACHES: 1
NUMBNESS: 0

## 2022-02-16 NOTE — PROGRESS NOTES
SUBJECTIVE:   CC: Zoe Alvarez is an 61 year old woman who presents for preventive health visit.       Patient has been advised of split billing requirements and indicates understanding: Yes     Healthy Habits:     Getting at least 3 servings of Calcium per day:  Yes    Bi-annual eye exam:  Yes    Dental care twice a year:  NO    Sleep apnea or symptoms of sleep apnea:  Excessive snoring    Diet:  Carbohydrate counting    Frequency of exercise:  None    Taking medications regularly:  Yes    Medication side effects:  Not applicable    PHQ-2 Total Score: 0    Additional concerns today:  No        Today's PHQ-2 Score:   PHQ-2 ( 1999 [employer]) 2/14/2022   Q1: Little interest or pleasure in doing things 0   Q2: Feeling down, depressed or hopeless 0   PHQ-2 Score 0   Q1: Little interest or pleasure in doing things Not at all   Q2: Feeling down, depressed or hopeless Not at all   PHQ-2 Score 0       Abuse: Current or Past (Physical, Sexual or Emotional) - No  Do you feel safe in your environment? Yes    Have you ever done Advance Care Planning? (For example, a Health Directive, POLST, or a discussion with a medical provider or your loved ones about your wishes): No, advance care planning information given to patient to review.  Advanced care planning was discussed at today's visit.    Social History     Tobacco Use     Smoking status: Former Smoker     Smokeless tobacco: Never Used   Substance Use Topics     Alcohol use: Yes     Alcohol/week: 1.0 standard drink     Comment: Alcoholic Drinks/day: <1 per wk     If you drink alcohol do you typically have >3 drinks per day or >7 drinks per week? No    Alcohol Use 2/14/2022   Prescreen: >3 drinks/day or >7 drinks/week? No       Reviewed orders with patient.  Reviewed health maintenance and updated orders accordingly - Yes  Lab work is in process  Labs reviewed in EPIC  BP Readings from Last 3 Encounters:   02/18/22 102/64   10/06/21 104/66   08/25/21 126/66    Wt  Readings from Last 3 Encounters:   02/18/22 82.9 kg (182 lb 11.2 oz)   10/06/21 88.2 kg (194 lb 8 oz)   08/25/21 89.8 kg (198 lb)                  Patient Active Problem List   Diagnosis     Obesity (BMI 30-39.9)     Chronic venous insufficiency     Epidermal Inclusion Cyst     Hyperlipidemia     Vertigo     Ingrowing Toenail     Nicotine Dependence     Type 2 diabetes mellitus without complication, with long-term current use of insulin (H)     Eczema     RLQ abdominal pain     Tarsal tunnel syndrome of left side     Compression neuropathy of right lower extremity     Mononeuritis of left lower extremity     Plantar fascial fibromatosis of left foot     Mononeuritis of right lower extremity     Tarsal tunnel syndrome of right side     Foot pain, left     Bilateral lower extremity edema     Chronic kidney disease, stage 3 (H)     Past Surgical History:   Procedure Laterality Date     BLADDER SUSPENSION  2007     DILATION AND CURETTAGE      SUCTION POST MISCARRIAGE     HC TARSAL TUNNEL RELEASE Left 9/21/2018    Procedure: DELLON QUADRUPLE NERVE DECOMPRESSION EXCISION PLANTAR FIBROMA ALL LEFT FOOT;  Surgeon: Jules Mao DPM;  Location: [facility];  Service: Podiatry     HYSTERECTOMY      1999     KNEE SURGERY       LAPAROSCOPIC APPENDECTOMY N/A 7/10/2014    Procedure: Laparoscopic Appendectomy;  Surgeon: Germain Howe MD;  Location: [facility];  Service:      [facility] APPENDECTOMY      Description: Appendectomy;  Recorded: 07/17/2014;  Comments: JULY 2014     [facility] TOTAL ABDOM HYSTERECTOMY      Description: Hysterectomy;  Recorded: 01/11/2012;       Social History     Tobacco Use     Smoking status: Former Smoker     Smokeless tobacco: Never Used   Substance Use Topics     Alcohol use: Yes     Alcohol/week: 1.0 standard drink     Comment: Alcoholic Drinks/day: <1 per wk     Family History   Problem Relation Age of Onset     Diabetes Mother      Breast Cancer Maternal Grandmother          Current  Outpatient Medications   Medication Sig Dispense Refill     atorvastatin (LIPITOR) 40 MG tablet Take 1 tablet (40 mg) by mouth daily 90 tablet 1     BD PEN NEEDLE TJ 2ND GEN 32G X 4 MM miscellaneous USE TWO TIMES A DAY WITH INSULIN 100 each 3     blood glucose meter (GLUCOMETER) [BLOOD GLUCOSE METER (GLUCOMETER)] Use 1 each As Directed as needed. Dispense glucometer brand per patient's insurance at pharmacy discretion. 1 each 0     empagliflozin (JARDIANCE) 25 mg Tab tablet [EMPAGLIFLOZIN (JARDIANCE) 25 MG TAB TABLET] Take 1 tablet (25 mg total) by mouth daily. 90 tablet 3     generic lancets (FINGERSTIX LANCETS) [GENERIC LANCETS (FINGERSTIX LANCETS)] Use to check blood sugar 1-2x daily. Dispense brand per patient's insurance at pharmacy discretion. 100 each 11     glipiZIDE (GLUCOTROL) 10 MG tablet Take 1 tablet (10 mg) by mouth 2 times daily (before meals) 60 tablet 5     LANTUS SOLOSTAR 100 UNIT/ML soln INJECT TWICE DAILY. UP TO 90 UNITS A DAY. 90 mL 1     sertraline (ZOLOFT) 100 MG tablet Take 1 tablet (100 mg) by mouth daily 90 tablet 0     traZODone (DESYREL) 50 MG tablet Take 1 tablet (50 mg) by mouth At Bedtime 60 tablet 0     valACYclovir (VALTREX) 1000 MG tablet [VALACYCLOVIR (VALTREX) 1000 MG TABLET] TAKE 2 TABLETS BY MOUTH NOW AND 2 TABLETS IN 12 HOURS. PRN. 4 tablet 3     Allergies   Allergen Reactions     Bactrim [Sulfamethoxazole W/Trimethoprim] Itching     Amoxicillin Swelling and Itching     Metformin Other (See Comments)     GI Upset       Breast Cancer Screening:    FHS-7:   Breast CA Risk Assessment (FHS-7) 2/14/2022   Did any of your first-degree relatives have breast or ovarian cancer? Unknown   Did any of your relatives have bilateral breast cancer? Unknown   Did any man in your family have breast cancer? No   Did any woman in your family have breast and ovarian cancer? Yes   Did any woman in your family have breast cancer before age 50 y? Unknown   Do you have 2 or more relatives with  breast and/or ovarian cancer? Unknown   Do you have 2 or more relatives with breast and/or bowel cancer? Unknown     Pertinent mammograms are reviewed under the imaging tab.    History of abnormal Pap smear: Status post benign hysterectomy. Health Maintenance and Surgical History updated.     Reviewed and updated as needed this visit by clinical staff   Tobacco  Allergies  Meds              Reviewed and updated as needed this visit by Provider                 Past Medical History:   Diagnosis Date     Arthritis      Diabetes mellitus (H)      History of transfusion     AFTER A MISCARRIAGE     Migraine      Neuropathy       Past Surgical History:   Procedure Laterality Date     BLADDER SUSPENSION  2007     DILATION AND CURETTAGE      SUCTION POST MISCARRIAGE     HC TARSAL TUNNEL RELEASE Left 9/21/2018    Procedure: DELLON QUADRUPLE NERVE DECOMPRESSION EXCISION PLANTAR FIBROMA ALL LEFT FOOT;  Surgeon: Jules Mao DPM;  Location: [facility];  Service: Podiatry     HYSTERECTOMY      1999     KNEE SURGERY       LAPAROSCOPIC APPENDECTOMY N/A 7/10/2014    Procedure: Laparoscopic Appendectomy;  Surgeon: Germain Howe MD;  Location: [facility];  Service:      [facility] APPENDECTOMY      Description: Appendectomy;  Recorded: 07/17/2014;  Comments: JULY 2014     [facility] TOTAL ABDOM HYSTERECTOMY      Description: Hysterectomy;  Recorded: 01/11/2012;       Review of Systems  CONSTITUTIONAL: NEGATIVE for fever, chills, change in weight  INTEGUMENTARY/SKIN: NEGATIVE for worrisome rashes, moles or lesions  EYES: NEGATIVE for vision changes or irritation  ENT: NEGATIVE for ear, mouth and throat problems  RESP: NEGATIVE for significant cough or SOB  BREAST: NEGATIVE for masses, tenderness or discharge  CV: NEGATIVE for chest pain, palpitations or peripheral edema  GI: NEGATIVE for nausea, abdominal pain, heartburn, or change in bowel habits  : NEGATIVE for unusual urinary or vaginal symptoms. No vaginal  "bleeding.  MUSCULOSKELETAL: NEGATIVE for significant arthralgias or myalgia  NEURO: NEGATIVE for weakness, dizziness or paresthesias  PSYCHIATRIC: NEGATIVE for changes in mood or affect      OBJECTIVE:   /64 (BP Location: Right arm, Patient Position: Sitting, Cuff Size: Adult Regular)   Pulse 72   Resp 20   Ht 1.632 m (5' 4.25\")   Wt 82.9 kg (182 lb 11.2 oz)   BMI 31.12 kg/m    Physical Exam  GENERAL: healthy, alert and no distress  EYES: Eyes grossly normal to inspection, PERRL and conjunctivae and sclerae normal  HENT: ear canals and TM's normal, nose and mouth without ulcers or lesions. 2+3+ tonsils bilaterally with crowded posterior oropharynx. Cracked teeth.   NECK: no adenopathy, no asymmetry, masses, or scars and thyroid normal to palpation  RESP: lungs clear to auscultation - no rales, rhonchi or wheezes  CV: regular rate and rhythm, normal S1 S2, no S3 or S4, no murmur, click or rub, no peripheral edema and peripheral pulses strong  ABDOMEN: soft, nontender, no hepatosplenomegaly, no masses and bowel sounds normal  MS: no gross musculoskeletal defects noted, no edema  SKIN: no suspicious lesions or rashes  NEURO: Normal strength and tone, mentation intact and speech normal  PSYCH: mentation appears normal, affect normal/bright        ASSESSMENT/PLAN:       ICD-10-CM    1. Routine general medical examination at a health care facility  Z00.00 Adult Sleep Eval & Management  Referral     Basic metabolic panel  (Ca, Cl, CO2, Creat, Gluc, K, Na, BUN)     TSH with free T4 reflex     Basic metabolic panel  (Ca, Cl, CO2, Creat, Gluc, K, Na, BUN)     TSH with free T4 reflex   2. Type 2 diabetes mellitus without complication, with long-term current use of insulin (H)  E11.9     Z79.4    3. Screen for colon cancer  Z12.11 Adult Gastro Ref - Procedure Only   4. Personal history of tobacco use  Z87.891 Prof Fee: Shared Decision Making Visit for Lung Cancer Screening     CT Chest Lung Cancer Scrn Low " "Dose wo   5. Moderate episode of recurrent major depressive disorder (H)  F33.1    6. HANS (generalized anxiety disorder)  F41.1        PHQ9 TOTAL SCORE: 1  HANS 7 TOTAL SCORE: 0  Patient is a 61-year-old male with past medical history of obesity, type 2 diabetes, history of tobacco abuse, HLD who presents today for an annual physical.    Was last seen in November and was doing well from a mental health standpoint.  Feels that her mental health continues to be stable.  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  Following with endocrinology for type 2 diabetes-recent changes made to her meds because HbA1c uptrending at 10.6.  Her mother did pass away from stroke late last year and she feels that she is doing well with the grieving process.  Is on Zoloft 100 mg every day and only taking trazodone as needed.  Patient does snore at night and has been told that she has apneic episodes.  Referral to sleep medicine placed.  Mammogram is at the end of March.  Is s/p total hysterectomy-no Pap smear required.  Last colonoscopy 2011-patient reports that they saw polyps but I cannot see the report.  Due for repeat.  Needs updated dental exam-encourage patient to reach out.  Patient used to smoke 1 PPD for about 30 years and quit around 12 years ago.  Qualifies for lung cancer screening.  After shared decision making, patient is amenable to getting lung cancer screening done.  Order placed.  Patient is not exercising a lot-counseling provided for that.  Not currently sexually active ( tara has ED) .  Up-to-date on his vaccines.  Due for some blood work-ordered today.      Patient has been advised of split billing requirements and indicates understanding: Yes    COUNSELING:  Reviewed preventive health counseling, as reflected in patient instructions    Estimated body mass index is 31.12 kg/m  as calculated from the following:    Height as of this encounter: 1.632 m (5' 4.25\").    Weight as of this encounter: " 82.9 kg (182 lb 11.2 oz).    Weight management plan: Discussed healthy diet and exercise guidelines    She reports that she has quit smoking. She has never used smokeless tobacco.    Val Garza DO  [facility]  Lung Cancer Screening Shared Decision Making Visit     Zoe Alvarez is eligible for lung cancer screening on the basis of the information provided in my signed lung cancer screening order.     I have discussed with patient the risks and benefits of screening for lung cancer with low-dose CT.     The risks include:  radiation exposure: one low dose chest CT has as much ionizing radiation as about 15 chest x-rays or 6 months of background radiation living in [place]    false positives: 96% of positive findings/nodules are NOT cancer, but some might still require additional diagnostic evaluation, including biopsy  over-diagnosis: some slow growing cancers that might never have been clinically significant will be detected and treated unnecessarily     The benefit of early detection of lung cancer is contingent upon adherence to annual screening or more frequent follow up if indicated.     Furthermore, reaping the benefits of screening requires Zoe Alvarez to be willing and physically able to undergo diagnostic procedures, if indicated. Although no specific guide is available for determining severity of comorbidities, it is reasonable to withhold screening in patients who have greater mortality risk from other diseases.     We did discuss that the only way to prevent lung cancer is to not smoke. Smoking cessation counseling was not given.      I did not offer risk estimation using a calculator such as this one:    ShouldIScreen

## 2022-02-18 ENCOUNTER — OFFICE VISIT (OUTPATIENT)
Dept: FAMILY MEDICINE | Facility: CLINIC | Age: 62
End: 2022-02-18
Payer: COMMERCIAL

## 2022-02-18 VITALS
HEIGHT: 64 IN | RESPIRATION RATE: 20 BRPM | SYSTOLIC BLOOD PRESSURE: 102 MMHG | WEIGHT: 182.7 LBS | BODY MASS INDEX: 31.19 KG/M2 | DIASTOLIC BLOOD PRESSURE: 64 MMHG | HEART RATE: 72 BPM

## 2022-02-18 DIAGNOSIS — Z87.891 PERSONAL HISTORY OF TOBACCO USE: ICD-10-CM

## 2022-02-18 DIAGNOSIS — Z12.11 SCREEN FOR COLON CANCER: ICD-10-CM

## 2022-02-18 DIAGNOSIS — Z79.4 TYPE 2 DIABETES MELLITUS WITHOUT COMPLICATION, WITH LONG-TERM CURRENT USE OF INSULIN (H): ICD-10-CM

## 2022-02-18 DIAGNOSIS — Z00.00 ROUTINE GENERAL MEDICAL EXAMINATION AT A HEALTH CARE FACILITY: Primary | ICD-10-CM

## 2022-02-18 DIAGNOSIS — F33.1 MODERATE EPISODE OF RECURRENT MAJOR DEPRESSIVE DISORDER (H): ICD-10-CM

## 2022-02-18 DIAGNOSIS — F41.1 GAD (GENERALIZED ANXIETY DISORDER): ICD-10-CM

## 2022-02-18 DIAGNOSIS — E11.9 TYPE 2 DIABETES MELLITUS WITHOUT COMPLICATION, WITH LONG-TERM CURRENT USE OF INSULIN (H): ICD-10-CM

## 2022-02-18 LAB
ANION GAP SERPL CALCULATED.3IONS-SCNC: 13 MMOL/L (ref 5–18)
BUN SERPL-MCNC: 23 MG/DL (ref 8–22)
CALCIUM SERPL-MCNC: 9.5 MG/DL (ref 8.5–10.5)
CHLORIDE BLD-SCNC: 106 MMOL/L (ref 98–107)
CO2 SERPL-SCNC: 21 MMOL/L (ref 22–31)
CREAT SERPL-MCNC: 0.87 MG/DL (ref 0.6–1.1)
GFR SERPL CREATININE-BSD FRML MDRD: 75 ML/MIN/1.73M2
GLUCOSE BLD-MCNC: 182 MG/DL (ref 70–125)
POTASSIUM BLD-SCNC: 4.3 MMOL/L (ref 3.5–5)
SODIUM SERPL-SCNC: 140 MMOL/L (ref 136–145)
TSH SERPL DL<=0.005 MIU/L-ACNC: 2.47 UIU/ML (ref 0.3–5)

## 2022-02-18 PROCEDURE — 80048 BASIC METABOLIC PNL TOTAL CA: CPT | Performed by: STUDENT IN AN ORGANIZED HEALTH CARE EDUCATION/TRAINING PROGRAM

## 2022-02-18 PROCEDURE — 84443 ASSAY THYROID STIM HORMONE: CPT | Performed by: STUDENT IN AN ORGANIZED HEALTH CARE EDUCATION/TRAINING PROGRAM

## 2022-02-18 PROCEDURE — G0296 VISIT TO DETERM LDCT ELIG: HCPCS | Performed by: STUDENT IN AN ORGANIZED HEALTH CARE EDUCATION/TRAINING PROGRAM

## 2022-02-18 PROCEDURE — 99396 PREV VISIT EST AGE 40-64: CPT | Performed by: STUDENT IN AN ORGANIZED HEALTH CARE EDUCATION/TRAINING PROGRAM

## 2022-02-18 PROCEDURE — 36415 COLL VENOUS BLD VENIPUNCTURE: CPT | Performed by: STUDENT IN AN ORGANIZED HEALTH CARE EDUCATION/TRAINING PROGRAM

## 2022-02-18 ASSESSMENT — ANXIETY QUESTIONNAIRES
7. FEELING AFRAID AS IF SOMETHING AWFUL MIGHT HAPPEN: NOT AT ALL
4. TROUBLE RELAXING: NOT AT ALL
2. NOT BEING ABLE TO STOP OR CONTROL WORRYING: NOT AT ALL
GAD7 TOTAL SCORE: 0
GAD7 TOTAL SCORE: 0
6. BECOMING EASILY ANNOYED OR IRRITABLE: NOT AT ALL
GAD7 TOTAL SCORE: 0
1. FEELING NERVOUS, ANXIOUS, OR ON EDGE: NOT AT ALL
3. WORRYING TOO MUCH ABOUT DIFFERENT THINGS: NOT AT ALL
7. FEELING AFRAID AS IF SOMETHING AWFUL MIGHT HAPPEN: NOT AT ALL
5. BEING SO RESTLESS THAT IT IS HARD TO SIT STILL: NOT AT ALL

## 2022-02-18 ASSESSMENT — PATIENT HEALTH QUESTIONNAIRE - PHQ9
10. IF YOU CHECKED OFF ANY PROBLEMS, HOW DIFFICULT HAVE THESE PROBLEMS MADE IT FOR YOU TO DO YOUR WORK, TAKE CARE OF THINGS AT HOME, OR GET ALONG WITH OTHER PEOPLE: NOT DIFFICULT AT ALL
SUM OF ALL RESPONSES TO PHQ QUESTIONS 1-9: 1
SUM OF ALL RESPONSES TO PHQ QUESTIONS 1-9: 1

## 2022-02-18 NOTE — PATIENT INSTRUCTIONS
Lung Cancer Screening   Frequently Asked Questions  If you are at high-risk for lung cancer, getting screened with low-dose computed tomography (LDCT) every year can help save your life. This handout offers answers to some of the most common questions about lung cancer screening. If you have other questions, please call [phone] ([phone]).     What is it?  Lung cancer screening uses special X-ray technology to create an image of your lung tissue. The exam is quick and easy and takes less than 10 seconds. We don t give you any medicine or use any needles. You can eat before and after the exam. You don t need to change your clothes as long as the clothing on your chest doesn t contain metal. But, you do need to be able to hold your breath for at least 6 seconds during the exam.    What is the goal of lung cancer screening?  The goal of lung cancer screening is to save lives. Many times, lung cancer is not found until a person starts having physical symptoms. Lung cancer screening can help detect lung cancer in the earliest stages when it may be easier to treat.    Who should be screened for lung cancer?  We suggest lung cancer screening for anyone who is at high-risk for lung cancer. You are in the high-risk group if you:      are between the ages of 55 and 79, and    have smoked at least 1 pack of cigarettes a day for 20 or more years, and    still smoke or have quit within the past 15 years.    However, if you have a new cough or shortness of breath, you should talk to your doctor before being screened.    Why does it matter if I have symptoms?  Certain symptoms can be a sign that you have a condition in your lungs that should be checked and treated by your doctor. These symptoms include fever, chest pain, a new or changing cough, shortness of breath that you have never felt before, coughing up blood or unexplained weight loss. Having any of these symptoms can greatly affect the results of lung  cancer screening.       Should all smokers get an LDCT lung cancer screening exam?  It depends. Lung cancer screening is for a very specific group of men and women who have a history of heavy smoking over a long period of time (see  Who should be screened for lung cancer  above).  I am in the high-risk group, but have been diagnosed with cancer in the past. Is LDCT lung cancer screening right for me?  In some cases, you should not have LDCT lung screening, such as when your doctor is already following your cancer with CT scan studies. Your doctor will help you decide if LDCT lung screening is right for you.  Do I need to have a screening exam every year?  Yes. If you are in the high-risk group described earlier, you should get an LDCT lung cancer screening exam every year until you are 79, or are no longer willing or able to undergo screening and possible procedures to diagnose and treat lung cancer.  How effective is LDCT at preventing death from lung cancer?  Studies have shown that LDCT lung cancer screening can lower the risk of death from lung cancer by 20 percent in people who are at high-risk.  What are the risks?  There are some risks and limitations of LDCT lung cancer screening. We want to make sure you understand the risks and benefits, so please let us know if you have any questions. Your doctor may want to talk with you more about these risks.    Radiation exposure: As with any exam that uses radiation, there is a very small increased risk of cancer. The amount of radiation in LDCT is small--about the same amount a person would get from a mammogram. Your doctor orders the exam when he or she feels the potential benefits outweigh the risks.    False negatives: No test is perfect, including LDCT. It is possible that you may have a medical condition, including lung cancer, that is not found during your exam. This is called a false negative result.    False positives and more testing: LDCT very often finds  something in the lung that could be cancer, but in fact is not. This is called a false positive result. False positive tests often cause anxiety. To make sure these findings are not cancer, you may need to have more tests. These tests will be done only if you give us permission. Sometimes patients need a treatment that can have side effects, such as a biopsy. For more information on false positives, see  What can I expect from the results?     Findings not related to lung cancer: Your LDCT exam also takes pictures of areas of your body next to your lungs. In a very small number of cases, the CT scan will show an abnormal finding in one of these areas, such as your kidneys, adrenal glands, liver or thyroid. This finding may not be serious, but you may need more tests. Your doctor can help you decide what other tests you may need, if any.  What can I expect from the results?  About 1 out of 4 LDCT exams will find something that may need more tests. Most of the time, these findings are lung nodules. Lung nodules are very small collections of tissue in the lung. These nodules are very common, and the vast majority--more than 97 percent--are not cancer (benign). Most are normal lymph nodes or small areas of scarring from past infections.  But, if a small lung nodule is found to be cancer, the cancer can be cured more than 90 percent of the time. To know if the nodule is cancer, we may need to get more images before your next yearly screening exam. If the nodule has suspicious features (for example, it is large, has an odd shape or grows over time), we will refer you to a specialist for further testing.  Will my doctor also get the results?  Yes. Your doctor will get a copy of your results.  Preventive Health Recommendations  Female Ages 50 - 64    Yearly exam: See your health care provider every year in order to  o Review health changes.   o Discuss preventive care.    o Review your medicines if your doctor has  prescribed any.      Get a Pap test every three years (unless you have an abnormal result and your provider advises testing more often).    If you get Pap tests with HPV test, you only need to test every 5 years, unless you have an abnormal result.     You do not need a Pap test if your uterus was removed (hysterectomy) and you have not had cancer.    You should be tested each year for STDs (sexually transmitted diseases) if you're at risk.     Have a mammogram every 1 to 2 years.    Have a colonoscopy at age 50, or have a yearly FIT test (stool test). These exams screen for colon cancer.      Have a cholesterol test every 5 years, or more often if advised.    Have a diabetes test (fasting glucose) every three years. If you are at risk for diabetes, you should have this test more often.     If you are at risk for osteoporosis (brittle bone disease), think about having a bone density scan (DEXA).    Shots: Get a flu shot each year. Get a tetanus shot every 10 years.    Nutrition:     Eat at least 5 servings of fruits and vegetables each day.    Eat whole-grain bread, whole-wheat pasta and brown rice instead of white grains and rice.    Get adequate Calcium and Vitamin D.     Lifestyle    Exercise at least 150 minutes a week (30 minutes a day, 5 days a week). This will help you control your weight and prevent disease.    Limit alcohol to one drink per day.    No smoking.     Wear sunscreen to prevent skin cancer.     See your dentist every six months for an exam and cleaning.    See your eye doctor every 1 to 2 years.

## 2022-02-19 ASSESSMENT — PATIENT HEALTH QUESTIONNAIRE - PHQ9: SUM OF ALL RESPONSES TO PHQ QUESTIONS 1-9: 1

## 2022-02-19 ASSESSMENT — ANXIETY QUESTIONNAIRES: GAD7 TOTAL SCORE: 0

## 2022-02-21 ENCOUNTER — TELEPHONE (OUTPATIENT)
Dept: GASTROENTEROLOGY | Facility: CLINIC | Age: 62
End: 2022-02-21
Payer: COMMERCIAL

## 2022-02-21 DIAGNOSIS — Z11.59 ENCOUNTER FOR SCREENING FOR OTHER VIRAL DISEASES: Primary | ICD-10-CM

## 2022-02-21 NOTE — TELEPHONE ENCOUNTER
Screening Questions  Blue=prep questions Red=location Green=sedation   1. Are you active on mychart? Y    2. What insurance is in the chart? [employer]     3.  Ordering/Referring Provider: Val Garza    4. BMI 31.2 , If greater than 40 review exclusion criteria also will need EXTENDED PREP    5.  Respiratory Screening (If yes to any of the following HOSPITAL setting only):     Do you use daily home oxygen? N  Do you have mod to severe Obstructive Sleep Apnea? N (can be seen at [facility] or hospital setting)    Do you have Pulmonary Hypertension? N   Do you have UNCONTROLLED asthma? N    6. Have you had a heart or lung transplant? N  (If yes, please review exclusion criteria)    7. Are you currently on dialysis?N  (If yes, schedule in HOSPITAL setting only)(If yes, please send Golytely prep)    8. Do you have chronic kidney disease? N (If yes, please send Golytely prep)    9. Have you had a stroke or Transient ischemic attack (TIA) within 6 months? N (If yes, do not schedule at [facility])    10. In the past 6 months, have you had any heart related issues including cardiomyopathy or heart attack? N (If yes, please review exclusion criteria)           If yes, did it require cardiac stenting or other implantable device?N  (If yes, please review exclusion criteria)      11. Do you have any implantable devices in your body (pacemaker, defib, LVAD)? N (If yes, schedule at [facility])    12. Do you take nitroglycerin? If yes, how often? N (if yes, schedule at HOSPITAL setting)    13. Are you currently taking any blood thinners?N (If yes- inform patient to follow up with PCP or provider for follow up instructions)     14. Are you a diabetic? Y (If yes, please send Golytely prep)    15. (Females) Are you currently pregnant? N  If yes, how many weeks?      16. Are you taking any prescription pain medications on a routine schedule? N If yes, MAC sedation and patient will need EXTENDED PREP.    17. Do you have any chemical dependencies  such as alcohol, street drugs, or methadone? N If yes, MAC sedation     18. Do you have any history of post-traumatic stress syndrome, severe anxiety or history of psychosis? N  If yes, MAC sedation.     19. Do you transfer independently? Y    20.  Do you have any issues with constipation? N   If yes, pt will need EXTENDED PREP     21. Preferred Pharmacy for Pre Prescription [id number] [employer] [facility] [place]    Scheduling Details    Which Colonoscopy Prep was Sent?: Golytely  Type of Procedure Scheduled: Colon  Surgeon:   Date of Procedure:   Location: [facility]  Caller (Please ask for phone number if not scheduled by patient): Lynne      Sedation Type:   Conscious Sedation- Needs  for 6 hours after the procedure  MAC/General-Needs  for 24 hours after procedure    Pre-op Required at [facility] [place] [place] and OR for MAC sedation:   (if yes advise patient they will need a pre-op prior to procedure)      Informed patient they will need an adult    Cannot take any type of public or medical transportation alone    Pre-Procedure Covid test to be completed at [facility] or Externally:     Confirmed Nurse will call to complete assessment     Additional comments: PT wants to be scheduled at [facility] closer to home ([facility] NEED EXTENDED PREP)

## 2022-02-24 DIAGNOSIS — Z79.4 TYPE 2 DIABETES MELLITUS WITHOUT COMPLICATION, WITH LONG-TERM CURRENT USE OF INSULIN (H): ICD-10-CM

## 2022-02-24 DIAGNOSIS — E11.9 TYPE 2 DIABETES MELLITUS WITHOUT COMPLICATION, WITH LONG-TERM CURRENT USE OF INSULIN (H): ICD-10-CM

## 2022-02-24 RX ORDER — GLIPIZIDE 10 MG/1
10 TABLET ORAL
Qty: 60 TABLET | Refills: 5 | OUTPATIENT
Start: 2022-02-24

## 2022-02-27 DIAGNOSIS — G47.00 INSOMNIA, UNSPECIFIED TYPE: ICD-10-CM

## 2022-02-27 DIAGNOSIS — F41.1 GAD (GENERALIZED ANXIETY DISORDER): ICD-10-CM

## 2022-02-27 DIAGNOSIS — F33.1 MODERATE EPISODE OF RECURRENT MAJOR DEPRESSIVE DISORDER (H): ICD-10-CM

## 2022-03-02 RX ORDER — TRAZODONE HYDROCHLORIDE 50 MG/1
50 TABLET, FILM COATED ORAL AT BEDTIME
Qty: 90 TABLET | Refills: 3 | Status: SHIPPED | OUTPATIENT
Start: 2022-03-02 | End: 2022-07-22

## 2022-03-02 NOTE — TELEPHONE ENCOUNTER
"Last Written Prescription Date:  11/10/21  Last Fill Quantity: 60,  # refills: 0   Last office visit provider:  2/18/22     Requested Prescriptions   Pending Prescriptions Disp Refills     traZODone (DESYREL) 50 MG tablet 60 tablet 0     Sig: Take 1 tablet (50 mg) by mouth At Bedtime       Serotonin Modulators Passed - 3/2/2022 11:40 AM        Passed - Recent (12 mo) or future (30 days) visit within the authorizing provider's specialty     Patient has had an office visit with the authorizing provider or a provider within the authorizing providers department within the previous 12 mos or has a future within next 30 days. See \"Patient Info\" tab in inbasket, or \"Choose Columns\" in Meds & Orders section of the refill encounter.              Passed - Medication is active on med list        Passed - Patient is age 18 or older        Passed - No active pregnancy on record        Passed - No positive pregnancy test in past 12 months             Aldo Momin RN 03/02/22 11:40 AM  "

## 2022-03-04 ENCOUNTER — HOSPITAL ENCOUNTER (OUTPATIENT)
Dept: CT IMAGING | Facility: HOSPITAL | Age: 62
Discharge: HOME | End: 2022-03-04
Attending: STUDENT IN AN ORGANIZED HEALTH CARE EDUCATION/TRAINING PROGRAM | Admitting: STUDENT IN AN ORGANIZED HEALTH CARE EDUCATION/TRAINING PROGRAM
Payer: COMMERCIAL

## 2022-03-04 DIAGNOSIS — Z87.891 PERSONAL HISTORY OF TOBACCO USE: ICD-10-CM

## 2022-03-04 PROCEDURE — 71271 CT THORAX LUNG CANCER SCR C-: CPT

## 2022-03-07 PROBLEM — R91.8 PULMONARY NODULES: Status: ACTIVE | Noted: 2022-03-07

## 2022-03-15 ENCOUNTER — LAB (OUTPATIENT)
Dept: LAB | Facility: CLINIC | Age: 62
End: 2022-03-15
Attending: INTERNAL MEDICINE
Payer: COMMERCIAL

## 2022-03-15 DIAGNOSIS — Z11.59 ENCOUNTER FOR SCREENING FOR OTHER VIRAL DISEASES: ICD-10-CM

## 2022-03-15 PROCEDURE — U0005 INFEC AGEN DETEC AMPLI PROBE: HCPCS

## 2022-03-15 PROCEDURE — U0003 INFECTIOUS AGENT DETECTION BY NUCLEIC ACID (DNA OR RNA); SEVERE ACUTE RESPIRATORY SYNDROME CORONAVIRUS 2 (SARS-COV-2) (CORONAVIRUS DISEASE [COVID-19]), AMPLIFIED PROBE TECHNIQUE, MAKING USE OF HIGH THROUGHPUT TECHNOLOGIES AS DESCRIBED BY CMS-2020-01-R: HCPCS

## 2022-03-16 LAB — SARS-COV-2 RNA RESP QL NAA+PROBE: NEGATIVE

## 2022-03-18 ENCOUNTER — HOSPITAL ENCOUNTER (OUTPATIENT)
Facility: CLINIC | Age: 62
Discharge: HOME | End: 2022-03-18
Attending: INTERNAL MEDICINE | Admitting: INTERNAL MEDICINE
Payer: COMMERCIAL

## 2022-03-18 VITALS
BODY MASS INDEX: 31.07 KG/M2 | HEART RATE: 83 BPM | DIASTOLIC BLOOD PRESSURE: 62 MMHG | RESPIRATION RATE: 16 BRPM | OXYGEN SATURATION: 95 % | WEIGHT: 182 LBS | SYSTOLIC BLOOD PRESSURE: 129 MMHG | HEIGHT: 64 IN

## 2022-03-18 DIAGNOSIS — Z12.11 SPECIAL SCREENING FOR MALIGNANT NEOPLASMS, COLON: Primary | ICD-10-CM

## 2022-03-18 LAB
COLONOSCOPY: NORMAL
GLUCOSE BLDC GLUCOMTR-MCNC: 155 MG/DL (ref 70–99)

## 2022-03-18 PROCEDURE — 45385 COLONOSCOPY W/LESION REMOVAL: CPT | Mod: PT | Performed by: INTERNAL MEDICINE

## 2022-03-18 PROCEDURE — 88305 TISSUE EXAM BY PATHOLOGIST: CPT | Mod: 26 | Performed by: PATHOLOGY

## 2022-03-18 PROCEDURE — 82962 GLUCOSE BLOOD TEST: CPT

## 2022-03-18 PROCEDURE — 999N000099 HC STATISTIC MODERATE SEDATION < 10 MIN: Performed by: INTERNAL MEDICINE

## 2022-03-18 PROCEDURE — 250N000011 HC RX IP 250 OP 636: Performed by: INTERNAL MEDICINE

## 2022-03-18 PROCEDURE — G0500 MOD SEDAT ENDO SERVICE >5YRS: HCPCS | Performed by: INTERNAL MEDICINE

## 2022-03-18 PROCEDURE — 45384 COLONOSCOPY W/LESION REMOVAL: CPT | Mod: XS,PT | Performed by: INTERNAL MEDICINE

## 2022-03-18 PROCEDURE — 45382 COLONOSCOPY W/CONTROL BLEED: CPT | Performed by: INTERNAL MEDICINE

## 2022-03-18 PROCEDURE — 88305 TISSUE EXAM BY PATHOLOGIST: CPT | Mod: TC | Performed by: INTERNAL MEDICINE

## 2022-03-18 PROCEDURE — 99153 MOD SED SAME PHYS/QHP EA: CPT | Performed by: INTERNAL MEDICINE

## 2022-03-18 RX ORDER — NALOXONE HYDROCHLORIDE 0.4 MG/ML
0.4 INJECTION, SOLUTION INTRAMUSCULAR; INTRAVENOUS; SUBCUTANEOUS
Status: DISCONTINUED | OUTPATIENT
Start: 2022-03-18 | End: 2022-03-18 | Stop reason: HOSPADM

## 2022-03-18 RX ORDER — PROCHLORPERAZINE MALEATE 10 MG
10 TABLET ORAL EVERY 6 HOURS PRN
Status: DISCONTINUED | OUTPATIENT
Start: 2022-03-18 | End: 2022-03-18 | Stop reason: HOSPADM

## 2022-03-18 RX ORDER — NALOXONE HYDROCHLORIDE 0.4 MG/ML
0.2 INJECTION, SOLUTION INTRAMUSCULAR; INTRAVENOUS; SUBCUTANEOUS
Status: DISCONTINUED | OUTPATIENT
Start: 2022-03-18 | End: 2022-03-18 | Stop reason: HOSPADM

## 2022-03-18 RX ORDER — ONDANSETRON 2 MG/ML
4 INJECTION INTRAMUSCULAR; INTRAVENOUS EVERY 6 HOURS PRN
Status: DISCONTINUED | OUTPATIENT
Start: 2022-03-18 | End: 2022-03-18 | Stop reason: HOSPADM

## 2022-03-18 RX ORDER — ATROPINE SULFATE 0.4 MG/ML
1 AMPUL (ML) INJECTION
Status: DISCONTINUED | OUTPATIENT
Start: 2022-03-18 | End: 2022-03-18 | Stop reason: HOSPADM

## 2022-03-18 RX ORDER — DIPHENHYDRAMINE HYDROCHLORIDE 50 MG/ML
25-50 INJECTION INTRAMUSCULAR; INTRAVENOUS
Status: DISCONTINUED | OUTPATIENT
Start: 2022-03-18 | End: 2022-03-18 | Stop reason: HOSPADM

## 2022-03-18 RX ORDER — FLUMAZENIL 0.1 MG/ML
0.2 INJECTION, SOLUTION INTRAVENOUS
Status: DISCONTINUED | OUTPATIENT
Start: 2022-03-18 | End: 2022-03-18 | Stop reason: HOSPADM

## 2022-03-18 RX ORDER — SIMETHICONE 40MG/0.6ML
133 SUSPENSION, DROPS(FINAL DOSAGE FORM)(ML) ORAL
Status: DISCONTINUED | OUTPATIENT
Start: 2022-03-18 | End: 2022-03-18 | Stop reason: HOSPADM

## 2022-03-18 RX ORDER — LIDOCAINE 40 MG/G
CREAM TOPICAL
Status: DISCONTINUED | OUTPATIENT
Start: 2022-03-18 | End: 2022-03-18 | Stop reason: HOSPADM

## 2022-03-18 RX ORDER — FENTANYL CITRATE 0.05 MG/ML
50-100 INJECTION, SOLUTION INTRAMUSCULAR; INTRAVENOUS EVERY 5 MIN PRN
Status: DISCONTINUED | OUTPATIENT
Start: 2022-03-18 | End: 2022-03-18 | Stop reason: HOSPADM

## 2022-03-18 RX ORDER — ONDANSETRON 2 MG/ML
4 INJECTION INTRAMUSCULAR; INTRAVENOUS
Status: DISCONTINUED | OUTPATIENT
Start: 2022-03-18 | End: 2022-03-18 | Stop reason: HOSPADM

## 2022-03-18 RX ORDER — ONDANSETRON 4 MG/1
4 TABLET, ORALLY DISINTEGRATING ORAL EVERY 6 HOURS PRN
Status: DISCONTINUED | OUTPATIENT
Start: 2022-03-18 | End: 2022-03-18 | Stop reason: HOSPADM

## 2022-03-18 RX ORDER — EPINEPHRINE 1 MG/ML
0.1 INJECTION, SOLUTION INTRAMUSCULAR; SUBCUTANEOUS
Status: DISCONTINUED | OUTPATIENT
Start: 2022-03-18 | End: 2022-03-18 | Stop reason: HOSPADM

## 2022-03-18 RX ADMIN — MIDAZOLAM 2 MG: 1 INJECTION INTRAMUSCULAR; INTRAVENOUS at 11:10

## 2022-03-18 RX ADMIN — FENTANYL CITRATE 100 MCG: 0.05 INJECTION, SOLUTION INTRAMUSCULAR; INTRAVENOUS at 11:10

## 2022-03-18 NOTE — DISCHARGE INSTRUCTIONS
The patient has received a copy of the Provation  report the doctor has written and discharge instructions have been discussed with the patient and responsible adult.  All questions were addressed and answered prior to patient discharge.      Understanding Colon and Rectal Polyps     The colon has a smooth lining composed of millions of cells.     The colon (also called the large intestine) is a muscular tube that forms the last part of the digestive tract. It absorbs water and stores food waste. The colon is about 4 to 6 feet long. The rectum is the last 6 inches of the colon. The colon and rectum have a smooth lining composed of millions of cells. Changes in these cells can lead to growths in the colon that can become cancerous and should be removed.     When the Colon Lining Changes  Changes that occur in the cells that line the colon or rectum can lead to growths called polyps. Over a period of years, polyps can turn cancerous. Removing polyps early may prevent cancer from ever forming.      Polyps  Polyps are fleshy clumps of tissue that form on the lining of the colon or rectum. Small polyps are usually benign (not cancerous). However, over time, cells in a polyp can change and become cancerous. The larger a polyp grows, the more likely this is to happen. Also, certain types of polyps known as adenomatous polyps are considered premalignant. This means that they will almost always become cancerous if they re not removed.          Cancer  Almost all colorectal cancers start when polyp cells begin growing abnormally. As a cancerous tumor grows, it may involve more and more of the colon or rectum. In time, cancer can also grow beyond the colon or rectum and spread to nearby organs or to glands called lymph nodes. The cells can also travel to other parts of the body. This is known as metastasis. The earlier a cancerous tumor is removed, the better the chance of preventing its spread.        [id number] Omar  [employer], [address] [place] [address], [place] [address]. All rights reserved. This information is not intended as a substitute for professional medical care. Always follow your healthcare professional's instructions.

## 2022-03-18 NOTE — H&P
Pre-Endoscopy History and Physical     Zoe Alvarez MRN# [id number]   YOB: 1960 Age: 61 year old     Date of Procedure: 3/18/2022  Primary care provider: Val Garza  Type of Endoscopy: Colonoscopy with possible biopsy, possible polypectomy  Reason for Procedure: sceen  Type of Anesthesia Anticipated: Conscious Sedation    HPI:    Zoe is a 61 year old female who will be undergoing the above procedure.      A history and physical has been performed. The patient's medications and allergies have been reviewed. The risks and benefits of the procedure and the sedation options and risks were discussed with the patient.  All questions were answered and informed consent was obtained.      She denies a personal or family history of anesthesia complications or bleeding disorders.     Patient Active Problem List   Diagnosis     Obesity (BMI 30-39.9)     Chronic venous insufficiency     Epidermal Inclusion Cyst     Hyperlipidemia     Vertigo     Ingrowing Toenail     Nicotine Dependence     Type 2 diabetes mellitus without complication, with long-term current use of insulin (H)     Eczema     RLQ abdominal pain     Tarsal tunnel syndrome of left side     Compression neuropathy of right lower extremity     Mononeuritis of left lower extremity     Plantar fascial fibromatosis of left foot     Mononeuritis of right lower extremity     Tarsal tunnel syndrome of right side     Foot pain, left     Bilateral lower extremity edema     Chronic kidney disease, stage 3 (H)     Pulmonary nodules        Past Medical History:   Diagnosis Date     Arthritis      Diabetes mellitus (H)      History of transfusion     AFTER A MISCARRIAGE     Migraine      Neuropathy         Past Surgical History:   Procedure Laterality Date     BLADDER SUSPENSION  2007     COLONOSCOPY       DILATION AND CURETTAGE      SUCTION POST MISCARRIAGE     HC TARSAL TUNNEL RELEASE Left 9/21/2018    Procedure: DELLON QUADRUPLE NERVE  DECOMPRESSION EXCISION PLANTAR FIBROMA ALL LEFT FOOT;  Surgeon: Jules Mao DPM;  Location: [facility] OR;  Service: Podiatry     HYSTERECTOMY      1999     KNEE SURGERY       LAPAROSCOPIC APPENDECTOMY N/A 7/10/2014    Procedure: Laparoscopic Appendectomy;  Surgeon: Germain Howe MD;  Location: [facility];  Service:      [facility] APPENDECTOMY      Description: Appendectomy;  Recorded: 07/17/2014;  Comments: JULY 2014     [facility] TOTAL ABDOM HYSTERECTOMY      Description: Hysterectomy;  Recorded: 01/11/2012;       Social History     Tobacco Use     Smoking status: Former Smoker     Smokeless tobacco: Never Used   Substance Use Topics     Alcohol use: Yes     Alcohol/week: 1.0 standard drink     Comment: Alcoholic Drinks/day: <1 per wk       Family History   Problem Relation Age of Onset     Diabetes Mother      Breast Cancer Maternal Grandmother      Colon Cancer No family hx of        Prior to Admission medications    Medication Sig Start Date End Date Taking? Authorizing Provider   empagliflozin (JARDIANCE) 25 mg Tab tablet [EMPAGLIFLOZIN (JARDIANCE) 25 MG TAB TABLET] Take 1 tablet (25 mg total) by mouth daily. 4/29/21  Yes Jayla Hermosillo NP   glipiZIDE (GLUCOTROL) 10 MG tablet Take 1 tablet (10 mg) by mouth 2 times daily (before meals) 2/1/22  Yes Jayla Hermosillo NP   LANTUS SOLOSTAR 100 UNIT/ML soln INJECT TWICE DAILY. UP TO 90 UNITS A DAY. 7/30/21  Yes Jayla Hermosillo NP   sertraline (ZOLOFT) 100 MG tablet Take 1 tablet (100 mg) by mouth daily 11/30/21  Yes Val Garza DO   atorvastatin (LIPITOR) 40 MG tablet Take 1 tablet (40 mg) by mouth daily 10/26/21   Val Garza DO   BD PEN NEEDLE TJ 2ND GEN 32G X 4 MM miscellaneous USE TWO TIMES A DAY WITH INSULIN 12/17/21   Jayla Hermosillo NP   blood glucose meter (GLUCOMETER) [BLOOD GLUCOSE METER (GLUCOMETER)] Use 1 each As Directed as needed. Dispense glucometer brand per patient's insurance at pharmacy discretion. 11/11/19   Jayla Hermosillo,  "NP   generic lancets (FINGERSTIX LANCETS) [GENERIC LANCETS (FINGERSTIX LANCETS)] Use to check blood sugar 1-2x daily. Dispense brand per patient's insurance at pharmacy discretion. 2/3/20   Candace Moody PA-C   traZODone (DESYREL) 50 MG tablet Take 1 tablet (50 mg) by mouth At Bedtime 3/2/22   Val Garza DO   valACYclovir (VALTREX) 1000 MG tablet [VALACYCLOVIR (VALTREX) 1000 MG TABLET] TAKE 2 TABLETS BY MOUTH NOW AND 2 TABLETS IN 12 HOURS. PRN. 11/24/20   Jayla Hermosillo NP       Allergies   Allergen Reactions     Bactrim [Sulfamethoxazole W/Trimethoprim] Itching     Amoxicillin Swelling and Itching     Metformin Other (See Comments)     GI Upset        REVIEW OF SYSTEMS:   5 point ROS negative except as noted above in HPI, including Gen., Resp., CV, GI &  system review.    PHYSICAL EXAM:   There were no vitals taken for this visit. Estimated body mass index is 31.12 kg/m  as calculated from the following:    Height as of 2/18/22: 1.632 m (5' 4.25\").    Weight as of 2/18/22: 82.9 kg (182 lb 11.2 oz).   GENERAL APPEARANCE: alert, and oriented  MENTAL STATUS: alert  AIRWAY EXAM: Mallampatti Class I (visualization of the soft palate, fauces, uvula, anterior and posterior pillars)  RESP: lungs clear to auscultation - no rales, rhonchi or wheezes  CV: regular rates and rhythm  DIAGNOSTICS:    Not indicated    IMPRESSION   ASA Class 2 - Mild systemic disease    PLAN:   Plan for Colonoscopy with possible biopsy, possible polypectomy. We discussed the risks, benefits and alternatives and the patient wished to proceed.    The above has been forwarded to the consulting provider.      Signed Electronically by: Cristobal Mcclelland MD  March 18, 2022          "

## 2022-03-18 NOTE — LETTER
February 22, 2022      Zoe Alvarez  [address] [address]        Dear Zoe,     Thank you for choosing [facility]. You are scheduled for the following service(s).   Please be aware that coverage of these services is subject to the terms and limitations of your health insurance plan.  Call member services at your health plan with any benefit or coverage questions.  Date:  3/18/2022       Procedure: COLONOSCOPY  Doctor:   Dr. Mcclelland         Arrival Time:  10:15 am   *Enter and check in at the Main Hospital Entrance  Procedure Time:  11:00 am    Location:   [facility]        Endoscopy Department, First Floor *         [address] [place], [place] [address]      [phone] or [phone] ([facility]) to reschedule                  NuLYTELY  DIVIDED PREP  Colonoscopy is the most accurate test to detect colon polyps and colon cancer; and the only test where polyps can be removed. During this procedure, a doctor examines the lining of your large intestine and rectum through a flexible tube.         Transportation  You must arrange for a ride for the day of your procedure with a responsible adult. A taxi , Uber, etc, is not an option unless you are accompanied by a responsible adult. If you fail to arrange transportation with a responsible adult, your procedure will be cancelled and rescheduled.    We have sent your prescription for NuLYTELY  to the pharmacy on file. Please call our office at [phone] if you did not receive a prescription.        COLONOSCOPY PRE-PROCEDURE CHECKLIST  If you have diabetes, ask your regular doctor for diet and medication restrictions.  If you take an anticoagulant or anti-platelet medication (such as Coumadin , Lovenox , Pradaxa , Xarelto , Eliquis , etc.), please call your primary doctor for advice on holding this medication.  If you take aspirin you may continue to do so.  If you are or may be pregnant,  please discuss the risks and benefits of this procedure with your doctor.      PREPARATION FOR COLONOSCOPY    7 days before:    Discontinue fiber supplements and medications containing iron. This includes Metamucil  and Fibercon ; and multivitamins with iron.  3 days before:    Begin a low-fiber diet. A low-fiber diet helps making the cleanout more effective. For additional details on low-fiber diet, please refer to the table on the last page.  2 days before:    Continue the low-fiber diet.     Drink at least 8 glasses of water throughout the day.     Stop eating solid foods at 11:45 pm.  1 day before:    In the morning: begin a clear liquid diet (liquids you can see through).     Examples of a clear liquid diet include: water, clear broth or bouillon, Gatorade, Pedialyte or Powerade, carbonated and non-carbonated soft drinks (Sprite , 7-Up , ginger ale), strained fruit juices without pulp (apple, white grape, white cranberry), Jell-O  and popsicles.     The following are not allowed on a clear liquid diet: red liquids, alcoholic beverages,  dairy products (milk, creamer, and yogurt), protein shakes,  juice with pulp and chewing tobacco.    At 4pm: drink 1 (one) 8 oz glass of NuLYTELY  solution every 15 minutes (approximately 8 glasses of 8 oz or half the bottle). Keep the solution refrigerated. Do not drink any other liquids while you are drinking the NuLYTELY  solution.    Over the course of the evening, drink an additional   liter of clear liquids and continue clear liquid diet.        Day of procedure:    6 hours before the procedure: drink 1 (one) 8 oz glass of NuLYTELY  solution every 15 minutes until the last half of the bottle (approximately 8 glasses of 8 oz) is gone. Keep the solution refrigerated. Do not drink any other liquids while you are drinking the NuLYTELY  solution. After that, you may continue the clear liquid diet until 4 hours before the procedure.      You may take all of your morning  medications including blood pressure medications, blood thinners (if you have not been instructed to stop these by our office), methadone, and anti-seizure medications with sips of water 4 hours prior to your procedure or earlier. Do not take insulin or vitamins prior to your procedure.       4 hours prior:   o STOP consuming all liquids   o Do not take anything by mouth during this time.   o Allow extra time to travel to your procedure as you may need to stop and use a restroom along the way.  You are ready for the procedure, if you followed all instructions and your stool is no longer formed, but clear or yellow liquid. If you are unsure whether your colon is clean, please call our office at [phone] before you leave for your appointment.    COLON CLEANSING TIPS: drink adequate amounts of fluids before and after your colon cleansing to prevent dehydration. Stay near a toilet because you will have diarrhea. Even if you are sitting on the toilet, continue to drink the cleansing solution every 15 minutes. If you feel nauseous or vomit, rinse your mouth with water, take a 15 to 30-minute-break and then continue drinking the solution. You will be uncomfortable until the stool has flushed from your colon (in about 2 to 4 hours). You may feel chilled.    Bring the following to your procedure:  - Insurance Card/Photo ID.   - List of current medications including over-the-counter medications and supplements.   - Your rescue inhaler if you currently use one to control asthma.    Canceling or rescheduling your appointment:   If you must cancel or reschedule your appointment, please call [phone] as soon as possible. If you need to cancel your appointment within 24 hours of your scheduled time please call [phone].      What happens during a colonoscopy?    Plan to spend up to two hours, starting at registration time, at the endoscopy center the day of your procedure. The colonoscopy takes an average of 15 to 30  minutes. Recovery time is about 30 minutes.    Before the exam:    You will change into a gown.    Your medical history and medication list will be reviewed with you, unless that has been done over the phone prior to the procedure.     A nurse will insert an intravenous (IV) line into your hand or arm.    The doctor will meet with you and will give you a consent form to sign.    During the exam:     Medicine will be given through the IV line to help you relax.     Your heart rate and oxygen levels will be monitored. If your blood pressure is low, you may be given fluids through the IV line.     The doctor will insert a flexible hollow tube, called a colonoscope, into your rectum. The scope will be advanced slowly through the large intestine (colon).    You may have a feeling of fullness or pressure.     If an abnormal tissue or a polyp is found, the doctor may remove it through the endoscope for closer examination, or biopsy. Tissue removal is painless.    After the exam:           Any tissue samples removed during the exam will be sent to a lab for evaluation. It may take 5-7 working days for you to be notified of the results.     A nurse will provide you with complete discharge instructions before you leave the endoscopy center. Be sure to ask the nurse for specific instructions if you take blood thinners such as Aspirin, Coumadin or Plavix.     The doctor will prepare a full report for you and for the physician who referred you for the procedure.     Your doctor will talk with you about the initial results of your exam.      Medication given during the exam will prohibit you from driving for the rest of the day.     Following the exam, you may resume your normal diet. Your first meal should be light, no greasy foods. Avoid alcohol until the next day.     You may resume your regular activities the day after the procedure.       LOW-FIBER DIET  Foods RECOMMENDED Foods to AVOID   Breads, Cereal, Rice and  Pasta:   White bread, rolls, biscuits, croissant and miriam toast.   Waffles, American toast and pancakes.   White rice, noodles, pasta, macaroni and peeled cooked potatoes.   Plain crackers and saltines.   Cooked cereals: farina, cream of rice.   Cold cereals: Puffed Rice , Rice Krispies , Corn Flakes  and Special K    Breads, Cereal, Rice and Pasta:   Breads or rolls with nuts, seeds or fruit.   Whole wheat, pumpernickel, rye breads and cornbread.   Potatoes with skin, brown or wild rice, and kasha (buckwheat).     Vegetables:   Tender cooked and canned vegetables without seeds: carrots, asparagus tips, green or wax beans, pumpkin, spinach, lima beans. Vegetables:   Raw or steamed vegetables.   Vegetables with seeds.   Sauerkraut.   Winter squash, peas, broccoli, Brussel sprouts, cabbage, onions, cauliflower, baked beans, peas and corn.   Fruits:   Strained fruit juice.   Canned fruit, except pineapple.   Ripe bananas and melon. Fruits:   Prunes and prune juice.   Raw fruits.   Dried fruits: figs, dates and raisins.   Milk/Dairy:   Milk: plain or flavored.   Yogurt, custard and ice cream.   Cheese and cottage cheese Milk/Dairy:     Meat and other proteins:   ground, well-cooked tender beef, lamb, ham, veal, pork, fish, poultry and organ meats.   Eggs.   Peanut butter without nuts. Meat and other proteins:   Tough, fibrous meats with gristle.   Dry beans, peas and lentils.   Peanut butter with nuts.   Tofu.   Fats, Snack, Sweets, Condiments and Beverages:   Margarine, butter, oils, mayonnaise, sour cream and salad dressing, plain gravy.   Sugar, hard candy, clear jelly, honey and syrup.   Spices, cooked herbs, bouillon, broth and soups made with allowed vegetable, ketchup and mustard.   Coffee, tea and carbonated drinks.   Plain cakes, cookies and pretzels.   Gelatin, plain puddings, custard, ice cream, sherbet and popsicles. Fats, Snack, Sweets, Condiments and Beverages:   Nuts, seeds and coconut.   Jam, marmalade  and preserves.   Pickles, olives, relish and horseradish.   All desserts containing nuts, seeds, dried fruit and coconut; or made from whole grains or bran.   Candy made with nuts or seeds.   Popcorn.                       DIRECTIONS TO THE ENDOSCOPY DEPARTMENT    From the north ([facility], [place])  Take [address] South, exit on [address] . Get into the left hand josé, turn left (east), go one-half mile to [address] and turn left. Take [address] to the Main Entrance  From the south ([facility])  Take [address]N to the [address]E split and exit on [address] . On [address] , turn left (west) to [address]. Turn right (north) on [address]. Go north to the second  stoplight, take a right and follow [address] to the Main Entrance.  From the east via 35E ([facility])  Take [address]E south to [address]  exit. Turn right on [address]. Go west to [address]. Turn right (north) on [address]. Go to the first stoplight, take a right on [address]  to the Main Entrance   From the east via [address] ([facility])ollow on [address]  to the Main Entrance  Take [address] to [address]. Turn left (south) on [address] to [employer]. Turn left (east) on on [address]  to the Main Entrance  From the west via [address] ([place], [place])  Take [address] east to [address]. Turn right (south) on [address] to [address]. Turn left (east) on [address]  to the Main Entrance

## 2022-03-21 LAB
PATH REPORT.COMMENTS IMP SPEC: NORMAL
PATH REPORT.COMMENTS IMP SPEC: NORMAL
PATH REPORT.FINAL DX SPEC: NORMAL
PATH REPORT.GROSS SPEC: NORMAL
PATH REPORT.MICROSCOPIC SPEC OTHER STN: NORMAL
PATH REPORT.RELEVANT HX SPEC: NORMAL
PHOTO IMAGE: NORMAL

## 2022-03-28 ENCOUNTER — MYC REFILL (OUTPATIENT)
Dept: FAMILY MEDICINE | Facility: CLINIC | Age: 62
End: 2022-03-28
Payer: COMMERCIAL

## 2022-03-28 DIAGNOSIS — F33.1 MODERATE EPISODE OF RECURRENT MAJOR DEPRESSIVE DISORDER (H): ICD-10-CM

## 2022-03-30 RX ORDER — SERTRALINE HYDROCHLORIDE 100 MG/1
100 TABLET, FILM COATED ORAL DAILY
Qty: 90 TABLET | Refills: 3 | Status: SHIPPED | OUTPATIENT
Start: 2022-03-30 | End: 2023-05-23

## 2022-03-30 NOTE — TELEPHONE ENCOUNTER
"Routing refill request to provider for review/approval because:  Drug interaction warning      Last Written Prescription Date:  11/30/21  Last Fill Quantity: 90,  # refills: 0   Last office visit provider: 2/18/22      Requested Prescriptions   Pending Prescriptions Disp Refills     sertraline (ZOLOFT) 100 MG tablet 90 tablet 0     Sig: Take 1 tablet (100 mg) by mouth daily       SSRIs Protocol Passed - 3/28/2022  6:19 AM        Passed - PHQ-9 score less than 5 in past 6 months     Please review last PHQ-9 score.           Passed - Medication is active on med list        Passed - Patient is age 18 or older        Passed - No active pregnancy on record        Passed - No positive pregnancy test in last 12 months        Passed - Recent (6 mo) or future (30 days) visit within the authorizing provider's specialty     Patient had office visit in the last 6 months or has a visit in the next 30 days with authorizing provider or within the authorizing provider's specialty.  See \"Patient Info\" tab in inbasket, or \"Choose Columns\" in Meds & Orders section of the refill encounter.                 Nancie Torres RN 03/30/22 9:46 AM  "

## 2022-04-02 DIAGNOSIS — E11.9 TYPE 2 DIABETES MELLITUS WITHOUT COMPLICATION (H): ICD-10-CM

## 2022-04-04 RX ORDER — INSULIN GLARGINE 100 [IU]/ML
INJECTION, SOLUTION SUBCUTANEOUS
Qty: 90 ML | Refills: 0 | Status: SHIPPED | OUTPATIENT
Start: 2022-04-04 | End: 2022-11-15

## 2022-04-05 RX ORDER — INSULIN GLARGINE 100 [IU]/ML
INJECTION, SOLUTION SUBCUTANEOUS
Qty: 75 ML | Refills: 0 | Status: SHIPPED | OUTPATIENT
Start: 2022-04-05 | End: 2022-09-06

## 2022-04-22 ENCOUNTER — TELEPHONE (OUTPATIENT)
Dept: SLEEP MEDICINE | Facility: CLINIC | Age: 62
End: 2022-04-22
Payer: COMMERCIAL

## 2022-04-22 NOTE — TELEPHONE ENCOUNTER
Called pt and she stated she hasnt had sleep study or cpap machine.    Ponce Fairchild, Visit facilitator

## 2022-04-26 ENCOUNTER — LAB (OUTPATIENT)
Dept: LAB | Facility: CLINIC | Age: 62
End: 2022-04-26
Payer: COMMERCIAL

## 2022-04-26 ENCOUNTER — TELEPHONE (OUTPATIENT)
Dept: ENDOCRINOLOGY | Facility: CLINIC | Age: 62
End: 2022-04-26
Payer: COMMERCIAL

## 2022-04-26 DIAGNOSIS — Z79.4 TYPE 2 DIABETES MELLITUS WITHOUT COMPLICATION, WITH LONG-TERM CURRENT USE OF INSULIN (H): ICD-10-CM

## 2022-04-26 DIAGNOSIS — E11.9 TYPE 2 DIABETES MELLITUS WITHOUT COMPLICATION, WITH LONG-TERM CURRENT USE OF INSULIN (H): ICD-10-CM

## 2022-04-26 LAB
ANION GAP SERPL CALCULATED.3IONS-SCNC: 11 MMOL/L (ref 5–18)
BUN SERPL-MCNC: 22 MG/DL (ref 8–22)
CALCIUM SERPL-MCNC: 9.6 MG/DL (ref 8.5–10.5)
CHLORIDE BLD-SCNC: 103 MMOL/L (ref 98–107)
CO2 SERPL-SCNC: 25 MMOL/L (ref 22–31)
CREAT SERPL-MCNC: 0.92 MG/DL (ref 0.6–1.1)
CREAT UR-MCNC: 46 MG/DL
GFR SERPL CREATININE-BSD FRML MDRD: 70 ML/MIN/1.73M2
GLUCOSE BLD-MCNC: 213 MG/DL (ref 70–125)
HBA1C MFR BLD: 9.4 % (ref 0–5.6)
MICROALBUMIN UR-MCNC: <0.5 MG/DL (ref 0–1.99)
MICROALBUMIN/CREAT UR: NORMAL MG/G{CREAT}
POTASSIUM BLD-SCNC: 4.4 MMOL/L (ref 3.5–5)
SODIUM SERPL-SCNC: 139 MMOL/L (ref 136–145)

## 2022-04-26 PROCEDURE — 83036 HEMOGLOBIN GLYCOSYLATED A1C: CPT

## 2022-04-26 PROCEDURE — 36415 COLL VENOUS BLD VENIPUNCTURE: CPT

## 2022-04-26 PROCEDURE — 82043 UR ALBUMIN QUANTITATIVE: CPT

## 2022-04-26 PROCEDURE — 80048 BASIC METABOLIC PNL TOTAL CA: CPT

## 2022-05-02 ENCOUNTER — ANCILLARY PROCEDURE (OUTPATIENT)
Dept: MAMMOGRAPHY | Facility: CLINIC | Age: 62
End: 2022-05-02
Attending: STUDENT IN AN ORGANIZED HEALTH CARE EDUCATION/TRAINING PROGRAM
Payer: COMMERCIAL

## 2022-05-02 DIAGNOSIS — Z12.31 VISIT FOR SCREENING MAMMOGRAM: ICD-10-CM

## 2022-05-02 PROCEDURE — 77067 SCR MAMMO BI INCL CAD: CPT

## 2022-05-03 ENCOUNTER — VIRTUAL VISIT (OUTPATIENT)
Dept: ENDOCRINOLOGY | Facility: CLINIC | Age: 62
End: 2022-05-03
Payer: COMMERCIAL

## 2022-05-03 DIAGNOSIS — Z79.4 TYPE 2 DIABETES MELLITUS WITHOUT COMPLICATION, WITH LONG-TERM CURRENT USE OF INSULIN (H): Primary | ICD-10-CM

## 2022-05-03 DIAGNOSIS — E11.9 TYPE 2 DIABETES MELLITUS WITHOUT COMPLICATION, WITH LONG-TERM CURRENT USE OF INSULIN (H): Primary | ICD-10-CM

## 2022-05-03 PROCEDURE — 99213 OFFICE O/P EST LOW 20 MIN: CPT | Mod: 95 | Performed by: NURSE PRACTITIONER

## 2022-05-03 NOTE — PROGRESS NOTES
"Lynne is a 61 year old who is being evaluated via a billable video visit.      How would you like to obtain your AVS? MyChart  If the video visit is dropped, the invitation should be resent by: Text to cell phone: [phone]  Will anyone else be joining your video visit? No      Video Start Time: 0930    [facility] ENDOCRINOLOGY    Diabetes Note 5/3/2022    Zoe Alvarez, 1960, [id number]          Reason for visit      1. Type 2 diabetes mellitus without complication, with long-term current use of insulin (H)        HPI     Zoe Alvarez is a very pleasant 61 year old old female who presents for follow up.  SUMMARY:    Lynne is contacted today via Video Visit in f/u for DM 2.  Her current A1c is 9.4 and down considerably from her last at 10.6.  She reports that she has been \"eating a lot of salads at work\" and now has a \"salad jamar\".  She notes that she has been having some BG in the 80s and 90s in the morning. She does feel that this happens when she doesn't eat a snack at HS. She remains on Jardiance, 25 mg daily, Glipizide 10 mg BID and Lantus, 45 units BID.     Blood Glucose Lo/27  F: 171  PM: 273      F: 189  PM: 181      F: 133  PM: X      F: 162  PM: X      F: 161  PM: 226    5/02  X    5/03  F: 220      Past Medical History     Patient Active Problem List   Diagnosis     Obesity (BMI 30-39.9)     Chronic venous insufficiency     Epidermal Inclusion Cyst     Hyperlipidemia     Vertigo     Ingrowing Toenail     Type 2 diabetes mellitus without complication, with long-term current use of insulin (H)     Eczema     RLQ abdominal pain     Tarsal tunnel syndrome of left side     Compression neuropathy of right lower extremity     Mononeuritis of left lower extremity     Plantar fascial fibromatosis of left foot     Mononeuritis of right lower extremity     Tarsal tunnel syndrome of right side     Foot pain, left     Bilateral lower extremity edema     Chronic kidney " disease, stage 3 (H)     Pulmonary nodules        Family History       family history includes Breast Cancer in her maternal grandmother; Diabetes in her mother.    Social History      reports that she has quit smoking. She has never used smokeless tobacco. She reports current alcohol use of about 1.0 standard drink of alcohol per week. She reports that she does not use drugs.      Review of Systems     Patient has no polyuria or polydipsia, no chest pain, dyspnea or TIA's, no numbness, tingling or pain in extremities  Remainder negative except as noted in HPI.      Vital Signs     There were no vitals taken for this visit.  Wt Readings from Last 3 Encounters:   03/18/22 82.6 kg (182 lb)   02/18/22 82.9 kg (182 lb 11.2 oz)   10/06/21 88.2 kg (194 lb 8 oz)       Physical Exam     Constitutional:  Well developed, Well nourished  HENT:  Normocephalic,   Neck: normal in appearance  Eyes:  PERRL, Conjunctiva pink  Respiratory:  No respiratory distress  Skin: No acanthosis nigricans, lipoatrophy or lipodystrophy  Neurologic:  Alert & oriented x 3, nonfocal  Psychiatric:  Affect, Mood, Insight appropriate          Assessment     1. Type 2 diabetes mellitus without complication, with long-term current use of insulin (H)        Plan     Lynne's control has improved considerably. She will continue to work towards goal of A1c of 8. No changes to her medication regimen today.     Jayla Hermosillo NP   Endocrinology  5/3/2022  10:08 AM      Lab Results     Microalbumin Urine mg/dL   Date Value Ref Range Status   04/26/2022 <0.50 0.00 - 1.99 mg/dL Final       Cholesterol   Date Value Ref Range Status   10/25/2021 196 <=199 mg/dL Final     Direct Measure HDL   Date Value Ref Range Status   10/25/2021 39 (L) >=50 mg/dL Final     Comment:     HDL Cholesterol Reference Range:     0-2 years:   No reference ranges established for patients under 2 years old  [facility] lipid analytes.    2-8 years:  Greater than 45  mg/dL     18 years and older:   Female: Greater than or equal to 50 mg/dL   Male:   Greater than or equal to 40 mg/dL     Triglycerides   Date Value Ref Range Status   10/25/2021 225 (H) <=149 mg/dL Final           Current Medications     Outpatient Medications Prior to Visit   Medication Sig Dispense Refill     atorvastatin (LIPITOR) 40 MG tablet Take 1 tablet (40 mg) by mouth daily 90 tablet 1     BD PEN NEEDLE TJ 2ND GEN 32G X 4 MM miscellaneous USE TWO TIMES A DAY WITH INSULIN 100 each 3     blood glucose meter (GLUCOMETER) [BLOOD GLUCOSE METER (GLUCOMETER)] Use 1 each As Directed as needed. Dispense glucometer brand per patient's insurance at pharmacy discretion. 1 each 0     empagliflozin (JARDIANCE) 25 mg Tab tablet [EMPAGLIFLOZIN (JARDIANCE) 25 MG TAB TABLET] Take 1 tablet (25 mg total) by mouth daily. 90 tablet 3     generic lancets (FINGERSTIX LANCETS) [GENERIC LANCETS (FINGERSTIX LANCETS)] Use to check blood sugar 1-2x daily. Dispense brand per patient's insurance at pharmacy discretion. 100 each 11     glipiZIDE (GLUCOTROL) 10 MG tablet Take 1 tablet (10 mg) by mouth 2 times daily (before meals) 60 tablet 5     insulin glargine (SEMGLEE) 100 UNIT/ML pen INJECT TWICE DAILY. UP TO 90 UNITS A DAY. 75 mL 0     LANTUS SOLOSTAR 100 UNIT/ML soln INJECT TWICE DAILY. UP TO 90 UNITS A DAY. 90 mL 0     sertraline (ZOLOFT) 100 MG tablet Take 1 tablet (100 mg) by mouth daily 90 tablet 3     traZODone (DESYREL) 50 MG tablet Take 1 tablet (50 mg) by mouth At Bedtime 90 tablet 3     valACYclovir (VALTREX) 1000 MG tablet [VALACYCLOVIR (VALTREX) 1000 MG TABLET] TAKE 2 TABLETS BY MOUTH NOW AND 2 TABLETS IN 12 HOURS. PRN. 4 tablet 3     No facility-administered medications prior to visit.           Video-Visit Details    Type of service:  Video Visit    Video End Time:    Originating Location (pt. Location): Home    [facility]     Platform used for Video  Visit: Marina    Date of last OV: 2/01/22  Reason for Visit: DM

## 2022-05-05 ENCOUNTER — VIRTUAL VISIT (OUTPATIENT)
Dept: SLEEP MEDICINE | Facility: CLINIC | Age: 62
End: 2022-05-05
Attending: STUDENT IN AN ORGANIZED HEALTH CARE EDUCATION/TRAINING PROGRAM
Payer: COMMERCIAL

## 2022-05-05 VITALS — WEIGHT: 182 LBS | HEIGHT: 64 IN | BODY MASS INDEX: 31.07 KG/M2

## 2022-05-05 DIAGNOSIS — G47.9 SLEEP DISTURBANCE: Primary | ICD-10-CM

## 2022-05-05 DIAGNOSIS — R40.0 DAYTIME SOMNOLENCE: ICD-10-CM

## 2022-05-05 DIAGNOSIS — G47.00 INSOMNIA, UNSPECIFIED TYPE: ICD-10-CM

## 2022-05-05 DIAGNOSIS — R06.81 WITNESSED APNEIC SPELLS: ICD-10-CM

## 2022-05-05 DIAGNOSIS — R06.83 SNORING: ICD-10-CM

## 2022-05-05 DIAGNOSIS — E66.811 OBESITY (BMI 30.0-34.9): ICD-10-CM

## 2022-05-05 PROCEDURE — 99203 OFFICE O/P NEW LOW 30 MIN: CPT | Mod: 95 | Performed by: NURSE PRACTITIONER

## 2022-05-05 ASSESSMENT — SLEEP AND FATIGUE QUESTIONNAIRES
HOW LIKELY ARE YOU TO NOD OFF OR FALL ASLEEP WHEN YOU ARE A PASSENGER IN A CAR FOR AN HOUR WITHOUT A BREAK: SLIGHT CHANCE OF DOZING
HOW LIKELY ARE YOU TO NOD OFF OR FALL ASLEEP WHILE SITTING INACTIVE IN A PUBLIC PLACE: MODERATE CHANCE OF DOZING
HOW LIKELY ARE YOU TO NOD OFF OR FALL ASLEEP WHILE WATCHING TV: HIGH CHANCE OF DOZING
HOW LIKELY ARE YOU TO NOD OFF OR FALL ASLEEP IN A CAR, WHILE STOPPED FOR A FEW MINUTES IN TRAFFIC: WOULD NEVER DOZE
HOW LIKELY ARE YOU TO NOD OFF OR FALL ASLEEP WHILE SITTING AND READING: HIGH CHANCE OF DOZING
HOW LIKELY ARE YOU TO NOD OFF OR FALL ASLEEP WHILE SITTING QUIETLY AFTER LUNCH WITHOUT ALCOHOL: MODERATE CHANCE OF DOZING
HOW LIKELY ARE YOU TO NOD OFF OR FALL ASLEEP WHILE SITTING AND TALKING TO SOMEONE: WOULD NEVER DOZE
HOW LIKELY ARE YOU TO NOD OFF OR FALL ASLEEP WHILE LYING DOWN TO REST IN THE AFTERNOON WHEN CIRCUMSTANCES PERMIT: HIGH CHANCE OF DOZING
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
## 2022-05-05 NOTE — PATIENT INSTRUCTIONS
"      MY TREATMENT INFORMATION FOR SLEEP APNEA-  oZe Alvarez    DOCTOR : CORRY Frazier CNP    Am I having a sleep study at a sleep center?  --->Due to normal delays, you will be contacted within 2-4 weeks to schedule    Am I having a home sleep study?  --->Watch the video for the device you are using:    -/drop off device-   https://www.Trips [employer] Salsaube.com/watch?v=yGGFBdELGhk    -Disposable device sent out require phone/computer application-   https://www.GLOG.com/watch?v=BCce_vbiwxE      Frequently asked questions:  1. What is Obstructive Sleep Apnea (FLOYD)? FLOYD is the most common type of sleep apnea. Apnea means, \"without breath.\"  Apnea is most often caused by narrowing or collapse of the upper airway as muscles relax during sleep.   Almost everyone has occasional apneas. Most people with sleep apnea have had brief interruptions at night frequently for many years.  The severity of sleep apnea is related to how frequent and severe the events are.   2. What are the consequences of FLOYD? Symptoms include: feeling sleepy during the day, snoring loudly, gasping or stopping of breathing, trouble sleeping, and occasionally morning headaches or heartburn at night.  Sleepiness can be serious and even increase the risk of falling asleep while driving. Other health consequences may include development of high blood pressure and other cardiovascular disease in persons who are susceptible. Untreated FLOYD  can contribute to heart disease, stroke and diabetes.   3. What are the treatment options? In most situations, sleep apnea is a lifelong disease that must be managed with daily therapy. Medications are not effective for sleep apnea and surgery is generally not considered until other therapies have been tried. Your treatment is your choice . Continuous Positive Airway (CPAP) works right away and is the therapy that is effective in nearly everyone. An oral device to hold your jaw forward is usually the next most " reliable option. Other options include postioning devices (to keep you off your back), weight loss, and surgery including a tongue pacing device. There is more detail about some of these options below.  4. Are my sleep studies covered by insurance? Although we will request verification of coverage, we advise you also check in advance of the study to ensure there is coverage.    Important tips for those choosing CPAP and similar devices   Know your equipment:  CPAP is continuous positive airway pressure that prevents obstructive sleep apnea by keeping the throat from collapsing while you are sleeping. In most cases, the device is  smart  and can slowly self-adjusts if your throat collapses and keeps a record every day of how well you are treated-this information is available to you and your care team.  BPAP is bilevel positive airway pressure that keeps your throat open and also assists each breath with a pressure boost to maintain adequate breathing.  Special kinds of BPAP are used in patients who have inadequate breathing from lung or heart disease. In most cases, the device is  smart  and can slowly self-adjusts to assist breathing. Like CPAP, the device keeps a record of how well you are treated.  Your mask is your connection to the device. You get to choose what feels most comfortable and the staff will help to make sure if fits. Here: are some examples of the different masks that are available:       Key points to remember on your journey with sleep apnea:  Sleep study.  PAP devices often need to be adjusted during a sleep study to show that they are effective and adjusted right.  Good tips to remember: Try wearing just the mask during a quiet time during the day so your body adapts to wearing it. A humidifier is recommended for comfort in most cases to prevent drying of your nose and throat. Allergy medication from your provider may help you if you are having nasal congestion.  Getting settled-in. It takes  more than one night for most of us to get used to wearing a mask. Try wearing just the mask during a quiet time during the day so your body adapts to wearing it. A humidifier is recommended for comfort in most cases. Our team will work with you carefully on the first day and will be in contact within 4 days and again at 2 and 4 weeks for advice and remote device adjustments. Your therapy is evaluated by the device each day.   Use it every night. The more you are able to sleep naturally for 7-8 hours, the more likely you will have good sleep and to prevent health risks or symptoms from sleep apnea. Even if you use it 4 hours it helps. Occasionally all of us are unable to use a medical therapy, in sleep apnea, it is not dangerous to miss one night.   Communicate. Call our skilled team on the number provided on the first day if your visit for problems that make it difficult to wear the device. Over 2 out of 3 patients can learn to wear the device long-term with help from our team. Remember to call our team or your sleep providers if you are unable to wear the device as we may have other solutions for those who cannot adapt to mask CPAP therapy. It is recommended that you sleep your sleep provider within the first 3 months and yearly after that if you are not having problems.   Use it for your health. We encourage use of CPAP masks during daytime quiet periods to allow your face and brain to adapt to the sensation of CPAP so that it will be a more natural sensation to awaken to at night or during naps. This can be very useful during the first few weeks or months of adapting to CPAP though it does not help medically to wear CPAP during wakefulness and  should not be used as a strategy just to meet guidelines.  Take care of your equipment. Make sure you clean your mask and tubing using directions every day and that your filter and mask are replaced as recommended or if they are not working.     BESIDES CPAP, WHAT OTHER  THERAPIES ARE THERE?    Positioning Device  Positioning devices are generally used when sleep apnea is mild and only occurs on your back.This example shows a pillow that straps around the waist. It may be appropriate for those whose sleep study shows milder sleep apnea that occurs primarily when lying flat on one's back. Preliminary studies have shown benefit but effectiveness at home may need to be verified by a home sleep test. These devices are generally not covered by medical insurance.  Examples of devices that maintain sleeping on the back to prevent snoring and mild sleep apnea.    Belt type body positioner  http://Xierkang.[employer] Translate/    Electronic reminder  http://nightshifttherapy.com/  http://www.[employer] Translate.au/      Oral Appliance  What is oral appliance therapy?  An oral appliance device fits on your teeth at night like a retainer used after having braces. The device is made by a specialized dentist and requires several visits over 1-2 months before a manufactured device is made to fit your teeth and is adjusted to prevent your sleep apnea. Once an oral device is working properly, snoring should be improved. A home sleep test may be recommended at that time if to determine whether the sleep apnea is adequately treated.       Some things to remember:  -Oral devices are often, but not always, covered by your medical insurance. Be sure to check with your insurance provider.   -If you are referred for oral therapy, you will be given a list of specialized dentists to consider or you may choose to visit the Web site of the American Academy of Dental Sleep Medicine  -Oral devices are less likely to work if you have severe sleep apnea or are extremely overweight.     More detailed information  An oral appliance is a small acrylic device that fits over the upper and lower teeth  (similar to a retainer or a mouth guard). This device slightly moves jaw forward, which moves the base of the tongue forward, opens the  airway, improves breathing for effective treat snoring and obstructive sleep apnea in perhaps 7 out of 10 people .  The best working devices are custom-made by a dental device  after a mold is made of the teeth 1, 2, 3.  When is an oral appliance indicated?  Oral appliance therapy is recommended as a first-line treatment for patients with primary snoring, mild sleep apnea, and for patients with moderate sleep apnea who prefer appliance therapy to use of CPAP4, 5. Severity of sleep apnea is determined by sleep testing and is based on the number of respiratory events per hour of sleep.   How successful is oral appliance therapy?  The success rate of oral appliance therapy in patients with mild sleep apnea is 75-80% while in patients with moderate sleep apnea it is 50-70%. The chance of success in patients with severe sleep apnea is 40-50%. The research also shows that oral appliances have a beneficial effect on the cardiovascular health of FLOYD patients at the same magnitude as CPAP therapy7.  Oral appliances should be a second-line treatment in cases of severe sleep apnea, but if not completely successful then a combination therapy utilizing CPAP plus oral appliance therapy may be effective. Oral appliances tend to be effective in a broad range of patients although studies show that the patients who have the highest success are females, younger patients, those with milder disease, and less severe obesity. 3, 6.   Finding a dentist that practices dental sleep medicine  Specific training is available through the American Academy of Dental Sleep Medicine for dentists interested in working in the field of sleep. To find a dentist who is educated in the field of sleep and the use of oral appliances, near you, visit the Web site of the American Academy of Dental Sleep Medicine.    References  1. alondra Massey al. Objectively measured vs self-reported compliance during oral appliance therapy for sleep-disordered  breathing. Chest 2013; 144(5): 8488-4348.  2. Nichole, et al. Objective measurement of compliance during oral appliance therapy for sleep-disordered breathing. Thorax 2013; 68(1): 91-96.  3. Leyda et al. Mandibular advancement devices in 620 men and women with FLOYD and snoring: tolerability and predictors of treatment success. Chest 2004; 125: 7035-5257.  4. Tanner et al. Oral appliances for snoring and FLOYD: a review. Sleep 2006; 29: 244-262.  5. Radha et al. Oral appliance treatment for FLOYD: an update. J Clin Sleep Med 2014; 10(2): 215-227.  6. July et al. Predictors of OSAH treatment outcome. J Dent Res 2007; 86: 5090-3688.      Weight Loss:    Weight loss is a long-term strategy that may improve sleep apnea in some patients.    Weight management is a personal decision and the decision should be based on your interest and the potential benefits.  If you are interested in exploring weight loss strategies, the following discussion covers the impact on weight loss on sleep apnea and the approaches that may be successful.    Being overweight does not necessarily mean you will have health consequences.  Those who have BMI over 35 or over 27 with existing medical conditions carries greater risk.   Weight loss decreases severity of sleep apnea in most people with obesity. For those with mild obesity who have developed snoring with weight gain, even 15-30 pound weight loss can improve and occasionally eliminate sleep apnea.  Structured and life-long dietary and health habits are necessary to lose weight and keep healthier weight levels.     Though there may be significant health benefits from weight loss, long-term weight loss is very difficult to achieve- studies show success with dietary management in less than 10% of people. In addition, substantial weight loss may require years of dietary control and may be difficult if patients have severe obesity. In these cases, surgical management may be  considered.  Finally, older individuals who have tolerated obesity without health complications may be less likely to benefit from weight loss strategies.      Your BMI is Body mass index is 31 kg/m .    What is BMI?  Body mass index (BMI) is one way to tell whether you are at a healthy weight, overweight, or obese. It measures your weight in relation to your height.  A BMI of 18.5 to 24.9 is in the healthy range. A person with a BMI of 25 to 29.9 is considered overweight, and someone with a BMI of 30 or greater is considered obese.  Another way to find out if you are at risk for health problems caused by overweight and obesity is to measure your waist. If you are a woman and your waist is more than 35 inches, or if you are a man and your waist is more than 40 inches, your risk of disease may be higher.  More than two-thirds of American adults are considered overweight or obese. Being overweight or obese increases the risk for further weight gain.  Excess weight may lead to heart disease and diabetes. Creating and following plans for healthy eating and physical activity may help you improve your health.    Methods for maintaining or losing weight.  Weight control is part of healthy lifestyle and includes exercise, emotional health, and healthy eating habits.  Careful eating habits lifelong is the mainstay of weight control.  Though there are significant health benefits from weight loss, long-term weight loss with diet alone may be very difficult to achieve- studies show long-term success with dietary management in less than 10% of people. Attaining a healthy weight may be especially difficult to achieve in those with severe obesity. In some cases, medications, devices and surgical management might be considered.    What can you do?  If you are overweight or obese and are interested in methods for weight loss, you should discuss this with your provider. In addition, we recommend that you review healthy life styles  and methods for weight loss available through the National Institutes of Health patient information sites:   http://win.niddk.nih.gov/publications/index.htm    Surgery:    Surgery for obstructive sleep apnea is considered generally only when other therapies fail to work. Surgery may be discussed with you if you are having a difficult time tolerating CPAP and or when there is an abnormal structure that requires surgical correction.  Nose and throat surgeries often enlarge the airway to prevent collapse.  Most of these surgeries create pain for 1-2 weeks and up to half of the most common surgeries are not effective throughout life.  You should carefully discuss the benefits and drawbacks to surgery with your sleep provider and surgeon to determine if it is the best solution for you.   More information  Surgery for FLOYD is directed at areas that are responsible for narrowing or complete obstruction of the airway during sleep.  There are a wide range of procedures available to enlarge and/or stabilize the airway to prevent blockage of breathing in the three major areas where it can occur: the palate, tongue, and nasal regions.  Successful surgical treatment depends on the accurate identification of the factors responsible for obstructive sleep apnea in each person.  A personalized approach is required because there is no single treatment that works well for everyone.  Because of anatomic variation, consultation with an examination by a sleep surgeon is a critical first step in determining what surgical options are best for each patient.  In some cases, examination during sedation may be recommended in order to guide the selection of procedures.  Patients will be counseled about risks and benefits as well as the typical recovery course after surgery. Surgery is typically not a cure for a person s FLOYD.  However, surgery will often significantly improve one s FLOYD severity (termed  success rate ).  Even in the absence of a  cure, surgery will decrease the cardiovascular risk associated with OSA7; improve overall quality of life8 (sleepiness, functionality, sleep quality, etc).      Palate Procedures:  Patients with FLOYD often have narrowing of their airway in the region of their tonsils and uvula.  The goals of palate procedures are to widen the airway in this region as well as to help the tissues resist collapse.  Modern palate procedure techniques focus on tissue conservation and soft tissue rearrangement, rather than tissue removal.  Often the uvula is preserved in this procedure. Residual sleep apnea is common in patient after pharyngoplasty with an average reduction in sleep apnea events of 33%2.      Tongue Procedures:  ExamWhile patients are awake, the muscles that surround the throat are active and keep this region open for breathing. These muscles relax during sleep, allowing the tongue and other structures to collapse and block breathing.  There are several different tongue procedures available.  Selection of a tongue base procedure depends on characteristics seen on physical exam.  Generally, procedures are aimed at removing bulky tissues in this area or preventing the back of the tongue from falling back during sleep.  Success rates for tongue surgery range from 50-62%3.    Hypoglossal Nerve Stimulation:  Hypoglossal nerve stimulation has recently received approval from the [place] Food and Drug Administration for the treatment of obstructive sleep apnea.  This is based on research showing that the system was safe and effective in treating sleep apnea6.  Results showed that the median AHI score decreased 68%, from 29.3 to 9.0. This therapy uses an implant system that senses breathing patterns and delivers mild stimulation to airway muscles, which keeps the airway open during sleep.  The system consists of three fully implanted components: a small generator (similar in size to a pacemaker), a breathing sensor, and a  stimulation lead.  Using a small handheld remote, a patient turns the therapy on before bed and off upon awakening.    Candidates for this device must be greater than 22 years of age, have moderate to severe FLOYD (AHI between 20-65), BMI less than 32, have tried CPAP/oral appliance without success, and have appropriate upper airway anatomy (determined by a sleep endoscopy performed by Dr. Presley).    Hypoglossal Nerve Stimulation Pathway:    The sleep surgeon s office will work with the patient through the insurance prior-authorization process (including communications and appeals).    Nasal Procedures:  Nasal obstruction can interfere with nasal breathing during the day and night.  Studies have shown that relief of nasal obstruction can improve the ability of some patients to tolerate positive airway pressure therapy for obstructive sleep apnea1.  Treatment options include medications such as nasal saline, topical corticosteroid and antihistamine sprays, and oral medications such as antihistamines or decongestants. Non-surgical treatments can include external nasal dilators for selected patients. If these are not successful by themselves, surgery can improve the nasal airway either alone or in combination with these other options.      Combination Procedures:  Combination of surgical procedures and other treatments may be recommended, particularly if patients have more than one area of narrowing or persistent positional disease.  The success rate of combination surgery ranges from 66-80%2,3.    References  Katie JOYA. The Role of the Nose in Snoring and Obstructive Sleep Apnoea: An Update.  Eur Arch Otorhinolaryngol. 2011; 268: 1365-73.   Preston SM; Teresa JA; Ricardo JR; Pallanch JF; Nikia MB; Edith SG; Domo SANDHU. Surgical modifications of the upper airway for obstructive sleep apnea in adults: a systematic review and meta-analysis. SLEEP 2010;33(10):1642-6170. Precious GOLDEN. Hypopharyngeal surgery in obstructive  sleep apnea: an evidence-based medicine review.  Arch Otolaryngol Head Neck Surg. 2006 Feb;132(2):206-13.  Parish YH1, Sreekanth Y, John CASEY. The efficacy of anatomically based multilevel surgery for obstructive sleep apnea. Otolaryngol Head Neck Surg. 2003 Oct;129(4):327-35.  Precious GOLDEN, Goldberg A. Hypopharyngeal Surgery in Obstructive Sleep Apnea: An Evidence-Based Medicine Review. Arch Otolaryngol Head Neck Surg. 2006 Feb;132(2):206-13.  Tanner BARRIOS et al. Upper-Airway Stimulation for Obstructive Sleep Apnea.  N Engl J Med. 2014 Jan 9;370(2):139-49.  Brielle Y et al. Increased Incidence of Cardiovascular Disease in Middle-aged Men with Obstructive Sleep Apnea. Am J Respir Crit Care Med; 2002 166: 159-165  Smalllida MIRANDA et al. Studying Life Effects and Effectiveness of Palatopharyngoplasty (SLEEP) study: Subjective Outcomes of Isolated Uvulopalatopharyngoplasty. Otolaryngol Head Neck Surg. 2011; 144: 623-631.        WHAT IF I ONLY HAVE SNORING?    Mandibular advancement devices, lateral sleep positioning, long-term weight loss and treatment of nasal allergies have been shown to improve snoring.  Exercising tongue muscles with a game (https://www.ncbi.nlm.nih.gov/pubmed/78320576) or stimulating the tongue during the day with a device (https://doi.org/10.3390/uos28389215) have improved snoring in some individuals.    Remember to Drive Safe... Drive Alive     Sleep health profoundly affects your health, mood, and your safety.  Thirty three percent of the population (one in three of us) is not getting enough sleep and many have a sleep disorder. Not getting enough sleep or having an untreated / undertreated sleep condition may make us sleepy without even knowing it. In fact, our driving could be dramatically impaired due to our sleep health. As your provider, here are some things I would like you to know about driving:     Here are some warning signs for impairment and dangerous drowsy driving:              -Having been awake  more than 16 hours               -Looking tired               -Eyelid drooping              -Head nodding (it could be too late at this point)              -Driving for more than 30 minutes     Some things you could do to make the driving safer if you are experiencing some drowsiness:              -Stop driving and rest              -Call for transportation              -Make sure your sleep disorder is adequately treated     Some things that have been shown NOT to work when experiencing drowsiness while driving:              -Turning on the radio              -Opening windows              -Eating any  distracting  /  entertaining  foods (e.g., sunflower seeds, candy, or any other)              -Talking on the phone      Your decision may not only impact your life, but also the life of others. Please, remember to drive safe for yourself and all of us.

## 2022-05-23 DIAGNOSIS — E11.9 TYPE 2 DIABETES MELLITUS WITHOUT COMPLICATION, WITH LONG-TERM CURRENT USE OF INSULIN (H): ICD-10-CM

## 2022-05-23 DIAGNOSIS — Z79.4 TYPE 2 DIABETES MELLITUS WITHOUT COMPLICATION, WITH LONG-TERM CURRENT USE OF INSULIN (H): ICD-10-CM

## 2022-05-23 RX ORDER — GLIPIZIDE 10 MG/1
10 TABLET ORAL
Qty: 180 TABLET | Refills: 1 | Status: SHIPPED | OUTPATIENT
Start: 2022-05-23 | End: 2022-12-08

## 2022-05-27 DIAGNOSIS — E11.9 TYPE 2 DIABETES MELLITUS WITHOUT COMPLICATION, WITH LONG-TERM CURRENT USE OF INSULIN (H): ICD-10-CM

## 2022-05-27 DIAGNOSIS — Z79.4 TYPE 2 DIABETES MELLITUS WITHOUT COMPLICATION, WITH LONG-TERM CURRENT USE OF INSULIN (H): ICD-10-CM

## 2022-05-27 RX ORDER — EMPAGLIFLOZIN 25 MG/1
TABLET, FILM COATED ORAL
Qty: 90 TABLET | Refills: 0 | Status: SHIPPED | OUTPATIENT
Start: 2022-05-27 | End: 2022-09-01

## 2022-07-05 ENCOUNTER — OFFICE VISIT (OUTPATIENT)
Dept: SLEEP MEDICINE | Facility: CLINIC | Age: 62
End: 2022-07-05
Payer: COMMERCIAL

## 2022-07-05 DIAGNOSIS — R06.83 SNORING: Primary | ICD-10-CM

## 2022-07-05 PROCEDURE — G0399 HOME SLEEP TEST/TYPE 3 PORTA: HCPCS | Performed by: INTERNAL MEDICINE

## 2022-07-06 ENCOUNTER — DOCUMENTATION ONLY (OUTPATIENT)
Dept: SLEEP MEDICINE | Facility: CLINIC | Age: 62
End: 2022-07-06

## 2022-07-07 NOTE — PROCEDURES
"HOME SLEEP STUDY INTERPRETATION        Patient: Zoe Alvarez  MRN: [id number]  YOB: 1960  Study Date: 2022  PCP/Referring Provider: Val Garza;   Ordering Provider: CORRY Frazier CNP         Indications for Home Study: Zoe Alvarez is a 61 year old female  who presents with symptoms suggestive of obstructive sleep apnea.    Estimated body mass index is 31 kg/m  as calculated from the following:    Height as of 22: 1.632 m (5' 4.25\").    Weight as of 22: 82.6 kg (182 lb).  Total score - [place]: 14 (2022 10:17 AM)  STOP-BAN/8        Data: A full night home sleep study was performed recording the standard physiologic parameters including body position, movement, sound, nasal pressure, thermal oral airflow, chest and abdominal movements with respiratory inductance plethysmography, and oxygen saturation by pulse oximetry. Pulse rate was estimated by oximetry recording. This study was considered adequate based on > 4 hours of quality oximetry and respiratory recording. As specified by the AASM Manual for the Scoring of Sleep and Associated events, version 2.3, Rule VIII.D 1B, 4% oxygen desaturation scoring for hypopneas is used as a standard of care on all home sleep apnea testing.        Analysis Time:  605.5 minutes        Respiration:   Sleep Associated Hypoxemia: sustained hypoxemia was present. Baseline oxygen saturation was 94%.  Time with saturation less than or equal to 88% was 38 minutes. The lowest oxygen saturation was 76%.   Snoring: Snoring was present.  Respiratory events: The home study revealed a presence of 7 obstructive apneas and 4 mixed and central apneas. There were 258 hypopneas resulting in a combined apnea/hypopnea index [AHI] of 26.7 events per hour.  AHI was 57.4 per hour supine, - per hour prone, 0 per hour on left side, and 11.1 per hour on right side.   Pattern: Excluding events noted above, respiratory rate and pattern was " Normal.      Position: Percent of time spent: supine - 33.6%, prone - 0%, on left - 0.4%, on right - 65.9%.      Heart Rate: By pulse oximetry normal rate was noted.       Assessment:     Moderate obstructive sleep apnea.    Sleep apnea events were predominantly supine position dependent.     Sleep associated hypoxemia was present.    Recommendations:    CPAP therapy is the treatment of choice for moderate obstructive sleep apnea. Consider auto-CPAP at 5-15 cmH2O.    If CPAP is not tolerated, alternative therapy option is oral appliance therapy through referral to dental sleep medicine.     Suggest optimizing sleep hygiene and avoiding sleep deprivation.    Weight management.        Diagnosis Code(s): Obstructive Sleep Apnea G47.33, Hypoxemia G47.36    Bacilio De León MD, July 7, 2022   Diplomate, American Board of Psychiatry and Neurology, Sleep Medicine

## 2022-07-07 NOTE — PROGRESS NOTES
HST POST-STUDY QUESTIONNAIRE    1. What time did you go to bed?  9:15  2. How long do you think it took to fall asleep?  15 min  3. What time did you wake up to start the day?  9:10  4. Did you get up during the night at all?  yes  5. If you woke up, do you remember approximately what time(s)? 1:30  6. Did you have any difficulty with the equipment?  No  7. Did you us any type of treatment with this study?  None  8. Was the head of the bed elevated? No  9. Did you sleep in a recliner?  No  10. Did you stop using CPAP at least 3 days before this test?  NA  11. Any other information you'd like us to know? No    This HSAT was performed using a Noxturnal T3 device which recorded snore, sound, movement activity, body position, nasal pressure, oronasal thermal airflow, pulse, oximetry and both chest and abdominal respiratory effort. HSAT data was restricted to the time patient states they were in bed.     HSAT was scored using 1B 4% hypopnea rule.     HST AHI (Non-PAT): 26.7  Snoring was reported as loud.  Time with SpO2 below 89% was 56.3 minutes.   Overall signal quality was good     Pt will follow up with sleep provider to determine appropriate therapy.

## 2022-07-22 ENCOUNTER — VIRTUAL VISIT (OUTPATIENT)
Dept: SLEEP MEDICINE | Facility: CLINIC | Age: 62
End: 2022-07-22
Payer: COMMERCIAL

## 2022-07-22 VITALS — WEIGHT: 186 LBS | HEIGHT: 64 IN | BODY MASS INDEX: 31.76 KG/M2

## 2022-07-22 DIAGNOSIS — G47.33 OSA (OBSTRUCTIVE SLEEP APNEA): Primary | ICD-10-CM

## 2022-07-22 DIAGNOSIS — G47.36 HYPOXEMIA ASSOCIATED WITH SLEEP: ICD-10-CM

## 2022-07-22 PROCEDURE — 99213 OFFICE O/P EST LOW 20 MIN: CPT | Mod: 95 | Performed by: NURSE PRACTITIONER

## 2022-07-22 ASSESSMENT — SLEEP AND FATIGUE QUESTIONNAIRES
HOW LIKELY ARE YOU TO NOD OFF OR FALL ASLEEP WHILE WATCHING TV: HIGH CHANCE OF DOZING
HOW LIKELY ARE YOU TO NOD OFF OR FALL ASLEEP WHILE SITTING AND TALKING TO SOMEONE: SLIGHT CHANCE OF DOZING
HOW LIKELY ARE YOU TO NOD OFF OR FALL ASLEEP WHEN YOU ARE A PASSENGER IN A CAR FOR AN HOUR WITHOUT A BREAK: SLIGHT CHANCE OF DOZING
HOW LIKELY ARE YOU TO NOD OFF OR FALL ASLEEP WHILE SITTING AND READING: HIGH CHANCE OF DOZING
HOW LIKELY ARE YOU TO NOD OFF OR FALL ASLEEP WHILE SITTING QUIETLY AFTER LUNCH WITHOUT ALCOHOL: MODERATE CHANCE OF DOZING
HOW LIKELY ARE YOU TO NOD OFF OR FALL ASLEEP WHILE SITTING INACTIVE IN A PUBLIC PLACE: SLIGHT CHANCE OF DOZING
HOW LIKELY ARE YOU TO NOD OFF OR FALL ASLEEP IN A CAR, WHILE STOPPED FOR A FEW MINUTES IN TRAFFIC: WOULD NEVER DOZE
HOW LIKELY ARE YOU TO NOD OFF OR FALL ASLEEP WHILE LYING DOWN TO REST IN THE AFTERNOON WHEN CIRCUMSTANCES PERMIT: MODERATE CHANCE OF DOZING

## 2022-07-22 ASSESSMENT — PAIN SCALES - GENERAL: PAINLEVEL: EXTREME PAIN (8)

## 2022-07-22 NOTE — PATIENT INSTRUCTIONS
"MY INFORMATION ON SLEEP APNEA-  Zoe Alvarez    DOCTOR : CORRY Frazier CNP  SLEEP CENTER :      MY CONTACT NUMBER:   [facility]  [phone]  [facility]   [phone]  [facility]   [phone]      [facility]  [phone]  [facility]   [phone]    [facility] [place] [address], [address]  [place] [address]  Phone: [phone]    Hours:  Mon - Fri: 8:00 a.m. - 4:30 p.m.  Sat: Closed  Sun: Closed      Key Points:  1. What is Obstructive Sleep Apnea (FLOYD)? FLOYD is the most common type of sleep apnea. Apnea literally means, \"without breath.\" It is characterized by repetitive pauses in breathing, despite continued effort to breathe, and is usually associated with a reduction in blood oxygen saturation. Apneas can last 10 to over 60 seconds. It is caused by narrowing or collapse of the upper airway as muscles relax during sleep.   2. What are the consequences of FLOYD? Symptoms include: daytime sleepiness- possibly increasing the risk of falling asleep while driving, unrefreshing/restless sleep, snoring, insomnia, waking frequently to urinate, waking with heartburn or reflux, reduced concentration and memory, and morning headaches. Other health consequences may include development of high blood pressure. Untreated FLOYD also can contribute to heart disease, stroke and diabetes.   3. What are the treatment options? In most situations, sleep apnea is a lifelong disease that must be managed with daily therapy. Continuous Positive Airway (CPAP) is the most reliable treatment. A mouthguard to hold your jaw forward is usually the next most reliable option. Other options include postioning devices (to keep you off your back), nasal valves, tongue retaining device, weight loss, surgery. There is more detail about these options toward the end of this " document.  4. What are the most important things to remember about using CPAP?     WHERE CAN I FIND MORE INFORMATION?    American Academy of Sleep Medicine Patient information on sleep disorders:  http://yoursleep.aasmnet.org    CPAP -  WHY AND HOW?                 Continuous positive airway pressure, or CPAP, is the most effective treatment for obstructive sleep apnea. It works by blowing room air, through a mask, to hold your throat open. A decision to use CPAP is a major step forward in the pursuit of a healthier life. The successful use of CPAP will help you breathe easier, sleep better and live healthier. Using CPAP can be a positive experience if you keep these huerta points in mind:  Commitment  CPAP is not a quick fix for your problem. It involves a long-term commitment to improve your sleep and your health.    Communication  Stay in close communication with both your sleep doctor and your CPAP supplier. Ask lots of questions and seek help when you need it.    Consistency  Use CPAP all night, every night and for every nap. You will receive the maximum health benefits from CPAP when you use it every time that you sleep. This will also make it easier for your body to adjust to the treatment.    Correction  The first machine and mask that you try may not be the best ones for you. Work with your sleep doctor and your CPAP supplier to make corrections to your equipment selection. Ask about trying a different type of machine or mask if you have ongoing problems. Make sure that your mask is a good fit and learn to use your equipment properly.    Challenge  Tell a family member or close friend to ask you each morning if you used your CPAP the previous night. Have someone to challenge you to give it your best effort.    Connection   Your adjustment to CPAP will be easier if you are able to connect with others who use the same treatment. Ask your sleep doctor if there is a support group in your area for people who have  "sleep apnea, or look for one on the Internet.  Comfort   Increase your level of comfort by using a saline spray, decongestant or heated humidifier if CPAP irritates your nose, mouth or throat. Use your unit's \"ramp\" setting to slowly get used to the air pressure level. There may be soft pads you can buy that will fit over your mask straps. Look on www.CPAP.com for accessories that can help make CPAP use more comfortable.  Cleaning   Clean your mask, tubing and headgear on a regular basis. Put this time in your schedule so that you don't forget to do it. Check and replace the filters for your CPAP unit and humidifier.    Completion   Although you are never finished with CPAP therapy, you should reward yourself by celebrating the completion of your first month of treatment. Expect this first month to be your hardest period of adjustment. It will involve some trial and error as you find the machine, mask and pressure settings that are right for you.    Continuation  After your first month of treatment, continue to make a daily commitment to use your CPAP all night, every night and for every nap.    CPAP-Tips to starting with success:  Begin using your CPAP for short periods of time during the day while you watch TV or read.    Use CPAP every night and for every nap. Using it less often reduces the health benefits and makes it harder for your body to get used to it.    Newer CPAP models are virtually silent; however, if you find the sound of your CPAP machine to be bothersome, place the unit under your bed to dampen the sound.     Make small adjustments to your mask, tubing, straps and headgear until you get the right fit. Tightening the mask may actually worsen the leak.  If it leaves significant marks on your face or irritates the bridge of your nose, it may not be the best mask for you.  Speak with the person who supplied the mask and consider trying other masks. Insurances will allow you to try different masks " during the first month of starting CPAP.  Insurance also covers a new mask, hose and filter about every 6 months.    Use a saline nasal spray to ease mild nasal congestion. Neti-Pot or saline nasal rinses may also help. Nasal gel sprays can help reduce nasal dryness.  Biotene mouthwash can be helpful to protect your teeth if you experience frequent dry mouth.  Dry mouth may be a sign of air escaping out of your mouth or out of the mask in the case of a full face mask.  Speak with your provider if you expect that is the case.     Take a nasal decongestant to relieve more severe nasal or sinus congestion.  Do not use Afrin (oxymetazoline) nasal spray more than 3 days in a row.  Speak with your sleep doctor if your nasal congestion is chronic.    Use a heated humidifier that fits your CPAP model to enhance your breathing comfort. Adjust the heat setting up if you get a dry nose or throat, down if you get condensation in the hose or mask.  Position the CPAP lower than you so that any condensation in the hose drains back into the machine rather than towards the mask.    Try a system that uses nasal pillows if traditional masks give you problems.    Clean your mask, tubing and headgear once a week. Make sure the equipment dries fully.    Regularly check and replace the filters for your CPAP unit and humidifier.    Work closely with your sleep provider and your CPAP supplier to make sure that you have the machine, mask and air pressure setting that works best for you. It is better to stop using it and call your provider to solve problems than to lay awake all night frustrated with the device.  Weight Loss:    Weight loss decreases severity of sleep apnea in most people with obesity. For those with mild obesity who have developed snoring with weight gain, even 15-30 pound weight loss can improve and occasionally eliminate sleep apnea.  Structured and life-long dietary and health habits are necessary to lose weight and keep  healthier weight levels.     Though there are significant health benefits from weight loss, long-term weight loss is very difficult to achieve- studies show success with dietary management in less than 10% of people. In addition, substantial weight loss may require years of dietary control and may be difficult if patients have severe obesity. In these cases, surgical management may be considered.    If you are interested in methods for weight loss, you should review the options discussed at the National Institutes of Health patient information sites:     http://win.niddk.nih.gov/publications/index.htm  http:/www.health.nih.gov/topic/WeightLossDieting    Bariatric programs offer counseling in all methods of weight loss:    Http:/www.[facility].org/Specialties/WeightLossSurgeryandMedicalMgmt/htm    Your BMI is Body mass index is 31.93 kg/m .    Weight management plan: Patient was referred to their PCP to discuss a diet and exercise plan.    Body mass index (BMI) is one way to tell whether you are at a healthy weight, overweight, or obese. It measures your weight in relation to your height.  A BMI of 18.5 to 24.9 is in the healthy range. A person with a BMI of 25 to 29.9 is considered overweight, and someone with a BMI of 30 or greater is considered obese.  Another way to find out if you are at risk for health problems caused by overweight and obesity is to measure your waist. If you are a woman and your waist is more than 35 inches, or if you are a man and your waist is more than 40 inches, your risk of disease may be higher.  More than two-thirds of American adults are considered overweight or obese. Being overweight or obese increases the risk for further weight gain.  Excess weight may lead to heart disease and diabetes. Creating and following plans for healthy eating and physical activity may help you improve your health.    Methods for maintaining or losing weight.    Weight control is part of healthy  lifestyle and includes exercise, emotional health, and healthy eating habits.  Careful eating habits lifelong is the mainstay of weight control.  Though there are significant health benefits from weight loss, long-term weight loss with diet alone may be very difficult to achieve- studies show long-term success with dietary management in less than 10% of people. Attaining a healthy weight may be especially difficult to achieve in those with severe obesity. In some cases, medications, devices and surgical management might be considered.    What can you do?    If you are overweight or obese and are interested in methods for weight loss, you should discuss this with your provider. In addition, we recommend that you review healthy life styles and methods for weight loss available through the National Institutes of Health patient information sites:     http://win.niddk.nih.gov/publications/index.htm

## 2022-07-22 NOTE — PROGRESS NOTES
"Lynne is a 61 year old who is being evaluated via a billable video visit.      How would you like to obtain your AVS? MyChart  If the video visit is dropped, the invitation should be resent by: Text to cell phone: [phone]  Will anyone else be joining your video visit? No  {If patient encounters technical issues they should call [phone] :[id number]}Kelvin Alan        Video-Visit Details    Video Start Time: {video visit start/end time for provider to select:152948}    Type of service:  Video Visit    Video End Time:{video visit start/end time for provider to select:262022}    Originating Location (pt. Location): {video visit patient location:382344::\"Home\"}    Distant Location (provider location):  [facility]     Platform used for Video Visit: {Virtual Visit Platforms:208598::\"[employer]\"}  "

## 2022-07-22 NOTE — PROGRESS NOTES
"Lynne is a 61 year old who is being evaluated via a billable video visit.      How would you like to obtain your AVS? MyChart  If the video visit is dropped, the invitation should be resent by: Text to cell phone: [phone]  Will anyone else be joining your video visit? Yue  Kelvin Alan      Video-Visit Details    Video Start Time: 9:58 AM    Type of service:  Video Visit    Video End Time:10:12 AM    Originating Location (pt. Location): Home    Distant Location (provider location):  [facility]     Platform used for Video Visit: GoHome        Home Sleep Apnea Testing Results Visit:    Chief Complaint   Patient presents with     Video Visit     Study Results     HST       Zoe Alvarez is a 61 year old female with a PMH pertinent for DM2 -insulin-dependent, HLD, chronic venous insufficiency, history of tobacco dependence -in remission, CKD stage III, anxiety, major depression, and obesity who returns to [facility] after having had Home Sleep Apnea Testing.  She presented with symptoms suggestive of obstructive sleep apnea.    Estimated body mass index is 31.93 kg/m  as calculated from the following:    Height as of this encounter: 1.626 m (5' 4\").    Weight as of this encounter: 84.4 kg (186 lb).  Total score - [place]: 13 (7/22/2022  6:30 AM)   Total Score: 4 (5/5/2022 10:58 AM)  ANATOLY Total Score: 18    Home Sleep Apnea Testing - 7/05/2022: 182 lbs: AHI 26.7/hr. Supine AHI 57.4/hr.  AHI was 57.4 per hour supine, - per hour prone, 0 per hour on left side, and 11.1 per hour on right side.    Oxygen Mukesh of 76%.  Baseline 94%.  Sp02 =< 88% for 38 minutes  She slept on her back (33.6%), prone (0%), left (0.4%) and right (65.9%) sides.   Snoring was reported as loud.  Analysis time: 605.5 minutes.     Overall signal quality was good      Zoe Alvarez reports that she slept Fair .     Home Sleep Apnea Testing was reviewed in detail today with Gilberto" "copy given to her for her records.    Past medical/surgical history, family history, social history, medications and allergies were reviewed.    Patient Active Problem List   Diagnosis     Obesity (BMI 30-39.9)     Chronic venous insufficiency     Epidermal Inclusion Cyst     Hyperlipidemia     Vertigo     Ingrowing Toenail     Type 2 diabetes mellitus without complication, with long-term current use of insulin (H)     Eczema     RLQ abdominal pain     Tarsal tunnel syndrome of left side     Compression neuropathy of right lower extremity     Mononeuritis of left lower extremity     Plantar fascial fibromatosis of left foot     Mononeuritis of right lower extremity     Tarsal tunnel syndrome of right side     Foot pain, left     Bilateral lower extremity edema     Chronic kidney disease, stage 3 (H)     Pulmonary nodules     Current Outpatient Medications   Medication     atorvastatin (LIPITOR) 40 MG tablet     BD PEN NEEDLE TJ 2ND GEN 32G X 4 MM miscellaneous     blood glucose meter (GLUCOMETER)     generic lancets (FINGERSTIX LANCETS)     glipiZIDE (GLUCOTROL) 10 MG tablet     insulin glargine (SEMGLEE) 100 UNIT/ML pen     JARDIANCE 25 MG TABS tablet     LANTUS SOLOSTAR 100 UNIT/ML soln     sertraline (ZOLOFT) 100 MG tablet     valACYclovir (VALTREX) 1000 MG tablet     No current facility-administered medications for this visit.     Ht 1.626 m (5' 4\")   Wt 84.4 kg (186 lb)   BMI 31.93 kg/m      Impression/Plan:  Moderate Obstructive Sleep Apnea.  - Sleep apnea events were predominantly supine position dependent.   Sleep associated hypoxemia was present.  - Comprehensive DME    Treatment options discussed today including  auto-CPAP at 5-15 cmH2O or oral appliance therapy.  Patient is not a good candidate for mandibular advancement device due to need for significant dental work and missing teeth.    Elected treatment with auto-CPAP at 5-15 cmH2O. A comprehensive DME order was placed for new APAP device, nasal " pillow mask and supplies sent to [facility].  We discussed the usual use/compliance requirements associated with new PAP device therapy.  In addition, the patient was notified that there may be a delay in obtaining her new APAP device due to the nationwide CPAP supply shortage.    Patient was asked to follow-up in approximately 2 months after obtaining new APAP device to review download data and use/compliance.    20 minutes spent with patient with >50% spent in counseling, chart review/documentation, and coordination of care on the date of the encounter.      CORRY Frazier CNP  Sleep Medicine      CC:  Val Garza,     This note was written with the assistance of the Dragon voice-dictation technology software. The final document, although reviewed, may contain errors. For corrections, please contact the office.

## 2022-07-22 NOTE — NURSING NOTE
Pt. to follow up in 2 months after obtaining a cpap device, sent Pt a [facility] chart appointment reminder.    Riccardo Roberts CMA

## 2022-07-24 ENCOUNTER — HOSPITAL ENCOUNTER (OUTPATIENT)
Dept: GENERAL RADIOLOGY | Facility: HOSPITAL | Age: 62
Discharge: HOME | End: 2022-07-24
Attending: FAMILY MEDICINE | Admitting: FAMILY MEDICINE
Payer: COMMERCIAL

## 2022-07-24 ENCOUNTER — OFFICE VISIT (OUTPATIENT)
Dept: FAMILY MEDICINE | Facility: CLINIC | Age: 62
End: 2022-07-24
Payer: COMMERCIAL

## 2022-07-24 VITALS
SYSTOLIC BLOOD PRESSURE: 116 MMHG | HEART RATE: 106 BPM | TEMPERATURE: 97.9 F | DIASTOLIC BLOOD PRESSURE: 78 MMHG | OXYGEN SATURATION: 99 % | WEIGHT: 181 LBS | BODY MASS INDEX: 31.07 KG/M2 | RESPIRATION RATE: 22 BRPM

## 2022-07-24 DIAGNOSIS — M54.50 ACUTE BILATERAL LOW BACK PAIN WITHOUT SCIATICA: ICD-10-CM

## 2022-07-24 DIAGNOSIS — M54.50 ACUTE BILATERAL LOW BACK PAIN WITHOUT SCIATICA: Primary | ICD-10-CM

## 2022-07-24 PROCEDURE — 96372 THER/PROPH/DIAG INJ SC/IM: CPT | Performed by: FAMILY MEDICINE

## 2022-07-24 PROCEDURE — 99213 OFFICE O/P EST LOW 20 MIN: CPT | Mod: 25 | Performed by: FAMILY MEDICINE

## 2022-07-24 PROCEDURE — 72100 X-RAY EXAM L-S SPINE 2/3 VWS: CPT | Mod: FY

## 2022-07-24 RX ORDER — KETOROLAC TROMETHAMINE 30 MG/ML
30 INJECTION, SOLUTION INTRAMUSCULAR; INTRAVENOUS ONCE
Status: COMPLETED | OUTPATIENT
Start: 2022-07-24 | End: 2022-07-24

## 2022-07-24 RX ORDER — CYCLOBENZAPRINE HCL 5 MG
5-10 TABLET ORAL 3 TIMES DAILY PRN
Qty: 30 TABLET | Refills: 0 | Status: SHIPPED | OUTPATIENT
Start: 2022-07-24 | End: 2022-08-15

## 2022-07-24 RX ADMIN — KETOROLAC TROMETHAMINE 30 MG: 30 INJECTION, SOLUTION INTRAMUSCULAR; INTRAVENOUS at 11:03

## 2022-07-24 NOTE — PROGRESS NOTES
Assessment & Plan     Acute bilateral low back pain without sciatica  Seems msk in nature. No neuro symptoms. no alarm features. xray is negative. Toradol mld improvement. Discussed options. We decided to go the route of physical therapy and muscle relxaers. If worsening, she will call her clinic or consider the ER. Use of OTC  meds. discussed   - XR Lumbar Spine 2/3 Views  - ketorolac (TORADOL) injection 30 mg       [id number]}    No follow-ups on file.    Lorenzo Lopez MD  [facility]    ------------------------------------------------------------------------  Subjective     Zoe Alvarez presents to clinic today for the following health issues:  chief complaint  HPI  Lower back pain bilaterally for the past few days not much better with tylenol. No oeruse but actually thinks worse as she was lying around a lot with covid. The covid symptoms are resolving. Just mild residual cough. No fever. No gu symptoms. No rash. Some looser stools.     No saddle anestesia. No bowel or bladder continence or retention symptoms.     The patient has a history of self limited lower back symptoms.       Review of Systems        Objective    /78 (BP Location: Right arm, Patient Position: Sitting, Cuff Size: Adult Large)   Pulse 106   Temp 97.9  F (36.6  C) (Tympanic)   Resp 22   Wt 82.1 kg (181 lb)   SpO2 99%   BMI 31.07 kg/m    Physical Exam   GENERAL: healthy, alert and no distress  ABDOMEN: soft, nontender, no hepatosplenomegaly, no masses and bowel sounds normal  MS:Lumbosacral spine area reveals no local tenderness or mass.  Antalgic gait. Painful and reduced LS ROM noted. Straight leg raise is negative  bilaterally.    X-Ray: shows DJD changes, likely chronic.   SKIN: no suspicious lesions or rashes

## 2022-07-24 NOTE — LETTER
July 24, 2022      Zoe Alvarez  [address] [address]        To Whom It May Concern,     Zoe Alvarez was seen in clinic today. Please excuse her absence. She should be able to return on or about July 27, 2022.     Sincerely,        Lorenzo Lopez MD           Breath sounds clear and equal bilaterally.

## 2022-07-26 ENCOUNTER — MYC MEDICAL ADVICE (OUTPATIENT)
Dept: ENDOCRINOLOGY | Facility: CLINIC | Age: 62
End: 2022-07-26

## 2022-07-26 ENCOUNTER — TELEPHONE (OUTPATIENT)
Dept: ENDOCRINOLOGY | Facility: CLINIC | Age: 62
End: 2022-07-26

## 2022-07-26 ENCOUNTER — LAB (OUTPATIENT)
Dept: LAB | Facility: CLINIC | Age: 62
End: 2022-07-26
Payer: COMMERCIAL

## 2022-07-26 DIAGNOSIS — Z79.4 TYPE 2 DIABETES MELLITUS WITHOUT COMPLICATION, WITH LONG-TERM CURRENT USE OF INSULIN (H): ICD-10-CM

## 2022-07-26 DIAGNOSIS — E11.9 TYPE 2 DIABETES MELLITUS WITHOUT COMPLICATION, WITH LONG-TERM CURRENT USE OF INSULIN (H): ICD-10-CM

## 2022-07-26 LAB — HBA1C MFR BLD: 10.1 % (ref 0–5.6)

## 2022-07-26 PROCEDURE — 36415 COLL VENOUS BLD VENIPUNCTURE: CPT

## 2022-07-26 PROCEDURE — 83036 HEMOGLOBIN GLYCOSYLATED A1C: CPT

## 2022-07-26 NOTE — TELEPHONE ENCOUNTER
The following [employer] sent:      Anjali Alvarez,    I see that you have a virtual appointment with Annelise Hermosillo CNP on 8/02/22; we would greatly appreciate it, if you could send us 7 days of your blood sugar readings for the week leading up to your video visit.. This way Annelise can review and have them accessible for your appointment. You can send them through pbsi.     If you prefer, please have them ready and I can take them verbally over the phone.    REMINDER - please remember to complete your labs prior to your visit.  If you are unable to do so, please reschedule your appointment with a lab appointment 1 week prior per your providers request.    Please call me at [phone] if you have any questions or concerns.     Thank you,    Lorrie PENALOZA, Medical Assistant for  Annelise Hermosillo NP  Endocrinology

## 2022-07-26 NOTE — TELEPHONE ENCOUNTER
Patient brought in her meter but stated that she did not check last week due to having COVID.  She was not taking her medications either as she mostly slept the entire week.  Blood sugars:    7/12/22:  270  7/13/22:  81  7/14/22:  125  7/15/22:  143  7/21/22:  289    Patient notified that Lorrie would contact her next week prior to her appt for more numbers.

## 2022-07-26 NOTE — PROGRESS NOTES
"Lynne is a 61 year old who is being evaluated via a billable video visit.      How would you like to obtain your AVS? MyChart  If the video visit is dropped, the invitation should be resent by: Text to cell phone: [phone]  Will anyone else be joining your video visit? [facility] ENDOCRINOLOGY    Diabetes Note 8/2/2022    Zoe Alvarez, 1960, [id number]          Reason for visit      1. Type 2 diabetes mellitus without complication, with long-term current use of insulin (H)        HPI     Zoe Alvarez is a very pleasant 61 year old old female who presents for follow up.  SUMMARY:    Lynne is contacted today via Video Visit in f/u for DM 2. Her current A1c is 10.1 and up from her previous at 9.4. She is really disappointed with this because she feels that she has been \"doing all the right things\".  She has been eating well, and \"a lot of salads\", and has been trying to move more. AM BG vary between 67 and  She remains on Lantus, 45 units BID, Jardiance, 25 mg and Glipizide, 10 mg BID prior to meals. We have previously discussed a GLP1 , but avoided it because of concerns for cost. She reports that she had COVID last week. This is the third time for her.         Blood glucose data:      Past Medical History     Patient Active Problem List   Diagnosis     Obesity (BMI 30-39.9)     Chronic venous insufficiency     Epidermal Inclusion Cyst     Hyperlipidemia     Vertigo     Ingrowing Toenail     Type 2 diabetes mellitus without complication, with long-term current use of insulin (H)     Eczema     RLQ abdominal pain     Tarsal tunnel syndrome of left side     Compression neuropathy of right lower extremity     Mononeuritis of left lower extremity     Plantar fascial fibromatosis of left foot     Mononeuritis of right lower extremity     Tarsal tunnel syndrome of right side     Foot pain, left     Bilateral lower extremity edema     Chronic kidney disease, stage 3 (H)     Pulmonary nodules "        Family History       family history includes Breast Cancer in her maternal grandmother; Diabetes in her mother.    Social History      reports that she has quit smoking. She has never used smokeless tobacco. She reports current alcohol use of about 1.0 standard drink of alcohol per week. She reports that she does not use drugs.      Review of Systems     Patient has no polyuria or polydipsia, no chest pain, dyspnea or TIA's, no numbness, tingling or pain in extremities  Remainder negative except as noted in HPI.    Answers for HPI/ROS submitted by the patient on 7/27/2022  General Symptoms: Yes  Skin Symptoms: Yes  HENT Symptoms: Yes  EYE SYMPTOMS: Yes  HEART SYMPTOMS: No  LUNG SYMPTOMS: Yes  INTESTINAL SYMPTOMS: Yes  URINARY SYMPTOMS: Yes  GYNECOLOGIC SYMPTOMS: No  BREAST SYMPTOMS: No  SKELETAL SYMPTOMS: Yes  BLOOD SYMPTOMS: Yes  NERVOUS SYSTEM SYMPTOMS: Yes  MENTAL HEALTH SYMPTOMS: No  Ear pain: Yes  Ear discharge: No  Hearing loss: No  Tinnitus: Yes  Nosebleeds: No  Congestion: Yes  Sinus pain: No  Trouble swallowing: Yes   Voice hoarseness: Yes  Mouth sores: Yes  Sore throat: Yes  Tooth pain: Yes  Gum tenderness: Yes  Bleeding gums: Yes  Change in taste: No  Change in sense of smell: Yes  Dry mouth: No  Hearing aid used: No  Neck lump: No  Fever: No  Loss of appetite: No  Weight loss: No  Fatigue: Yes  Night sweats: No  Chills: No  Increased stress: No  Excessive hunger: No  Excessive thirst: Yes  Feeling hot or cold when others believe the temperature is normal: No  Loss of height: No  Post-operative complications: No  Surgical site pain: No  Hallucinations: No  Change in or Loss of Energy: Yes  Hyperactivity: No  Confusion: No  Changes in hair: No  Changes in moles/birth marks: No  Itching: Yes  Rashes: No  Changes in nails: No  Acne: No  Hair in places you don't want it: No  Change in facial hair: No  Warts: No  Non-healing sores: No  Scarring: No  Flaking of skin: Yes  Color changes of hands/feet in  cold : No  Sun sensitivity: No  Skin thickening: No  Eye pain: No  Vision loss: No  Dry eyes: No  Watery eyes: No  Eye bulging: No  Double vision: No  Flashing of lights: Yes  Spots: Yes  Floaters: Yes  Redness: No  Crossed eyes: No  Tunnel Vision: No  Yellowing of eyes: No  Eye irritation: No  Cough: Yes  Sputum or phlegm: Yes  Coughing up blood: No  Difficulty breating or shortness of breath: No  Snoring: Yes  Wheezing: No  Difficulty breathing on exertion: Yes  Nighttime Cough: Yes  Difficulty breathing when lying flat: No  Heart burn or indigestion: Yes  Nausea: Yes  Vomiting: No  Abdominal pain: No  Bloating: Yes  Constipation: No  Diarrhea: Yes  Blood in stool: No  Black stools: No  Rectal or Anal pain: No  Fecal incontinence: No  Yellowing of skin or eyes: No  Vomit with blood: No  Change in stools: No  Trouble holding urine or incontinence: Yes  Pain or burning: No  Trouble starting or stopping: Yes  Increased frequency of urination: Yes  Blood in urine: No  Decreased frequency of urination: No  Frequent nighttime urination: No  Flank pain: No  Difficulty emptying bladder: No  Back pain: Yes  Muscle aches: Yes  Neck pain: Yes  Swollen joints: No  Joint pain: No  Bone pain: No  Muscle cramps: Yes  Muscle weakness: No  Joint stiffness: No  Bone fracture: No  Anemia: No  Swollen glands: Yes  Easy bleeding or bruising: No  Edema or swelling: No  Trouble with coordination: Yes  Dizziness or trouble with balance: Yes  Fainting or black-out spells: No  Memory loss: No  Headache: Yes  Seizures: No  Speech problems: No  Tingling: No  Tremor: No  Weakness: No  Difficulty walking: No  Paralysis: No  Numbness: Yes            Vital Signs     There were no vitals taken for this visit.  Wt Readings from Last 3 Encounters:   07/24/22 82.1 kg (181 lb)   07/22/22 84.4 kg (186 lb)   05/05/22 82.6 kg (182 lb)       Physical Exam     Constitutional:  Well developed, Well nourished  HENT:  Normocephalic,   Neck: normal in  appearance  Eyes:  PERRL, Conjunctiva pink  Respiratory:  No respiratory distress  Skin: No acanthosis nigricans, lipoatrophy or lipodystrophy  Neurologic:  Alert & oriented x 3, nonfocal  Psychiatric:  Affect, Mood, Insight appropriate          Assessment     1. Type 2 diabetes mellitus without complication, with long-term current use of insulin (H)        Plan     It appears that her insurance will cover Bydureon Bcise. Discussed possible side effects. No changes to the rest of her medication regimen. I will see her back in 3 months.          Jayla Hermosillo NP   Endocrinology  8/2/2022  9:24 AM      Lab Results     Microalbumin Urine mg/dL   Date Value Ref Range Status   04/26/2022 <0.50 0.00 - 1.99 mg/dL Final       Cholesterol   Date Value Ref Range Status   10/25/2021 196 <=199 mg/dL Final     Direct Measure HDL   Date Value Ref Range Status   10/25/2021 39 (L) >=50 mg/dL Final     Comment:     HDL Cholesterol Reference Range:     0-2 years:   No reference ranges established for patients under 2 years old  at [facility] for lipid analytes.    2-8 years:  Greater than 45 mg/dL     18 years and older:   Female: Greater than or equal to 50 mg/dL   Male:   Greater than or equal to 40 mg/dL     Triglycerides   Date Value Ref Range Status   10/25/2021 225 (H) <=149 mg/dL Final       [unfilled]      Current Medications     Outpatient Medications Prior to Visit   Medication Sig Dispense Refill     atorvastatin (LIPITOR) 40 MG tablet Take 1 tablet (40 mg) by mouth daily 90 tablet 1     BD PEN NEEDLE TJ 2ND GEN 32G X 4 MM miscellaneous USE TWO TIMES A DAY WITH INSULIN 100 each 3     blood glucose meter (GLUCOMETER) [BLOOD GLUCOSE METER (GLUCOMETER)] Use 1 each As Directed as needed. Dispense glucometer brand per patient's insurance at pharmacy discretion. 1 each 0     cyclobenzaprine (FLEXERIL) 5 MG tablet Take 1-2 tablets (5-10 mg) by mouth 3 times daily as needed for muscle spasms 30 tablet 0      generic lancets (FINGERSTIX LANCETS) [GENERIC LANCETS (FINGERSTIX LANCETS)] Use to check blood sugar 1-2x daily. Dispense brand per patient's insurance at pharmacy discretion. 100 each 11     glipiZIDE (GLUCOTROL) 10 MG tablet TAKE 1 TABLET (10 MG) BY MOUTH 2 TIMES DAILY (BEFORE MEALS) 180 tablet 1     insulin glargine (SEMGLEE) 100 UNIT/ML pen INJECT TWICE DAILY. UP TO 90 UNITS A DAY. 75 mL 0     JARDIANCE 25 MG TABS tablet TAKE 1 TABLET BY MOUTH EVERY DAY 90 tablet 0     LANTUS SOLOSTAR 100 UNIT/ML soln INJECT TWICE DAILY. UP TO 90 UNITS A DAY. 90 mL 0     sertraline (ZOLOFT) 100 MG tablet Take 1 tablet (100 mg) by mouth daily 90 tablet 3     valACYclovir (VALTREX) 1000 MG tablet [VALACYCLOVIR (VALTREX) 1000 MG TABLET] TAKE 2 TABLETS BY MOUTH NOW AND 2 TABLETS IN 12 HOURS. PRN. 4 tablet 3     No facility-administered medications prior to visit.             Video-Visit Details    Video Start Time: 0900    Type of service:  Video Visit    Video End Time:0920    Originating Location (pt. Location): Home    Distant Location (provider location):  [facility]     Platform used for Video Visit: IFTTT    Date of last OV: 5/03/22  Reason for Visit: DM      Blood Glucose Log: * Check with patient for more current numbers        7/28  F 75      7/29  F 96      7/30  F 140      7/31  F 125  PM X    8/01  F 124  PM X    8/02  F 67

## 2022-07-27 ASSESSMENT — ENCOUNTER SYMPTOMS
TINGLING: 0
TROUBLE SWALLOWING: 1
NUMBNESS: 1
EYE IRRITATION: 0
NAUSEA: 1
SWOLLEN GLANDS: 1
HOARSE VOICE: 1
JOINT SWELLING: 0
ALTERED TEMPERATURE REGULATION: 0
DISTURBANCES IN COORDINATION: 1
HEARTBURN: 1
MUSCLE WEAKNESS: 0
PARALYSIS: 0
BOWEL INCONTINENCE: 0
SNORES LOUDLY: 1
WEIGHT LOSS: 0
POSTURAL DYSPNEA: 0
BRUISES/BLEEDS EASILY: 0
NIGHT SWEATS: 0
HALLUCINATIONS: 0
EYE REDNESS: 0
HEADACHES: 1
EYE WATERING: 0
DOUBLE VISION: 0
CHILLS: 0
CONSTIPATION: 0
SPEECH CHANGE: 0
SORE THROAT: 1
TREMORS: 0
BLOOD IN STOOL: 0
EYE PAIN: 0
SMELL DISTURBANCE: 1
VOMITING: 0
DYSURIA: 0
SEIZURES: 0
ABDOMINAL PAIN: 0
DECREASED APPETITE: 0
SPUTUM PRODUCTION: 1
FEVER: 0
STIFFNESS: 0
SINUS CONGESTION: 1
MUSCLE CRAMPS: 1
POLYDIPSIA: 1
SINUS PAIN: 0
BLOATING: 1
DIFFICULTY URINATING: 0
POOR WOUND HEALING: 0
NECK MASS: 0
MYALGIAS: 1
NAIL CHANGES: 0
SKIN CHANGES: 0
HEMATURIA: 0
DYSPNEA ON EXERTION: 1
TASTE DISTURBANCE: 0
WHEEZING: 0
DIARRHEA: 1
LOSS OF CONSCIOUSNESS: 0
SHORTNESS OF BREATH: 0
BACK PAIN: 1
COUGH DISTURBING SLEEP: 1
FLANK PAIN: 0
NECK PAIN: 1
RECTAL PAIN: 0
ARTHRALGIAS: 0
INCREASED ENERGY: 1
COUGH: 1
HEMOPTYSIS: 0
WEAKNESS: 0
MEMORY LOSS: 0
DIZZINESS: 1
JAUNDICE: 0
POLYPHAGIA: 0
FATIGUE: 1

## 2022-07-29 NOTE — TELEPHONE ENCOUNTER
The following Sitestarhart sent to the patient:    Anjali Alvarez,    I see that you have a virtual appointment with Annelise Hermosillo CNP on 5/03/22; we would greatly appreciate it, if you could send us 7 days of your blood sugar readings for the week leading up to your video visit.. This way Annelise can review and have them accessible for your appointment. You can send them through [employer].     If you prefer, please have them ready and I can take them verbally over the phone.    REMINDER - please remember to complete your labs prior to your visit.  If you are unable to do so, please reschedule your appointment with a lab appointment 1 week prior per your providers request.    Please call me at [phone] if you have any questions or concerns.     Thank you,    Lorrie PENALOZA, Medical Assistant for  Annelise Hermosillo NP  Endocrinology          no

## 2022-08-02 ENCOUNTER — VIRTUAL VISIT (OUTPATIENT)
Dept: ENDOCRINOLOGY | Facility: CLINIC | Age: 62
End: 2022-08-02
Payer: COMMERCIAL

## 2022-08-02 DIAGNOSIS — Z79.4 TYPE 2 DIABETES MELLITUS WITHOUT COMPLICATION, WITH LONG-TERM CURRENT USE OF INSULIN (H): Primary | ICD-10-CM

## 2022-08-02 DIAGNOSIS — E11.9 TYPE 2 DIABETES MELLITUS WITHOUT COMPLICATION, WITH LONG-TERM CURRENT USE OF INSULIN (H): Primary | ICD-10-CM

## 2022-08-02 PROCEDURE — 99213 OFFICE O/P EST LOW 20 MIN: CPT | Mod: 95 | Performed by: NURSE PRACTITIONER

## 2022-08-03 ENCOUNTER — HOSPITAL ENCOUNTER (OUTPATIENT)
Dept: PHYSICAL THERAPY | Facility: REHABILITATION | Age: 62
Discharge: HOME | End: 2022-08-03
Attending: FAMILY MEDICINE
Payer: COMMERCIAL

## 2022-08-03 DIAGNOSIS — M54.50 ACUTE BILATERAL LOW BACK PAIN WITHOUT SCIATICA: ICD-10-CM

## 2022-08-03 PROCEDURE — 97161 PT EVAL LOW COMPLEX 20 MIN: CPT | Mod: GP

## 2022-08-03 PROCEDURE — 97110 THERAPEUTIC EXERCISES: CPT | Mod: GP

## 2022-08-05 NOTE — PROGRESS NOTES
"  Assessment & Plan   Problem List Items Addressed This Visit        Endocrine    Type 2 diabetes mellitus without complication, with long-term current use of insulin (H)      Other Visit Diagnoses     Moderate episode of recurrent major depressive disorder (H)    -  Primary    Insomnia, unspecified type        FLOYD on CPAP             MDD/HANS:  PHQ-9 and HANS-7 score are 0 today  Patient is smiling, upbeat and very hopeful  Plan: Will take trazodone off her medication list and continue her on current dose of Zoloft    FLOYD:  On CPAP and doing well.    Uncontrolled type 2 diabetes:  Follows with endocrinology.  [id number] was uptrending at 10.2.  She is working with endocrinology to get a GLP-1 approved through her insurance.  Will defer ongoing management to them.  Am hopeful that now she is on CPAP, this will improve insulin resistance and subsequent diabetes control.    Return in about 1 year (around 8/15/2023) for Routine preventive.    Val Garza DO  [facility] TERRI Batista is a 61 year old, presenting for the following health issues:  Follow Up (mood)    Acute concerns: Mood, sleep/CPAP    Patient was seen last for an annual physical.  At that time, she was struggling from a mental health standpoint as she was going through grieving process for the loss of her mom ( of stroke in ).  Was on Zoloft and trazodone.  Was also having difficulty sleeping and was referred to sleep medicine for witnessed apneic episodes.    Today, patient reports that she is in \"a really good place\".  From a mental health standpoint, she is not having any panic attacks.  On a day-to-day basis, she is doing well.  Mostly has good days and is more hopeful.    Did go see sleep medicine and was diagnosed with sleep apnea.  Has been using the CPAP for about a week and her sleep has drastically improved.  Since she started the CPAP, has no longer been using the trazodone at all.  Is very happy with " "the outcome           Social History     Tobacco Use     Smoking status: Former Smoker     Smokeless tobacco: Never Used   Substance Use Topics     Alcohol use: Yes     Alcohol/week: 1.0 standard drink     Comment: Alcoholic Drinks/day: <1 per wk     Drug use: No     PHQ 11/10/2021 2/18/2022 8/15/2022   PHQ-9 Total Score 0 1 0   Q9: Thoughts of better off dead/self-harm past 2 weeks Not at all Not at all Not at all     HANS-7 SCORE 11/10/2021 2/18/2022 8/15/2022   Total Score - 0 (minimal anxiety) 0 (minimal anxiety)   Total Score 1 0 0     Review of Systems   As per HPI      Objective    /66 (BP Location: Right arm, Patient Position: Sitting, Cuff Size: Adult Regular)   Pulse 94   Resp 16   Ht 1.632 m (5' 4.25\")   Wt 85.2 kg (187 lb 12.8 oz)   SpO2 98%   BMI 31.99 kg/m    Body mass index is 31.99 kg/m .  Physical Exam   GENERAL: healthy, alert and no distress  RESP: lungs clear to auscultation - no rales, rhonchi or wheezes  CV: regular rate and rhythm, normal S1 S2, no S3 or S4, no murmur, click or rub, no peripheral edema and peripheral pulses strong  MS: no gross musculoskeletal defects noted, no edema  PSYCH: mentation appears normal, affect normal/bright      "

## 2022-08-06 NOTE — PROGRESS NOTES
08/03/22 1500   General Information   Type of Visit Initial OP Ortho PT Evaluation   Start of Care Date 08/03/22   Referring Physician Lorenzo Lopez MD   Patient/Family Goals Statement to get better   Orders Evaluate and Treat   Date of Order 07/24/22   Certification Required? No   Medical Diagnosis Acute bilateral low back pain without sciatica   Surgical/Medical history reviewed Yes   Precautions/Limitations no known precautions/limitations   Weight-Bearing Status - LUE full weight-bearing   Weight-Bearing Status - RUE full weight-bearing   Weight-Bearing Status - LLE full weight-bearing   Weight-Bearing Status - RLE full weight-bearing   General Information Comments PMH: diabetes; PSH: hysterectomy   Body Part(s)   Body Part(s) Lumbar Spine/SI   Presentation and Etiology   Pertinent history of current problem (include personal factors and/or comorbidities that impact the POC) Pt reports that her current low back pain started about 2 weeks ago. Pt reports that she had COVID and was in bed while recovering and started getting low back pain. Pt reports that the pain was so bad that she couldn't sleep and wasn't comfortable in standing, sitting or laying down. Pt reports that she recently had an injection from the doctor which helped decrease the pain. Pt reports that she was also prescribed lidocaine gel which also helps with the pain. Pt reports that when her pain was bad and she was either sitting or laying down that it felt like her hips and spine were trying to separate. Pt reports that she has bilateral back pain and sometimes has shooting pain down her R leg down to her knee. Pt reports that she does have neuropathy and does have numbness/tingling, but doesn't have any numbness/tingling associated with the back pain. Pt reports that she gets more shooting pain down the back of her leg. Pt reports that walking can be painful and that her hips can hurt and if she tries to arch her back, she feels a  pulling in her back. Pt reports that she has tried Tylenol, ice and heat which did not help.   Impairments A. Pain;D. Decreased ROM;E. Decreased flexibility;F. Decreased strength and endurance;G. Impaired balance;H. Impaired gait   Functional Limitations perform activities of daily living;perform required work activities;perform desired leisure / sports activities   Symptom Location Bilateral low back with radicular pain in back of right leg down to knee   How/Where did it occur From insidious onset;Other  (Pt reports laying in bed while recovering from COVID and started experiencing low back pain)   Onset date of current episode/exacerbation 07/20/22   Chronicity New   Pain rating (0-10 point scale) Best (/10);Worst (/10)  (Current: 1-2/10, not very bad at all)   Best (/10) 0/10   Worst (/10) 10/10, at beginning   Pain quality G. Cramping;E. Shooting  (Cramping/spasms in back, shooting down right leg)   Frequency of pain/symptoms B. Intermittent   Pain/symptoms are: Worse during the night   Pain/symptoms exacerbated by A. Sitting;B. Walking   Pain/symptoms eased by K. Other   Pain eased by comment Injection and lidocaine gel   Progression of symptoms since onset: Improved   Prior Level of Function   Prior Level of Function-Mobility Independent   Prior Level of Function-ADLs Independent   Functional Level Prior Comment Independent   Current Level of Function   Patient role/employment history A. Employed   Current equipment-Gait/Locomotion None   Fall Risk Screen   Fall screen completed by PT   Have you fallen 2 or more times in the past year? No   Have you fallen and had an injury in the past year? No   Is patient a fall risk? No   Abuse Screen (yes response referral indicated)   Feels Unsafe at Home or Work/School no   Feels Threatened by Someone no   Does Anyone Try to Keep You From Having Contact with Others or Doing Things Outside Your Home? no   Physical Signs of Abuse Present no   System Outcome Measures    Outcome Measures   (Oswestry: 12% disability)   Lumbar Spine/SI Objective Findings   Gait/Locomotion No deviations noted   Hamstring Flexibility Limited   Hip Flexor Flexibility Limited   Quadricep Flexibility Limited   Piriformis Flexibility Limited   Flexion ROM 75%, tight and painful in lower back   Extension ROM 25%, pain, more painful than lumbar flexion   Right Side Bending ROM 50%, pain, worse than L side bending   Left Side Bending ROM 50%, pain   Lumbar ROM Comment L rotation and R rotation: 75%, stretch in back   Hip Screen Scour and FADIR positive bilaterally, LATOYA negative on R and positive on L   Hip Flexion (L2) Strength L: 4/5; R: 4-/5, pain   Knee Flexion Strength L: 4/5, painful in lower back; R: 4+/5   Knee Extension (L3) Strength B: 4+/5   Ankle Dorsiflexion (L4) Strength B: 4+/5   SLR Positive bilaterally   Posture Normal   Planned Therapy Interventions   Planned Therapy Interventions balance training;gait training;joint mobilization;manual therapy;neuromuscular re-education;ROM;strengthening;stretching   Planned Modality Interventions   Planned Modality Interventions Cryotherapy;Electrical stimulation;Hot packs;TENS;Traction   Clinical Impression   Criteria for Skilled Therapeutic Interventions Met yes, treatment indicated   PT Diagnosis Bilateral low back pain with radicular symptoms in RLE   Influenced by the following impairments Decreased lumbar ROM limited due to pain, decreased BLE strength limited due to pain, positive FADIR, Scour and SLR bilaterally, positive LATOYA on L   Functional limitations due to impairments Impaired functional mobility, decreased tolerance completing ADL's, recreational activities and work related tasks   Clinical Presentation Stable/Uncomplicated   Clinical Decision Making (Complexity) Low complexity   Therapy Frequency 1 time/week   Predicted Duration of Therapy Intervention (days/wks) 1 time a week for minimum of 6 weeks, minimum of 6 sessions   Risk &  Benefits of therapy have been explained Yes   Patient, Family & other staff in agreement with plan of care Yes   Clinical Impression Comments Pt is a 61 year old female who presents to physical therapy with reports of bilateral low back pain with radicular symptoms in RLE. Pt has decreased lumbar ROM and decreased BLE limited due to pain. Pt also has positive SLR, FADIR, and Scour bilaterally with positive LATOYA on L. Pt scored 12% disability on Oswestry with reports of pain when lifting heavy weights. Pt would benefit from physical therapy to improve ROM, increase strength and decrease pain to improve functional mobility and increase tolerance completing ADL's, recreational activities and work related tasks.   ORTHO GOALS   PT Ortho Eval Goals 1;2;3;4   Ortho Goal 1   Goal Identifier HEP   Goal Description Pt will be independent with HEP to improve mobility and decrease pain.   Target Date 10/31/22   Ortho Goal 2   Goal Identifier Sitting   Goal Description Pt will report ability to sit longer than 1 hour with less than 2/10 pain to improve functional mobility and increase tolerance completing ADL's, recreational activities and work related tasks.   Target Date 10/31/22   Ortho Goal 3   Goal Identifier Lifting   Goal Description Pt will report ability to lift heavy weights with less than 2/10 pain to improve functional mobility and increase tolerance completing ADL's, recreational activities and work related tasks.   Target Date 10/31/22   Ortho Goal 4   Goal Identifier Walking   Goal Description Pt will report ability to walk greater than 1 mile with less than 2/10 pain to improve functional mobility and increase tolerance completing ADL's, recreational activities and work related tasks.   Target Date 10/31/22   Total Evaluation Time   PT Eval, Low Complexity Minutes (05659) 20     Chari Branch, PT, DPT

## 2022-08-08 ENCOUNTER — DOCUMENTATION ONLY (OUTPATIENT)
Dept: SLEEP MEDICINE | Facility: CLINIC | Age: 62
End: 2022-08-08

## 2022-08-08 DIAGNOSIS — G47.33 OBSTRUCTIVE SLEEP APNEA (ADULT) (PEDIATRIC): Primary | ICD-10-CM

## 2022-08-08 NOTE — PROGRESS NOTES
Patient was offered choice of vendor and chose [facility].  Patient Zoe Alvarez was set up at [place]  on August 8, 2022. Patient received a Resmed Airsense 11 Pressures were set at 5-15 cm H2O.   Patient s ramp is 4 cm H2O for Auto and FLEX/EPR is EPR, 1.  Patient received a Resmed Mask name: AirFit [id number]  Nasal mask size Medium, heated tubing and heated humidifier.  Patient does not need to meet compliance.    OBDULIO MHOAMUD

## 2022-08-11 ENCOUNTER — DOCUMENTATION ONLY (OUTPATIENT)
Dept: SLEEP MEDICINE | Facility: CLINIC | Age: 62
End: 2022-08-11

## 2022-08-11 NOTE — PROGRESS NOTES
3 day Sleep therapy management telephone visit    Diagnostic AHI:  26.7 HST    Confirmed with patient at time of call- Yes Patient is still interested in STM service       Subjective measures:  Patient reports thins are going pretty good with her CPAP. She had a great night the first night and felt refreshed. Her coworker told her she looked more refreshed. The past night she woke up choking with a dry mouth. She thinks she may have been breathing with her mouth open.         Objective data     Order Settings for PAP  CPAP min 5    CPAP max 15    CPAP fixed         Device settings from machine CPAP min 5.0     CPAP max 15.0      CPAP fixed      EPR Setting ONE    RESMED soft response  OFF     Assessment: Nightly usage over four hours      Action plan: Patient to have 14 day STM visit. Patient has a follow up visit scheduled:   no and not required by insurance    Replacement device: No  STM ordered by provider: Yes     Total time spent on accessing and  interpreting remote patient PAP therapy data  10 minutes    Total time spent counseling, coaching  and reviewing PAP therapy data with patient  5 minutes    [id number] no

## 2022-08-12 DIAGNOSIS — E11.9 TYPE 2 DIABETES MELLITUS WITHOUT COMPLICATION, WITH LONG-TERM CURRENT USE OF INSULIN (H): Primary | ICD-10-CM

## 2022-08-12 DIAGNOSIS — Z79.4 TYPE 2 DIABETES MELLITUS WITHOUT COMPLICATION, WITH LONG-TERM CURRENT USE OF INSULIN (H): Primary | ICD-10-CM

## 2022-08-12 NOTE — TELEPHONE ENCOUNTER
I called the pt, informed of the below, and informed Rodolfo is covered with a monthly cost around 185 $. She will try to find a coupon and let me know if she has success with this. She does want the byetta sent.    To get better and follow your care plan as instructed.

## 2022-08-15 ENCOUNTER — OFFICE VISIT (OUTPATIENT)
Dept: FAMILY MEDICINE | Facility: CLINIC | Age: 62
End: 2022-08-15
Payer: COMMERCIAL

## 2022-08-15 ENCOUNTER — TELEPHONE (OUTPATIENT)
Dept: FAMILY MEDICINE | Facility: CLINIC | Age: 62
End: 2022-08-15

## 2022-08-15 VITALS
DIASTOLIC BLOOD PRESSURE: 66 MMHG | WEIGHT: 187.8 LBS | HEART RATE: 94 BPM | BODY MASS INDEX: 32.06 KG/M2 | OXYGEN SATURATION: 98 % | SYSTOLIC BLOOD PRESSURE: 104 MMHG | RESPIRATION RATE: 16 BRPM | HEIGHT: 64 IN

## 2022-08-15 DIAGNOSIS — E11.9 TYPE 2 DIABETES MELLITUS WITHOUT COMPLICATION, WITH LONG-TERM CURRENT USE OF INSULIN (H): ICD-10-CM

## 2022-08-15 DIAGNOSIS — G47.33 OSA ON CPAP: ICD-10-CM

## 2022-08-15 DIAGNOSIS — G47.00 INSOMNIA, UNSPECIFIED TYPE: ICD-10-CM

## 2022-08-15 DIAGNOSIS — Z79.4 TYPE 2 DIABETES MELLITUS WITHOUT COMPLICATION, WITH LONG-TERM CURRENT USE OF INSULIN (H): ICD-10-CM

## 2022-08-15 DIAGNOSIS — F33.1 MODERATE EPISODE OF RECURRENT MAJOR DEPRESSIVE DISORDER (H): Primary | ICD-10-CM

## 2022-08-15 PROCEDURE — 99214 OFFICE O/P EST MOD 30 MIN: CPT | Performed by: STUDENT IN AN ORGANIZED HEALTH CARE EDUCATION/TRAINING PROGRAM

## 2022-08-15 PROCEDURE — 96127 BRIEF EMOTIONAL/BEHAV ASSMT: CPT | Performed by: STUDENT IN AN ORGANIZED HEALTH CARE EDUCATION/TRAINING PROGRAM

## 2022-08-15 ASSESSMENT — PATIENT HEALTH QUESTIONNAIRE - PHQ9
SUM OF ALL RESPONSES TO PHQ QUESTIONS 1-9: 0
SUM OF ALL RESPONSES TO PHQ QUESTIONS 1-9: 0

## 2022-08-15 ASSESSMENT — ANXIETY QUESTIONNAIRES
GAD7 TOTAL SCORE: 0
GAD7 TOTAL SCORE: 0
7. FEELING AFRAID AS IF SOMETHING AWFUL MIGHT HAPPEN: NOT AT ALL
GAD7 TOTAL SCORE: 0
4. TROUBLE RELAXING: NOT AT ALL
6. BECOMING EASILY ANNOYED OR IRRITABLE: NOT AT ALL
1. FEELING NERVOUS, ANXIOUS, OR ON EDGE: NOT AT ALL
3. WORRYING TOO MUCH ABOUT DIFFERENT THINGS: NOT AT ALL
2. NOT BEING ABLE TO STOP OR CONTROL WORRYING: NOT AT ALL
5. BEING SO RESTLESS THAT IT IS HARD TO SIT STILL: NOT AT ALL
7. FEELING AFRAID AS IF SOMETHING AWFUL MIGHT HAPPEN: NOT AT ALL

## 2022-08-15 ASSESSMENT — PAIN SCALES - GENERAL: PAINLEVEL: NO PAIN (0)

## 2022-08-15 NOTE — TELEPHONE ENCOUNTER
No DANYA on file & we did not refer pt to the below location    LM for pt - please verify that patient would like medication list sent to clinic below

## 2022-08-15 NOTE — TELEPHONE ENCOUNTER
Patient calling back.    Writer relayed message.    Patient calling back to give verbal OK.    Patient gave verbal OK (8/15/2022 5:02 PM)    - to send med list to   [facility], [facility]  [phone] Fax  [phone] P

## 2022-08-15 NOTE — TELEPHONE ENCOUNTER
[facility], [facility] calling to have a medication list faxed over asap.    [facility], [facility]  [phone] Fax  [phone] P

## 2022-08-23 ENCOUNTER — DOCUMENTATION ONLY (OUTPATIENT)
Dept: SLEEP MEDICINE | Facility: CLINIC | Age: 62
End: 2022-08-23

## 2022-08-23 NOTE — PROGRESS NOTES
14  DAY STM VISIT    Diagnostic AHI:  26.7  HST    Message left for patient to return call.     Assessment: Pt meeting objective benchmarks.       Action plan: pt to have 30 day STM visit.      Device type: Auto-CPAP    PAP settings: CPAP min 5.0 cm  H20       CPAP max 15.0 cm  H20      95th% pressure 12.4 cm  H20        RESMED EPR level Setting: ONE    RESMED Soft response setting:  OFF    Mask type:  Nasal Pillows    Objective measures: 14 day rolling measures      Compliance  92 %      Leak  19.76  lpm  last  upload      AHI 1.43   last  upload      Average number of minutes 391      Objective measure goal  Compliance   Goal >70%  Leak   Goal < 24 lpm  AHI  Goal < 5  Usage  Goal >240        Total time spent on accessing and interpreting remote patient PAP therapy data  10 minutes    Total time spent counseling, coaching  and reviewing PAP therapy data with patient  1 minutes    [id number]  [id number]  no (3 day STM)

## 2022-09-01 DIAGNOSIS — Z79.4 TYPE 2 DIABETES MELLITUS WITHOUT COMPLICATION, WITH LONG-TERM CURRENT USE OF INSULIN (H): ICD-10-CM

## 2022-09-01 DIAGNOSIS — E11.9 TYPE 2 DIABETES MELLITUS WITHOUT COMPLICATION, WITH LONG-TERM CURRENT USE OF INSULIN (H): ICD-10-CM

## 2022-09-01 RX ORDER — EMPAGLIFLOZIN 25 MG/1
TABLET, FILM COATED ORAL
Qty: 30 TABLET | Refills: 2 | Status: SHIPPED | OUTPATIENT
Start: 2022-09-01 | End: 2022-12-06

## 2022-09-05 DIAGNOSIS — E11.9 TYPE 2 DIABETES MELLITUS WITHOUT COMPLICATION (H): ICD-10-CM

## 2022-09-06 RX ORDER — INSULIN GLARGINE-YFGN 100 [IU]/ML
INJECTION, SOLUTION SUBCUTANEOUS
Qty: 81 ML | Refills: 0 | Status: SHIPPED | OUTPATIENT
Start: 2022-09-06 | End: 2023-02-06

## 2022-09-08 ENCOUNTER — DOCUMENTATION ONLY (OUTPATIENT)
Dept: SLEEP MEDICINE | Facility: CLINIC | Age: 62
End: 2022-09-08

## 2022-09-08 NOTE — PROGRESS NOTES
30 DAY STM VISIT    Diagnostic AHI:  26.7  HST    Data only recheck     Assessment: Pt meeting objective benchmarks.     Action plan: pt to have 6 month STM visit  Patient has scheduled a follow up visit with CORRY Frazier CNP on 11/4/2022.   Device type: Auto-CPAP  PAP settings: CPAP min 5.0 cm  H20     CPAP max 15.0 cm  H20    95th% pressure 12.9 cm  H20      RESMED EPR level Setting: ONE    RESMED Soft response setting:  OFF  Mask type:  Nasal Pillows  Objective measures: 14 day rolling measures      Compliance  92 %      Leak  14.16 lpm  last  upload      AHI 1.51   last  upload      Average number of minutes 471      Objective measure goal  Compliance   Goal >70%  Leak   Goal < 24 lpm  AHI  Goal < 5  Usage  Goal >240        Total time spent on accessing and interpreting remote patient PAP therapy data  10 minutes    Total time spent counseling, coaching  and reviewing PAP therapy data with patient  0 minutes     [id number] this call  [id number] no  at 3 or 14 day [facility]

## 2022-09-24 ENCOUNTER — HEALTH MAINTENANCE LETTER (OUTPATIENT)
Age: 62
End: 2022-09-24

## 2022-09-24 DIAGNOSIS — Z79.4 TYPE 2 DIABETES MELLITUS WITHOUT COMPLICATION, WITH LONG-TERM CURRENT USE OF INSULIN (H): ICD-10-CM

## 2022-09-24 DIAGNOSIS — E11.9 TYPE 2 DIABETES MELLITUS WITHOUT COMPLICATION, WITH LONG-TERM CURRENT USE OF INSULIN (H): ICD-10-CM

## 2022-09-26 RX ORDER — GLIPIZIDE 10 MG/1
10 TABLET ORAL
Qty: 180 TABLET | Refills: 1 | OUTPATIENT
Start: 2022-09-26

## 2022-11-04 ENCOUNTER — VIRTUAL VISIT (OUTPATIENT)
Dept: SLEEP MEDICINE | Facility: CLINIC | Age: 62
End: 2022-11-04
Payer: COMMERCIAL

## 2022-11-04 ENCOUNTER — LAB (OUTPATIENT)
Dept: LAB | Facility: CLINIC | Age: 62
End: 2022-11-04
Payer: COMMERCIAL

## 2022-11-04 VITALS
DIASTOLIC BLOOD PRESSURE: 74 MMHG | HEIGHT: 64 IN | BODY MASS INDEX: 31.76 KG/M2 | SYSTOLIC BLOOD PRESSURE: 119 MMHG | WEIGHT: 186 LBS | HEART RATE: 92 BPM

## 2022-11-04 DIAGNOSIS — G47.33 OSA (OBSTRUCTIVE SLEEP APNEA): Primary | ICD-10-CM

## 2022-11-04 DIAGNOSIS — E11.9 TYPE 2 DIABETES MELLITUS WITHOUT COMPLICATION, WITH LONG-TERM CURRENT USE OF INSULIN (H): ICD-10-CM

## 2022-11-04 DIAGNOSIS — G47.36 HYPOXEMIA ASSOCIATED WITH SLEEP: ICD-10-CM

## 2022-11-04 DIAGNOSIS — Z79.4 TYPE 2 DIABETES MELLITUS WITHOUT COMPLICATION, WITH LONG-TERM CURRENT USE OF INSULIN (H): ICD-10-CM

## 2022-11-04 LAB
ANION GAP SERPL CALCULATED.3IONS-SCNC: 12 MMOL/L (ref 7–15)
BUN SERPL-MCNC: 21.5 MG/DL (ref 8–23)
CALCIUM SERPL-MCNC: 9.6 MG/DL (ref 8.8–10.2)
CHLORIDE SERPL-SCNC: 98 MMOL/L (ref 98–107)
CREAT SERPL-MCNC: 0.85 MG/DL (ref 0.51–0.95)
DEPRECATED HCO3 PLAS-SCNC: 25 MMOL/L (ref 22–29)
GFR SERPL CREATININE-BSD FRML MDRD: 77 ML/MIN/1.73M2
GLUCOSE SERPL-MCNC: 237 MG/DL (ref 70–99)
HBA1C MFR BLD: 9.8 % (ref 0–5.6)
LDLC SERPL DIRECT ASSAY-MCNC: 68 MG/DL
POTASSIUM SERPL-SCNC: 4 MMOL/L (ref 3.4–5.3)
SODIUM SERPL-SCNC: 135 MMOL/L (ref 136–145)

## 2022-11-04 PROCEDURE — 83721 ASSAY OF BLOOD LIPOPROTEIN: CPT

## 2022-11-04 PROCEDURE — 83036 HEMOGLOBIN GLYCOSYLATED A1C: CPT

## 2022-11-04 PROCEDURE — 99213 OFFICE O/P EST LOW 20 MIN: CPT | Mod: 95 | Performed by: NURSE PRACTITIONER

## 2022-11-04 PROCEDURE — 36415 COLL VENOUS BLD VENIPUNCTURE: CPT

## 2022-11-04 PROCEDURE — 80048 BASIC METABOLIC PNL TOTAL CA: CPT

## 2022-11-04 ASSESSMENT — SLEEP AND FATIGUE QUESTIONNAIRES

## 2022-11-04 ASSESSMENT — PAIN SCALES - GENERAL: PAINLEVEL: NO PAIN (0)

## 2022-11-04 NOTE — PROGRESS NOTES
Lynne is a 62 year old who is being evaluated via a billable video visit.      How would you like to obtain your AVS? MyChart  If the video visit is dropped, the invitation should be resent by: Text to cell phone: [phone]  Will anyone else be joining your video visit? Yue   Yesika CLAYTON        Video-Visit Details    Video Start Time: 2:31 PM    Type of service:  Video Visit    Video End Time: 2:41 PM    Originating Location (pt. Location): Other In Car - in [place]        Distant Location (provider location):  On-site    Platform used for Video Visit: [facility]      Sleep Apnea - Follow-up Visit:    Impression/Plan:  1. FLOYD (obstructive sleep apnea)  2. Hypoxemia associated with sleep    Moderate Sleep apnea. Tolerating PAP well. Daytime symptoms are improved.  The patient continues to benefit from PAP therapy.  Patient is having some difficulty with air leak from her mouth with mouth opening at night.  We discussed obtaining a chinstrap to help with this as she is comfortable with a nasal mask that she is using.  We also discussed the use of a fullface style mask or a combination/hybrid style fullface mask which would allow nose/mouth breathing.    A review of her APAP download data shows very good use and compliance and moderate FLOYD that is well controlled on current pressure settings.    Continue current plan of care.  No new orders placed at this visit.  Patient to follow-up with DME ([facility]) to obtain a chinstrap to use with her nasal mask.  She will consider mask change to FFM or combination/hybrid FFM should this not correct the problem.    Zoe Alvarez will follow up in about 6 month(s) to review APAP download data and use/compliance, or sooner should she experience further difficulties.     20 minutes spent with patient, all of which were spent face-to-face counseling, consulting, chart review/documentation, and coordinating plan of care on the date of the encounter.      Marquise  CORRY Tomas CNP  Sleep Medicine    CC:  Val Garza,         History of Present Illness:  Chief Complaint   Patient presents with     CPAP Follow Up       Zoe Alvarez is a 62 year old female with a PMH pertinent for DM2 -insulin-dependent, HLD, chronic venous insufficiency, history of tobacco dependence -in remission, CKD stage III, anxiety, major depression, and obesity who presents for follow-up of their moderate sleep apnea, managed with CPAP. Patient does not need to meet compliance.  She reports that her CPAP use has been going well overall, but notes some breathing through her mouth and dryness and notes waking up feeling more rested/refreshed in the morning.    Previous Study Results: [facility] - HST  Date: 7/05/2022.  Weight 182 pounds.  AHI: 26.7/hr. Supine AHI: 57.4/hr. O2 blanca 76%.  Time SpO2 </= 88%: 38 minutes    Do you use a CPAP Machine at home: Yes  Overall, on a scale of 0-10 how would you rate your CPAP (0 poor, 10 great): 10    What type of mask do you use: Nasal Mask  Is your mask comfortable:    If not, why:      Is your mask leaking: Yes  If yes, where do you feel it: Im opening my mouth  How many night per week does the mask leak (0-7):      Do you notice snoring with mask on: No  Do you notice gasping arousals with mask on: Yes  Are you having significant oral or nasal dryness: Yes  Is the pressure setting comfortable: Yes  If not, why:      What is your typical bedtime: 8 or 9  How long does it take you to go to sleep on PAP therapy: 10 minutes  What time do you typically get out of bed for the day: 430 am  How many hours on average per night are you using PAP therapy: 7  How many hours are you sleeping per night: 7  Do you feel well rested in the morning: Yes      ResMed AirSense 11 (set up on 8/08/2022 at [facility])  Auto-PAP 5.0 - 15.0 cmH2O 30 day usage data:  10/06/22 - 11/05/22  76% of days with > 4 hours of use. 7/30 days with no use.   Average use 334 minutes per day.   95%ile  Leak 20.3 L/min.   CPAP 95% pressure 13 cm.   AHI 1.28 events per hour.        EPWORTH SLEEPINESS SCALE      [place] Sleepiness Scale ( AYDEN Ortiz  1990-1997[facility] [place]/English - Final version - 21 Nov 07 - [facility] [place].) 11/4/2022   Sitting and reading Slight chance of dozing   Watching TV Slight chance of dozing   Sitting, inactive in a public place (e.g. a theatre or a meeting) Would never doze   As a passenger in a car for an hour without a break Would never doze   Lying down to rest in the afternoon when circumstances permit Would never doze   Sitting and talking to someone Would never doze   Sitting quietly after a lunch without alcohol Would never doze   In a car, while stopped for a few minutes in traffic Would never doze   [place] Score (MC) 2   [place] Score (Sleep) 2       INSOMNIA SEVERITY INDEX (ANATOLY)      Insomnia Severity Index (ANATOLY) 11/4/2022   Difficulty falling asleep 1   Difficulty staying asleep 2   Problems waking up too early 1   How SATISFIED/DISSATISFIED are you with your CURRENT sleep pattern? 1   How NOTICEABLE to others do you think your sleep problem is in terms of impairing the quality of your life? 1   How WORRIED/DISTRESSED are you about your current sleep problem? 1   To what extent do you consider your sleep problem to INTERFERE with your daily functioning (e.g. daytime fatigue, mood, ability to function at work/daily chores, concentration, memory, mood, etc.) CURRENTLY? 1   ANATOLY Total Score 8       Guidelines for Scoring/Interpretation:  Total score categories:  0-7 = No clinically significant insomnia   8-14 = Subthreshold insomnia   15-21 = Clinical insomnia (moderate severity)  22-28 = Clinical insomnia (severe)  Used via courtesy of www.Marketocracyealth.va.gov with permission from Jose Burgess PhD., [facility]      Past medical/surgical history, family history, social history, medications and allergies were reviewed.        Problem List:  Patient Active Problem List     Diagnosis Date Noted     Pulmonary nodules 03/07/2022     Priority: Medium     Seen on CT 3/2022 - not suspicious, repeat in 1 year       Chronic kidney disease, stage 3 (H) 03/31/2021     Priority: Medium     Bilateral lower extremity edema 05/29/2019     Priority: Medium     Obesity (BMI 30-39.9)      Priority: Medium     Created by Conversion         Chronic venous insufficiency      Priority: Medium     Created by Conversion  Replacement Utility updated for latest IMO load  Overview:   Overview:   Created by Conversion  Replacement Utility updated for latest IMO load         Vertigo      Priority: Medium     Created by Conversion         Type 2 diabetes mellitus without complication, with long-term current use of insulin (H)      Priority: Medium     Created by Conversion         Tarsal tunnel syndrome of right side 11/01/2018     Priority: Medium     Mononeuritis of right lower extremity 10/31/2018     Priority: Medium     Added automatically from request for surgery [id number]         Foot pain, left 10/15/2018     Priority: Medium     Compression neuropathy of right lower extremity 09/20/2018     Priority: Medium     Mononeuritis of left lower extremity 09/20/2018     Priority: Medium     Plantar fascial fibromatosis of left foot 09/20/2018     Priority: Medium     Tarsal tunnel syndrome of left side 08/21/2018     Priority: Medium     RLQ abdominal pain 07/09/2014     Priority: Medium     Hyperlipidemia      Priority: Medium     Created by Conversion         Epidermal Inclusion Cyst      Priority: Medium     Created by Conversion         Ingrowing Toenail      Priority: Medium     Created by Conversion         Eczema      Priority: Medium     Created by Conversion          Current Outpatient Medications   Medication     BD PEN NEEDLE TJ 2ND GEN 32G X 4 MM miscellaneous     blood glucose meter (GLUCOMETER)     generic lancets (FINGERSTIX LANCETS)     glipiZIDE (GLUCOTROL) 10 MG tablet     JARDIANCE 25 MG TABS  "tablet     LANTUS SOLOSTAR 100 UNIT/ML soln     SEMGLEE, YFGN, 100 UNIT/ML SOPN     sertraline (ZOLOFT) 100 MG tablet     valACYclovir (VALTREX) 1000 MG tablet     atorvastatin (LIPITOR) 40 MG tablet     exenatide (BYETTA) 5 mcg/0.02mL per dose (60 doses per 1.2 mL prefilled pen)     exenatide ER (BYDUREON BCISE) 2 MG/0.85ML auto-injector     No current facility-administered medications for this visit.     /74   Pulse 92   Ht 1.632 m (5' 4.25\")   Wt 84.4 kg (186 lb)   BMI 31.68 kg/m        This note was written with the assistance of the Dragon voice-dictation technology software. The final document, although reviewed, may contain errors. For corrections, please contact the office.  "

## 2022-11-04 NOTE — PATIENT INSTRUCTIONS
"MY INFORMATION ON SLEEP APNEA-  Zoe Alvarez    DOCTOR : CORRY Frazier CNP  SLEEP CENTER :      MY CONTACT NUMBER:   [facility]  [phone]  [facility]   [phone]  [facility]   [phone]      [facility]  [phone]  [facility]   [phone]    [facility] [address], [address]  [place] [address]  Phone: [phone]    Hours:  Mon - Fri: 8:00 a.m. - 4:30 p.m.  Sat: Closed  Sun: Closed      Key Points:  1. What is Obstructive Sleep Apnea (FLOYD)? FLOYD is the most common type of sleep apnea. Apnea literally means, \"without breath.\" It is characterized by repetitive pauses in breathing, despite continued effort to breathe, and is usually associated with a reduction in blood oxygen saturation. Apneas can last 10 to over 60 seconds. It is caused by narrowing or collapse of the upper airway as muscles relax during sleep.   2. What are the consequences of FLOYD? Symptoms include: daytime sleepiness- possibly increasing the risk of falling asleep while driving, unrefreshing/restless sleep, snoring, insomnia, waking frequently to urinate, waking with heartburn or reflux, reduced concentration and memory, and morning headaches. Other health consequences may include development of high blood pressure. Untreated FLOYD also can contribute to heart disease, stroke and diabetes.   3. What are the treatment options? In most situations, sleep apnea is a lifelong disease that must be managed with daily therapy. Continuous Positive Airway (CPAP) is the most reliable treatment. A mouthguard to hold your jaw forward is usually the next most reliable option. Other options include postioning devices (to keep you off your back), nasal valves, tongue retaining device, weight loss, surgery. There is more detail about these options toward the end of this " document.  4. What are the most important things to remember about using CPAP?     WHERE CAN I FIND MORE INFORMATION?    American Academy of Sleep Medicine Patient information on sleep disorders:  http://yoursleep.aasmnet.org    CPAP -  WHY AND HOW?                 Continuous positive airway pressure, or CPAP, is the most effective treatment for obstructive sleep apnea. It works by blowing room air, through a mask, to hold your throat open. A decision to use CPAP is a major step forward in the pursuit of a healthier life. The successful use of CPAP will help you breathe easier, sleep better and live healthier. Using CPAP can be a positive experience if you keep these huerta points in mind:  Commitment  CPAP is not a quick fix for your problem. It involves a long-term commitment to improve your sleep and your health.    Communication  Stay in close communication with both your sleep doctor and your CPAP supplier. Ask lots of questions and seek help when you need it.    Consistency  Use CPAP all night, every night and for every nap. You will receive the maximum health benefits from CPAP when you use it every time that you sleep. This will also make it easier for your body to adjust to the treatment.    Correction  The first machine and mask that you try may not be the best ones for you. Work with your sleep doctor and your CPAP supplier to make corrections to your equipment selection. Ask about trying a different type of machine or mask if you have ongoing problems. Make sure that your mask is a good fit and learn to use your equipment properly.    Challenge  Tell a family member or close friend to ask you each morning if you used your CPAP the previous night. Have someone to challenge you to give it your best effort.    Connection   Your adjustment to CPAP will be easier if you are able to connect with others who use the same treatment. Ask your sleep doctor if there is a support group in your area for people who have  "sleep apnea, or look for one on the Internet.  Comfort   Increase your level of comfort by using a saline spray, decongestant or heated humidifier if CPAP irritates your nose, mouth or throat. Use your unit's \"ramp\" setting to slowly get used to the air pressure level. There may be soft pads you can buy that will fit over your mask straps. Look on www.CPAP.com for accessories that can help make CPAP use more comfortable.  Cleaning   Clean your mask, tubing and headgear on a regular basis. Put this time in your schedule so that you don't forget to do it. Check and replace the filters for your CPAP unit and humidifier.    Completion   Although you are never finished with CPAP therapy, you should reward yourself by celebrating the completion of your first month of treatment. Expect this first month to be your hardest period of adjustment. It will involve some trial and error as you find the machine, mask and pressure settings that are right for you.    Continuation  After your first month of treatment, continue to make a daily commitment to use your CPAP all night, every night and for every nap.    CPAP-Tips to starting with success:  Begin using your CPAP for short periods of time during the day while you watch TV or read.    Use CPAP every night and for every nap. Using it less often reduces the health benefits and makes it harder for your body to get used to it.    Newer CPAP models are virtually silent; however, if you find the sound of your CPAP machine to be bothersome, place the unit under your bed to dampen the sound.     Make small adjustments to your mask, tubing, straps and headgear until you get the right fit. Tightening the mask may actually worsen the leak.  If it leaves significant marks on your face or irritates the bridge of your nose, it may not be the best mask for you.  Speak with the person who supplied the mask and consider trying other masks. Insurances will allow you to try different masks " during the first month of starting CPAP.  Insurance also covers a new mask, hose and filter about every 6 months.    Use a saline nasal spray to ease mild nasal congestion. Neti-Pot or saline nasal rinses may also help. Nasal gel sprays can help reduce nasal dryness.  Biotene mouthwash can be helpful to protect your teeth if you experience frequent dry mouth.  Dry mouth may be a sign of air escaping out of your mouth or out of the mask in the case of a full face mask.  Speak with your provider if you expect that is the case.     Take a nasal decongestant to relieve more severe nasal or sinus congestion.  Do not use Afrin (oxymetazoline) nasal spray more than 3 days in a row.  Speak with your sleep doctor if your nasal congestion is chronic.    Use a heated humidifier that fits your CPAP model to enhance your breathing comfort. Adjust the heat setting up if you get a dry nose or throat, down if you get condensation in the hose or mask.  Position the CPAP lower than you so that any condensation in the hose drains back into the machine rather than towards the mask.    Try a system that uses nasal pillows if traditional masks give you problems.    Clean your mask, tubing and headgear once a week. Make sure the equipment dries fully.    Regularly check and replace the filters for your CPAP unit and humidifier.    Work closely with your sleep provider and your CPAP supplier to make sure that you have the machine, mask and air pressure setting that works best for you. It is better to stop using it and call your provider to solve problems than to lay awake all night frustrated with the device.  Weight Loss:    Weight loss decreases severity of sleep apnea in most people with obesity. For those with mild obesity who have developed snoring with weight gain, even 15-30 pound weight loss can improve and occasionally eliminate sleep apnea.  Structured and life-long dietary and health habits are necessary to lose weight and keep  healthier weight levels.     Though there are significant health benefits from weight loss, long-term weight loss is very difficult to achieve- studies show success with dietary management in less than 10% of people. In addition, substantial weight loss may require years of dietary control and may be difficult if patients have severe obesity. In these cases, surgical management may be considered.    If you are interested in methods for weight loss, you should review the options discussed at the National Institutes of Health patient information sites:     http://win.niddk.nih.gov/publications/index.htm  http:/www.health.nih.gov/topic/WeightLossDieting    Bariatric programs offer counseling in all methods of weight loss:    Http:/www.[facility].org/Specialties/WeightLossSurgeryandMedicalMgmt/htm    Your BMI is Body mass index is 31.68 kg/m .    Weight management plan: Patient was referred to their PCP to discuss a diet and exercise plan.    Body mass index (BMI) is one way to tell whether you are at a healthy weight, overweight, or obese. It measures your weight in relation to your height.  A BMI of 18.5 to 24.9 is in the healthy range. A person with a BMI of 25 to 29.9 is considered overweight, and someone with a BMI of 30 or greater is considered obese.  Another way to find out if you are at risk for health problems caused by overweight and obesity is to measure your waist. If you are a woman and your waist is more than 35 inches, or if you are a man and your waist is more than 40 inches, your risk of disease may be higher.  More than two-thirds of American adults are considered overweight or obese. Being overweight or obese increases the risk for further weight gain.  Excess weight may lead to heart disease and diabetes. Creating and following plans for healthy eating and physical activity may help you improve your health.    Methods for maintaining or losing weight.    Weight control is part of healthy  lifestyle and includes exercise, emotional health, and healthy eating habits.  Careful eating habits lifelong is the mainstay of weight control.  Though there are significant health benefits from weight loss, long-term weight loss with diet alone may be very difficult to achieve- studies show long-term success with dietary management in less than 10% of people. Attaining a healthy weight may be especially difficult to achieve in those with severe obesity. In some cases, medications, devices and surgical management might be considered.    What can you do?    If you are overweight or obese and are interested in methods for weight loss, you should discuss this with your provider. In addition, we recommend that you review healthy life styles and methods for weight loss available through the National Institutes of Health patient information sites:     http://win.niddk.nih.gov/publications/index.htm

## 2022-11-04 NOTE — PROGRESS NOTES
"Lynne is a 62 year old who is being evaluated via a billable video visit.      How would you like to obtain your AVS? MyChart  If the video visit is dropped, the invitation should be resent by: Text to cell phone: [phone]  Will anyone else be joining your video visit? Yue CLAYTON  {If patient encounters technical issues they should call [phone] :[id number]}      Video-Visit Details    Video Start Time: {video visit start/end time for provider to select:971508}    Type of service:  Video Visit    Video End Time:{video visit start/end time for provider to select:237666}    Originating Location (pt. Location): {video visit patient location:253555::\"Home\"}    {PROVIDER LOCATION On-site should be selected for visits conducted from your clinic location or adjoining [facility] hospital, academic office, or other nearby [facility] building. Off-site should be selected for all other provider locations, including home:[id number]}    Distant Location (provider location):  {virtual location provider:908993}    Platform used for Video Visit: {Virtual Visit Platforms:604242::\"[employer]\"}  "

## 2022-11-11 ENCOUNTER — TELEPHONE (OUTPATIENT)
Dept: ENDOCRINOLOGY | Facility: CLINIC | Age: 62
End: 2022-11-11

## 2022-11-11 NOTE — TELEPHONE ENCOUNTER
The following INFOGRAPHIQS message sent:    Anjali Alvarez,    I see that you have a virtual appointment with Annelise Hermosillo CNP on 11/15/22; we would greatly appreciate it, if you could send us 7 days of your blood sugar readings for the week leading up to your video visit. This way Annelise can review and have them accessible for your appointment. You can send them through User[employer] Live Chat.     Please call me at [phone] if you have any questions or concerns.     Thank you,    Lorrie PENALOZA, Medical Assistant   Endocrinology

## 2022-11-15 ENCOUNTER — VIRTUAL VISIT (OUTPATIENT)
Dept: ENDOCRINOLOGY | Facility: CLINIC | Age: 62
End: 2022-11-15
Payer: COMMERCIAL

## 2022-11-15 DIAGNOSIS — Z79.4 TYPE 2 DIABETES MELLITUS WITHOUT COMPLICATION, WITH LONG-TERM CURRENT USE OF INSULIN (H): Primary | ICD-10-CM

## 2022-11-15 DIAGNOSIS — E11.9 TYPE 2 DIABETES MELLITUS WITHOUT COMPLICATION, WITH LONG-TERM CURRENT USE OF INSULIN (H): Primary | ICD-10-CM

## 2022-11-15 PROCEDURE — 99213 OFFICE O/P EST LOW 20 MIN: CPT | Mod: 95 | Performed by: NURSE PRACTITIONER

## 2022-11-15 RX ORDER — SEMAGLUTIDE 1.34 MG/ML
INJECTION, SOLUTION SUBCUTANEOUS
Qty: 1.5 ML | Refills: 5 | Status: SHIPPED | OUTPATIENT
Start: 2022-11-15 | End: 2023-05-23

## 2022-11-15 NOTE — PROGRESS NOTES
Lynne is a 62 year old who is being evaluated via a billable video visit.      How would you like to obtain your AVS? MyChart  If the video visit is dropped, the invitation should be resent by: Text to cell phone: [phone]  Will anyone else be joining your video visit? [facility] ENDOCRINOLOGY    Diabetes Note 11/15/2022    Zoe Alvarez, 1960, [id number]          Reason for visit      1. Type 2 diabetes mellitus without complication, with long-term current use of insulin (H)        HPI     Zoe Alvarez is a very pleasant 62 year old old female who presents for follow up.  SUMMARY:    Lynne is contacted via Video Visit in f/u for DM 2.  Her A1c has improved a bit from 10.1 to 9.8. She reports that she has been trying to be more mindful of her diet and that she has been working some double shifts that have been keeping her more physically active. We have tried to get her a GLP1 in the past but they have all been too expensive for her. She notes that after the first of the year, her insurance will change and will cover Ozempic. She has several people with whom she works that are also using it with good results and she is eager to try it. She is currently taking Glipizide 10 mg BID and Jardiance, 25 mg daily. She also takes Semglee, 45 units BID.     Blood glucose data:      Past Medical History     Patient Active Problem List   Diagnosis     Obesity (BMI 30-39.9)     Chronic venous insufficiency     Epidermal Inclusion Cyst     Hyperlipidemia     Vertigo     Ingrowing Toenail     Type 2 diabetes mellitus without complication, with long-term current use of insulin (H)     Eczema     RLQ abdominal pain     Tarsal tunnel syndrome of left side     Compression neuropathy of right lower extremity     Mononeuritis of left lower extremity     Plantar fascial fibromatosis of left foot     Mononeuritis of right lower extremity     Tarsal tunnel syndrome of right side     Foot pain, left      Bilateral lower extremity edema     Chronic kidney disease, stage 3 (H)     Pulmonary nodules        Family History       family history includes Breast Cancer in her maternal grandmother; Diabetes in her mother.    Social History      reports that she has quit smoking. She has never used smokeless tobacco. She reports current alcohol use of about 1.0 standard drink per week. She reports that she does not use drugs.      Review of Systems     Patient has no polyuria or polydipsia, no chest pain, dyspnea or TIA's, no numbness, tingling or pain in extremities  Remainder negative except as noted in HPI.      Vital Signs     There were no vitals taken for this visit.  Wt Readings from Last 3 Encounters:   11/04/22 84.4 kg (186 lb)   08/15/22 85.2 kg (187 lb 12.8 oz)   07/24/22 82.1 kg (181 lb)       Physical Exam     Constitutional:  Well developed, Well nourished  HENT:  Normocephalic,   Neck: normal in appearance  Eyes:  PERRL, Conjunctiva pink  Respiratory:  No respiratory distress  Skin: No acanthosis nigricans, lipoatrophy or lipodystrophy  Neurologic:  Alert & oriented x 3, nonfocal  Psychiatric:  Affect, Mood, Insight appropriate          Assessment     1. Type 2 diabetes mellitus without complication, with long-term current use of insulin (H)        Plan     Pt has been provided with a voucher for her first Ozempic pen for free. She will also get a savings card. She will continue with the rest of her medication regimen. We will f/u together in February.         Jayla Hermosillo NP   Endocrinology  11/15/2022  1:40 PM        Lab Results     Microalbumin Urine mg/dL   Date Value Ref Range Status   04/26/2022 <0.50 0.00 - 1.99 mg/dL Final       Cholesterol   Date Value Ref Range Status   10/25/2021 196 <=199 mg/dL Final     Direct Measure HDL   Date Value Ref Range Status   10/25/2021 39 (L) >=50 mg/dL Final     Comment:     HDL Cholesterol Reference Range:     0-2 years:   No reference ranges established for  patients under 2 years old  at [facility] for lipid analytes.    2-8 years:  Greater than 45 mg/dL     18 years and older:   Female: Greater than or equal to 50 mg/dL   Male:   Greater than or equal to 40 mg/dL     Triglycerides   Date Value Ref Range Status   10/25/2021 225 (H) <=149 mg/dL Final       [unfilled]      Current Medications     Outpatient Medications Prior to Visit   Medication Sig Dispense Refill     atorvastatin (LIPITOR) 40 MG tablet Take 1 tablet (40 mg) by mouth daily 90 tablet 1     BD PEN NEEDLE TJ 2ND GEN 32G X 4 MM miscellaneous USE TWO TIMES A DAY WITH INSULIN 100 each 3     blood glucose meter (GLUCOMETER) [BLOOD GLUCOSE METER (GLUCOMETER)] Use 1 each As Directed as needed. Dispense glucometer brand per patient's insurance at pharmacy discretion. 1 each 0     generic lancets (FINGERSTIX LANCETS) [GENERIC LANCETS (FINGERSTIX LANCETS)] Use to check blood sugar 1-2x daily. Dispense brand per patient's insurance at pharmacy discretion. 100 each 11     glipiZIDE (GLUCOTROL) 10 MG tablet TAKE 1 TABLET (10 MG) BY MOUTH 2 TIMES DAILY (BEFORE MEALS) 180 tablet 1     JARDIANCE 25 MG TABS tablet TAKE 1 TABLET BY MOUTH EVERY DAY 30 tablet 2     SEMGLEE, YFGN, 100 UNIT/ML SOPN INJECT TWICE DAILY. UP TO 90 UNITS A DAY. 81 mL 0     sertraline (ZOLOFT) 100 MG tablet Take 1 tablet (100 mg) by mouth daily 90 tablet 3     valACYclovir (VALTREX) 1000 MG tablet [VALACYCLOVIR (VALTREX) 1000 MG TABLET] TAKE 2 TABLETS BY MOUTH NOW AND 2 TABLETS IN 12 HOURS. PRN. 4 tablet 3     exenatide (BYETTA) 5 mcg/0.02mL per dose (60 doses per 1.2 mL prefilled pen) Inject 5 mcg Subcutaneous 2 times daily (before meals) for 90 days (Patient not taking: Reported on 8/15/2022) 1.2 mL 2     exenatide ER (BYDUREON BCISE) 2 MG/0.85ML auto-injector Inject 2 mg Subcutaneous every 7 days (Patient not taking: Reported on 8/15/2022) 2.55 mL 3     LANTUS SOLOSTAR 100 UNIT/ML soln INJECT TWICE DAILY. UP TO 90 UNITS A DAY. 90  mL 0     No facility-administered medications prior to visit.           Video-Visit Details    Video Start Time: 0930    Type of service:  Video Visit    Video End Time:0950    Originating Location (pt. Location): Home        Distant Location (provider location):  On-site    Platform used for Video Visit: Marina    Date of last OV: 8/02/22  Reason for Visit: DM      Blood Glucose Log:  Patient does not have a log to provide today  Answers for HPI/ROS submitted by the patient on 11/14/2022  General Symptoms: No  Skin Symptoms: No  HENT Symptoms: No  EYE SYMPTOMS: No  HEART SYMPTOMS: No  LUNG SYMPTOMS: No  INTESTINAL SYMPTOMS: No  URINARY SYMPTOMS: No  GYNECOLOGIC SYMPTOMS: No  BREAST SYMPTOMS: No  SKELETAL SYMPTOMS: No  BLOOD SYMPTOMS: No  NERVOUS SYSTEM SYMPTOMS: No  MENTAL HEALTH SYMPTOMS: No

## 2022-11-18 NOTE — NURSING NOTE
Writer pended a [employer] message to patient reminding her to call and schedule a 6 month follow up regarding CPAP.

## 2022-11-28 DIAGNOSIS — E78.2 MIXED HYPERLIPIDEMIA: ICD-10-CM

## 2022-11-29 RX ORDER — ATORVASTATIN CALCIUM 40 MG/1
40 TABLET, FILM COATED ORAL DAILY
Qty: 90 TABLET | Refills: 2 | Status: SHIPPED | OUTPATIENT
Start: 2022-11-29 | End: 2024-03-06

## 2022-11-29 NOTE — TELEPHONE ENCOUNTER
"Routing refill request to provider for review/approval because:  A break in medication    Last Written Prescription Date:  10/26/21  Last Fill Quantity: 90,  # refills: 1   Last office visit provider:  8/15/22     Requested Prescriptions   Pending Prescriptions Disp Refills     atorvastatin (LIPITOR) 40 MG tablet 90 tablet 1     Sig: Take 1 tablet (40 mg) by mouth daily       Statins Protocol Passed - 11/29/2022  9:14 AM        Passed - LDL on file in past 12 months     Recent Labs   Lab Test 11/04/22  1516   LDL 68             Passed - No abnormal creatine kinase in past 12 months     No lab results found.             Passed - Recent (12 mo) or future (30 days) visit within the authorizing provider's specialty     Patient has had an office visit with the authorizing provider or a provider within the authorizing providers department within the previous 12 mos or has a future within next 30 days. See \"Patient Info\" tab in inbasket, or \"Choose Columns\" in Meds & Orders section of the refill encounter.              Passed - Medication is active on med list        Passed - Patient is age 18 or older        Passed - No active pregnancy on record        Passed - No positive pregnancy test in past 12 months             Aldo Momin RN 11/29/22 9:14 AM  "

## 2022-12-06 DIAGNOSIS — Z79.4 TYPE 2 DIABETES MELLITUS WITHOUT COMPLICATION, WITH LONG-TERM CURRENT USE OF INSULIN (H): ICD-10-CM

## 2022-12-06 DIAGNOSIS — E11.9 TYPE 2 DIABETES MELLITUS WITHOUT COMPLICATION, WITH LONG-TERM CURRENT USE OF INSULIN (H): ICD-10-CM

## 2022-12-06 RX ORDER — EMPAGLIFLOZIN 25 MG/1
TABLET, FILM COATED ORAL
Qty: 90 TABLET | Refills: 0 | Status: SHIPPED | OUTPATIENT
Start: 2022-12-06 | End: 2023-02-22

## 2022-12-08 DIAGNOSIS — E11.9 TYPE 2 DIABETES MELLITUS WITHOUT COMPLICATION (H): ICD-10-CM

## 2022-12-08 DIAGNOSIS — E11.9 TYPE 2 DIABETES MELLITUS WITHOUT COMPLICATION, WITH LONG-TERM CURRENT USE OF INSULIN (H): ICD-10-CM

## 2022-12-08 DIAGNOSIS — Z79.4 TYPE 2 DIABETES MELLITUS WITHOUT COMPLICATION, WITH LONG-TERM CURRENT USE OF INSULIN (H): ICD-10-CM

## 2022-12-08 RX ORDER — EMPAGLIFLOZIN 25 MG/1
TABLET, FILM COATED ORAL
Qty: 30 TABLET | Refills: 2 | OUTPATIENT
Start: 2022-12-08

## 2022-12-08 RX ORDER — GLIPIZIDE 10 MG/1
10 TABLET ORAL
Qty: 180 TABLET | Refills: 1 | Status: SHIPPED | OUTPATIENT
Start: 2022-12-08 | End: 2023-08-29

## 2022-12-08 RX ORDER — PEN NEEDLE, DIABETIC 32GX 5/32"
NEEDLE, DISPOSABLE MISCELLANEOUS
Qty: 100 EACH | Refills: 3 | Status: SHIPPED | OUTPATIENT
Start: 2022-12-08 | End: 2022-12-12

## 2022-12-12 DIAGNOSIS — E11.9 TYPE 2 DIABETES MELLITUS WITHOUT COMPLICATION (H): ICD-10-CM

## 2022-12-12 RX ORDER — PEN NEEDLE, DIABETIC 32GX 5/32"
NEEDLE, DISPOSABLE MISCELLANEOUS
Qty: 100 EACH | Refills: 3 | Status: SHIPPED | OUTPATIENT
Start: 2022-12-12 | End: 2024-03-04

## 2022-12-13 NOTE — PROGRESS NOTES
[facility]    Outpatient Physical Therapy Discharge Note  Patient: Zoe Alvarez  : 1960    Beginning/End Dates of Reporting Period:  8/3/2022    Referring Provider: Lorenzo Lopez MD    Therapy Diagnosis: Bilateral low back pain with radicular symptoms in RLE     Client Self Report: See initial evaluation note      Goals:  Goal Identifier HEP   Goal Description Pt will be independent with HEP to improve mobility and decrease pain.   Target Date 10/31/22   Date Met      Progress (detail required for progress note):  Not met, pt failed to schedule more PT appointments.     Goal Identifier Sitting   Goal Description Pt will report ability to sit longer than 1 hour with less than 2/10 pain to improve functional mobility and increase tolerance completing ADL's, recreational activities and work related tasks.   Target Date 10/31/22   Date Met      Progress (detail required for progress note):  Not met, pt failed to schedule more PT appointments.     Goal Identifier Lifting   Goal Description Pt will report ability to lift heavy weights with less than 2/10 pain to improve functional mobility and increase tolerance completing ADL's, recreational activities and work related tasks.   Target Date 10/31/22   Date Met      Progress (detail required for progress note):  Not met, pt failed to schedule more PT appointments.     Goal Identifier Walking   Goal Description Pt will report ability to walk greater than 1 mile with less than 2/10 pain to improve functional mobility and increase tolerance completing ADL's, recreational activities and work related tasks.   Target Date 10/31/22   Date Met      Progress (detail required for progress note):  Not met, pt failed to schedule more PT appointments.         Plan:  Discharge from therapy.    Discharge:    Reason for Discharge: Patient chooses to discontinue  therapy.  Patient has failed to schedule further appointments.    Equipment Issued: none    Discharge Plan: Patient to continue home program.  Pt to receive new referral for PT from doctor to schedule more PT appointments.       08/03/22 1500   General Information   Type of Visit Initial OP Ortho PT Evaluation   Start of Care Date 08/03/22   Referring Physician Lorenzo Lopez MD   Patient/Family Goals Statement to get better   Orders Evaluate and Treat   Date of Order 07/24/22   Certification Required? No   Medical Diagnosis Acute bilateral low back pain without sciatica   Surgical/Medical history reviewed Yes   Precautions/Limitations no known precautions/limitations   Weight-Bearing Status - LUE full weight-bearing   Weight-Bearing Status - RUE full weight-bearing   Weight-Bearing Status - LLE full weight-bearing   Weight-Bearing Status - RLE full weight-bearing   General Information Comments PMH: diabetes; PSH: hysterectomy   Body Part(s)   Body Part(s) Lumbar Spine/SI   Presentation and Etiology   Pertinent history of current problem (include personal factors and/or comorbidities that impact the POC) Pt reports that her current low back pain started about 2 weeks ago. Pt reports that she had COVID and was in bed while recovering and started getting low back pain. Pt reports that the pain was so bad that she couldn't sleep and wasn't comfortable in standing, sitting or laying down. Pt reports that she recently had an injection from the doctor which helped decrease the pain. Pt reports that she was also prescribed lidocaine gel which also helps with the pain. Pt reports that when her pain was bad and she was either sitting or laying down that it felt like her hips and spine were trying to separate. Pt reports that she has bilateral back pain and sometimes has shooting pain down her R leg down to her knee. Pt reports that she does have neuropathy and does have numbness/tingling, but doesn't have any  numbness/tingling associated with the back pain. Pt reports that she gets more shooting pain down the back of her leg. Pt reports that walking can be painful and that her hips can hurt and if she tries to arch her back, she feels a pulling in her back. Pt reports that she has tried Tylenol, ice and heat which did not help.   Impairments A. Pain;D. Decreased ROM;E. Decreased flexibility;F. Decreased strength and endurance;G. Impaired balance;H. Impaired gait   Functional Limitations perform activities of daily living;perform required work activities;perform desired leisure / sports activities   Symptom Location Bilateral low back with radicular pain in back of right leg down to knee   How/Where did it occur From insidious onset;Other  (Pt reports laying in bed while recovering from COVID and started experiencing low back pain)   Onset date of current episode/exacerbation 07/20/22   Chronicity New   Pain rating (0-10 point scale) Best (/10);Worst (/10)  (Current: 1-2/10, not very bad at all)   Best (/10) 0/10   Worst (/10) 10/10, at beginning   Pain quality G. Cramping;E. Shooting  (Cramping/spasms in back, shooting down right leg)   Frequency of pain/symptoms B. Intermittent   Pain/symptoms are: Worse during the night   Pain/symptoms exacerbated by A. Sitting;B. Walking   Pain/symptoms eased by K. Other   Pain eased by comment Injection and lidocaine gel   Progression of symptoms since onset: Improved   Prior Level of Function   Prior Level of Function-Mobility Independent   Prior Level of Function-ADLs Independent   Functional Level Prior Comment Independent   Current Level of Function   Patient role/employment history A. Employed   Current equipment-Gait/Locomotion None   Fall Risk Screen   Fall screen completed by PT   Have you fallen 2 or more times in the past year? No   Have you fallen and had an injury in the past year? No   Is patient a fall risk? No   Abuse Screen (yes response referral indicated)   Feels  Unsafe at Home or Work/School no   Feels Threatened by Someone no   Does Anyone Try to Keep You From Having Contact with Others or Doing Things Outside Your Home? no   Physical Signs of Abuse Present no   System Outcome Measures   Outcome Measures   (Oswestry: 12% disability)   Lumbar Spine/SI Objective Findings   Gait/Locomotion No deviations noted   Hamstring Flexibility Limited   Hip Flexor Flexibility Limited   Quadricep Flexibility Limited   Piriformis Flexibility Limited   Flexion ROM 75%, tight and painful in lower back   Extension ROM 25%, pain, more painful than lumbar flexion   Right Side Bending ROM 50%, pain, worse than L side bending   Left Side Bending ROM 50%, pain   Lumbar ROM Comment L rotation and R rotation: 75%, stretch in back   Hip Screen Scour and FADIR positive bilaterally, LATOYA negative on R and positive on L   Hip Flexion (L2) Strength L: 4/5; R: 4-/5, pain   Knee Flexion Strength L: 4/5, painful in lower back; R: 4+/5   Knee Extension (L3) Strength B: 4+/5   Ankle Dorsiflexion (L4) Strength B: 4+/5   SLR Positive bilaterally   Posture Normal   Planned Therapy Interventions   Planned Therapy Interventions balance training;gait training;joint mobilization;manual therapy;neuromuscular re-education;ROM;strengthening;stretching   Planned Modality Interventions   Planned Modality Interventions Cryotherapy;Electrical stimulation;Hot packs;TENS;Traction   Clinical Impression   Criteria for Skilled Therapeutic Interventions Met yes, treatment indicated   PT Diagnosis Bilateral low back pain with radicular symptoms in RLE   Influenced by the following impairments Decreased lumbar ROM limited due to pain, decreased BLE strength limited due to pain, positive FADIR, Scour and SLR bilaterally, positive LATOYA on L   Functional limitations due to impairments Impaired functional mobility, decreased tolerance completing ADL's, recreational activities and work related tasks   Clinical Presentation  Stable/Uncomplicated   Clinical Decision Making (Complexity) Low complexity   Therapy Frequency 1 time/week   Predicted Duration of Therapy Intervention (days/wks) 1 time a week for minimum of 6 weeks, minimum of 6 sessions   Risk & Benefits of therapy have been explained Yes   Patient, Family & other staff in agreement with plan of care Yes   Clinical Impression Comments Pt is a 61 year old female who presents to physical therapy with reports of bilateral low back pain with radicular symptoms in RLE. Pt has decreased lumbar ROM and decreased BLE limited due to pain. Pt also has positive SLR, FADIR, and Scour bilaterally with positive LATOYA on L. Pt scored 12% disability on Oswestry with reports of pain when lifting heavy weights. Pt would benefit from physical therapy to improve ROM, increase strength and decrease pain to improve functional mobility and increase tolerance completing ADL's, recreational activities and work related tasks.   ORTHO GOALS   PT Ortho Eval Goals 1;2;3;4   Ortho Goal 1   Goal Identifier HEP   Goal Description Pt will be independent with HEP to improve mobility and decrease pain.   Target Date 10/31/22   Ortho Goal 2   Goal Identifier Sitting   Goal Description Pt will report ability to sit longer than 1 hour with less than 2/10 pain to improve functional mobility and increase tolerance completing ADL's, recreational activities and work related tasks.   Target Date 10/31/22   Ortho Goal 3   Goal Identifier Lifting   Goal Description Pt will report ability to lift heavy weights with less than 2/10 pain to improve functional mobility and increase tolerance completing ADL's, recreational activities and work related tasks.   Target Date 10/31/22   Ortho Goal 4   Goal Identifier Walking   Goal Description Pt will report ability to walk greater than 1 mile with less than 2/10 pain to improve functional mobility and increase tolerance completing ADL's, recreational activities and work related  tasks.   Target Date 10/31/22   Total Evaluation Time   PT Humberto, Low Complexity Minutes (20482) 20     Chari Branch, PT, DPT

## 2022-12-13 NOTE — ADDENDUM NOTE
Encounter addended by: Chari Branch, PT on: 12/13/2022 2:16 PM   Actions taken: Clinical Note Signed, Flowsheet accepted, Episode resolved no

## 2023-01-16 DIAGNOSIS — E11.9 TYPE 2 DIABETES MELLITUS WITHOUT COMPLICATION, WITH LONG-TERM CURRENT USE OF INSULIN (H): ICD-10-CM

## 2023-01-16 DIAGNOSIS — Z79.4 TYPE 2 DIABETES MELLITUS WITHOUT COMPLICATION, WITH LONG-TERM CURRENT USE OF INSULIN (H): ICD-10-CM

## 2023-01-16 RX ORDER — GLIPIZIDE 10 MG/1
10 TABLET ORAL
Qty: 180 TABLET | Refills: 1 | OUTPATIENT
Start: 2023-01-16

## 2023-02-06 DIAGNOSIS — E11.9 TYPE 2 DIABETES MELLITUS WITHOUT COMPLICATION (H): ICD-10-CM

## 2023-02-06 RX ORDER — INSULIN GLARGINE-YFGN 100 [IU]/ML
90 INJECTION, SOLUTION SUBCUTANEOUS DAILY
Qty: 81 ML | Refills: 3 | Status: SHIPPED | OUTPATIENT
Start: 2023-02-06 | End: 2024-03-11

## 2023-02-10 ENCOUNTER — LAB (OUTPATIENT)
Dept: LAB | Facility: CLINIC | Age: 63
End: 2023-02-10
Payer: COMMERCIAL

## 2023-02-10 DIAGNOSIS — Z79.4 TYPE 2 DIABETES MELLITUS WITHOUT COMPLICATION, WITH LONG-TERM CURRENT USE OF INSULIN (H): ICD-10-CM

## 2023-02-10 DIAGNOSIS — E11.9 TYPE 2 DIABETES MELLITUS WITHOUT COMPLICATION, WITH LONG-TERM CURRENT USE OF INSULIN (H): ICD-10-CM

## 2023-02-10 LAB — HBA1C MFR BLD: 8.4 % (ref 0–5.6)

## 2023-02-10 PROCEDURE — 83036 HEMOGLOBIN GLYCOSYLATED A1C: CPT

## 2023-02-10 PROCEDURE — 36415 COLL VENOUS BLD VENIPUNCTURE: CPT

## 2023-02-14 ENCOUNTER — DOCUMENTATION ONLY (OUTPATIENT)
Dept: SLEEP MEDICINE | Facility: CLINIC | Age: 63
End: 2023-02-14
Payer: COMMERCIAL

## 2023-02-14 NOTE — PROGRESS NOTES
6 month [facility]    STM Recheck Visit     Diagnostic AHI: 26.7 HST    Data only recheck     Assessment: Pt meeting objective benchmarks.     Action plan:   pt to follow up per provider request       Device type: Auto-CPAP  PAP settings: CPAP min 5.0 cm  H20     CPAP max 15.0 cm  H20    95th% pressure 13.5 cm  H20   Objective measures: 14 day rolling measures      Compliance  64 %      Leak  11.24 lpm  last  upload      AHI 0.77   last  upload      Average number of minutes 283      Objective measure goal  Compliance   Goal >70%  Leak   Goal < 24 lpm  AHI  Goal < 5  Usage  Goal >240    Total time spent on accessing, reviewing and interpreting remote patient PAP therapy data:   10 minutes      Total time spent with direct patient communication :   0 minutes

## 2023-02-20 DIAGNOSIS — B00.9 HERPES SIMPLEX VIRUS INFECTION: ICD-10-CM

## 2023-02-20 RX ORDER — VALACYCLOVIR HYDROCHLORIDE 1 G/1
TABLET, FILM COATED ORAL
Qty: 4 TABLET | Refills: 3 | OUTPATIENT
Start: 2023-02-20

## 2023-02-21 ENCOUNTER — VIRTUAL VISIT (OUTPATIENT)
Dept: ENDOCRINOLOGY | Facility: CLINIC | Age: 63
End: 2023-02-21
Payer: COMMERCIAL

## 2023-02-21 DIAGNOSIS — B00.9 HERPES SIMPLEX VIRUS INFECTION: ICD-10-CM

## 2023-02-21 DIAGNOSIS — E11.9 TYPE 2 DIABETES MELLITUS WITHOUT COMPLICATION, WITH LONG-TERM CURRENT USE OF INSULIN (H): Primary | ICD-10-CM

## 2023-02-21 DIAGNOSIS — Z79.4 TYPE 2 DIABETES MELLITUS WITHOUT COMPLICATION, WITH LONG-TERM CURRENT USE OF INSULIN (H): Primary | ICD-10-CM

## 2023-02-21 PROCEDURE — 99213 OFFICE O/P EST LOW 20 MIN: CPT | Mod: VID | Performed by: NURSE PRACTITIONER

## 2023-02-21 RX ORDER — VALACYCLOVIR HYDROCHLORIDE 1 G/1
TABLET, FILM COATED ORAL
Qty: 4 TABLET | Refills: 5 | Status: SHIPPED | OUTPATIENT
Start: 2023-02-21 | End: 2024-02-29

## 2023-02-21 ASSESSMENT — ENCOUNTER SYMPTOMS
EYE REDNESS: 0
EYE WATERING: 0
DYSPNEA ON EXERTION: 0
WHEEZING: 0
DOUBLE VISION: 0
POSTURAL DYSPNEA: 0
SPUTUM PRODUCTION: 1
EYE PAIN: 1
EYE IRRITATION: 0
HEMOPTYSIS: 0
COUGH DISTURBING SLEEP: 1
SHORTNESS OF BREATH: 0
COUGH: 1
SNORES LOUDLY: 1

## 2023-02-21 NOTE — PROGRESS NOTES
[facility] ENDOCRINOLOGY    Diabetes Note 2/21/2023    Zoe Alvarez, 1960, [id number]          Reason for visit      1. Type 2 diabetes mellitus without complication, with long-term current use of insulin (H)    2. Herpes simplex virus infection        HPI     Zoe Alvarez is a very pleasant 62 year old old female who presents for follow up.  SUMMARY:    Zoe is contacted today via Video Visit in f/u for DM 2. For the first time in 4 years, her A1c is below 8.5 - *8.4*, and down from her previous at 9.8.  I am unsure who is more excited about it her or myself.  She has been on Ozempic and doing quite well with it. Unfortunately, she has been without it for 3 weeks because of the shortage. She will restart today.  In addition to the Ozempic 0.5 mg weekly, she is also taking Jardiance, 25 mg daily, Semglee, 45 units AM and PM and Glipizide 10 mg BID prior to meals. She notes today that she has also stopped drinking Pop of all kinds.       Blood glucose data:      Past Medical History     Patient Active Problem List   Diagnosis     Obesity (BMI 30-39.9)     Chronic venous insufficiency     Epidermal Inclusion Cyst     Hyperlipidemia     Vertigo     Ingrowing Toenail     Type 2 diabetes mellitus without complication, with long-term current use of insulin (H)     Eczema     RLQ abdominal pain     Tarsal tunnel syndrome of left side     Compression neuropathy of right lower extremity     Mononeuritis of left lower extremity     Plantar fascial fibromatosis of left foot     Mononeuritis of right lower extremity     Tarsal tunnel syndrome of right side     Foot pain, left     Bilateral lower extremity edema     Chronic kidney disease, stage 3 (H)     Pulmonary nodules        Family History       family history includes Breast Cancer in her maternal grandmother; Diabetes in her mother.    Social History      reports that she has quit smoking. She has never used smokeless tobacco. She reports current  alcohol use of about 1.0 standard drink per week. She reports that she does not use drugs.      Review of Systems     Patient has no polyuria or polydipsia, no chest pain, dyspnea or TIA's, no numbness, tingling or pain in extremities  Remainder negative except as noted in HPI.    Answers for HPI/ROS submitted by the patient on 2/21/2023  General Symptoms: No  Skin Symptoms: No  HENT Symptoms: No  EYE SYMPTOMS: Yes  HEART SYMPTOMS: No  LUNG SYMPTOMS: Yes  INTESTINAL SYMPTOMS: No  URINARY SYMPTOMS: No  GYNECOLOGIC SYMPTOMS: No  BREAST SYMPTOMS: No  SKELETAL SYMPTOMS: No  BLOOD SYMPTOMS: No  NERVOUS SYSTEM SYMPTOMS: No  MENTAL HEALTH SYMPTOMS: No  Eye pain: Yes  Vision loss: No  Dry eyes: No  Watery eyes: No  Eye bulging: No  Double vision: No  Flashing of lights: Yes  Spots: Yes  Floaters: Yes  Redness: No  Crossed eyes: No  Tunnel Vision: No  Yellowing of eyes: No  Eye irritation: No  Cough: Yes  Sputum or phlegm: Yes  Coughing up blood: No  Difficulty breating or shortness of breath: No  Snoring: Yes  Wheezing: No  Difficulty breathing on exertion: No  Nighttime Cough: Yes  Difficulty breathing when lying flat: No          Vital Signs     There were no vitals taken for this visit.  Wt Readings from Last 3 Encounters:   11/04/22 84.4 kg (186 lb)   08/15/22 85.2 kg (187 lb 12.8 oz)   07/24/22 82.1 kg (181 lb)       Physical Exam     Constitutional:  Well developed, Well nourished  HENT:  Normocephalic,   Neck: normal in appearance  Eyes:  PERRL, Conjunctiva pink  Respiratory:  No respiratory distress  Skin: No acanthosis nigricans, lipoatrophy or lipodystrophy  Neurologic:  Alert & oriented x 3, nonfocal  Psychiatric:  Affect, Mood, Insight appropriate          Assessment     1. Type 2 diabetes mellitus without complication, with long-term current use of insulin (H)    2. Herpes simplex virus infection        Plan     Lynne's control has improved greatly. She will continue on her current medication regimen and will  "f/u with me in 3 months.       Jayla Hermosillo NP   Endocrinology  2/21/2023  8:32 AM        Lab Results     Microalbumin Urine mg/dL   Date Value Ref Range Status   04/26/2022 <0.50 0.00 - 1.99 mg/dL Final       Cholesterol   Date Value Ref Range Status   10/25/2021 196 <=199 mg/dL Final     Direct Measure HDL   Date Value Ref Range Status   10/25/2021 39 (L) >=50 mg/dL Final     Comment:     HDL Cholesterol Reference Range:     0-2 years:   No reference ranges established for patients under 2 years old  at [employer] for lipid analytes.    2-8 years:  Greater than 45 mg/dL     18 years and older:   Female: Greater than or equal to 50 mg/dL   Male:   Greater than or equal to 40 mg/dL     Triglycerides   Date Value Ref Range Status   10/25/2021 225 (H) <=149 mg/dL Final       [unfilled]      Current Medications     Outpatient Medications Prior to Visit   Medication Sig Dispense Refill     atorvastatin (LIPITOR) 40 MG tablet Take 1 tablet (40 mg) by mouth daily 90 tablet 2     blood glucose meter (GLUCOMETER) [BLOOD GLUCOSE METER (GLUCOMETER)] Use 1 each As Directed as needed. Dispense glucometer brand per patient's insurance at pharmacy discretion. 1 each 0     generic lancets (FINGERSTIX LANCETS) [GENERIC LANCETS (FINGERSTIX LANCETS)] Use to check blood sugar 1-2x daily. Dispense brand per patient's insurance at pharmacy discretion. 100 each 11     glipiZIDE (GLUCOTROL) 10 MG tablet Take 1 tablet (10 mg) by mouth 2 times daily (before meals) 180 tablet 1     Insulin Glargine-yfgn (SEMGLEE, YFGN,) 100 UNIT/ML SOPN Inject 90 Units Subcutaneous daily \"up to 90 units daily\" 81 mL 3     insulin pen needle (BD PEN NEEDLE TJ 2ND GEN) 32G X 4 MM miscellaneous USE TWO TIMES A DAY WITH INSULIN 100 each 3     JARDIANCE 25 MG TABS tablet TAKE 1 TABLET BY MOUTH EVERY DAY 90 tablet 0     semaglutide (OZEMPIC, 0.25 OR 0.5 MG/DOSE,) 2 MG/1.5ML SOPN pen Take 0.25 mg weekly x 4 weeks then increase to 0.5 mg weekly 1.5 " mL 5     sertraline (ZOLOFT) 100 MG tablet Take 1 tablet (100 mg) by mouth daily 90 tablet 3     valACYclovir (VALTREX) 1000 MG tablet [VALACYCLOVIR (VALTREX) 1000 MG TABLET] TAKE 2 TABLETS BY MOUTH NOW AND 2 TABLETS IN 12 HOURS. PRN. 4 tablet 3     No facility-administered medications prior to visit.               Video-Visit Details    Type of service:  Video Visit    Video Start Time (time video started): 0730    Video End Time (time video stopped): 0750    Originating Location (pt. Location): Home        Distant Location (provider location):  On-site    Mode of Communication:  Video Conference via [employer]          Date of last OV: 11/15/22  Reason for Visit: DM    Blood Glucose Log:

## 2023-02-22 DIAGNOSIS — Z79.4 TYPE 2 DIABETES MELLITUS WITHOUT COMPLICATION, WITH LONG-TERM CURRENT USE OF INSULIN (H): ICD-10-CM

## 2023-02-22 DIAGNOSIS — E11.9 TYPE 2 DIABETES MELLITUS WITHOUT COMPLICATION, WITH LONG-TERM CURRENT USE OF INSULIN (H): ICD-10-CM

## 2023-02-22 RX ORDER — EMPAGLIFLOZIN 25 MG/1
TABLET, FILM COATED ORAL
Qty: 90 TABLET | Refills: 0 | Status: SHIPPED | OUTPATIENT
Start: 2023-02-22 | End: 2023-07-11

## 2023-04-26 ENCOUNTER — ANCILLARY PROCEDURE (OUTPATIENT)
Dept: MAMMOGRAPHY | Facility: CLINIC | Age: 63
End: 2023-04-26
Attending: STUDENT IN AN ORGANIZED HEALTH CARE EDUCATION/TRAINING PROGRAM
Payer: COMMERCIAL

## 2023-04-26 DIAGNOSIS — Z12.31 VISIT FOR SCREENING MAMMOGRAM: ICD-10-CM

## 2023-04-26 PROCEDURE — 77067 SCR MAMMO BI INCL CAD: CPT

## 2023-05-08 ENCOUNTER — HEALTH MAINTENANCE LETTER (OUTPATIENT)
Age: 63
End: 2023-05-08

## 2023-05-10 ENCOUNTER — ANCILLARY PROCEDURE (OUTPATIENT)
Dept: MAMMOGRAPHY | Facility: CLINIC | Age: 63
End: 2023-05-10
Attending: STUDENT IN AN ORGANIZED HEALTH CARE EDUCATION/TRAINING PROGRAM
Payer: COMMERCIAL

## 2023-05-10 DIAGNOSIS — N64.89 BREAST ASYMMETRY: ICD-10-CM

## 2023-05-10 PROCEDURE — 77061 BREAST TOMOSYNTHESIS UNI: CPT | Mod: RT

## 2023-05-11 ENCOUNTER — LAB (OUTPATIENT)
Dept: LAB | Facility: CLINIC | Age: 63
End: 2023-05-11
Payer: COMMERCIAL

## 2023-05-11 DIAGNOSIS — Z79.4 TYPE 2 DIABETES MELLITUS WITHOUT COMPLICATION, WITH LONG-TERM CURRENT USE OF INSULIN (H): ICD-10-CM

## 2023-05-11 DIAGNOSIS — E11.9 TYPE 2 DIABETES MELLITUS WITHOUT COMPLICATION, WITH LONG-TERM CURRENT USE OF INSULIN (H): ICD-10-CM

## 2023-05-11 LAB
ANION GAP SERPL CALCULATED.3IONS-SCNC: 11 MMOL/L (ref 7–15)
BUN SERPL-MCNC: 19.6 MG/DL (ref 8–23)
CALCIUM SERPL-MCNC: 9.7 MG/DL (ref 8.8–10.2)
CHLORIDE SERPL-SCNC: 104 MMOL/L (ref 98–107)
CREAT SERPL-MCNC: 0.9 MG/DL (ref 0.51–0.95)
CREAT UR-MCNC: 80.1 MG/DL
DEPRECATED HCO3 PLAS-SCNC: 24 MMOL/L (ref 22–29)
GFR SERPL CREATININE-BSD FRML MDRD: 72 ML/MIN/1.73M2
GLUCOSE SERPL-MCNC: 161 MG/DL (ref 70–99)
HBA1C MFR BLD: 8.2 % (ref 0–5.6)
MICROALBUMIN UR-MCNC: <12 MG/L
MICROALBUMIN/CREAT UR: NORMAL MG/G{CREAT}
POTASSIUM SERPL-SCNC: 4.3 MMOL/L (ref 3.4–5.3)
SODIUM SERPL-SCNC: 139 MMOL/L (ref 136–145)

## 2023-05-11 PROCEDURE — 82043 UR ALBUMIN QUANTITATIVE: CPT

## 2023-05-11 PROCEDURE — 36415 COLL VENOUS BLD VENIPUNCTURE: CPT

## 2023-05-11 PROCEDURE — 82570 ASSAY OF URINE CREATININE: CPT

## 2023-05-11 PROCEDURE — 80048 BASIC METABOLIC PNL TOTAL CA: CPT

## 2023-05-11 PROCEDURE — 83036 HEMOGLOBIN GLYCOSYLATED A1C: CPT

## 2023-05-22 DIAGNOSIS — F33.1 MODERATE EPISODE OF RECURRENT MAJOR DEPRESSIVE DISORDER (H): ICD-10-CM

## 2023-05-23 ENCOUNTER — OFFICE VISIT (OUTPATIENT)
Dept: PODIATRY | Facility: CLINIC | Age: 63
End: 2023-05-23
Payer: COMMERCIAL

## 2023-05-23 ENCOUNTER — HOSPITAL ENCOUNTER (OUTPATIENT)
Dept: GENERAL RADIOLOGY | Facility: HOSPITAL | Age: 63
Discharge: HOME | End: 2023-05-23
Attending: PODIATRIST | Admitting: PODIATRIST
Payer: COMMERCIAL

## 2023-05-23 ENCOUNTER — VIRTUAL VISIT (OUTPATIENT)
Dept: ENDOCRINOLOGY | Facility: CLINIC | Age: 63
End: 2023-05-23
Payer: COMMERCIAL

## 2023-05-23 VITALS
OXYGEN SATURATION: 96 % | HEART RATE: 96 BPM | SYSTOLIC BLOOD PRESSURE: 116 MMHG | DIASTOLIC BLOOD PRESSURE: 72 MMHG | RESPIRATION RATE: 18 BRPM

## 2023-05-23 DIAGNOSIS — M79.672 LEFT FOOT PAIN: ICD-10-CM

## 2023-05-23 DIAGNOSIS — Z79.4 TYPE 2 DIABETES MELLITUS WITHOUT COMPLICATION, WITH LONG-TERM CURRENT USE OF INSULIN (H): Primary | ICD-10-CM

## 2023-05-23 DIAGNOSIS — M79.672 LEFT FOOT PAIN: Primary | ICD-10-CM

## 2023-05-23 DIAGNOSIS — E11.9 TYPE 2 DIABETES MELLITUS WITHOUT COMPLICATION, WITH LONG-TERM CURRENT USE OF INSULIN (H): Primary | ICD-10-CM

## 2023-05-23 PROCEDURE — 99214 OFFICE O/P EST MOD 30 MIN: CPT | Mod: VID | Performed by: NURSE PRACTITIONER

## 2023-05-23 PROCEDURE — 73630 X-RAY EXAM OF FOOT: CPT | Mod: 26 | Performed by: PODIATRIST

## 2023-05-23 PROCEDURE — 73630 X-RAY EXAM OF FOOT: CPT | Mod: LT

## 2023-05-23 PROCEDURE — 99213 OFFICE O/P EST LOW 20 MIN: CPT | Performed by: PODIATRIST

## 2023-05-23 RX ORDER — SERTRALINE HYDROCHLORIDE 100 MG/1
100 TABLET, FILM COATED ORAL DAILY
Qty: 90 TABLET | Refills: 0 | Status: SHIPPED | OUTPATIENT
Start: 2023-05-23 | End: 2023-08-29

## 2023-05-23 RX ORDER — IBUPROFEN 600 MG/1
600 TABLET, FILM COATED ORAL EVERY 6 HOURS PRN
Qty: 42 TABLET | Refills: 0 | Status: SHIPPED | OUTPATIENT
Start: 2023-05-23 | End: 2023-08-29

## 2023-05-23 ASSESSMENT — PAIN SCALES - GENERAL: PAINLEVEL: MODERATE PAIN (4)

## 2023-05-23 NOTE — LETTER
5/23/2023         RE: Zoe Alvarez  [address] [facility] [place] [address]        Dear Colleague,    Thank you for referring your patient, Zoe Alvarez, to the [facility]. Please see a copy of my visit note below.    [facility] ENDOCRINOLOGY    Diabetes Note 5/23/2023    Zoe Alvarez, 1960, [id number]          Reason for visit      1. Type 2 diabetes mellitus without complication, with long-term current use of insulin (H)        HPI     Zoe Alvarez is a very pleasant 62 year old old female who presents for follow up.  SUMMARY:    Lynne is contacted today via Video Visit in f/u for DM 2. Her current A1c is 8.2 and improved from her previous of 8.4  She continues to take Semglee, 45 units in the morning and 45 units in the evening.  She is taking Glipizide, 10 mg BID, Jardiance 25 mg daily, and Ozempic 0.5 mg weekly.     She mentions that she has had some heel pain recently, and will be seeing Dr Mao today. She also had a procedure done to her eyes to facilitate drainage to fowler off Glaucoma.           Blood glucose data:      Past Medical History     Patient Active Problem List   Diagnosis     Obesity (BMI 30-39.9)     Chronic venous insufficiency     Epidermal Inclusion Cyst     Hyperlipidemia     Vertigo     Ingrowing Toenail     Type 2 diabetes mellitus without complication, with long-term current use of insulin (H)     Eczema     RLQ abdominal pain     Tarsal tunnel syndrome of left side     Compression neuropathy of right lower extremity     Mononeuritis of left lower extremity     Plantar fascial fibromatosis of left foot     Mononeuritis of right lower extremity     Tarsal tunnel syndrome of right side     Foot pain, left     Bilateral lower extremity edema     Chronic kidney disease, stage 3 (H)     Pulmonary nodules        Family History       family history includes Breast Cancer in her maternal grandmother; Diabetes in her mother.    Social  History      reports that she has quit smoking. She has never used smokeless tobacco. She reports current alcohol use of about 1.0 standard drink of alcohol per week. She reports that she does not use drugs.      Review of Systems     Patient has no polyuria or polydipsia, no chest pain, dyspnea or TIA's, no numbness, tingling or pain in extremities  Remainder negative except as noted in HPI.      Vital Signs     There were no vitals taken for this visit.  Wt Readings from Last 3 Encounters:   11/04/22 84.4 kg (186 lb)   08/15/22 85.2 kg (187 lb 12.8 oz)   07/24/22 82.1 kg (181 lb)       Physical Exam     Constitutional:  Well developed, Well nourished  HENT:  Normocephalic,   Neck: normal in appearance  Eyes:  PERRL, Conjunctiva pink  Respiratory:  No respiratory distress  Skin: No acanthosis nigricans, lipoatrophy or lipodystrophy  Neurologic:  Alert & oriented x 3, nonfocal  Psychiatric:  Affect, Mood, Insight appropriate          Assessment     1. Type 2 diabetes mellitus without complication, with long-term current use of insulin (H)        Plan     Will increase her Ozempic to 1 mg weekly. She will decrease her insulin dose by 10 units (40/40) and we will reconvene in 3 months.      Jayla Hermosillo NP   Endocrinology  5/23/2023  7:34 AM      Lab Results     Microalbumin Urine mg/dL   Date Value Ref Range Status   04/26/2022 <0.50 0.00 - 1.99 mg/dL Final       Cholesterol   Date Value Ref Range Status   10/25/2021 196 <=199 mg/dL Final     Direct Measure HDL   Date Value Ref Range Status   10/25/2021 39 (L) >=50 mg/dL Final     Comment:     HDL Cholesterol Reference Range:     0-2 years:   No reference ranges established for patients under 2 years old  at [facility] for lipid analytes.    2-8 years:  Greater than 45 mg/dL     18 years and older:   Female: Greater than or equal to 50 mg/dL   Male:   Greater than or equal to 40 mg/dL     Triglycerides   Date Value Ref Range Status   10/25/2021 225  "(H) <=149 mg/dL Final       [unfilled]      Current Medications     Outpatient Medications Prior to Visit   Medication Sig Dispense Refill     atorvastatin (LIPITOR) 40 MG tablet Take 1 tablet (40 mg) by mouth daily 90 tablet 2     blood glucose meter (GLUCOMETER) [BLOOD GLUCOSE METER (GLUCOMETER)] Use 1 each As Directed as needed. Dispense glucometer brand per patient's insurance at pharmacy discretion. 1 each 0     generic lancets (FINGERSTIX LANCETS) [GENERIC LANCETS (FINGERSTIX LANCETS)] Use to check blood sugar 1-2x daily. Dispense brand per patient's insurance at pharmacy discretion. 100 each 11     glipiZIDE (GLUCOTROL) 10 MG tablet Take 1 tablet (10 mg) by mouth 2 times daily (before meals) 180 tablet 1     Insulin Glargine-yfgn (SEMGLEE, YFGN,) 100 UNIT/ML SOPN Inject 90 Units Subcutaneous daily \"up to 90 units daily\" 81 mL 3     insulin pen needle (BD PEN NEEDLE TJ 2ND GEN) 32G X 4 MM miscellaneous USE TWO TIMES A DAY WITH INSULIN 100 each 3     JARDIANCE 25 MG TABS tablet TAKE ONE TABLET BY MOUTH ONCE DAILY 90 tablet 0     sertraline (ZOLOFT) 100 MG tablet Take 1 tablet (100 mg) by mouth daily 90 tablet 3     valACYclovir (VALTREX) 1000 mg tablet [VALACYCLOVIR (VALTREX) 1000 MG TABLET] TAKE 2 TABLETS BY MOUTH NOW AND 2 TABLETS IN 12 HOURS. PRN. 4 tablet 5     semaglutide (OZEMPIC, 0.25 OR 0.5 MG/DOSE,) 2 MG/1.5ML SOPN pen Take 0.25 mg weekly x 4 weeks then increase to 0.5 mg weekly 1.5 mL 5     No facility-administered medications prior to visit.               Blood sugars:    5/23/23:  205        Again, thank you for allowing me to participate in the care of your patient.        Sincerely,        Jayla Hermosillo NP    "

## 2023-05-23 NOTE — PROGRESS NOTES
[facility]OCRINOLOGY    Diabetes Note 5/23/2023    Zoe Alvarez, 1960, [id number]          Reason for visit      1. Type 2 diabetes mellitus without complication, with long-term current use of insulin (H)        HPI     Zoe Alvarez is a very pleasant 62 year old old female who presents for follow up.  SUMMARY:    Lynne is contacted today via Video Visit in f/u for DM 2. Her current A1c is 8.2 and improved from her previous of 8.4  She continues to take Semglee, 45 units in the morning and 45 units in the evening.  She is taking Glipizide, 10 mg BID, Jardiance 25 mg daily, and Ozempic 0.5 mg weekly.     She mentions that she has had some heel pain recently, and will be seeing Dr Mao today. She also had a procedure done to her eyes to facilitate drainage to fowler off Glaucoma.           Blood glucose data:      Past Medical History     Patient Active Problem List   Diagnosis     Obesity (BMI 30-39.9)     Chronic venous insufficiency     Epidermal Inclusion Cyst     Hyperlipidemia     Vertigo     Ingrowing Toenail     Type 2 diabetes mellitus without complication, with long-term current use of insulin (H)     Eczema     RLQ abdominal pain     Tarsal tunnel syndrome of left side     Compression neuropathy of right lower extremity     Mononeuritis of left lower extremity     Plantar fascial fibromatosis of left foot     Mononeuritis of right lower extremity     Tarsal tunnel syndrome of right side     Foot pain, left     Bilateral lower extremity edema     Chronic kidney disease, stage 3 (H)     Pulmonary nodules        Family History       family history includes Breast Cancer in her maternal grandmother; Diabetes in her mother.    Social History      reports that she has quit smoking. She has never used smokeless tobacco. She reports current alcohol use of about 1.0 standard drink of alcohol per week. She reports that she does not use drugs.      Review of Systems     Patient has no polyuria  or polydipsia, no chest pain, dyspnea or TIA's, no numbness, tingling or pain in extremities  Remainder negative except as noted in HPI.      Vital Signs     There were no vitals taken for this visit.  Wt Readings from Last 3 Encounters:   11/04/22 84.4 kg (186 lb)   08/15/22 85.2 kg (187 lb 12.8 oz)   07/24/22 82.1 kg (181 lb)       Physical Exam     Constitutional:  Well developed, Well nourished  HENT:  Normocephalic,   Neck: normal in appearance  Eyes:  PERRL, Conjunctiva pink  Respiratory:  No respiratory distress  Skin: No acanthosis nigricans, lipoatrophy or lipodystrophy  Neurologic:  Alert & oriented x 3, nonfocal  Psychiatric:  Affect, Mood, Insight appropriate          Assessment     1. Type 2 diabetes mellitus without complication, with long-term current use of insulin (H)        Plan     Will increase her Ozempic to 1 mg weekly. She will decrease her insulin dose by 10 units (40/40) and we will reconvene in 3 months.      Jayla Hermosillo NP   Endocrinology  5/23/2023  7:34 AM      Lab Results     Microalbumin Urine mg/dL   Date Value Ref Range Status   04/26/2022 <0.50 0.00 - 1.99 mg/dL Final       Cholesterol   Date Value Ref Range Status   10/25/2021 196 <=199 mg/dL Final     Direct Measure HDL   Date Value Ref Range Status   10/25/2021 39 (L) >=50 mg/dL Final     Comment:     HDL Cholesterol Reference Range:     0-2 years:   No reference ranges established for patients under 2 years old  at [facility] for lipid analytes.    2-8 years:  Greater than 45 mg/dL     18 years and older:   Female: Greater than or equal to 50 mg/dL   Male:   Greater than or equal to 40 mg/dL     Triglycerides   Date Value Ref Range Status   10/25/2021 225 (H) <=149 mg/dL Final       [unfilled]      Current Medications     Outpatient Medications Prior to Visit   Medication Sig Dispense Refill     atorvastatin (LIPITOR) 40 MG tablet Take 1 tablet (40 mg) by mouth daily 90 tablet 2     blood glucose meter  "(GLUCOMETER) [BLOOD GLUCOSE METER (GLUCOMETER)] Use 1 each As Directed as needed. Dispense glucometer brand per patient's insurance at pharmacy discretion. 1 each 0     generic lancets (FINGERSTIX LANCETS) [GENERIC LANCETS (FINGERSTIX LANCETS)] Use to check blood sugar 1-2x daily. Dispense brand per patient's insurance at pharmacy discretion. 100 each 11     glipiZIDE (GLUCOTROL) 10 MG tablet Take 1 tablet (10 mg) by mouth 2 times daily (before meals) 180 tablet 1     Insulin Glargine-yfgn (SEMGLEE, YFGN,) 100 UNIT/ML SOPN Inject 90 Units Subcutaneous daily \"up to 90 units daily\" 81 mL 3     insulin pen needle (BD PEN NEEDLE TJ 2ND GEN) 32G X 4 MM miscellaneous USE TWO TIMES A DAY WITH INSULIN 100 each 3     JARDIANCE 25 MG TABS tablet TAKE ONE TABLET BY MOUTH ONCE DAILY 90 tablet 0     sertraline (ZOLOFT) 100 MG tablet Take 1 tablet (100 mg) by mouth daily 90 tablet 3     valACYclovir (VALTREX) 1000 mg tablet [VALACYCLOVIR (VALTREX) 1000 MG TABLET] TAKE 2 TABLETS BY MOUTH NOW AND 2 TABLETS IN 12 HOURS. PRN. 4 tablet 5     semaglutide (OZEMPIC, 0.25 OR 0.5 MG/DOSE,) 2 MG/1.5ML SOPN pen Take 0.25 mg weekly x 4 weeks then increase to 0.5 mg weekly 1.5 mL 5     No facility-administered medications prior to visit.               Blood sugars:    5/23/23:  205    "

## 2023-05-23 NOTE — Clinical Note
5/23/2023         RE: Zoe Alvarez  [address] [facility] [address]        Dear Colleague,    Thank you for referring your patient, Zoe Alvarez, to the [facility]. Please see a copy of my visit note below.    FOOT AND ANKLE SURGERY/PODIATRY Progress Note        ASSESSMENT:   Left heel pain unknown etiology    HPI: Zoe Alvarez was seen again today complaining of severe pain in the back of her left heel.  The patient stated she has had this pain for approximately 1 week.  She describes it as a sharp pain which is aggravated with weightbearing and ambulation.  She has no pain while resting.  She has not had any redness or swelling.  She stated that the back of her heel is quite sensitive to even light touch.  She does not recall stepping on any sharp object.  She typically wears an open back shoe so her pain cannot be attributed to ill fitting shoes.    Past Medical History:   Diagnosis Date     Arthritis      Diabetes mellitus (H)      History of transfusion     AFTER A MISCARRIAGE     Migraine      Neuropathy         Past Surgical History:   Procedure Laterality Date     BLADDER SUSPENSION  2007     COLONOSCOPY       COLONOSCOPY N/A 3/18/2022    Procedure: COLONOSCOPY, FLEXIBLE, WITH LESION REMOVAL USING SNARE with polypectomies by cold snare and 1 clip applied and cautery to rectum polyp sites to prevent bleeding;  Surgeon: Cristobal Mcclelland MD;  Location:  GI     DILATION AND CURETTAGE      SUCTION POST MISCARRIAGE     HC TARSAL TUNNEL RELEASE Left 9/21/2018    Procedure: DELLON QUADRUPLE NERVE DECOMPRESSION EXCISION PLANTAR FIBROMA ALL LEFT FOOT;  Surgeon: Jules Mao DPM;  Location: [facility] OR;  Service: Podiatry     HYSTERECTOMY      1999     KNEE SURGERY       LAPAROSCOPIC APPENDECTOMY N/A 7/10/2014    Procedure: Laparoscopic Appendectomy;  Surgeon: Germain Howe MD;  Location: [facility] OR;  Service:      [facility] APPENDECTOMY       "Description: Appendectomy;  Recorded: 07/17/2014;  Comments: JULY 2014     [facility] TOTAL ABDOM HYSTERECTOMY      Description: Hysterectomy;  Recorded: 01/11/2012;       Allergies   Allergen Reactions     Bactrim [Sulfamethoxazole-Trimethoprim] Itching     Amoxicillin Swelling and Itching     Metformin Other (See Comments)     GI Upset         Current Outpatient Medications:      atorvastatin (LIPITOR) 40 MG tablet, Take 1 tablet (40 mg) by mouth daily, Disp: 90 tablet, Rfl: 2     blood glucose meter (GLUCOMETER), [BLOOD GLUCOSE METER (GLUCOMETER)] Use 1 each As Directed as needed. Dispense glucometer brand per patient's insurance at pharmacy discretion., Disp: 1 each, Rfl: 0     generic lancets (FINGERSTIX LANCETS), [GENERIC LANCETS (FINGERSTIX LANCETS)] Use to check blood sugar 1-2x daily. Dispense brand per patient's insurance at pharmacy discretion., Disp: 100 each, Rfl: 11     glipiZIDE (GLUCOTROL) 10 MG tablet, Take 1 tablet (10 mg) by mouth 2 times daily (before meals), Disp: 180 tablet, Rfl: 1     Insulin Glargine-yfgn (SEMGLEE, YFGN,) 100 UNIT/ML SOPN, Inject 90 Units Subcutaneous daily \"up to 90 units daily\", Disp: 81 mL, Rfl: 3     insulin pen needle (BD PEN NEEDLE TJ 2ND GEN) 32G X 4 MM miscellaneous, USE TWO TIMES A DAY WITH INSULIN, Disp: 100 each, Rfl: 3     JARDIANCE 25 MG TABS tablet, TAKE ONE TABLET BY MOUTH ONCE DAILY, Disp: 90 tablet, Rfl: 0     Semaglutide, 1 MG/DOSE, (OZEMPIC) 4 MG/3ML pen, Inject 1 mg Subcutaneous every 7 days, Disp: 9 mL, Rfl: 1     sertraline (ZOLOFT) 100 MG tablet, Take 1 tablet (100 mg) by mouth daily, Disp: 90 tablet, Rfl: 3     valACYclovir (VALTREX) 1000 mg tablet, [VALACYCLOVIR (VALTREX) 1000 MG TABLET] TAKE 2 TABLETS BY MOUTH NOW AND 2 TABLETS IN 12 HOURS. PRN., Disp: 4 tablet, Rfl: 5    Family History   Problem Relation Age of Onset     Diabetes Mother      Breast Cancer Maternal Grandmother      Colon Cancer No family hx of        Social History     Socioeconomic " History     Marital status:      Spouse name: Not on file     Number of children: 2     Years of education: Not on file     Highest education level: Not on file   Occupational History     Not on file   Tobacco Use     Smoking status: Former     Smokeless tobacco: Never   Vaping Use     Vaping status: Not on file   Substance and Sexual Activity     Alcohol use: Yes     Alcohol/week: 1.0 standard drink of alcohol     Comment: Alcoholic Drinks/day: <1 per wk     Drug use: No     Sexual activity: Yes     Partners: Male     Birth control/protection: Surgical   Other Topics Concern     Parent/sibling w/ CABG, MI or angioplasty before 65F 55M? Not Asked   Social History Narrative     Not on file     Social Determinants of Health     Financial Resource Strain: Not on file   Food Insecurity: Not on file   Transportation Needs: Not on file   Physical Activity: Not on file   Stress: Not on file   Social Connections: Not on file   Intimate Partner Violence: Not on file   Housing Stability: Not on file       10 point Review of Systems is negative      /72   Pulse 96   Resp 18   SpO2 96%     BMI= There is no height or weight on file to calculate BMI.    OBJECTIVE:  General appearance: Patient is alert and fully cooperative with history & exam.  No sign of distress is noted during the visit.  Vascular: Dorsalis pedis and posterior tibial pulses are palpable. There is no pedal hair growth bilaterally.  CFT < 3 sec from anterior tibial surface to distal digits bilaterally. There is no appreciable edema noted.  Dermatologic: Turgor and texture are within normal limits. No coloration or temperature changes. No primary or secondary lesions noted.  Neurologic: All epicritic and proprioceptive sensations are grossly intact bilaterally.  Musculoskeletal: All active and passive ankle, subtalar, midtarsal, and 1st MPJ range of motion are grossly intact without pain or crepitus, with the exception of none. Manual muscle  strength is within normal limits bilaterally. All dorsiflexors, plantarflexors, invertors, evertors are intact bilaterally. Tenderness present to the posterior aspect of the left heel on palpation.  No tenderness to the left heel with range of motion. Calf is soft/non-tender without warmth/induration    Imaging:         MA Diagnostic Right w/Michaelle    Result Date: 5/10/2023  EXAM: MA DIAGNOSTIC RIGHT W/ MICHAELLE LOCATION: [facility] DATE/TIME: 5/10/2023 9:26 AM CDT INDICATION:  Breast asymmetry COMPARISON: 04/26/2023, 05/02/2022, 08/21/2020, 02/05/2018, 07/02/2014, 10/18/2011 MAMMOGRAPHIC FINDINGS: Right full-field digital diagnostic mammogram performed. The breasts are almost entirely fatty.Breast tomosynthesis was used in interpretation. On the additional tomographic images the asymmetry seen on the screening exam resolves consistent with a summation artifact (superimposition of normal structures). There has been no significant change compared to the prior exams.     IMPRESSION: 1.  No findings to suggest malignancy. ACR BI-RADS Category 1: Negative. Return to annual screening schedule is recommended. Results were given to the patient at the time of exam.     MA Screening Digital Bilateral    Result Date: 4/26/2023  BILATERAL FULL FIELD DIGITAL SCREENING MAMMOGRAM Performed on: 4/26/23 Compared to: 05/02/2022, 08/21/2020, 02/05/2018, and 07/02/2014 Technique: This study was evaluated with the assistance of Computer-Aided Detection. Findings: The breasts are almost entirely fatty.. There is a possible asymmetry in the right breast central core, middle depth. The remainder of the breast tissue is unremarkable. There is no suspicious finding of the left breast.    IMPRESSION: BI-RADS CATEGORY: 0 - Incomplete - Need Additional Imaging RECOMMENDED FOLLOW-UP: Additional mammographic images and possible ultrasound of the right breast. The results and recommendations of this examination will be  communicated to the patient and the imaging center will attempt to contact the patient within 2-3 business days to schedule follow-up imaging. Olena Boyd MD            TREATMENT:  An x-ray of the left foot was taken today.  The x-rays were read and interpreted by this physician.        Jules Salcido DPM  [facility] Foot & Ankle Surgery/Podiatry         Again, thank you for allowing me to participate in the care of your patient.        Sincerely,        Jules Mao DPM

## 2023-05-23 NOTE — TELEPHONE ENCOUNTER
"Routing refill request to provider for review/approval because:  Patient needs to be seen because:  Past due for 6 month follow up on depression and PHQ-9    Last Written Prescription Date:  3/30/2022  Last Fill Quantity: 90,  # refills: 3   Last office visit provider:  8/15/2022     Requested Prescriptions   Pending Prescriptions Disp Refills     sertraline (ZOLOFT) 100 MG tablet 90 tablet 3     Sig: Take 1 tablet (100 mg) by mouth daily       SSRIs Protocol Failed - 5/22/2023  1:55 PM        Failed - PHQ-9 score less than 5 in past 6 months     Please review last PHQ-9 score.           Failed - Recent (6 mo) or future (30 days) visit within the authorizing provider's specialty     Patient had office visit in the last 6 months or has a visit in the next 30 days with authorizing provider or within the authorizing provider's specialty.  See \"Patient Info\" tab in inbasket, or \"Choose Columns\" in Meds & Orders section of the refill encounter.            Passed - Medication is active on med list        Passed - Patient is age 18 or older        Passed - No active pregnancy on record        Passed - No positive pregnancy test in last 12 months             Maranda Morris RN 05/23/23 1:48 PM  "

## 2023-05-23 NOTE — PROGRESS NOTES
FOOT AND ANKLE SURGERY/PODIATRY Progress Note        ASSESSMENT:   Left heel pain unknown etiology    HPI: Zoe Alvarez was seen again today complaining of severe pain in the back of her left heel.  The patient stated she has had this pain for approximately 1 week.  She describes it as a sharp pain which is aggravated with weightbearing and ambulation.  She has no pain while resting.  She has not had any redness or swelling.  She stated that the back of her heel is quite sensitive to even light touch.  She does not recall stepping on any sharp object.  She typically wears an open back shoe so her pain cannot be attributed to ill fitting shoes.    Past Medical History:   Diagnosis Date     Arthritis      Diabetes mellitus (H)      History of transfusion     AFTER A MISCARRIAGE     Migraine      Neuropathy         Past Surgical History:   Procedure Laterality Date     BLADDER SUSPENSION  2007     COLONOSCOPY       COLONOSCOPY N/A 3/18/2022    Procedure: COLONOSCOPY, FLEXIBLE, WITH LESION REMOVAL USING SNARE with polypectomies by cold snare and 1 clip applied and cautery to rectum polyp sites to prevent bleeding;  Surgeon: Cristobal Mcclelland MD;  Location:  GI     DILATION AND CURETTAGE      SUCTION POST MISCARRIAGE     HC TARSAL TUNNEL RELEASE Left 9/21/2018    Procedure: DELLON QUADRUPLE NERVE DECOMPRESSION EXCISION PLANTAR FIBROMA ALL LEFT FOOT;  Surgeon: Jules Mao DPM;  Location: [facility];  Service: Podiatry     HYSTERECTOMY      1999     KNEE SURGERY       LAPAROSCOPIC APPENDECTOMY N/A 7/10/2014    Procedure: Laparoscopic Appendectomy;  Surgeon: Germain Howe MD;  Location: [facility];  Service:      [facility] APPENDECTOMY      Description: Appendectomy;  Recorded: 07/17/2014;  Comments: JULY 2014     [facility] TOTAL ABDOM HYSTERECTOMY      Description: Hysterectomy;  Recorded: 01/11/2012;       Allergies   Allergen Reactions     Bactrim [Sulfamethoxazole-Trimethoprim] Itching     Amoxicillin  "Swelling and Itching     Metformin Other (See Comments)     GI Upset         Current Outpatient Medications:      atorvastatin (LIPITOR) 40 MG tablet, Take 1 tablet (40 mg) by mouth daily, Disp: 90 tablet, Rfl: 2     blood glucose meter (GLUCOMETER), [BLOOD GLUCOSE METER (GLUCOMETER)] Use 1 each As Directed as needed. Dispense glucometer brand per patient's insurance at pharmacy discretion., Disp: 1 each, Rfl: 0     generic lancets (FINGERSTIX LANCETS), [GENERIC LANCETS (FINGERSTIX LANCETS)] Use to check blood sugar 1-2x daily. Dispense brand per patient's insurance at pharmacy discretion., Disp: 100 each, Rfl: 11     glipiZIDE (GLUCOTROL) 10 MG tablet, Take 1 tablet (10 mg) by mouth 2 times daily (before meals), Disp: 180 tablet, Rfl: 1     Insulin Glargine-yfgn (SEMGLEE, YFGN,) 100 UNIT/ML SOPN, Inject 90 Units Subcutaneous daily \"up to 90 units daily\", Disp: 81 mL, Rfl: 3     insulin pen needle (BD PEN NEEDLE TJ 2ND GEN) 32G X 4 MM miscellaneous, USE TWO TIMES A DAY WITH INSULIN, Disp: 100 each, Rfl: 3     JARDIANCE 25 MG TABS tablet, TAKE ONE TABLET BY MOUTH ONCE DAILY, Disp: 90 tablet, Rfl: 0     Semaglutide, 1 MG/DOSE, (OZEMPIC) 4 MG/3ML pen, Inject 1 mg Subcutaneous every 7 days, Disp: 9 mL, Rfl: 1     sertraline (ZOLOFT) 100 MG tablet, Take 1 tablet (100 mg) by mouth daily, Disp: 90 tablet, Rfl: 3     valACYclovir (VALTREX) 1000 mg tablet, [VALACYCLOVIR (VALTREX) 1000 MG TABLET] TAKE 2 TABLETS BY MOUTH NOW AND 2 TABLETS IN 12 HOURS. PRN., Disp: 4 tablet, Rfl: 5    Family History   Problem Relation Age of Onset     Diabetes Mother      Breast Cancer Maternal Grandmother      Colon Cancer No family hx of        Social History     Socioeconomic History     Marital status:      Spouse name: Not on file     Number of children: 2     Years of education: Not on file     Highest education level: Not on file   Occupational History     Not on file   Tobacco Use     Smoking status: Former     Smokeless " tobacco: Never   Vaping Use     Vaping status: Not on file   Substance and Sexual Activity     Alcohol use: Yes     Alcohol/week: 1.0 standard drink of alcohol     Comment: Alcoholic Drinks/day: <1 per wk     Drug use: No     Sexual activity: Yes     Partners: Male     Birth control/protection: Surgical   Other Topics Concern     Parent/sibling w/ CABG, MI or angioplasty before 65F 55M? Not Asked   Social History Narrative     Not on file     Social Determinants of Health     Financial Resource Strain: Not on file   Food Insecurity: Not on file   Transportation Needs: Not on file   Physical Activity: Not on file   Stress: Not on file   Social Connections: Not on file   Intimate Partner Violence: Not on file   Housing Stability: Not on file       10 point Review of Systems is negative      /72   Pulse 96   Resp 18   SpO2 96%     BMI= There is no height or weight on file to calculate BMI.    OBJECTIVE:  General appearance: Patient is alert and fully cooperative with history & exam.  No sign of distress is noted during the visit.  Vascular: Dorsalis pedis and posterior tibial pulses are palpable. There is no pedal hair growth bilaterally.  CFT < 3 sec from anterior tibial surface to distal digits bilaterally. There is no appreciable edema noted.  Dermatologic: Turgor and texture are within normal limits. No coloration or temperature changes. No primary or secondary lesions noted.  Neurologic: All epicritic and proprioceptive sensations are grossly intact bilaterally.  Musculoskeletal: All active and passive ankle, subtalar, midtarsal, and 1st MPJ range of motion are grossly intact without pain or crepitus, with the exception of none. Manual muscle strength is within normal limits bilaterally. All dorsiflexors, plantarflexors, invertors, evertors are intact bilaterally. Tenderness present to the posterior aspect of the left heel on palpation.  No tenderness to the left heel with range of motion. Calf is  soft/non-tender without warmth/induration    Imaging:         MA Diagnostic Right w/Michaelle    Result Date: 5/10/2023  EXAM: MA DIAGNOSTIC RIGHT W/ MICHAELLE LOCATION: [facility] DATE/TIME: 5/10/2023 9:26 AM CDT INDICATION:  Breast asymmetry COMPARISON: 04/26/2023, 05/02/2022, 08/21/2020, 02/05/2018, 07/02/2014, 10/18/2011 MAMMOGRAPHIC FINDINGS: Right full-field digital diagnostic mammogram performed. The breasts are almost entirely fatty.Breast tomosynthesis was used in interpretation. On the additional tomographic images the asymmetry seen on the screening exam resolves consistent with a summation artifact (superimposition of normal structures). There has been no significant change compared to the prior exams.     IMPRESSION: 1.  No findings to suggest malignancy. ACR BI-RADS Category 1: Negative. Return to annual screening schedule is recommended. Results were given to the patient at the time of exam.     MA Screening Digital Bilateral    Result Date: 4/26/2023  BILATERAL FULL FIELD DIGITAL SCREENING MAMMOGRAM Performed on: 4/26/23 Compared to: 05/02/2022, 08/21/2020, 02/05/2018, and 07/02/2014 Technique: This study was evaluated with the assistance of Computer-Aided Detection. Findings: The breasts are almost entirely fatty.. There is a possible asymmetry in the right breast central core, middle depth. The remainder of the breast tissue is unremarkable. There is no suspicious finding of the left breast.    IMPRESSION: BI-RADS CATEGORY: 0 - Incomplete - Need Additional Imaging RECOMMENDED FOLLOW-UP: Additional mammographic images and possible ultrasound of the right breast. The results and recommendations of this examination will be communicated to the patient and the imaging center will attempt to contact the patient within 2-3 business days to schedule follow-up imaging. Olena Boyd MD            TREATMENT:  An x-ray of the left foot was taken today.  The x-rays were read and  interpreted by this physician.  The x-rays show that there is a small hyperostosis at the posterior aspect of the calcaneus near the Achilles tendon insertion.  I informed the patient that this is not in the area of her pain.  I told the patient there is no sign of any foreign body.  There is no soft tissue abnormalities.  I informed the patient I believe she has some moderate inflammation of unknown etiology.  She was started on ibuprofen 600 mg 1 tab 3 times daily.  She is to return to clinic in 10 days if she has not improved and I will recommend an MRI.        Jules Mao; KATIE  [facility] Foot & Ankle Surgery/Podiatry

## 2023-06-01 ASSESSMENT — SLEEP AND FATIGUE QUESTIONNAIRES
HOW LIKELY ARE YOU TO NOD OFF OR FALL ASLEEP WHILE SITTING AND TALKING TO SOMEONE: SLIGHT CHANCE OF DOZING
HOW LIKELY ARE YOU TO NOD OFF OR FALL ASLEEP WHILE SITTING AND READING: HIGH CHANCE OF DOZING
HOW LIKELY ARE YOU TO NOD OFF OR FALL ASLEEP WHILE LYING DOWN TO REST IN THE AFTERNOON WHEN CIRCUMSTANCES PERMIT: HIGH CHANCE OF DOZING
HOW LIKELY ARE YOU TO NOD OFF OR FALL ASLEEP IN A CAR, WHILE STOPPED FOR A FEW MINUTES IN TRAFFIC: WOULD NEVER DOZE
HOW LIKELY ARE YOU TO NOD OFF OR FALL ASLEEP WHILE WATCHING TV: HIGH CHANCE OF DOZING
HOW LIKELY ARE YOU TO NOD OFF OR FALL ASLEEP WHEN YOU ARE A PASSENGER IN A CAR FOR AN HOUR WITHOUT A BREAK: SLIGHT CHANCE OF DOZING
HOW LIKELY ARE YOU TO NOD OFF OR FALL ASLEEP WHILE SITTING INACTIVE IN A PUBLIC PLACE: SLIGHT CHANCE OF DOZING
HOW LIKELY ARE YOU TO NOD OFF OR FALL ASLEEP WHILE SITTING QUIETLY AFTER LUNCH WITHOUT ALCOHOL: SLIGHT CHANCE OF DOZING

## 2023-06-02 ENCOUNTER — VIRTUAL VISIT (OUTPATIENT)
Dept: SLEEP MEDICINE | Facility: CLINIC | Age: 63
End: 2023-06-02
Payer: COMMERCIAL

## 2023-06-02 DIAGNOSIS — G47.33 OSA (OBSTRUCTIVE SLEEP APNEA): Primary | ICD-10-CM

## 2023-06-02 DIAGNOSIS — G47.36 HYPOXEMIA ASSOCIATED WITH SLEEP: ICD-10-CM

## 2023-06-02 PROCEDURE — 99214 OFFICE O/P EST MOD 30 MIN: CPT | Mod: VID | Performed by: NURSE PRACTITIONER

## 2023-06-02 NOTE — Clinical Note
Hi Chip,  Can you follow-up with this patient in approximately 2 to 3 months to see how PAP therapy is going with a FFM.  She had tried nasal pillow mask and then chinstrap but found this to be too cumbersome and has not been using her machine recently and is now requesting FFM, which was ordered today.  Thank you,  CORRY Frazier CNP

## 2023-06-02 NOTE — PROGRESS NOTES
Virtual Visit Details    Type of service:  Video Visit     Originating Location (pt. Location): Home    Distant Location (provider location):  Off-site  Platform used for Video Visit: [facility]    Sleep Apnea - Follow-up Visit:    Impression/Plan:  1. FLOYD (obstructive sleep apnea)  2. Hypoxemia associated with sleep  - Comprehensive DME    Moderate Sleep apnea. Tolerating PAP well, overall, but having difficulty tolerating nasal pillow mask and chinstrap as she finds this to be quite cumbersome and difficult to wear. Daytime symptoms are worsened without CPAP use recently.  She would like to obtain a new fullface mask to see if this corrects the problem.    A review of her APAP download data shows suboptimal use and compliance and moderate FLOYD that is well controlled on current pressure settings with use.    A comprehensive DME order was placed for new fullface mask, and supplies and sent to [facility] medical equipment today.  We will also ask STM to follow-up with patient in about 2-3 months to see how things are going with her new FFM/therapy.    Zoe Alvarez will follow up in about 6 month(s).     Fifteen minutes spent with patient, all of which were spent face-to-face counseling, consulting, chart review/documentation, and coordinating plan of care on the date of the encounter.      CORRY Frazier CNP  Sleep Medicine      CC:  Val Garza,         History of Present Illness:  Chief Complaint   Patient presents with     RECHECK     CPAP       Zoe Alvarez is a 62 year old female with a PMH pertinent for DM2 -insulin-dependent, HLD, chronic venous insufficiency, history of tobacco dependence -in remission, CKD stage III, anxiety, major depression, and obesity who presents for 6-month follow-up of their moderate sleep apnea, managed with CPAP. She has not been using her machine due to difficulty tolerating extra chin strap with nasal pillow mask.    Previous Study Results: [facility] - [facility]  Date:  7/05/2022.  Weight 182 pounds.  AHI: 26.7/hr. Supine AHI: 57.4/hr. O2 blanca 76%.  Time SpO2 </= 88%: 38 minutes      Do you use a CPAP Machine at home: Yes  Overall, on a scale of 0-10 how would you rate your CPAP (0 poor, 10 great): 10    What type of mask do you use: Nasal Pillow  Is your mask comfortable: No  If not, why: I woul like a full mask instead fo the nasal  mask    Is your mask leaking: No  If yes, where do you feel it:    How many night per week does the mask leak (0-7):      Do you notice snoring with mask on: No  Do you notice gasping arousals with mask on: No  Are you having significant oral or nasal dryness: Yes  Is the pressure setting comfortable: Yes  If not, why:      What is your typical bedtime: 9 pm  How long does it take you to go to sleep on PAP therapy: 10 minutes  What time do you typically get out of bed for the day: 4:30  How many hours on average per night are you using PAP therapy: 6.5  How many hours are you sleeping per night: 6.5  Do you feel well rested in the morning: Yes      ResMed AirSense 11 (set up on 8/08/2022 at [facility])  Auto-PAP 5.0 - 15.0 cmH2O 30 day usage data:  4/29/23 - 5/28/23  27% of days with > 4 hours of use. 20/30 days with no use.   Average use 5 hours 23 minutes per day.   95%ile Leak 13.6 L/min.   CPAP 95% pressure 13.4 cm.   AHI 0.5 events per hour.        EPWORTH SLEEPINESS SCALE         6/1/2023     1:53 PM    Amboy Sleepiness Scale ( AYDEN Ortiz  1990-1997[employer] [place]/English - Final version - 21 Nov 07 - [facility] [place].)   Sitting and reading High chance of dozing   Watching TV High chance of dozing   Sitting, inactive in a public place (e.g. a theatre or a meeting) Slight chance of dozing   As a passenger in a car for an hour without a break Slight chance of dozing   Lying down to rest in the afternoon when circumstances permit High chance of dozing   Sitting and talking to someone Slight chance of dozing   Sitting quietly after a lunch  without alcohol Slight chance of dozing   In a car, while stopped for a few minutes in traffic Would never doze   Howell Score (MC) 13   Howell Score (Sleep) 13       INSOMNIA SEVERITY INDEX (ANATOLY)          6/1/2023     1:46 PM   Insomnia Severity Index (ANATOLY)   Difficulty falling asleep 1   Difficulty staying asleep 1   Problems waking up too early 1   How SATISFIED/DISSATISFIED are you with your CURRENT sleep pattern? 2   How NOTICEABLE to others do you think your sleep problem is in terms of impairing the quality of your life? 1   How WORRIED/DISTRESSED are you about your current sleep problem? 1   To what extent do you consider your sleep problem to INTERFERE with your daily functioning (e.g. daytime fatigue, mood, ability to function at work/daily chores, concentration, memory, mood, etc.) CURRENTLY? 1   ANATOLY Total Score 8       Guidelines for Scoring/Interpretation:  Total score categories:  0-7 = No clinically significant insomnia   8-14 = Subthreshold insomnia   15-21 = Clinical insomnia (moderate severity)  22-28 = Clinical insomnia (severe)  Used via courtesy of www.[employer].va.gov with permission from Jose Burgess PhD., [facility]      Past medical/surgical history, family history, social history, medications and allergies were reviewed.        Problem List:  Patient Active Problem List    Diagnosis Date Noted     Pulmonary nodules 03/07/2022     Priority: Medium     Seen on CT 3/2022 - not suspicious, repeat in 1 year       Chronic kidney disease, stage 3 (H) 03/31/2021     Priority: Medium     Bilateral lower extremity edema 05/29/2019     Priority: Medium     Obesity (BMI 30-39.9)      Priority: Medium     Created by Conversion         Chronic venous insufficiency      Priority: Medium     Created by Conversion  Replacement Utility updated for latest IMO load  Overview:   Overview:   Created by Conversion  Replacement Utility updated for latest IMO load         Vertigo      Priority: Medium      Created by Conversion         Type 2 diabetes mellitus without complication, with long-term current use of insulin (H)      Priority: Medium     Created by Conversion         Tarsal tunnel syndrome of right side 11/01/2018     Priority: Medium     Mononeuritis of right lower extremity 10/31/2018     Priority: Medium     Added automatically from request for surgery [id number]         Foot pain, left 10/15/2018     Priority: Medium     Compression neuropathy of right lower extremity 09/20/2018     Priority: Medium     Mononeuritis of left lower extremity 09/20/2018     Priority: Medium     Plantar fascial fibromatosis of left foot 09/20/2018     Priority: Medium     Tarsal tunnel syndrome of left side 08/21/2018     Priority: Medium     RLQ abdominal pain 07/09/2014     Priority: Medium     Hyperlipidemia      Priority: Medium     Created by Conversion         Epidermal Inclusion Cyst      Priority: Medium     Created by Conversion         Ingrowing Toenail      Priority: Medium     Created by Conversion         Eczema      Priority: Medium     Created by Conversion          Current Outpatient Medications   Medication     sertraline (ZOLOFT) 100 MG tablet     atorvastatin (LIPITOR) 40 MG tablet     blood glucose meter (GLUCOMETER)     generic lancets (FINGERSTIX LANCETS)     glipiZIDE (GLUCOTROL) 10 MG tablet     ibuprofen (ADVIL/MOTRIN) 600 MG tablet     Insulin Glargine-yfgn (SEMGLEE, YFGN,) 100 UNIT/ML SOPN     insulin pen needle (BD PEN NEEDLE TJ 2ND GEN) 32G X 4 MM miscellaneous     JARDIANCE 25 MG TABS tablet     Semaglutide, 1 MG/DOSE, (OZEMPIC) 4 MG/3ML pen     valACYclovir (VALTREX) 1000 mg tablet     No current facility-administered medications for this visit.       There were no vitals taken for this visit.      This note was written with the assistance of the Dragon voice-dictation technology software. The final document, although reviewed, may contain errors. For corrections, please contact the office.

## 2023-06-02 NOTE — NURSING NOTE
Is the patient currently in the state of [place]? YES    Visit mode:VIDEO    If the visit is dropped, the patient can be reconnected by: VIDEO VISIT: Send to e-mail at: [email or web address]    Will anyone else be joining the visit? NO      How would you like to obtain your AVS? MyChart    Are changes needed to the allergy or medication list? NO    Reason for visit: RECHECK (CPAP)

## 2023-06-02 NOTE — PATIENT INSTRUCTIONS
"MY INFORMATION ON SLEEP APNEA-  Zoe Alvarez    DOCTOR : CORRY Frazier CNP  SLEEP CENTER :      MY CONTACT NUMBER:   [facility]  [phone]  [facility]   [phone]  [facility]   [phone]      [facility]  [phone]  [facility]   [phone]    [facility] [place] [address], [address]  [place] [address]  Phone: [phone]    Hours:  Mon - Fri: 8:00 a.m. - 4:30 p.m.  Sat: Closed  Sun: Closed      Key Points:  1. What is Obstructive Sleep Apnea (FLOYD)? FLOYD is the most common type of sleep apnea. Apnea literally means, \"without breath.\" It is characterized by repetitive pauses in breathing, despite continued effort to breathe, and is usually associated with a reduction in blood oxygen saturation. Apneas can last 10 to over 60 seconds. It is caused by narrowing or collapse of the upper airway as muscles relax during sleep.   2. What are the consequences of FLOYD? Symptoms include: daytime sleepiness- possibly increasing the risk of falling asleep while driving, unrefreshing/restless sleep, snoring, insomnia, waking frequently to urinate, waking with heartburn or reflux, reduced concentration and memory, and morning headaches. Other health consequences may include development of high blood pressure. Untreated FLOYD also can contribute to heart disease, stroke and diabetes.   3. What are the treatment options? In most situations, sleep apnea is a lifelong disease that must be managed with daily therapy. Continuous Positive Airway (CPAP) is the most reliable treatment. A mouthguard to hold your jaw forward is usually the next most reliable option. Other options include postioning devices (to keep you off your back), nasal valves, tongue retaining device, weight loss, surgery. There is more detail about these options toward the end of this " document.  4. What are the most important things to remember about using CPAP?     WHERE CAN I FIND MORE INFORMATION?    American Academy of Sleep Medicine Patient information on sleep disorders:  http://yoursleep.aasmnet.org    CPAP -  WHY AND HOW?                 Continuous positive airway pressure, or CPAP, is the most effective treatment for obstructive sleep apnea. It works by blowing room air, through a mask, to hold your throat open. A decision to use CPAP is a major step forward in the pursuit of a healthier life. The successful use of CPAP will help you breathe easier, sleep better and live healthier. Using CPAP can be a positive experience if you keep these huerta points in mind:  Commitment  CPAP is not a quick fix for your problem. It involves a long-term commitment to improve your sleep and your health.    Communication  Stay in close communication with both your sleep doctor and your CPAP supplier. Ask lots of questions and seek help when you need it.    Consistency  Use CPAP all night, every night and for every nap. You will receive the maximum health benefits from CPAP when you use it every time that you sleep. This will also make it easier for your body to adjust to the treatment.    Correction  The first machine and mask that you try may not be the best ones for you. Work with your sleep doctor and your CPAP supplier to make corrections to your equipment selection. Ask about trying a different type of machine or mask if you have ongoing problems. Make sure that your mask is a good fit and learn to use your equipment properly.    Challenge  Tell a family member or close friend to ask you each morning if you used your CPAP the previous night. Have someone to challenge you to give it your best effort.    Connection   Your adjustment to CPAP will be easier if you are able to connect with others who use the same treatment. Ask your sleep doctor if there is a support group in your area for people who have  "sleep apnea, or look for one on the Internet.  Comfort   Increase your level of comfort by using a saline spray, decongestant or heated humidifier if CPAP irritates your nose, mouth or throat. Use your unit's \"ramp\" setting to slowly get used to the air pressure level. There may be soft pads you can buy that will fit over your mask straps. Look on www.CPAP.com for accessories that can help make CPAP use more comfortable.  Cleaning   Clean your mask, tubing and headgear on a regular basis. Put this time in your schedule so that you don't forget to do it. Check and replace the filters for your CPAP unit and humidifier.    Completion   Although you are never finished with CPAP therapy, you should reward yourself by celebrating the completion of your first month of treatment. Expect this first month to be your hardest period of adjustment. It will involve some trial and error as you find the machine, mask and pressure settings that are right for you.    Continuation  After your first month of treatment, continue to make a daily commitment to use your CPAP all night, every night and for every nap.    CPAP-Tips to starting with success:  Begin using your CPAP for short periods of time during the day while you watch TV or read.    Use CPAP every night and for every nap. Using it less often reduces the health benefits and makes it harder for your body to get used to it.    Newer CPAP models are virtually silent; however, if you find the sound of your CPAP machine to be bothersome, place the unit under your bed to dampen the sound.     Make small adjustments to your mask, tubing, straps and headgear until you get the right fit. Tightening the mask may actually worsen the leak.  If it leaves significant marks on your face or irritates the bridge of your nose, it may not be the best mask for you.  Speak with the person who supplied the mask and consider trying other masks. Insurances will allow you to try different masks " during the first month of starting CPAP.  Insurance also covers a new mask, hose and filter about every 6 months.    Use a saline nasal spray to ease mild nasal congestion. Neti-Pot or saline nasal rinses may also help. Nasal gel sprays can help reduce nasal dryness.  Biotene mouthwash can be helpful to protect your teeth if you experience frequent dry mouth.  Dry mouth may be a sign of air escaping out of your mouth or out of the mask in the case of a full face mask.  Speak with your provider if you expect that is the case.     Take a nasal decongestant to relieve more severe nasal or sinus congestion.  Do not use Afrin (oxymetazoline) nasal spray more than 3 days in a row.  Speak with your sleep doctor if your nasal congestion is chronic.    Use a heated humidifier that fits your CPAP model to enhance your breathing comfort. Adjust the heat setting up if you get a dry nose or throat, down if you get condensation in the hose or mask.  Position the CPAP lower than you so that any condensation in the hose drains back into the machine rather than towards the mask.    Try a system that uses nasal pillows if traditional masks give you problems.    Clean your mask, tubing and headgear once a week. Make sure the equipment dries fully.    Regularly check and replace the filters for your CPAP unit and humidifier.    Work closely with your sleep provider and your CPAP supplier to make sure that you have the machine, mask and air pressure setting that works best for you. It is better to stop using it and call your provider to solve problems than to lay awake all night frustrated with the device.  Weight Loss:    Weight loss decreases severity of sleep apnea in most people with obesity. For those with mild obesity who have developed snoring with weight gain, even 15-30 pound weight loss can improve and occasionally eliminate sleep apnea.  Structured and life-long dietary and health habits are necessary to lose weight and keep  healthier weight levels.     Though there are significant health benefits from weight loss, long-term weight loss is very difficult to achieve- studies show success with dietary management in less than 10% of people. In addition, substantial weight loss may require years of dietary control and may be difficult if patients have severe obesity. In these cases, surgical management may be considered.    If you are interested in methods for weight loss, you should review the options discussed at the National Institutes of Health patient information sites:     http://win.niddk.nih.gov/publications/index.htm  http:/www.health.nih.gov/topic/WeightLossDieting    Bariatric programs offer counseling in all methods of weight loss:    Http:/www.[facility].org/Specialties/WeightLossSurgeryandMedicalMgmt/htm    Your BMI is There is no height or weight on file to calculate BMI.        Body mass index (BMI) is one way to tell whether you are at a healthy weight, overweight, or obese. It measures your weight in relation to your height.  A BMI of 18.5 to 24.9 is in the healthy range. A person with a BMI of 25 to 29.9 is considered overweight, and someone with a BMI of 30 or greater is considered obese.  Another way to find out if you are at risk for health problems caused by overweight and obesity is to measure your waist. If you are a woman and your waist is more than 35 inches, or if you are a man and your waist is more than 40 inches, your risk of disease may be higher.  More than two-thirds of American adults are considered overweight or obese. Being overweight or obese increases the risk for further weight gain.  Excess weight may lead to heart disease and diabetes. Creating and following plans for healthy eating and physical activity may help you improve your health.    Methods for maintaining or losing weight.    Weight control is part of healthy lifestyle and includes exercise, emotional health, and healthy eating  habits.  Careful eating habits lifelong is the mainstay of weight control.  Though there are significant health benefits from weight loss, long-term weight loss with diet alone may be very difficult to achieve- studies show long-term success with dietary management in less than 10% of people. Attaining a healthy weight may be especially difficult to achieve in those with severe obesity. In some cases, medications, devices and surgical management might be considered.    What can you do?    If you are overweight or obese and are interested in methods for weight loss, you should discuss this with your provider. In addition, we recommend that you review healthy life styles and methods for weight loss available through the National Institutes of Health patient information sites:     http://win.niddk.nih.gov/publications/index.htm

## 2023-07-11 DIAGNOSIS — Z79.4 TYPE 2 DIABETES MELLITUS WITHOUT COMPLICATION, WITH LONG-TERM CURRENT USE OF INSULIN (H): ICD-10-CM

## 2023-07-11 DIAGNOSIS — E11.9 TYPE 2 DIABETES MELLITUS WITHOUT COMPLICATION, WITH LONG-TERM CURRENT USE OF INSULIN (H): ICD-10-CM

## 2023-07-11 RX ORDER — EMPAGLIFLOZIN 25 MG/1
TABLET, FILM COATED ORAL
Qty: 90 TABLET | Refills: 0 | Status: SHIPPED | OUTPATIENT
Start: 2023-07-11 | End: 2023-08-29

## 2023-07-11 NOTE — TELEPHONE ENCOUNTER
"Requested Prescriptions   Pending Prescriptions Disp Refills     JARDIANCE 25 MG TABS tablet [Pharmacy Med Name: JARDIANCE 25MG TABS] 90 tablet 0     Sig: TAKE ONE TABLET BY MOUTH ONCE DAILY       Sodium Glucose Co-Transport Inhibitor Agents Passed - 7/11/2023  6:01 AM        Passed - Patient has documented A1c within the specified period of time.     If HgbA1C is 8 or greater, it needs to be on file within the past 3 months.  If less than 8, must be on file within the past 6 months.     Recent Labs   Lab Test 05/11/23  1505   A1C 8.2*             Passed - No creatinine >1.4 or GFR <45 within the past 12 mos     Recent Labs   Lab Test 05/11/23  1505 10/25/21  1552 03/31/21  1451   GFRESTIMATED 72   < > >60   GFRESTBLACK  --   --  >60    < > = values in this interval not displayed.       Recent Labs   Lab Test 05/11/23  1505   CR 0.90             Passed - Medication is active on med list        Passed - Patient is age 18 or older        Passed - Patient is not pregnant        Passed - Patient has documented normal Potassium within the last 12 mos.     Recent Labs   Lab Test 05/11/23  1505   POTASSIUM 4.3             Passed - Patient has no positive pregnancy test within the past 12 mos.        Passed - Recent (6 mo) or future (30 days) visit within the authorizing provider's specialty     Patient had office visit in the last 6 months or has a visit in the next 30 days with authorizing provider or within the authorizing provider's specialty.  See \"Patient Info\" tab in inbasket, or \"Choose Columns\" in Meds & Orders section of the refill encounter.                 "

## 2023-07-17 ENCOUNTER — DOCUMENTATION ONLY (OUTPATIENT)
Dept: SLEEP MEDICINE | Facility: CLINIC | Age: 63
End: 2023-07-17
Payer: COMMERCIAL

## 2023-07-17 NOTE — PROGRESS NOTES
STM Recheck:  [facility] reaching out to patient about getting a Full Face Mask.  Will recheck in two weeks.

## 2023-08-02 ENCOUNTER — DOCUMENTATION ONLY (OUTPATIENT)
Dept: SLEEP MEDICINE | Facility: CLINIC | Age: 63
End: 2023-08-02
Payer: COMMERCIAL

## 2023-08-11 ENCOUNTER — DOCUMENTATION ONLY (OUTPATIENT)
Dept: SLEEP MEDICINE | Facility: CLINIC | Age: 63
End: 2023-08-11
Payer: COMMERCIAL

## 2023-08-11 NOTE — PROGRESS NOTES
Patient came to [place]  for mask fitting appointment on August 11, 2023. Patient requested to switch masks because oral breathing. Discussed the following masks: Vitera, AirFit F20.  Patient selected a [employer] Mask name: ViteraFull Face mask size Small.    Cari Álvarez

## 2023-08-22 ENCOUNTER — LAB (OUTPATIENT)
Dept: LAB | Facility: CLINIC | Age: 63
End: 2023-08-22
Payer: COMMERCIAL

## 2023-08-22 DIAGNOSIS — E11.9 TYPE 2 DIABETES MELLITUS WITHOUT COMPLICATION, WITH LONG-TERM CURRENT USE OF INSULIN (H): ICD-10-CM

## 2023-08-22 DIAGNOSIS — Z79.4 TYPE 2 DIABETES MELLITUS WITHOUT COMPLICATION, WITH LONG-TERM CURRENT USE OF INSULIN (H): ICD-10-CM

## 2023-08-22 LAB
ANION GAP SERPL CALCULATED.3IONS-SCNC: 12 MMOL/L (ref 7–15)
BUN SERPL-MCNC: 16.3 MG/DL (ref 8–23)
CALCIUM SERPL-MCNC: 9.5 MG/DL (ref 8.8–10.2)
CHLORIDE SERPL-SCNC: 104 MMOL/L (ref 98–107)
CREAT SERPL-MCNC: 0.77 MG/DL (ref 0.51–0.95)
DEPRECATED HCO3 PLAS-SCNC: 25 MMOL/L (ref 22–29)
GFR SERPL CREATININE-BSD FRML MDRD: 87 ML/MIN/1.73M2
GLUCOSE SERPL-MCNC: 170 MG/DL (ref 70–99)
HBA1C MFR BLD: 7.5 % (ref 0–5.6)
LDLC SERPL DIRECT ASSAY-MCNC: 127 MG/DL
POTASSIUM SERPL-SCNC: 4.3 MMOL/L (ref 3.4–5.3)
SODIUM SERPL-SCNC: 141 MMOL/L (ref 136–145)

## 2023-08-22 PROCEDURE — 36415 COLL VENOUS BLD VENIPUNCTURE: CPT

## 2023-08-22 PROCEDURE — 80048 BASIC METABOLIC PNL TOTAL CA: CPT

## 2023-08-22 PROCEDURE — 83721 ASSAY OF BLOOD LIPOPROTEIN: CPT

## 2023-08-22 PROCEDURE — 83036 HEMOGLOBIN GLYCOSYLATED A1C: CPT

## 2023-08-29 ENCOUNTER — VIRTUAL VISIT (OUTPATIENT)
Dept: ENDOCRINOLOGY | Facility: CLINIC | Age: 63
End: 2023-08-29
Payer: COMMERCIAL

## 2023-08-29 ENCOUNTER — TELEPHONE (OUTPATIENT)
Dept: ENDOCRINOLOGY | Facility: CLINIC | Age: 63
End: 2023-08-29

## 2023-08-29 DIAGNOSIS — E11.9 TYPE 2 DIABETES MELLITUS WITHOUT COMPLICATION, WITH LONG-TERM CURRENT USE OF INSULIN (H): ICD-10-CM

## 2023-08-29 DIAGNOSIS — Z79.4 TYPE 2 DIABETES MELLITUS WITHOUT COMPLICATION, WITH LONG-TERM CURRENT USE OF INSULIN (H): ICD-10-CM

## 2023-08-29 PROCEDURE — 99213 OFFICE O/P EST LOW 20 MIN: CPT | Mod: VID | Performed by: NURSE PRACTITIONER

## 2023-08-29 RX ORDER — GLIPIZIDE 10 MG/1
10 TABLET ORAL
Qty: 180 TABLET | Refills: 1 | Status: SHIPPED | OUTPATIENT
Start: 2023-08-29 | End: 2024-03-04

## 2023-08-29 NOTE — PROGRESS NOTES
[facility] ENDOCRINOLOGY    Diabetes Note 8/29/2023    Zoe Alvarez, 1960, [id number]          Reason for visit      1. Type 2 diabetes mellitus without complication, with long-term current use of insulin (H)        HPI     Zoe Alvarez is a very pleasant 62 year old old female who presents for follow up.  SUMMARY:    Lynne is seen today in follow-up for DM 2. Her current A1c is a delightful 7.5 and the best that we have seen together in some time. She is pretty happy about it. 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     As stated, she is taking Ozempic 1 mg weekly. She also takes Semglee, 45 units BID, Glipizide, 10 mg BID, and Jardiance, 25 mg daily. She is unable to tolerate Metformin.     Noted, her LDL level has risen. She reports that she was forgetting to take the Atorvastatin, but has re-incorporated it into her daily routine. Renal function remains within normal range.     She reports that her weight is stable at 185, which is about what it was the last time she was seen in Clinic in November.     Blood glucose data:    BLOOD GLUCOSE     8/29/23  F 84    8/28/23  D 121    8/27/23  L 141  D 154     8/26/23  125, 130, 167    8/25/23  L138    8/24/23  F 76  D 170    8/23/23  F 90       Past Medical History     Patient Active Problem List   Diagnosis    Obesity (BMI 30-39.9)    Chronic venous insufficiency    Epidermal Inclusion Cyst    Hyperlipidemia    Vertigo    Type 2 diabetes mellitus without complication, with long-term current use of insulin (H)    Eczema    RLQ abdominal pain    Tarsal tunnel syndrome of left side    Compression neuropathy of right lower extremity    Mononeuritis of left lower extremity    Plantar fascial fibromatosis of left foot    Mononeuritis of right lower extremity    Tarsal  tunnel syndrome of right side    Foot pain, left    Bilateral lower extremity edema    Chronic kidney disease, stage 3 (H)    Pulmonary nodules        Family History       family history includes Breast Cancer in her maternal grandmother; Diabetes in her mother.    Social History      reports that she has quit smoking. She has never used smokeless tobacco. She reports current alcohol use of about 1.0 standard drink of alcohol per week. She reports that she does not use drugs.      Review of Systems     Patient has no polyuria or polydipsia, no chest pain, dyspnea or TIA's, no numbness, tingling or pain in extremities  Remainder negative except as noted in HPI.    Vital Signs     There were no vitals taken for this visit.  Wt Readings from Last 3 Encounters:   11/04/22 84.4 kg (186 lb)   08/15/22 85.2 kg (187 lb 12.8 oz)   07/24/22 82.1 kg (181 lb)       Physical Exam     Constitutional:  Well developed, Well nourished  HENT:  Normocephalic,   Neck: normal in appearance  Eyes:  PERRL, Conjunctiva pink  Respiratory:  No respiratory distress  Skin: No acanthosis nigricans, lipoatrophy or lipodystrophy  Neurologic:  Alert & oriented x 3, nonfocal  Psychiatric:  Affect, Mood, Insight appropriate        Assessment     1. Type 2 diabetes mellitus without complication, with long-term current use of insulin (H)        Plan     Lynne would like to see her A1c lower. I would like to see it just stay stable. Encouraged her to keep working. She will let me know if she is having any hypoglycemia. No changes to her current medication regimen. Follow-up in 3 months.           Jayla Hermosillo NP   Endocrinology  8/29/2023  7:12 AM      Lab Results     Microalbumin Urine mg/dL   Date Value Ref Range Status   04/26/2022 <0.50 0.00 - 1.99 mg/dL Final       Cholesterol   Date Value Ref Range Status   10/25/2021 196 <=199 mg/dL Final     Direct Measure HDL   Date Value Ref Range Status   10/25/2021 39 (L) >=50 mg/dL Final     Comment:  "    HDL Cholesterol Reference Range:     0-2 years:   No reference ranges established for patients under 2 years old  at [facility] for lipid analytes.    2-8 years:  Greater than 45 mg/dL     18 years and older:   Female: Greater than or equal to 50 mg/dL   Male:   Greater than or equal to 40 mg/dL     Triglycerides   Date Value Ref Range Status   10/25/2021 225 (H) <=149 mg/dL Final             Current Medications     Outpatient Medications Prior to Visit   Medication Sig Dispense Refill    atorvastatin (LIPITOR) 40 MG tablet Take 1 tablet (40 mg) by mouth daily 90 tablet 2    blood glucose meter (GLUCOMETER) [BLOOD GLUCOSE METER (GLUCOMETER)] Use 1 each As Directed as needed. Dispense glucometer brand per patient's insurance at pharmacy discretion. 1 each 0    generic lancets (FINGERSTIX LANCETS) [GENERIC LANCETS (FINGERSTIX LANCETS)] Use to check blood sugar 1-2x daily. Dispense brand per patient's insurance at pharmacy discretion. 100 each 11    glipiZIDE (GLUCOTROL) 10 MG tablet Take 1 tablet (10 mg) by mouth 2 times daily (before meals) 180 tablet 1    Insulin Glargine-yfgn (SEMGLEE, YFGN,) 100 UNIT/ML SOPN Inject 90 Units Subcutaneous daily \"up to 90 units daily\" 81 mL 3    insulin pen needle (BD PEN NEEDLE TJ 2ND GEN) 32G X 4 MM miscellaneous USE TWO TIMES A DAY WITH INSULIN 100 each 3    JARDIANCE 25 MG TABS tablet TAKE ONE TABLET BY MOUTH ONCE DAILY 90 tablet 0    Semaglutide, 1 MG/DOSE, (OZEMPIC) 4 MG/3ML pen Inject 1 mg Subcutaneous every 7 days 9 mL 1    valACYclovir (VALTREX) 1000 mg tablet [VALACYCLOVIR (VALTREX) 1000 MG TABLET] TAKE 2 TABLETS BY MOUTH NOW AND 2 TABLETS IN 12 HOURS. PRN. 4 tablet 5    ibuprofen (ADVIL/MOTRIN) 600 MG tablet Take 1 tablet (600 mg) by mouth every 6 hours as needed for moderate pain 42 tablet 0    sertraline (ZOLOFT) 100 MG tablet Take 1 tablet (100 mg) by mouth daily (Patient not taking: Reported on 8/29/2023) 90 tablet 0     No facility-administered " medications prior to visit.         Virtual Visit Details    Type of service:  Video Visit     Originating Location (pt. Location): Home    Distant Location (provider location):  On-site  Platform used for Video Visit: [employer]

## 2023-08-29 NOTE — LETTER
8/29/2023         RE: Zoe Alvarez  [address] [place] [facility] [address]        Dear Colleague,    Thank you for referring your patient, Zoe Alvarez, to the [facility]. Please see a copy of my visit note below.    [facility] ENDOCRINOLOGY    Diabetes Note 8/29/2023    Zoe Alvarez, 1960, [id number]          Reason for visit      1. Type 2 diabetes mellitus without complication, with long-term current use of insulin (H)        HPI     Zoe Alvarez is a very pleasant 62 year old old female who presents for follow up.  SUMMARY:    Lynne is seen today in follow-up for DM 2. Her current A1c is a delightful 7.5 and the best that we have seen together in some time. She is pretty happy about it. 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     As stated, she is taking Ozempic 1 mg weekly. She also takes Semglee, 45 units BID, Glipizide, 10 mg BID, and Jardiance, 25 mg daily. She is unable to tolerate Metformin.     Noted, her LDL level has risen. She reports that she was forgetting to take the Atorvastatin, but has re-incorporated it into her daily routine. Renal function remains within normal range.     She reports that her weight is stable at 185, which is about what it was the last time she was seen in Clinic in November.     Blood glucose data:    BLOOD GLUCOSE     8/29/23  F 84    8/28/23  D 121    8/27/23  L 141  D 154     8/26/23  125, 130, 167    8/25/23  L138    8/24/23  F 76  D 170    8/23/23  F 90       Past Medical History     Patient Active Problem List   Diagnosis     Obesity (BMI 30-39.9)     Chronic venous insufficiency     Epidermal Inclusion Cyst     Hyperlipidemia     Vertigo     Type 2 diabetes mellitus without complication, with long-term current use of insulin  (H)     Eczema     RLQ abdominal pain     Tarsal tunnel syndrome of left side     Compression neuropathy of right lower extremity     Mononeuritis of left lower extremity     Plantar fascial fibromatosis of left foot     Mononeuritis of right lower extremity     Tarsal tunnel syndrome of right side     Foot pain, left     Bilateral lower extremity edema     Chronic kidney disease, stage 3 (H)     Pulmonary nodules        Family History       family history includes Breast Cancer in her maternal grandmother; Diabetes in her mother.    Social History      reports that she has quit smoking. She has never used smokeless tobacco. She reports current alcohol use of about 1.0 standard drink of alcohol per week. She reports that she does not use drugs.      Review of Systems     Patient has no polyuria or polydipsia, no chest pain, dyspnea or TIA's, no numbness, tingling or pain in extremities  Remainder negative except as noted in HPI.    Vital Signs     There were no vitals taken for this visit.  Wt Readings from Last 3 Encounters:   11/04/22 84.4 kg (186 lb)   08/15/22 85.2 kg (187 lb 12.8 oz)   07/24/22 82.1 kg (181 lb)       Physical Exam     Constitutional:  Well developed, Well nourished  HENT:  Normocephalic,   Neck: normal in appearance  Eyes:  PERRL, Conjunctiva pink  Respiratory:  No respiratory distress  Skin: No acanthosis nigricans, lipoatrophy or lipodystrophy  Neurologic:  Alert & oriented x 3, nonfocal  Psychiatric:  Affect, Mood, Insight appropriate        Assessment     1. Type 2 diabetes mellitus without complication, with long-term current use of insulin (H)        Plan     Lynne would like to see her A1c lower. I would like to see it just stay stable. Encouraged her to keep working. She will let me know if she is having any hypoglycemia. No changes to her current medication regimen. Follow-up in 3 months.           Jayla Hermosillo NP   Endocrinology  8/29/2023  7:12 AM      Lab Results  "    Microalbumin Urine mg/dL   Date Value Ref Range Status   04/26/2022 <0.50 0.00 - 1.99 mg/dL Final       Cholesterol   Date Value Ref Range Status   10/25/2021 196 <=199 mg/dL Final     Direct Measure HDL   Date Value Ref Range Status   10/25/2021 39 (L) >=50 mg/dL Final     Comment:     HDL Cholesterol Reference Range:     0-2 years:   No reference ranges established for patients under 2 years old  at [facility] for lipid analytes.    2-8 years:  Greater than 45 mg/dL     18 years and older:   Female: Greater than or equal to 50 mg/dL   Male:   Greater than or equal to 40 mg/dL     Triglycerides   Date Value Ref Range Status   10/25/2021 225 (H) <=149 mg/dL Final             Current Medications     Outpatient Medications Prior to Visit   Medication Sig Dispense Refill     atorvastatin (LIPITOR) 40 MG tablet Take 1 tablet (40 mg) by mouth daily 90 tablet 2     blood glucose meter (GLUCOMETER) [BLOOD GLUCOSE METER (GLUCOMETER)] Use 1 each As Directed as needed. Dispense glucometer brand per patient's insurance at pharmacy discretion. 1 each 0     generic lancets (FINGERSTIX LANCETS) [GENERIC LANCETS (FINGERSTIX LANCETS)] Use to check blood sugar 1-2x daily. Dispense brand per patient's insurance at pharmacy discretion. 100 each 11     glipiZIDE (GLUCOTROL) 10 MG tablet Take 1 tablet (10 mg) by mouth 2 times daily (before meals) 180 tablet 1     Insulin Glargine-yfgn (SEMGLEE, YFGN,) 100 UNIT/ML SOPN Inject 90 Units Subcutaneous daily \"up to 90 units daily\" 81 mL 3     insulin pen needle (BD PEN NEEDLE TJ 2ND GEN) 32G X 4 MM miscellaneous USE TWO TIMES A DAY WITH INSULIN 100 each 3     JARDIANCE 25 MG TABS tablet TAKE ONE TABLET BY MOUTH ONCE DAILY 90 tablet 0     Semaglutide, 1 MG/DOSE, (OZEMPIC) 4 MG/3ML pen Inject 1 mg Subcutaneous every 7 days 9 mL 1     valACYclovir (VALTREX) 1000 mg tablet [VALACYCLOVIR (VALTREX) 1000 MG TABLET] TAKE 2 TABLETS BY MOUTH NOW AND 2 TABLETS IN 12 HOURS. PRN. 4 " tablet 5     ibuprofen (ADVIL/MOTRIN) 600 MG tablet Take 1 tablet (600 mg) by mouth every 6 hours as needed for moderate pain 42 tablet 0     sertraline (ZOLOFT) 100 MG tablet Take 1 tablet (100 mg) by mouth daily (Patient not taking: Reported on 8/29/2023) 90 tablet 0     No facility-administered medications prior to visit.         Virtual Visit Details    Type of service:  Video Visit     Originating Location (pt. Location): Home    Distant Location (provider location):  On-site  Platform used for Video Visit: [employer]        Again, thank you for allowing me to participate in the care of your patient.        Sincerely,        Jayla Hermosillo NP

## 2023-08-29 NOTE — TELEPHONE ENCOUNTER
Called pt 2x, pt is not picking up. Pt need a follow up in 3 mo with lab 1-2 weeks before the appointment. Please schedule her with Annelise in 3 mo.

## 2023-09-12 ENCOUNTER — DOCUMENTATION ONLY (OUTPATIENT)
Dept: SLEEP MEDICINE | Facility: CLINIC | Age: 63
End: 2023-09-12
Payer: COMMERCIAL

## 2023-09-12 DIAGNOSIS — G47.33 OBSTRUCTIVE SLEEP APNEA (ADULT) (PEDIATRIC): Primary | ICD-10-CM

## 2023-10-28 ENCOUNTER — OFFICE VISIT (OUTPATIENT)
Dept: FAMILY MEDICINE | Facility: CLINIC | Age: 63
End: 2023-10-28
Payer: COMMERCIAL

## 2023-10-28 ENCOUNTER — HOSPITAL ENCOUNTER (OUTPATIENT)
Dept: GENERAL RADIOLOGY | Facility: HOSPITAL | Age: 63
Discharge: HOME | End: 2023-10-28
Payer: COMMERCIAL

## 2023-10-28 VITALS
TEMPERATURE: 98.3 F | HEART RATE: 104 BPM | RESPIRATION RATE: 18 BRPM | DIASTOLIC BLOOD PRESSURE: 79 MMHG | OXYGEN SATURATION: 99 % | SYSTOLIC BLOOD PRESSURE: 152 MMHG

## 2023-10-28 DIAGNOSIS — M25.462 PAIN AND SWELLING OF LEFT KNEE: ICD-10-CM

## 2023-10-28 DIAGNOSIS — M25.562 ACUTE PAIN OF LEFT KNEE: ICD-10-CM

## 2023-10-28 DIAGNOSIS — M25.562 ACUTE PAIN OF LEFT KNEE: Primary | ICD-10-CM

## 2023-10-28 DIAGNOSIS — M25.562 PAIN AND SWELLING OF LEFT KNEE: ICD-10-CM

## 2023-10-28 PROCEDURE — 73562 X-RAY EXAM OF KNEE 3: CPT | Mod: LT

## 2023-10-28 PROCEDURE — 99213 OFFICE O/P EST LOW 20 MIN: CPT

## 2023-10-28 NOTE — PROGRESS NOTES
Assessment & Plan:        ICD-10-CM    1. Acute pain of left knee  M25.562       2. Pain and swelling of left knee  M25.562     M25.462            Plan/Clinical Decision Making:  Left knee pain, worse with movement. Tripped over the left leg yesterday, did not fall or injury the knee and had no pain at that time. This morning she awoke with significant pain throughout the left knee which has persisted throughout the day. Has a history of prior meniscal tear s/p repair 20 years ago which she believes was on the left knee. Low suspicion for osseous injury given no falls or trauma to the knee but xray imaging ordered which is negative. Ace wrap applied in the department. Recommended rest, ice and elevation to help with swelling. Use Ibuprofen and Tylenol PRN for pain. Follow up with Orthopedics in 1-2 weeks if symptoms are not improving; follow up sooner for any worsening pain.      No follow-ups on file.     At the end of the encounter, I discussed results, diagnosis, medications. Discussed red flags for immediate return to clinic/ER, as well as indications for follow up if no improvement. Patient understood and agreed to plan. Patient was stable for discharge.    Keila Barrios PA-C on 10/28/2023 at 5:50 PM    Subjective:     HPI:  Lynne is a 63 year old female who presents to clinic today for the following health issues:  Chief Complaint   Patient presents with    Knee Injury     Lt knee pain and swelling x today.     HPI  Zoe Alvarez is a 63 year old female who comes in today with left knee pain and swelling. Tweaked the left knee yesterday and had no pain at that time but then awoke this morning with pain and swelling. Pain worse with bending the knee. Has been using ice with some relief. Difficulty walking secondary to pain. Has had prior meniscal injury s/p meniscal repair 20 years ago-cannot remember if this was on the left or right knee. No other injuries or concerns at this time.     History obtained  from the patient.    Review of Systems   ROS: 10 point ROS neg other than the symptoms noted above in the HPI.    Patient Active Problem List   Diagnosis    Obesity (BMI 30-39.9)    Chronic venous insufficiency    Epidermal Inclusion Cyst    Hyperlipidemia    Vertigo    Type 2 diabetes mellitus without complication, with long-term current use of insulin (H)    Eczema    RLQ abdominal pain    Tarsal tunnel syndrome of left side    Compression neuropathy of right lower extremity    Mononeuritis of left lower extremity    Plantar fascial fibromatosis of left foot    Mononeuritis of right lower extremity    Tarsal tunnel syndrome of right side    Foot pain, left    Bilateral lower extremity edema    Chronic kidney disease, stage 3 (H)    Pulmonary nodules        Past Medical History:   Diagnosis Date    Arthritis     Diabetes mellitus (H)     History of transfusion     AFTER A MISCARRIAGE    Migraine     Neuropathy        Social History     Tobacco Use    Smoking status: Former    Smokeless tobacco: Never   Substance Use Topics    Alcohol use: Yes     Alcohol/week: 1.0 standard drink of alcohol     Comment: Alcoholic Drinks/day: <1 per wk       Objective:     Vitals:    10/28/23 1745   BP: (!) 152/79   Pulse: 104   Resp: 18   Temp: 98.3  F (36.8  C)   TempSrc: Oral   SpO2: 99%       Physical Exam   EXAM:   Pleasant, alert, appropriate appearance. NAD.  Head Exam: Normocephalic, atraumatic.  Chest/Respiratory Exam: No respiratory distress, no audible wheezes.  Ext/musculoskeletal: Pain along lateral joint line and throughout anterior knee. Swelling of the knee on exam and slightly warmer than right knee. Sensation intact to light touch. No pinpoint bony tenderness.   Neuro: CN II-XII intact grossly intact.  Skin: no rash or lesion.      Results:  Results for orders placed or performed during the hospital encounter of 10/28/23   XR Knee Left 3 Views     Status: None    Narrative    EXAM: XR KNEE LEFT 3 VIEWS  LOCATION: M  [facility]  DATE: 10/28/2023    INDICATION:  Acute pain of left knee, Pain and swelling of left knee, Pain and swelling of left knee  COMPARISON: None.      Impression    IMPRESSION: Mild degenerative changes in the medial and patellofemoral compartments. Small knee joint effusion. No fractures are evident. Normal patellar alignment.

## 2023-11-28 ENCOUNTER — LAB (OUTPATIENT)
Dept: LAB | Facility: CLINIC | Age: 63
End: 2023-11-28
Payer: COMMERCIAL

## 2023-11-28 DIAGNOSIS — Z79.4 TYPE 2 DIABETES MELLITUS WITHOUT COMPLICATION, WITH LONG-TERM CURRENT USE OF INSULIN (H): ICD-10-CM

## 2023-11-28 DIAGNOSIS — E11.9 TYPE 2 DIABETES MELLITUS WITHOUT COMPLICATION, WITH LONG-TERM CURRENT USE OF INSULIN (H): ICD-10-CM

## 2023-11-28 LAB — HBA1C MFR BLD: 7.6 % (ref 0–5.6)

## 2023-11-28 PROCEDURE — 83036 HEMOGLOBIN GLYCOSYLATED A1C: CPT

## 2023-11-28 PROCEDURE — 36415 COLL VENOUS BLD VENIPUNCTURE: CPT

## 2023-11-28 PROCEDURE — 80048 BASIC METABOLIC PNL TOTAL CA: CPT

## 2023-11-29 LAB
ANION GAP SERPL CALCULATED.3IONS-SCNC: 9 MMOL/L (ref 7–15)
BUN SERPL-MCNC: 20.7 MG/DL (ref 8–23)
CALCIUM SERPL-MCNC: 9.5 MG/DL (ref 8.8–10.2)
CHLORIDE SERPL-SCNC: 103 MMOL/L (ref 98–107)
CREAT SERPL-MCNC: 1 MG/DL (ref 0.51–0.95)
DEPRECATED HCO3 PLAS-SCNC: 26 MMOL/L (ref 22–29)
EGFRCR SERPLBLD CKD-EPI 2021: 63 ML/MIN/1.73M2
GLUCOSE SERPL-MCNC: 212 MG/DL (ref 70–99)
POTASSIUM SERPL-SCNC: 4.3 MMOL/L (ref 3.4–5.3)
SODIUM SERPL-SCNC: 138 MMOL/L (ref 135–145)

## 2023-12-12 ENCOUNTER — VIRTUAL VISIT (OUTPATIENT)
Dept: ENDOCRINOLOGY | Facility: CLINIC | Age: 63
End: 2023-12-12
Payer: COMMERCIAL

## 2023-12-12 DIAGNOSIS — Z79.4 TYPE 2 DIABETES MELLITUS WITHOUT COMPLICATION, WITH LONG-TERM CURRENT USE OF INSULIN (H): Primary | ICD-10-CM

## 2023-12-12 DIAGNOSIS — E11.9 TYPE 2 DIABETES MELLITUS WITHOUT COMPLICATION, WITH LONG-TERM CURRENT USE OF INSULIN (H): Primary | ICD-10-CM

## 2023-12-12 PROCEDURE — 99214 OFFICE O/P EST MOD 30 MIN: CPT | Mod: VID | Performed by: NURSE PRACTITIONER

## 2023-12-12 RX ORDER — DICLOFENAC SODIUM 75 MG/1
1 TABLET, DELAYED RELEASE ORAL
COMMUNITY
Start: 2023-11-01 | End: 2024-07-01

## 2023-12-12 NOTE — PROGRESS NOTES
"[facility] ENDOCRINOLOGY    Diabetes Note 12/12/2023    Zoe Alvarez, 1960, [id number]          Reason for visit      1. Type 2 diabetes mellitus without complication, with long-term current use of insulin (H)        HPI     Zoe Alvarez is a very pleasant 63 year old old female who presents for follow up.  SUMMARY:    Lynne is seen today via Video Visit in follow-up for DM 2. Her current A1c is 7.6 and about the same as her last at 7.5.  She is feeling well.     She continues to use Ozempic, 1 mg weekly. It is helping to control her BG, but she has not yet been able to lose any weight. She is interested in going up to 2 mg.     She is also taking Semglee, 30 units twice daily, Glipizide 10 mg BID, and Jardiance, 25 mg daily.     She notes that she has BG in the 80s \"a couple of times a week\". She notes that when this happens, her vision gets \"pixelated\". She usually has a couple of sips of juice and then feels better. She has not had to have outside help.     Renal function remains within normal range.       Blood glucose data:      Past Medical History     Patient Active Problem List   Diagnosis    Obesity (BMI 30-39.9)    Chronic venous insufficiency    Epidermal Inclusion Cyst    Hyperlipidemia    Vertigo    Type 2 diabetes mellitus without complication, with long-term current use of insulin (H)    Eczema    RLQ abdominal pain    Tarsal tunnel syndrome of left side    Compression neuropathy of right lower extremity    Mononeuritis of left lower extremity    Plantar fascial fibromatosis of left foot    Mononeuritis of right lower extremity    Tarsal tunnel syndrome of right side    Foot pain, left    Bilateral lower extremity edema    Chronic kidney disease, stage 3 (H)    Pulmonary nodules        Family History       family history includes Breast Cancer in her maternal grandmother; Diabetes in her mother.    Social History      reports that she has quit smoking. She has never used smokeless " tobacco. She reports current alcohol use of about 1.0 standard drink of alcohol per week. She reports that she does not use drugs.      Review of Systems     Patient has no polyuria or polydipsia, no chest pain, dyspnea or TIA's, no numbness, tingling or pain in extremities  Remainder negative except as noted in HPI.      Vital Signs     There were no vitals taken for this visit.  Wt Readings from Last 3 Encounters:   11/04/22 84.4 kg (186 lb)   08/15/22 85.2 kg (187 lb 12.8 oz)   07/24/22 82.1 kg (181 lb)       Physical Exam     Constitutional:  Well developed, Well nourished  HENT:  Normocephalic,   Neck: normal in appearance  Eyes:  PERRL, Conjunctiva pink  Respiratory:  No respiratory distress  Skin: No acanthosis nigricans, lipoatrophy or lipodystrophy  Neurologic:  Alert & oriented x 3, nonfocal  Psychiatric:  Affect, Mood, Insight appropriate      Assessment     1. Type 2 diabetes mellitus without complication, with long-term current use of insulin (H)        Plan     Lynne's control remains good. We will increase her Ozempic dose to 2 mgs weekly and see how she does with it. No other changes today. She will follow-up with me in 3 months.         Jayla Hermosillo NP  [facility] Endocrinology  12/12/2023  7:13 AM      Lab Results     Microalbumin Urine mg/dL   Date Value Ref Range Status   04/26/2022 <0.50 0.00 - 1.99 mg/dL Final       Cholesterol   Date Value Ref Range Status   10/25/2021 196 <=199 mg/dL Final     Direct Measure HDL   Date Value Ref Range Status   10/25/2021 39 (L) >=50 mg/dL Final     Comment:     HDL Cholesterol Reference Range:     0-2 years:   No reference ranges established for patients under 2 years old  at [facility] for lipid analytes.    2-8 years:  Greater than 45 mg/dL     18 years and older:   Female: Greater than or equal to 50 mg/dL   Male:   Greater than or equal to 40 mg/dL     Triglycerides   Date Value Ref Range Status   10/25/2021 225 (H) <=149 mg/dL Final  "      [unfilled]      Current Medications     Outpatient Medications Prior to Visit   Medication Sig Dispense Refill    atorvastatin (LIPITOR) 40 MG tablet Take 1 tablet (40 mg) by mouth daily 90 tablet 2    blood glucose meter (GLUCOMETER) [BLOOD GLUCOSE METER (GLUCOMETER)] Use 1 each As Directed as needed. Dispense glucometer brand per patient's insurance at pharmacy discretion. 1 each 0    diclofenac (VOLTAREN) 75 MG EC tablet Take 1 tablet by mouth 2 times daily      empagliflozin (JARDIANCE) 25 MG TABS tablet Take 1 tablet (25 mg) by mouth daily 90 tablet 1    generic lancets (FINGERSTIX LANCETS) [GENERIC LANCETS (FINGERSTIX LANCETS)] Use to check blood sugar 1-2x daily. Dispense brand per patient's insurance at pharmacy discretion. 100 each 11    glipiZIDE (GLUCOTROL) 10 MG tablet Take 1 tablet (10 mg) by mouth 2 times daily (before meals) 180 tablet 1    Insulin Glargine-yfgn (SEMGLEE, YFGN,) 100 UNIT/ML SOPN Inject 90 Units Subcutaneous daily \"up to 90 units daily\" 81 mL 3    insulin pen needle (BD PEN NEEDLE TJ 2ND GEN) 32G X 4 MM miscellaneous USE TWO TIMES A DAY WITH INSULIN 100 each 3    valACYclovir (VALTREX) 1000 mg tablet [VALACYCLOVIR (VALTREX) 1000 MG TABLET] TAKE 2 TABLETS BY MOUTH NOW AND 2 TABLETS IN 12 HOURS. PRN. 4 tablet 5    Semaglutide, 1 MG/DOSE, (OZEMPIC) 4 MG/3ML pen Inject 1 mg Subcutaneous every 7 days 9 mL 1     No facility-administered medications prior to visit.             Virtual Visit Details    Type of service:  Video Visit     Originating Location (pt. Location): Home    Distant Location (provider location):  On-site  Platform used for Video Visit: [facility]      Blood Glucose:    12/12  F  147    12/11  F  104  Before Dinner  189    12/10  F  89  Before Dinner  199    12/9  F  91  Before Dinner  109    12/8  F  112  Before Dinner  220    12/7  F  136  Before Dinner  263    12/6  F  85  Before  Dinner  146  "

## 2023-12-12 NOTE — LETTER
"    12/12/2023         RE: Zoe Alvarez  [address] [facility] [address]        Dear Colleague,    Thank you for referring your patient, Zoe Alvarez, to the [facility]. Please see a copy of my visit note below.    [facility] ENDOCRINOLOGY    Diabetes Note 12/12/2023    Zoe Alvarez, 1960, [id number]          Reason for visit      1. Type 2 diabetes mellitus without complication, with long-term current use of insulin (H)        HPI     Zoe Alvarez is a very pleasant 63 year old old female who presents for follow up.  SUMMARY:    Lynne is seen today via Video Visit in follow-up for DM 2. Her current A1c is 7.6 and about the same as her last at 7.5.  She is feeling well.     She continues to use Ozempic, 1 mg weekly. It is helping to control her BG, but she has not yet been able to lose any weight. She is interested in going up to 2 mg.     She is also taking Semglee, 30 units daily, Glipizide 10 mg BID, and Jardiance, 25 mg daily.     She notes that she has BG in the 80s \"a couple of times a week\". She notes that when this happens, her vision gets \"pixelated\". She usually has a couple of sips of juice and then feels better. She has not had to have outside help.     Renal function remains within normal range.       Blood glucose data:      Past Medical History     Patient Active Problem List   Diagnosis     Obesity (BMI 30-39.9)     Chronic venous insufficiency     Epidermal Inclusion Cyst     Hyperlipidemia     Vertigo     Type 2 diabetes mellitus without complication, with long-term current use of insulin (H)     Eczema     RLQ abdominal pain     Tarsal tunnel syndrome of left side     Compression neuropathy of right lower extremity     Mononeuritis of left lower extremity     Plantar fascial fibromatosis of left foot     Mononeuritis of right lower extremity     Tarsal tunnel syndrome of right side     Foot pain, left     Bilateral lower extremity " edema     Chronic kidney disease, stage 3 (H)     Pulmonary nodules        Family History       family history includes Breast Cancer in her maternal grandmother; Diabetes in her mother.    Social History      reports that she has quit smoking. She has never used smokeless tobacco. She reports current alcohol use of about 1.0 standard drink of alcohol per week. She reports that she does not use drugs.      Review of Systems     Patient has no polyuria or polydipsia, no chest pain, dyspnea or TIA's, no numbness, tingling or pain in extremities  Remainder negative except as noted in HPI.      Vital Signs     There were no vitals taken for this visit.  Wt Readings from Last 3 Encounters:   11/04/22 84.4 kg (186 lb)   08/15/22 85.2 kg (187 lb 12.8 oz)   07/24/22 82.1 kg (181 lb)       Physical Exam     Constitutional:  Well developed, Well nourished  HENT:  Normocephalic,   Neck: normal in appearance  Eyes:  PERRL, Conjunctiva pink  Respiratory:  No respiratory distress  Skin: No acanthosis nigricans, lipoatrophy or lipodystrophy  Neurologic:  Alert & oriented x 3, nonfocal  Psychiatric:  Affect, Mood, Insight appropriate      Assessment     1. Type 2 diabetes mellitus without complication, with long-term current use of insulin (H)        Plan     Lynne's control remains good. We will increase her Ozempic dose to 2 mgs weekly and see how she does with it. No other changes today. She will follow-up with me in 3 months.         Jayla Hermosillo NP   Endocrinology  12/12/2023  7:13 AM      Lab Results     Microalbumin Urine mg/dL   Date Value Ref Range Status   04/26/2022 <0.50 0.00 - 1.99 mg/dL Final       Cholesterol   Date Value Ref Range Status   10/25/2021 196 <=199 mg/dL Final     Direct Measure HDL   Date Value Ref Range Status   10/25/2021 39 (L) >=50 mg/dL Final     Comment:     HDL Cholesterol Reference Range:     0-2 years:   No reference ranges established for patients under 2 years old  at [facility]  "Laboratories for lipid analytes.    2-8 years:  Greater than 45 mg/dL     18 years and older:   Female: Greater than or equal to 50 mg/dL   Male:   Greater than or equal to 40 mg/dL     Triglycerides   Date Value Ref Range Status   10/25/2021 225 (H) <=149 mg/dL Final       [unfilled]      Current Medications     Outpatient Medications Prior to Visit   Medication Sig Dispense Refill     atorvastatin (LIPITOR) 40 MG tablet Take 1 tablet (40 mg) by mouth daily 90 tablet 2     blood glucose meter (GLUCOMETER) [BLOOD GLUCOSE METER (GLUCOMETER)] Use 1 each As Directed as needed. Dispense glucometer brand per patient's insurance at pharmacy discretion. 1 each 0     diclofenac (VOLTAREN) 75 MG EC tablet Take 1 tablet by mouth 2 times daily       empagliflozin (JARDIANCE) 25 MG TABS tablet Take 1 tablet (25 mg) by mouth daily 90 tablet 1     generic lancets (FINGERSTIX LANCETS) [GENERIC LANCETS (FINGERSTIX LANCETS)] Use to check blood sugar 1-2x daily. Dispense brand per patient's insurance at pharmacy discretion. 100 each 11     glipiZIDE (GLUCOTROL) 10 MG tablet Take 1 tablet (10 mg) by mouth 2 times daily (before meals) 180 tablet 1     Insulin Glargine-yfgn (SEMGLEE, YFGN,) 100 UNIT/ML SOPN Inject 90 Units Subcutaneous daily \"up to 90 units daily\" 81 mL 3     insulin pen needle (BD PEN NEEDLE TJ 2ND GEN) 32G X 4 MM miscellaneous USE TWO TIMES A DAY WITH INSULIN 100 each 3     valACYclovir (VALTREX) 1000 mg tablet [VALACYCLOVIR (VALTREX) 1000 MG TABLET] TAKE 2 TABLETS BY MOUTH NOW AND 2 TABLETS IN 12 HOURS. PRN. 4 tablet 5     Semaglutide, 1 MG/DOSE, (OZEMPIC) 4 MG/3ML pen Inject 1 mg Subcutaneous every 7 days 9 mL 1     No facility-administered medications prior to visit.             Virtual Visit Details    Type of service:  Video Visit     Originating Location (pt. Location): Home    Distant Location (provider location):  On-site  Platform used for Video Visit: Marina      Blood Glucose:    12/12  F  147    12/11  F  " 104  Before Dinner  189    12/10  F  89  Before Dinner  199    12/9  F  91  Before Dinner  109    12/8  F  112  Before Dinner  220    12/7  F  136  Before Dinner  263    12/6  F  85  Before  Dinner  146      Again, thank you for allowing me to participate in the care of your patient.        Sincerely,        Jayla Hermosillo NP

## 2023-12-19 ENCOUNTER — HOSPITAL ENCOUNTER (OUTPATIENT)
Dept: GENERAL RADIOLOGY | Facility: HOSPITAL | Age: 63
Discharge: HOME | End: 2023-12-19
Attending: FAMILY MEDICINE | Admitting: FAMILY MEDICINE
Payer: COMMERCIAL

## 2023-12-19 ENCOUNTER — OFFICE VISIT (OUTPATIENT)
Dept: FAMILY MEDICINE | Facility: CLINIC | Age: 63
End: 2023-12-19
Payer: COMMERCIAL

## 2023-12-19 VITALS
OXYGEN SATURATION: 96 % | BODY MASS INDEX: 30.49 KG/M2 | RESPIRATION RATE: 18 BRPM | TEMPERATURE: 101.2 F | HEART RATE: 112 BPM | DIASTOLIC BLOOD PRESSURE: 66 MMHG | WEIGHT: 179 LBS | SYSTOLIC BLOOD PRESSURE: 102 MMHG

## 2023-12-19 DIAGNOSIS — R05.1 ACUTE COUGH: ICD-10-CM

## 2023-12-19 DIAGNOSIS — J02.9 SORE THROAT: ICD-10-CM

## 2023-12-19 DIAGNOSIS — J10.1 INFLUENZA B: ICD-10-CM

## 2023-12-19 DIAGNOSIS — J02.0 STREPTOCOCCAL PHARYNGITIS: Primary | ICD-10-CM

## 2023-12-19 LAB
DEPRECATED S PYO AG THROAT QL EIA: POSITIVE
FLUAV AG SPEC QL IA: NEGATIVE
FLUBV AG SPEC QL IA: POSITIVE

## 2023-12-19 PROCEDURE — 87880 STREP A ASSAY W/OPTIC: CPT | Performed by: FAMILY MEDICINE

## 2023-12-19 PROCEDURE — 71046 X-RAY EXAM CHEST 2 VIEWS: CPT

## 2023-12-19 PROCEDURE — 87804 INFLUENZA ASSAY W/OPTIC: CPT | Performed by: FAMILY MEDICINE

## 2023-12-19 PROCEDURE — 99214 OFFICE O/P EST MOD 30 MIN: CPT | Performed by: FAMILY MEDICINE

## 2023-12-19 RX ORDER — AZITHROMYCIN 500 MG/1
500 TABLET, FILM COATED ORAL DAILY
Qty: 5 TABLET | Refills: 0 | Status: SHIPPED | OUTPATIENT
Start: 2023-12-19 | End: 2023-12-24

## 2023-12-19 NOTE — PROGRESS NOTES
Assessment:     Streptococcal pharyngitis  - azithromycin (ZITHROMAX) 500 MG tablet  Dispense: 5 tablet; Refill: 0    Influenza B    Sore throat  - Streptococcus A Rapid Screen w/Reflex to PCR - Clinic Collect  - Influenza A & B Antigen - Clinic Collect    Acute cough  - XR Chest 2 Views    Plan:     Patient has strep throat.  Will treat strep with zithromax.  Patient is contagious for 24 hours after they start taking the first dose of antibiotics.  Should throw out toothbrush after you have been on antibiotics for 48 hours.  May take Tylenol or ibuprofen as needed for fever or discomfort.  Please follow-up if symptoms are getting worse or not improving.    Patient positive for influenza. Discussed the typical course of symptoms and the length that patient will be contagious.  Recommend symptomatic care with Tylenol, ibuprofen, rest, and fluids.  Follow-up if symptoms getting worse or not improving in the expected time course of symptoms.        MEDICATIONS:   Orders Placed This Encounter   Medications    azithromycin (ZITHROMAX) 500 MG tablet     Sig: Take 1 tablet (500 mg) by mouth daily for 5 days     Dispense:  5 tablet     Refill:  0         Subjective:       63 year old female presents for evaluation of a 6-day history of cough and sore throat along with fevers.  No shortness of breath or wheezing.  She has had some mild nasal congestion.  She has had bodyaches and chills.    Patient Active Problem List   Diagnosis    Obesity (BMI 30-39.9)    Chronic venous insufficiency    Epidermal Inclusion Cyst    Hyperlipidemia    Vertigo    Type 2 diabetes mellitus without complication, with long-term current use of insulin (H)    Eczema    RLQ abdominal pain    Tarsal tunnel syndrome of left side    Compression neuropathy of right lower extremity    Mononeuritis of left lower extremity    Plantar fascial fibromatosis of left foot    Mononeuritis of right lower extremity    Tarsal tunnel syndrome of right side    Foot  pain, left    Bilateral lower extremity edema    Chronic kidney disease, stage 3 (H)    Pulmonary nodules       Past Medical History:   Diagnosis Date    Arthritis     Diabetes mellitus (H)     History of transfusion     AFTER A MISCARRIAGE    Migraine     Neuropathy        Past Surgical History:   Procedure Laterality Date    BLADDER SUSPENSION  2007    COLONOSCOPY      COLONOSCOPY N/A 3/18/2022    Procedure: COLONOSCOPY, FLEXIBLE, WITH LESION REMOVAL USING SNARE with polypectomies by cold snare and 1 clip applied and cautery to rectum polyp sites to prevent bleeding;  Surgeon: Cristobal Mcclelland MD;  Location:  GI    DILATION AND CURETTAGE      SUCTION POST MISCARRIAGE    HC TARSAL TUNNEL RELEASE Left 9/21/2018    Procedure: DELLON QUADRUPLE NERVE DECOMPRESSION EXCISION PLANTAR FIBROMA ALL LEFT FOOT;  Surgeon: Jules Mao DPM;  Location: [facility] OR;  Service: Podiatry    HYSTERECTOMY      1999    KNEE SURGERY      LAPAROSCOPIC APPENDECTOMY N/A 7/10/2014    Procedure: Laparoscopic Appendectomy;  Surgeon: Germain Howe MD;  Location: [facility];  Service:     [facility] APPENDECTOMY      Description: Appendectomy;  Recorded: 07/17/2014;  Comments: JULY 2014    [facility] TOTAL ABDOM HYSTERECTOMY      Description: Hysterectomy;  Recorded: 01/11/2012;       Current Outpatient Medications   Medication    atorvastatin (LIPITOR) 40 MG tablet    blood glucose (NO BRAND SPECIFIED) test strip    blood glucose meter (GLUCOMETER)    blood glucose monitoring (NO BRAND SPECIFIED) meter device kit    diclofenac (VOLTAREN) 75 MG EC tablet    empagliflozin (JARDIANCE) 25 MG TABS tablet    generic lancets (FINGERSTIX LANCETS)    glipiZIDE (GLUCOTROL) 10 MG tablet    Insulin Glargine-yfgn (SEMGLEE, YFGN,) 100 UNIT/ML SOPN    insulin pen needle (BD PEN NEEDLE TJ 2ND GEN) 32G X 4 MM miscellaneous    Semaglutide, 2 MG/DOSE, (OZEMPIC) 8 MG/3ML pen    valACYclovir (VALTREX) 1000 mg tablet     No current facility-administered  medications for this visit.       Allergies   Allergen Reactions    Bactrim [Sulfamethoxazole-Trimethoprim] Itching    Amoxicillin Swelling and Itching    Metformin Other (See Comments)     GI Upset       Family History   Problem Relation Age of Onset    Diabetes Mother     Breast Cancer Maternal Grandmother     Colon Cancer No family hx of        Social History     Socioeconomic History    Marital status:      Spouse name: None    Number of children: 2    Years of education: None    Highest education level: None   Tobacco Use    Smoking status: Former    Smokeless tobacco: Never   Substance and Sexual Activity    Alcohol use: Yes     Alcohol/week: 1.0 standard drink of alcohol     Comment: Alcoholic Drinks/day: <1 per wk    Drug use: No    Sexual activity: Yes     Partners: Male     Birth control/protection: Surgical         Review of Systems  Pertinent items are noted in HPI.      Objective:                 General Appearance:    /66   Pulse 112   Temp (!) 101.2  F (38.4  C)   Resp 18   Wt 81.2 kg (179 lb)   SpO2 96%   BMI 30.49 kg/m          Alert, mildly ill-appearing but in no distress.   Head:    Normocephalic, without obvious abnormality, atraumatic   Eyes:    Conjunctiva/corneas clear   Ears:    Normal TM's without erythema or bulging. Normal external ear canals, both ears   Nose:   Nares normal, septum midline, mucosa normal, no drainage    or sinus tenderness   Throat:   Lips, mucosa, and tongue normal; teeth and gums normal.  No tonsilar hypertrophy or exudate.   Neck:   Supple, symmetrical, trachea midline, no adenopathy    Lungs:   rhonchi heard at the left base.  No wheezes heard.  Good aeration.    Heart:    Regular rate and rhythm, S1 and S2 normal, no murmur, rub or gallop       Extremities:   Extremities normal, atraumatic, no cyanosis or edema   Skin:   Skin color, texture, turgor normal, no rashes or lesions           Results for orders placed or performed during the hospital  encounter of 12/19/23   XR Chest 2 Views     Status: None    Narrative    EXAM: XR CHEST 2 VIEWS  LOCATION: [facility]  DATE: 12/19/2023    INDICATION:  Acute cough  COMPARISON: 3/4/2022      Impression    IMPRESSION: Lungs are clear. No pleural effusion. Heart size and pulmonary vascularity within normal limits.   Results for orders placed or performed in visit on 12/19/23   Streptococcus A Rapid Screen w/Reflex to PCR - Clinic Collect     Status: Abnormal    Specimen: Throat; Swab   Result Value Ref Range    Group A Strep antigen Positive (A) Negative   Influenza A & B Antigen - Clinic Collect     Status: Abnormal    Specimen: Nose; Swab   Result Value Ref Range    Influenza A antigen Negative Negative    Influenza B antigen Positive (A) Negative    Narrative    Test results must be correlated with clinical data. If necessary, results should be confirmed by a molecular assay or viral culture.       This note has been dictated using voice recognition software. Any grammatical or context distortions are unintentional and inherent to the software

## 2023-12-19 NOTE — LETTER
December 19, 2023      Zoe Alvarez  [address] [facility] [address]        To Whom It May Concern:    Zoe Alvarez  was seen on 12/19/2023.  Please excuse her until 12/22/2023 due to illness.        Sincerely,        Rosy Ibarra MD

## 2023-12-24 ENCOUNTER — OFFICE VISIT (OUTPATIENT)
Dept: FAMILY MEDICINE | Facility: CLINIC | Age: 63
End: 2023-12-24
Payer: COMMERCIAL

## 2023-12-24 VITALS
SYSTOLIC BLOOD PRESSURE: 103 MMHG | DIASTOLIC BLOOD PRESSURE: 68 MMHG | HEART RATE: 99 BPM | TEMPERATURE: 98.6 F | OXYGEN SATURATION: 96 % | RESPIRATION RATE: 14 BRPM | WEIGHT: 177.7 LBS | BODY MASS INDEX: 30.27 KG/M2

## 2023-12-24 DIAGNOSIS — R06.82 TACHYPNEA: ICD-10-CM

## 2023-12-24 DIAGNOSIS — J10.1 INFLUENZA B: Primary | ICD-10-CM

## 2023-12-24 DIAGNOSIS — Z87.891 HISTORY OF SMOKING: ICD-10-CM

## 2023-12-24 DIAGNOSIS — R05.8 POST-VIRAL COUGH SYNDROME: ICD-10-CM

## 2023-12-24 PROCEDURE — 99214 OFFICE O/P EST MOD 30 MIN: CPT | Performed by: NURSE PRACTITIONER

## 2023-12-24 RX ORDER — ALBUTEROL SULFATE 90 UG/1
2 AEROSOL, METERED RESPIRATORY (INHALATION) ONCE
Status: COMPLETED | OUTPATIENT
Start: 2023-12-24 | End: 2023-12-24

## 2023-12-24 RX ADMIN — ALBUTEROL SULFATE 2 PUFF: 90 INHALANT RESPIRATORY (INHALATION) at 10:34

## 2023-12-24 ASSESSMENT — ENCOUNTER SYMPTOMS
COUGH: 1
FEVER: 0
RHINORRHEA: 1
SHORTNESS OF BREATH: 1

## 2023-12-24 NOTE — PROGRESS NOTES
Assessment & Plan     Influenza B    - albuterol (PROVENTIL HFA/VENTOLIN HFA) inhaler  -Sent home with inhaler.  Continue 2 puffs up to 4 times daily as needed.  Consider following up for asthma/COPD testing  Post-viral cough syndrome      Tachypnea  -Improved with albuterol    History of smoking         Focused exam and history done due to COVID-19 pandemic in a walk-in setting.      11 days of cough associated with influenza B diagnosis on the 19th after 6 days of coughing with fevers.    History of smoking, quit 14 years ago.  Is mildly tachypneic on exam.     Lungs are completely clear without fevers in the last few days.  Did try test dose of albuterol inhaler to see if helpful -patient reports significant improvement with this -easier breathing, less frequent cough.  Lungs are continuing to be clear.  Recheck vital signs -respiratory rate now 14 with oxygen saturation 96% on room air.    Recheck if worsening shortness of breath, new fevers or needing inhaler more than 4 times daily.    OTCs recommended: None [   ].  Dextromethorphan  [  x], guaifenesin [ x ], pseudoephedrine [   ], Afrin for no greater than 3 days [  ], Tylenol or ibuprofen [ x ].                Return in about 10 days (around 1/3/2024) for If no better.    Carley Alaniz, [facility]    Jason Batista is a 63 year old female who presents to clinic today for the following health issues:  Chief Complaint   Patient presents with    Cough     X 11 days, was seen on Tuesday (dx with flu and strep, finished antibiotic last night), fever still present, symptoms still the same     HPI    Was seen on the 19th, and diagnosed with strep and influenza B.  URI symptoms with fever had been going on for 6 days at time of dx.  = 11 days of symptoms.    Back today for persistent cough.    Was given azithromycin for strep.  Sore throat rated 2-3 out of 10.    Was a smoker, quit 14 years ago.  Does feel little short of breath  with this.    No temps over 100.4 in the last few days.      Sore throat is persistent, but rated 3 out of 10 at this time.  Has a history of enlarged tonsils.    Works in a group home.  History of diabetes Type 2.      Review of Systems   Constitutional:  Negative for fever.   HENT:  Positive for rhinorrhea.    Respiratory:  Positive for cough and shortness of breath.            Objective    /68   Pulse 99   Temp 98.6  F (37  C) (Oral)   Resp 14   Wt 80.6 kg (177 lb 11.2 oz)   SpO2 96%   BMI 30.27 kg/m    Physical Exam  Constitutional:       General: She is not in acute distress.     Appearance: She is well-developed.   HENT:      Nose: No congestion.      Mouth/Throat:      Pharynx: No posterior oropharyngeal erythema.      Tonsils: No tonsillar exudate. 2+ on the right. 2+ on the left.   Eyes:      General:         Right eye: No discharge.         Left eye: No discharge.      Conjunctiva/sclera: Conjunctivae normal.   Pulmonary:      Effort: Pulmonary effort is normal.      Breath sounds: Normal breath sounds. No wheezing.   Musculoskeletal:         General: Normal range of motion.   Skin:     General: Skin is warm and dry.      Capillary Refill: Capillary refill takes less than 2 seconds.   Neurological:      Mental Status: She is alert and oriented to person, place, and time.   Psychiatric:         Mood and Affect: Mood normal.         Behavior: Behavior normal.         Thought Content: Thought content normal.         Judgment: Judgment normal.

## 2023-12-24 NOTE — PATIENT INSTRUCTIONS
You are experiencing common viral symptoms. Coughs from flu can take 3  weeks to resolve on average.     Try over-the-counter cough and cold medication as needed such as Dayquil/Nyquil    You can use your inhaler that we gave you to days 2 puffs up to 4 times daily.  Be rechecked if you think you need it more than this.  See the handout for proper use.    Recheck if shortness of breath, persistent ear pain or not starting to feel better in about 7 days      Tylenol or ibuprofen as needed if not in your cough medicine  For sore throat-Also try Cepacol lozenges, throat coat tea or tea with honey.

## 2023-12-26 ENCOUNTER — TELEPHONE (OUTPATIENT)
Dept: ENDOCRINOLOGY | Facility: CLINIC | Age: 63
End: 2023-12-26

## 2024-01-01 DIAGNOSIS — Z79.4 TYPE 2 DIABETES MELLITUS WITHOUT COMPLICATION, WITH LONG-TERM CURRENT USE OF INSULIN (H): Primary | ICD-10-CM

## 2024-01-01 DIAGNOSIS — E11.9 TYPE 2 DIABETES MELLITUS WITHOUT COMPLICATION, WITH LONG-TERM CURRENT USE OF INSULIN (H): Primary | ICD-10-CM

## 2024-02-19 RX ORDER — SEMAGLUTIDE 1.34 MG/ML
1 INJECTION, SOLUTION SUBCUTANEOUS
Qty: 9 ML | Refills: 1 | OUTPATIENT
Start: 2024-02-19

## 2024-02-19 NOTE — TELEPHONE ENCOUNTER
Requested Prescriptions   Pending Prescriptions Disp Refills    OZEMPIC (1 MG/DOSE) 4 MG/3ML pen [Pharmacy Med Name: OZEMPIC (1 MG/DOSE) 4MG/3ML SOPN] 9 mL 1     Sig: INJECT 1 MG SUBCUTANEOUS EVERY 7 DAYS       GLP-1 Agonists Protocol Failed - 2/18/2024 11:21 AM        Failed - Medication indicated for associated diagnosis     Medication is associated with one or more of the following diagnoses:     Type 2 diabetes mellitus           Passed - HgbA1C in past 3 or 6 months     If HgbA1C is 8 or greater, it needs to be on file within the past 3 months.  If less than 8, must be on file within the past 6 months.     Recent Labs   Lab Test 11/28/23  1528   A1C 7.6*             Passed - Medication is active on med list        Passed - Has GFR on file in past 12 months and most recent value is normal        Passed - Recent (6 mo) or future (90 days) visit within the authorizing provider's specialty     The patient must have completed an in-person or virtual visit within the past 6 months or has a future visit scheduled within the next 90 days with the authorizing provider s specialty.  Urgent care and e-visits do not quality as an office visit for this protocol.          Passed - Patient is age 18 or older        Passed - No active pregnancy on record        Passed - No positive pregnancy test in past 12 months

## 2024-02-29 ENCOUNTER — TELEPHONE (OUTPATIENT)
Dept: FAMILY MEDICINE | Facility: CLINIC | Age: 64
End: 2024-02-29
Payer: COMMERCIAL

## 2024-02-29 DIAGNOSIS — B00.9 HERPES SIMPLEX VIRUS INFECTION: ICD-10-CM

## 2024-02-29 DIAGNOSIS — E11.9 TYPE 2 DIABETES MELLITUS WITHOUT COMPLICATION, WITH LONG-TERM CURRENT USE OF INSULIN (H): ICD-10-CM

## 2024-02-29 DIAGNOSIS — Z79.4 TYPE 2 DIABETES MELLITUS WITHOUT COMPLICATION, WITH LONG-TERM CURRENT USE OF INSULIN (H): ICD-10-CM

## 2024-02-29 RX ORDER — VALACYCLOVIR HYDROCHLORIDE 1 G/1
TABLET, FILM COATED ORAL
Qty: 4 TABLET | Refills: 1 | Status: SHIPPED | OUTPATIENT
Start: 2024-02-29 | End: 2024-02-29

## 2024-02-29 RX ORDER — VALACYCLOVIR HYDROCHLORIDE 1 G/1
TABLET, FILM COATED ORAL
Qty: 4 TABLET | Refills: 0 | Status: SHIPPED | OUTPATIENT
Start: 2024-02-29 | End: 2024-07-24

## 2024-02-29 RX ORDER — EMPAGLIFLOZIN 25 MG/1
25 TABLET, FILM COATED ORAL DAILY
Qty: 90 TABLET | Refills: 0 | Status: SHIPPED | OUTPATIENT
Start: 2024-02-29 | End: 2024-06-17

## 2024-02-29 NOTE — TELEPHONE ENCOUNTER
Requested Prescriptions   Pending Prescriptions Disp Refills    JARDIANCE 25 MG TABS tablet [Pharmacy Med Name: JARDIANCE 25MG TABS] 90 tablet 1     Sig: TAKE 1 TABLET (25 MG) BY MOUTH DAILY       Sodium Glucose Co-Transport Inhibitor Agents Passed - 2/29/2024  9:04 AM        Passed - Patient has documented A1c within the specified period of time.     If HgbA1C is 8 or greater, it needs to be on file within the past 3 months.  If less than 8, must be on file within the past 6 months.     Recent Labs   Lab Test 11/28/23  1528   A1C 7.6*             Passed - Medication is active on med list        Passed - Has GFR on file in past 12 months and most recent value is normal        Passed - Recent (6 mo) or future (90 days) visit within the authorizing provider's specialty     The patient must have completed an in-person or virtual visit within the past 6 months or has a future visit scheduled within the next 90 days with the authorizing provider s specialty.  Urgent care and e-visits do not quality as an office visit for this protocol.          Passed - Medication indicated for associated diagnosis     Medication is associated with one or more of the following diagnoses:     Diabetic nephropathy, With Albuminuria - Type 2 diabetes mellitus     Disorder of cardiovascular system; Prophylaxis - Type 2 diabetes mellitus     Type 2 diabetes mellitus    Disorder of cardiovascular system; Prophylaxis - Heart failure   Chronic kidney disease, (At risk of progression) to reduce the risk of sustained   estimated GFR decline, end-stage kidney disease, cardiovascular death,   and hospitalization for heart failure     Heart failure, (NYHA class II to IV, reduced ejection fraction) to reduce risk of  cardiovascular death and hospitalization           Passed - Patient is age 18 or older        Passed - Patient is not pregnant        Passed - Patient has documented normal Potassium within the last 12 mos.     Recent Labs   Lab Test  11/28/23  1528   POTASSIUM 4.3             Passed - Patient has no positive pregnancy test within the past 12 mos.          valACYclovir (VALTREX) 1000 mg tablet [Pharmacy Med Name: VALACYCLOVIR HCL 1GM TABS] 4 tablet 2     Sig: TAKE TWO TABLETS BY MOUTH NOW AND TWO TABLETS IN 12 HOURS AS NEEDED       Antivirals for Herpes Protocol Failed - 2/29/2024  9:04 AM        Failed - Normal serum creatinine on file in past 12 months     Recent Labs   Lab Test 11/28/23  1528   CR 1.00*       Ok to refill medication if creatinine is low          Passed - Patient is age 12 or older        Passed - Recent (12 mo) or future (30 days) visit within the authorizing provider's specialty     The patient must have completed an in-person or virtual visit within the past 12 months or has a future visit scheduled within the next 90 days with the authorizing provider s specialty.  Urgent care and e-visits do not quality as an office visit for this protocol.          Passed - Medication is active on med list

## 2024-02-29 NOTE — TELEPHONE ENCOUNTER
Please call patient -    ______________________________________________________________________     Home phone:  [phone] (home)     Cell phone:   Telephone Information:   Mobile [phone]       Other contacts:  Name Home Phone Work Phone Mobile Phone Relationship Lgl Grd   KIT SHELTON [phone]   Spouse      ______________________________________________________________________     We did a refill of her valacyclovir (Valtrex).    She has not seen Dr. Val Garza since August of 2022.  She is due for follow up and should be scheduled for a physical in the next 3 months.    Fab Ley MD  [facility]  2/29/2024, 4:13 PM

## 2024-03-04 DIAGNOSIS — Z79.4 TYPE 2 DIABETES MELLITUS WITHOUT COMPLICATION, WITH LONG-TERM CURRENT USE OF INSULIN (H): ICD-10-CM

## 2024-03-04 DIAGNOSIS — E11.9 TYPE 2 DIABETES MELLITUS WITHOUT COMPLICATION (H): ICD-10-CM

## 2024-03-04 DIAGNOSIS — E11.9 TYPE 2 DIABETES MELLITUS WITHOUT COMPLICATION, WITH LONG-TERM CURRENT USE OF INSULIN (H): ICD-10-CM

## 2024-03-04 RX ORDER — GLIPIZIDE 10 MG/1
10 TABLET ORAL 2 TIMES DAILY
Qty: 60 TABLET | Refills: 0 | Status: SHIPPED | OUTPATIENT
Start: 2024-03-04 | End: 2024-03-28

## 2024-03-04 RX ORDER — PEN NEEDLE, DIABETIC 32GX 5/32"
NEEDLE, DISPOSABLE MISCELLANEOUS
Qty: 100 EACH | Refills: 1 | Status: SHIPPED | OUTPATIENT
Start: 2024-03-04 | End: 2024-06-06

## 2024-03-04 NOTE — TELEPHONE ENCOUNTER
"Requested Prescriptions   Pending Prescriptions Disp Refills    insulin pen needle (BD TJ U/F) 32G X 4 MM miscellaneous [Pharmacy Med Name: BD PEN NEEDLE TJ  32G X 4 MM MISC]  1     Sig: USE TWO TIMES A DAY WITH INSULIN       Diabetic Supplies Protocol Passed - 3/4/2024  9:47 AM        Passed - Medication is active on med list        Passed - Medication indicated for associated diagnosis        Passed - Patient is 18 years of age or older        Passed - Recent (6 mo) or future (30 days) visit within the authorizing provider's specialty     Patient had office visit in the last 6 months or has a visit in the next 30 days with authorizing provider.  See \"Patient Info\" tab in inbasket, or \"Choose Columns\" in Meds & Orders section of the refill encounter.                "

## 2024-03-04 NOTE — TELEPHONE ENCOUNTER
Requested Prescriptions   Pending Prescriptions Disp Refills    glipiZIDE (GLUCOTROL) 10 MG tablet [Pharmacy Med Name: GLIPIZIDE 10MG TABS] 180 tablet 1     Sig: TAKE ONE TABLET BY MOUTH TWICE A DAY BEFORE MEALS       Sulfonylurea Agents Failed - 3/4/2024  3:21 PM        Failed - Patient has a recent creatinine (normal) within the past 12 mos.     Recent Labs   Lab Test 11/28/23  1528   CR 1.00*       Ok to refill medication if creatinine is low          Passed - Patient has documented A1c within the specified period of time.     If HgbA1C is 8 or greater, it needs to be on file within the past 3 months.  If less than 8, must be on file within the past 6 months.     Recent Labs   Lab Test 11/28/23  1528   A1C 7.6*             Passed - Medication is active on med list        Passed - Has GFR on file in past 12 months and most recent value is normal        Passed - Recent (6 mo) or future (90 days) visit within the authorizing provider's specialty     The patient must have completed an in-person or virtual visit within the past 6 months or has a future visit scheduled within the next 90 days with the authorizing provider s specialty.  Urgent care and e-visits do not quality as an office visit for this protocol.          Passed - Medication indicated for associated diagnosis     Medication is associated with one or more of the following diagnoses:  Maturity-onset diabetes of the young   Peripheral circulatory disorder due to type 2 diabetes mellitus   Type 2 diabetes mellitus           Passed - Patient is age 18 or older        Passed - No active pregnancy on record        Passed - Patient has not had a positive pregnancy test within the past 12 mos.

## 2024-03-06 DIAGNOSIS — E78.2 MIXED HYPERLIPIDEMIA: ICD-10-CM

## 2024-03-06 RX ORDER — ATORVASTATIN CALCIUM 40 MG/1
40 TABLET, FILM COATED ORAL DAILY
Qty: 90 TABLET | Refills: 0 | Status: SHIPPED | OUTPATIENT
Start: 2024-03-06 | End: 2024-05-28

## 2024-03-11 ENCOUNTER — LAB (OUTPATIENT)
Dept: LAB | Facility: CLINIC | Age: 64
End: 2024-03-11
Payer: COMMERCIAL

## 2024-03-11 DIAGNOSIS — E11.9 TYPE 2 DIABETES MELLITUS WITHOUT COMPLICATION, WITH LONG-TERM CURRENT USE OF INSULIN (H): ICD-10-CM

## 2024-03-11 DIAGNOSIS — Z79.4 TYPE 2 DIABETES MELLITUS WITHOUT COMPLICATION, WITH LONG-TERM CURRENT USE OF INSULIN (H): ICD-10-CM

## 2024-03-11 DIAGNOSIS — E11.9 TYPE 2 DIABETES MELLITUS WITHOUT COMPLICATION (H): ICD-10-CM

## 2024-03-11 LAB
ANION GAP SERPL CALCULATED.3IONS-SCNC: 8 MMOL/L (ref 7–15)
BUN SERPL-MCNC: 22 MG/DL (ref 8–23)
CALCIUM SERPL-MCNC: 9.7 MG/DL (ref 8.8–10.2)
CHLORIDE SERPL-SCNC: 108 MMOL/L (ref 98–107)
CREAT SERPL-MCNC: 0.9 MG/DL (ref 0.51–0.95)
DEPRECATED HCO3 PLAS-SCNC: 27 MMOL/L (ref 22–29)
EGFRCR SERPLBLD CKD-EPI 2021: 71 ML/MIN/1.73M2
GLUCOSE SERPL-MCNC: 142 MG/DL (ref 70–99)
HBA1C MFR BLD: 6.4 % (ref 0–5.6)
POTASSIUM SERPL-SCNC: 4.3 MMOL/L (ref 3.4–5.3)
SODIUM SERPL-SCNC: 143 MMOL/L (ref 135–145)

## 2024-03-11 PROCEDURE — 36415 COLL VENOUS BLD VENIPUNCTURE: CPT

## 2024-03-11 PROCEDURE — 80048 BASIC METABOLIC PNL TOTAL CA: CPT

## 2024-03-11 PROCEDURE — 83036 HEMOGLOBIN GLYCOSYLATED A1C: CPT

## 2024-03-11 RX ORDER — INSULIN GLARGINE-YFGN 100 [IU]/ML
30 INJECTION, SOLUTION SUBCUTANEOUS DAILY
Qty: 15 ML | Refills: 0 | Status: SHIPPED | OUTPATIENT
Start: 2024-03-11 | End: 2024-03-14

## 2024-03-11 NOTE — TELEPHONE ENCOUNTER
Requested Prescriptions   Pending Prescriptions Disp Refills    SEMGLEE (YFGN) 100 UNIT/ML SOPN [Pharmacy Med Name: SEMGLEE (YFGN) 100UNIT/ML PEN] 81 mL 0     Sig: INJECT UP TO 90 UNITS UNDER THE SKIN DAILY       There is no refill protocol information for this order

## 2024-03-14 RX ORDER — INSULIN GLARGINE-YFGN 100 [IU]/ML
30 INJECTION, SOLUTION SUBCUTANEOUS 2 TIMES DAILY
Qty: 30 ML | Refills: 0 | Status: SHIPPED | OUTPATIENT
Start: 2024-03-14 | End: 2024-05-24

## 2024-03-20 ENCOUNTER — TELEPHONE (OUTPATIENT)
Dept: ENDOCRINOLOGY | Facility: CLINIC | Age: 64
End: 2024-03-20

## 2024-03-20 ENCOUNTER — VIRTUAL VISIT (OUTPATIENT)
Dept: ENDOCRINOLOGY | Facility: CLINIC | Age: 64
End: 2024-03-20
Payer: COMMERCIAL

## 2024-03-20 DIAGNOSIS — E11.9 TYPE 2 DIABETES MELLITUS WITHOUT COMPLICATION, WITH LONG-TERM CURRENT USE OF INSULIN (H): Primary | ICD-10-CM

## 2024-03-20 DIAGNOSIS — Z79.4 TYPE 2 DIABETES MELLITUS WITHOUT COMPLICATION, WITH LONG-TERM CURRENT USE OF INSULIN (H): Primary | ICD-10-CM

## 2024-03-20 PROBLEM — N18.30 CHRONIC KIDNEY DISEASE, STAGE 3 (H): Status: RESOLVED | Noted: 2021-03-31 | Resolved: 2024-03-20

## 2024-03-20 PROCEDURE — 99214 OFFICE O/P EST MOD 30 MIN: CPT | Mod: 95 | Performed by: NURSE PRACTITIONER

## 2024-03-20 NOTE — LETTER
"    3/20/2024         RE: Zoe Alvarez  [address] [facility] [address]        Dear Colleague,    Thank you for referring your patient, Zoe Alvarez, to the [facility]. Please see a copy of my visit note below.    [facility] ENDOCRINOLOGY    Diabetes Note 3/20/2024    Zoe Alvarez, 1960, [id number]          Reason for visit      1. Type 2 diabetes mellitus without complication, with long-term current use of insulin (H)        HPI     Zoe Alvarez is a very pleasant 63 year old old female who presents for follow up.  SUMMARY:    Lynne is seen today in follow-up for DM 2. Her current A1c is 6.4 and down considerably from her previous of 7.6.     We increased her Ozempic dose to 2 mg at her last appointment, and it seems to be helping. She remains on Semglee, 30 units BID, Glipizide 10 mg BID, and Jardiance, 25 mg.      She notes that she has adjusted her Insulin dosing periodically, dependent upon what her BG are looking like. She also tried stopping the Glipizide, but \"my numbers really jumped\" so she started taking it again.  She has not had much in the way of Hypoglycemia and feels that \"it has been manageable\".     She has recently joined a [facility], and thus far has only been able to go weekly, but plans in increasing her visits during the week when she finds or makes the time.       Blood glucose data:    3/20  F 82    3/19  B 93  D 83    3/18  B 117    3/17  D 156    3/16  B 112    3/15  B 90  D 163    3/14  B 123  L 106  D 104    3/13  B 141  D 70      Past Medical History     Patient Active Problem List   Diagnosis     Obesity (BMI 30-39.9)     Chronic venous insufficiency     Epidermal Inclusion Cyst     Hyperlipidemia     Vertigo     Type 2 diabetes mellitus without complication, with long-term current use of insulin (H)     Eczema     RLQ abdominal pain     Tarsal tunnel syndrome of left side     Compression neuropathy of right lower extremity     " "Mononeuritis of left lower extremity     Plantar fascial fibromatosis of left foot     Mononeuritis of right lower extremity     Tarsal tunnel syndrome of right side     Foot pain, left     Bilateral lower extremity edema     Pulmonary nodules        Family History       family history includes Breast Cancer in her maternal grandmother; Diabetes in her mother.    Social History      reports that she has quit smoking. She has never used smokeless tobacco. She reports current alcohol use of about 1.0 standard drink of alcohol per week. She reports that she does not use drugs.      Review of Systems     Patient has no polyuria or polydipsia, no chest pain, dyspnea or TIA's, no numbness, tingling or pain in extremities  Remainder negative except as noted in HPI.    Vital Signs     There were no vitals taken for this visit.  Wt Readings from Last 3 Encounters:   12/24/23 80.6 kg (177 lb 11.2 oz)   12/19/23 81.2 kg (179 lb)   11/04/22 84.4 kg (186 lb)       Physical Exam     Constitutional:  Well developed, Well nourished  HENT:  Normocephalic,   Neck: normal in appearance  Eyes:  PERRL, Conjunctiva pink  Respiratory:  No respiratory distress  Skin: No acanthosis nigricans, lipoatrophy or lipodystrophy  Neurologic:  Alert & oriented x 3, nonfocal  Psychiatric:  Affect, Mood, Insight appropriate        Assessment     1. Type 2 diabetes mellitus without complication, with long-term current use of insulin (H)        Plan     No changes to her current medication regimen as her management has improved significantly. She also notes that her weight is \"down a little bit\". I will see her back in 6 months.          Jayla Hermosillo NP   Endocrinology  3/20/2024  8:35 AM      Lab Results     Microalbumin Urine mg/dL   Date Value Ref Range Status   04/26/2022 <0.50 0.00 - 1.99 mg/dL Final       Cholesterol   Date Value Ref Range Status   10/25/2021 196 <=199 mg/dL Final     Direct Measure HDL   Date Value Ref Range Status "   10/25/2021 39 (L) >=50 mg/dL Final     Comment:     HDL Cholesterol Reference Range:     0-2 years:   No reference ranges established for patients under 2 years old  at [facility] for lipid analytes.    2-8 years:  Greater than 45 mg/dL     18 years and older:   Female: Greater than or equal to 50 mg/dL   Male:   Greater than or equal to 40 mg/dL     Triglycerides   Date Value Ref Range Status   10/25/2021 225 (H) <=149 mg/dL Final       [unfilled]      Current Medications     Outpatient Medications Prior to Visit   Medication Sig Dispense Refill     atorvastatin (LIPITOR) 40 MG tablet Take 1 tablet (40 mg) by mouth daily 90 tablet 0     blood glucose (NO BRAND SPECIFIED) lancets standard Use to test blood sugar 4 times daily or as directed. 100 each 11     blood glucose (NO BRAND SPECIFIED) test strip Use to test blood sugar 2 times daily or as directed. 200 strip 5     blood glucose meter (GLUCOMETER) [BLOOD GLUCOSE METER (GLUCOMETER)] Use 1 each As Directed as needed. Dispense glucometer brand per patient's insurance at pharmacy discretion. 1 each 0     blood glucose monitoring (NO BRAND SPECIFIED) meter device kit Use to test blood sugar 2 times daily or as directed. 1 kit 0     diclofenac (VOLTAREN) 75 MG EC tablet Take 1 tablet by mouth 2 times daily       glipiZIDE (GLUCOTROL) 10 MG tablet TAKE ONE TABLET BY MOUTH TWICE A DAY BEFORE MEALS 60 tablet 0     Insulin Glargine-yfgn (SEMGLEE, YFGN,) 100 UNIT/ML SOPN Inject 30 Units Subcutaneous 2 times daily 30 mL 0     insulin pen needle (BD TJ U/F) 32G X 4 MM miscellaneous USE TWO TIMES A DAY WITH INSULIN 100 each 1     JARDIANCE 25 MG TABS tablet TAKE 1 TABLET (25 MG) BY MOUTH DAILY 90 tablet 0     Semaglutide, 2 MG/DOSE, (OZEMPIC) 8 MG/3ML pen Inject 2 mg Subcutaneous every 7 days 9 mL 3     valACYclovir (VALTREX) 1000 mg tablet TAKE TWO TABLETS BY MOUTH NOW AND TWO TABLETS IN 12 HOURS AS NEEDED 4 tablet 0     No facility-administered medications  prior to visit.             Virtual Visit Details    Type of service:  Video Visit     Originating Location (pt. Location): Home    Distant Location (provider location):  On-site  Platform used for Video Visit: Marina      Again, thank you for allowing me to participate in the care of your patient.        Sincerely,        Jayla Hermosillo NP

## 2024-03-20 NOTE — PROGRESS NOTES
"[facility] ENDOCRINOLOGY    Diabetes Note 3/20/2024    Zoe Alvarez, 1960, [id number]          Reason for visit      1. Type 2 diabetes mellitus without complication, with long-term current use of insulin (H)        HPI     Zoe Alvarez is a very pleasant 63 year old old female who presents for follow up.  SUMMARY:    Lynne is seen today in follow-up for DM 2. Her current A1c is 6.4 and down considerably from her previous of 7.6.     We increased her Ozempic dose to 2 mg at her last appointment, and it seems to be helping. She remains on Semglee, 30 units BID, Glipizide 10 mg BID, and Jardiance, 25 mg.      She notes that she has adjusted her Insulin dosing periodically, dependent upon what her BG are looking like. She also tried stopping the Glipizide, but \"my numbers really jumped\" so she started taking it again.  She has not had much in the way of Hypoglycemia and feels that \"it has been manageable\".     She has recently joined a [employer], and thus far has only been able to go weekly, but plans in increasing her visits during the week when she finds or makes the time.       Blood glucose data:    3/20  F 82    3/19  B 93  D 83    3/18  B 117    3/17  D 156    3/16  B 112    3/15  B 90  D 163    3/14  B 123  L 106  D 104    3/13  B 141  D 70      Past Medical History     Patient Active Problem List   Diagnosis    Obesity (BMI 30-39.9)    Chronic venous insufficiency    Epidermal Inclusion Cyst    Hyperlipidemia    Vertigo    Type 2 diabetes mellitus without complication, with long-term current use of insulin (H)    Eczema    RLQ abdominal pain    Tarsal tunnel syndrome of left side    Compression neuropathy of right lower extremity    Mononeuritis of left lower extremity    Plantar fascial fibromatosis of left foot    Mononeuritis of right lower extremity    Tarsal tunnel syndrome of right side    Foot pain, left    Bilateral lower extremity edema    Pulmonary nodules        Family History " "      family history includes Breast Cancer in her maternal grandmother; Diabetes in her mother.    Social History      reports that she has quit smoking. She has never used smokeless tobacco. She reports current alcohol use of about 1.0 standard drink of alcohol per week. She reports that she does not use drugs.      Review of Systems     Patient has no polyuria or polydipsia, no chest pain, dyspnea or TIA's, no numbness, tingling or pain in extremities  Remainder negative except as noted in HPI.    Vital Signs     There were no vitals taken for this visit.  Wt Readings from Last 3 Encounters:   12/24/23 80.6 kg (177 lb 11.2 oz)   12/19/23 81.2 kg (179 lb)   11/04/22 84.4 kg (186 lb)       Physical Exam     Constitutional:  Well developed, Well nourished  HENT:  Normocephalic,   Neck: normal in appearance  Eyes:  PERRL, Conjunctiva pink  Respiratory:  No respiratory distress  Skin: No acanthosis nigricans, lipoatrophy or lipodystrophy  Neurologic:  Alert & oriented x 3, nonfocal  Psychiatric:  Affect, Mood, Insight appropriate        Assessment     1. Type 2 diabetes mellitus without complication, with long-term current use of insulin (H)        Plan     No changes to her current medication regimen as her management has improved significantly. She also notes that her weight is \"down a little bit\". I will see her back in 6 months.          Jayla Hermosillo NP   Endocrinology  3/20/2024  8:35 AM      Lab Results     Microalbumin Urine mg/dL   Date Value Ref Range Status   04/26/2022 <0.50 0.00 - 1.99 mg/dL Final       Cholesterol   Date Value Ref Range Status   10/25/2021 196 <=199 mg/dL Final     Direct Measure HDL   Date Value Ref Range Status   10/25/2021 39 (L) >=50 mg/dL Final     Comment:     HDL Cholesterol Reference Range:     0-2 years:   No reference ranges established for patients under 2 years old  [facility] for lipid analytes.    2-8 years:  Greater than 45 mg/dL     18 years and older: "   Female: Greater than or equal to 50 mg/dL   Male:   Greater than or equal to 40 mg/dL     Triglycerides   Date Value Ref Range Status   10/25/2021 225 (H) <=149 mg/dL Final       [unfilled]      Current Medications     Outpatient Medications Prior to Visit   Medication Sig Dispense Refill    atorvastatin (LIPITOR) 40 MG tablet Take 1 tablet (40 mg) by mouth daily 90 tablet 0    blood glucose (NO BRAND SPECIFIED) lancets standard Use to test blood sugar 4 times daily or as directed. 100 each 11    blood glucose (NO BRAND SPECIFIED) test strip Use to test blood sugar 2 times daily or as directed. 200 strip 5    blood glucose meter (GLUCOMETER) [BLOOD GLUCOSE METER (GLUCOMETER)] Use 1 each As Directed as needed. Dispense glucometer brand per patient's insurance at pharmacy discretion. 1 each 0    blood glucose monitoring (NO BRAND SPECIFIED) meter device kit Use to test blood sugar 2 times daily or as directed. 1 kit 0    diclofenac (VOLTAREN) 75 MG EC tablet Take 1 tablet by mouth 2 times daily      glipiZIDE (GLUCOTROL) 10 MG tablet TAKE ONE TABLET BY MOUTH TWICE A DAY BEFORE MEALS 60 tablet 0    Insulin Glargine-yfgn (SEMGLEE, YFGN,) 100 UNIT/ML SOPN Inject 30 Units Subcutaneous 2 times daily 30 mL 0    insulin pen needle (BD TJ U/F) 32G X 4 MM miscellaneous USE TWO TIMES A DAY WITH INSULIN 100 each 1    JARDIANCE 25 MG TABS tablet TAKE 1 TABLET (25 MG) BY MOUTH DAILY 90 tablet 0    Semaglutide, 2 MG/DOSE, (OZEMPIC) 8 MG/3ML pen Inject 2 mg Subcutaneous every 7 days 9 mL 3    valACYclovir (VALTREX) 1000 mg tablet TAKE TWO TABLETS BY MOUTH NOW AND TWO TABLETS IN 12 HOURS AS NEEDED 4 tablet 0     No facility-administered medications prior to visit.             Virtual Visit Details    Type of service:  Video Visit     Originating Location (pt. Location): Home    Distant Location (provider location):  On-site  Platform used for Video Visit: Marina

## 2024-03-20 NOTE — TELEPHONE ENCOUNTER
Lvm to call back and make a 6 mo return with lab 2 week prior to appointment. Please contact pt to schedule 6 mo follow up with lab.

## 2024-03-28 DIAGNOSIS — Z79.4 TYPE 2 DIABETES MELLITUS WITHOUT COMPLICATION, WITH LONG-TERM CURRENT USE OF INSULIN (H): ICD-10-CM

## 2024-03-28 DIAGNOSIS — E11.9 TYPE 2 DIABETES MELLITUS WITHOUT COMPLICATION, WITH LONG-TERM CURRENT USE OF INSULIN (H): ICD-10-CM

## 2024-03-28 RX ORDER — GLIPIZIDE 10 MG/1
10 TABLET ORAL 2 TIMES DAILY
Qty: 180 TABLET | Refills: 0 | Status: SHIPPED | OUTPATIENT
Start: 2024-03-28 | End: 2024-06-17

## 2024-03-28 NOTE — TELEPHONE ENCOUNTER
Requested Prescriptions   Pending Prescriptions Disp Refills    glipiZIDE (GLUCOTROL) 10 MG tablet [Pharmacy Med Name: GLIPIZIDE 10MG TABS] 60 tablet 0     Sig: TAKE ONE TABLET BY MOUTH TWICE A DAY BEFORE MEALS       Sulfonylurea Agents Passed - 3/28/2024 10:16 AM        Passed - Patient has documented A1c within the specified period of time.     If HgbA1C is 8 or greater, it needs to be on file within the past 3 months.  If less than 8, must be on file within the past 6 months.     Recent Labs   Lab Test 03/11/24  1443   A1C 6.4*             Passed - Medication is active on med list        Passed - Has GFR on file in past 12 months and most recent value is normal        Passed - Recent (6 mo) or future (90 days) visit within the authorizing provider's specialty     The patient must have completed an in-person or virtual visit within the past 6 months or has a future visit scheduled within the next 90 days with the authorizing provider s specialty.  Urgent care and e-visits do not quality as an office visit for this protocol.          Passed - Medication indicated for associated diagnosis     Medication is associated with one or more of the following diagnoses:  Maturity-onset diabetes of the young   Peripheral circulatory disorder due to type 2 diabetes mellitus   Type 2 diabetes mellitus           Passed - Patient is age 18 or older        Passed - No active pregnancy on record        Passed - Patient has a recent creatinine (normal) within the past 12 mos.     Recent Labs   Lab Test 03/11/24  1443   CR 0.90                 Passed - Patient has not had a positive pregnancy test within the past 12 mos.

## 2024-03-31 DIAGNOSIS — Z79.4 TYPE 2 DIABETES MELLITUS WITHOUT COMPLICATION, WITH LONG-TERM CURRENT USE OF INSULIN (H): ICD-10-CM

## 2024-03-31 DIAGNOSIS — E11.9 TYPE 2 DIABETES MELLITUS WITHOUT COMPLICATION, WITH LONG-TERM CURRENT USE OF INSULIN (H): ICD-10-CM

## 2024-04-01 RX ORDER — GLIPIZIDE 10 MG/1
10 TABLET ORAL 2 TIMES DAILY
Qty: 180 TABLET | Refills: 0 | OUTPATIENT
Start: 2024-04-01

## 2024-05-24 DIAGNOSIS — E11.9 TYPE 2 DIABETES MELLITUS WITHOUT COMPLICATION (H): ICD-10-CM

## 2024-05-24 RX ORDER — INSULIN GLARGINE-YFGN 100 [IU]/ML
INJECTION, SOLUTION SUBCUTANEOUS
Qty: 60 ML | Refills: 0 | Status: SHIPPED | OUTPATIENT
Start: 2024-05-24 | End: 2024-08-12

## 2024-05-24 NOTE — TELEPHONE ENCOUNTER
Requested Prescriptions   Pending Prescriptions Disp Refills    SEMGLEE (YFGN) 100 UNIT/ML SOPN [Pharmacy Med Name: SEMGLEE (YFGN) 100UNIT/ML SOPN] 30 mL 0     Sig: INJECT 30 UNITS UNDER THE SKIN TWO TIMES A DAY       There is no refill protocol information for this order

## 2024-05-28 DIAGNOSIS — E78.2 MIXED HYPERLIPIDEMIA: ICD-10-CM

## 2024-05-28 RX ORDER — ATORVASTATIN CALCIUM 40 MG/1
40 TABLET, FILM COATED ORAL DAILY
Qty: 90 TABLET | Refills: 1 | Status: SHIPPED | OUTPATIENT
Start: 2024-05-28

## 2024-06-06 DIAGNOSIS — E11.9 TYPE 2 DIABETES MELLITUS WITHOUT COMPLICATION (H): ICD-10-CM

## 2024-06-06 RX ORDER — PEN NEEDLE, DIABETIC 32GX 5/32"
NEEDLE, DISPOSABLE MISCELLANEOUS
Qty: 200 EACH | Refills: 1 | Status: SHIPPED | OUTPATIENT
Start: 2024-06-06

## 2024-06-06 NOTE — TELEPHONE ENCOUNTER
Requested Prescriptions   Pending Prescriptions Disp Refills    BD TJ U/F 32G X 4 MM insulin pen needle [Pharmacy Med Name: BD PEN NEEDLE TJ  32G X 4 MM MISC]  1     Sig: USE TWO TIMES A DAY WITH INSULIN       Diabetic Supplies Protocol Passed - 6/6/2024  5:02 AM        Passed - Medication is active on med list        Passed - Recent (12 month) or future (90 days) visit with authorizing provider s specialty     The patient must have completed an in-person or virtual visit within the past 12 months or has a future visit scheduled within the next 90 days with the authorizing provider s specialty.  Urgent care and e-visits do not quality as an office visit for this protocol.          Passed - Medication indicated for associated diagnosis        Passed - Patient is 18 years of age or older

## 2024-06-15 DIAGNOSIS — Z79.4 TYPE 2 DIABETES MELLITUS WITHOUT COMPLICATION, WITH LONG-TERM CURRENT USE OF INSULIN (H): ICD-10-CM

## 2024-06-15 DIAGNOSIS — E11.9 TYPE 2 DIABETES MELLITUS WITHOUT COMPLICATION, WITH LONG-TERM CURRENT USE OF INSULIN (H): ICD-10-CM

## 2024-06-17 DIAGNOSIS — Z79.4 TYPE 2 DIABETES MELLITUS WITHOUT COMPLICATION, WITH LONG-TERM CURRENT USE OF INSULIN (H): ICD-10-CM

## 2024-06-17 DIAGNOSIS — E11.9 TYPE 2 DIABETES MELLITUS WITHOUT COMPLICATION, WITH LONG-TERM CURRENT USE OF INSULIN (H): ICD-10-CM

## 2024-06-17 RX ORDER — GLIPIZIDE 10 MG/1
10 TABLET ORAL 2 TIMES DAILY
Qty: 180 TABLET | Refills: 0 | Status: SHIPPED | OUTPATIENT
Start: 2024-06-17 | End: 2024-07-01

## 2024-06-17 RX ORDER — EMPAGLIFLOZIN 25 MG/1
25 TABLET, FILM COATED ORAL DAILY
Qty: 90 TABLET | Refills: 0 | Status: SHIPPED | OUTPATIENT
Start: 2024-06-17 | End: 2024-09-16

## 2024-06-17 NOTE — TELEPHONE ENCOUNTER
Requested Prescriptions   Pending Prescriptions Disp Refills    glipiZIDE (GLUCOTROL) 10 MG tablet [Pharmacy Med Name: GLIPIZIDE 10MG TABS] 180 tablet 0     Sig: TAKE ONE TABLET BY MOUTH TWICE A DAY BEFORE MEALS       Sulfonylurea Agents Passed - 6/17/2024  9:05 AM        Passed - Patient has documented A1c within the specified period of time.     If HgbA1C is 8 or greater, it needs to be on file within the past 3 months.  If less than 8, must be on file within the past 6 months.     Recent Labs   Lab Test 03/11/24  1443   A1C 6.4*             Passed - Medication is active on med list        Passed - Has GFR on file in past 12 months and most recent value is normal        Passed - Recent (6 mo) or future (90 days) visit within the authorizing provider's specialty     The patient must have completed an in-person or virtual visit within the past 6 months or has a future visit scheduled within the next 90 days with the authorizing provider s specialty.  Urgent care and e-visits do not quality as an office visit for this protocol.          Passed - Medication indicated for associated diagnosis     Medication is associated with one or more of the following diagnoses:  Maturity-onset diabetes of the young   Peripheral circulatory disorder due to type 2 diabetes mellitus   Type 2 diabetes mellitus           Passed - Patient is age 18 or older        Passed - No active pregnancy on record        Passed - Patient has a recent creatinine (normal) within the past 12 mos.     Recent Labs   Lab Test 03/11/24  1443   CR 0.90                 Passed - Patient has not had a positive pregnancy test within the past 12 mos.

## 2024-06-28 SDOH — HEALTH STABILITY: PHYSICAL HEALTH: ON AVERAGE, HOW MANY DAYS PER WEEK DO YOU ENGAGE IN MODERATE TO STRENUOUS EXERCISE (LIKE A BRISK WALK)?: 2 DAYS

## 2024-06-28 SDOH — HEALTH STABILITY: PHYSICAL HEALTH: ON AVERAGE, HOW MANY MINUTES DO YOU ENGAGE IN EXERCISE AT THIS LEVEL?: 30 MIN

## 2024-06-28 ASSESSMENT — SOCIAL DETERMINANTS OF HEALTH (SDOH): HOW OFTEN DO YOU GET TOGETHER WITH FRIENDS OR RELATIVES?: THREE TIMES A WEEK

## 2024-07-01 ENCOUNTER — OFFICE VISIT (OUTPATIENT)
Dept: FAMILY MEDICINE | Facility: CLINIC | Age: 64
End: 2024-07-01
Payer: COMMERCIAL

## 2024-07-01 VITALS
HEIGHT: 64 IN | SYSTOLIC BLOOD PRESSURE: 110 MMHG | WEIGHT: 175 LBS | OXYGEN SATURATION: 95 % | TEMPERATURE: 97.5 F | HEART RATE: 96 BPM | BODY MASS INDEX: 29.88 KG/M2 | DIASTOLIC BLOOD PRESSURE: 58 MMHG | RESPIRATION RATE: 18 BRPM

## 2024-07-01 DIAGNOSIS — M65.331 TRIGGER FINGER, RIGHT MIDDLE FINGER: ICD-10-CM

## 2024-07-01 DIAGNOSIS — Z00.00 ROUTINE GENERAL MEDICAL EXAMINATION AT A HEALTH CARE FACILITY: Primary | ICD-10-CM

## 2024-07-01 DIAGNOSIS — Z87.891 PERSONAL HISTORY OF TOBACCO USE: ICD-10-CM

## 2024-07-01 DIAGNOSIS — F33.1 MODERATE EPISODE OF RECURRENT MAJOR DEPRESSIVE DISORDER (H): ICD-10-CM

## 2024-07-01 DIAGNOSIS — E66.9 OBESITY (BMI 30-39.9): ICD-10-CM

## 2024-07-01 DIAGNOSIS — Z80.3 FAMILY HISTORY OF MALIGNANT NEOPLASM OF BREAST: ICD-10-CM

## 2024-07-01 DIAGNOSIS — G47.33 OSA ON CPAP: ICD-10-CM

## 2024-07-01 DIAGNOSIS — E11.65 TYPE 2 DIABETES MELLITUS WITH HYPERGLYCEMIA, WITHOUT LONG-TERM CURRENT USE OF INSULIN (H): ICD-10-CM

## 2024-07-01 LAB
ALBUMIN SERPL BCG-MCNC: 4.4 G/DL (ref 3.5–5.2)
ALP SERPL-CCNC: 81 U/L (ref 40–150)
ALT SERPL W P-5'-P-CCNC: 31 U/L (ref 0–50)
ANION GAP SERPL CALCULATED.3IONS-SCNC: 10 MMOL/L (ref 7–15)
AST SERPL W P-5'-P-CCNC: 26 U/L (ref 0–45)
BILIRUB SERPL-MCNC: 0.4 MG/DL
BUN SERPL-MCNC: 15 MG/DL (ref 8–23)
CALCIUM SERPL-MCNC: 9.9 MG/DL (ref 8.8–10.2)
CHLORIDE SERPL-SCNC: 105 MMOL/L (ref 98–107)
CHOLEST SERPL-MCNC: 116 MG/DL
CREAT SERPL-MCNC: 0.85 MG/DL (ref 0.51–0.95)
DEPRECATED HCO3 PLAS-SCNC: 28 MMOL/L (ref 22–29)
EGFRCR SERPLBLD CKD-EPI 2021: 77 ML/MIN/1.73M2
ERYTHROCYTE [DISTWIDTH] IN BLOOD BY AUTOMATED COUNT: 12 % (ref 10–15)
FASTING STATUS PATIENT QL REPORTED: ABNORMAL
FASTING STATUS PATIENT QL REPORTED: NORMAL
GLUCOSE SERPL-MCNC: 93 MG/DL (ref 70–99)
HCT VFR BLD AUTO: 41.2 % (ref 35–47)
HDLC SERPL-MCNC: 36 MG/DL
HGB BLD-MCNC: 13.8 G/DL (ref 11.7–15.7)
LDLC SERPL CALC-MCNC: 51 MG/DL
MCH RBC QN AUTO: 30.4 PG (ref 26.5–33)
MCHC RBC AUTO-ENTMCNC: 33.5 G/DL (ref 31.5–36.5)
MCV RBC AUTO: 91 FL (ref 78–100)
NONHDLC SERPL-MCNC: 80 MG/DL
PLATELET # BLD AUTO: 199 10E3/UL (ref 150–450)
POTASSIUM SERPL-SCNC: 4.3 MMOL/L (ref 3.4–5.3)
PROT SERPL-MCNC: 7.4 G/DL (ref 6.4–8.3)
RBC # BLD AUTO: 4.54 10E6/UL (ref 3.8–5.2)
SODIUM SERPL-SCNC: 143 MMOL/L (ref 135–145)
TRIGL SERPL-MCNC: 143 MG/DL
TSH SERPL DL<=0.005 MIU/L-ACNC: 1.44 UIU/ML (ref 0.3–4.2)
WBC # BLD AUTO: 8.8 10E3/UL (ref 4–11)

## 2024-07-01 PROCEDURE — 36415 COLL VENOUS BLD VENIPUNCTURE: CPT | Performed by: STUDENT IN AN ORGANIZED HEALTH CARE EDUCATION/TRAINING PROGRAM

## 2024-07-01 PROCEDURE — 80061 LIPID PANEL: CPT | Performed by: STUDENT IN AN ORGANIZED HEALTH CARE EDUCATION/TRAINING PROGRAM

## 2024-07-01 PROCEDURE — 85027 COMPLETE CBC AUTOMATED: CPT | Performed by: STUDENT IN AN ORGANIZED HEALTH CARE EDUCATION/TRAINING PROGRAM

## 2024-07-01 PROCEDURE — 80053 COMPREHEN METABOLIC PANEL: CPT | Performed by: STUDENT IN AN ORGANIZED HEALTH CARE EDUCATION/TRAINING PROGRAM

## 2024-07-01 PROCEDURE — 99396 PREV VISIT EST AGE 40-64: CPT | Performed by: STUDENT IN AN ORGANIZED HEALTH CARE EDUCATION/TRAINING PROGRAM

## 2024-07-01 PROCEDURE — 84443 ASSAY THYROID STIM HORMONE: CPT | Performed by: STUDENT IN AN ORGANIZED HEALTH CARE EDUCATION/TRAINING PROGRAM

## 2024-07-01 PROCEDURE — G0296 VISIT TO DETERM LDCT ELIG: HCPCS | Performed by: STUDENT IN AN ORGANIZED HEALTH CARE EDUCATION/TRAINING PROGRAM

## 2024-07-01 ASSESSMENT — PATIENT HEALTH QUESTIONNAIRE - PHQ9
SUM OF ALL RESPONSES TO PHQ QUESTIONS 1-9: 0
SUM OF ALL RESPONSES TO PHQ QUESTIONS 1-9: 0

## 2024-07-01 ASSESSMENT — PAIN SCALES - GENERAL: PAINLEVEL: NO PAIN (0)

## 2024-07-01 NOTE — PROGRESS NOTES
Preventive Care Visit  [facility]  2024      Assessment & Plan       ICD-10-CM    1. Routine general medical examination at a health care facility  Z00.00 MA Screen Bilateral w/Demar     TSH with free T4 reflex     Lipid Profile (Chol, Trig, HDL, LDL calc)     Comprehensive metabolic panel (BMP + Alb, Alk Phos, ALT, AST, Total. Bili, TP)     CBC with platelets      2. Moderate episode of recurrent major depressive disorder (H)  F33.1       3. Type 2 diabetes mellitus with hyperglycemia, without long-term current use of insulin (H)  E11.65       4. Personal history of tobacco use  Z87.891 Prof fee: Shared Decision Making for Lung Cancer Screening     CT Chest Lung Cancer Scrn Low Dose wo      5. Obesity (BMI 30-39.9)  E66.9       6. Trigger finger, right middle finger  M65.331       7. Family history of malignant neoplasm of breast  Z80.3       8. 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 BMI improved. Continue lifestyle changes.   Family history of cancers: Sister had breast cancer ( late 60s) requiring lumpectomy + chemo   Eye exam/dental exam: needs dentist appt. UTD eye doctor    Alcohol use:occasionally   Tobacco use/marijuana use/drug use: Quit smoking 14 years. (+) hx of 20 + years of PPD.   Mental health: stable   Vaccines: Declines COVID.  Blood work: Ordered      Last Pap smear: S/p total hysterectomy.  No Pap needed moving forward.  Mammogram: Due for repeat mammogram.  Last mammogram -asymmetry in the right breast.  Diagnostic mammo was unremarkable.  Colon cancer screenin-polyps were identified.  Pathology was benign.  Repeat in 5 years.  lung cancer screening: Last lung cancer screening was .  Still within 15-year of quitting.  Repeat lung cancer screening ordered  "today.    Type 2 diabetes:  Doing really well  Last A1c 3/11/2024 was 6.4  I started going to the gym, cut out all soda, has been walking more regularly  The consistent exercise makes her feel physically stronger  Is following with Endo  Plan:  - Defer to Endo for management    MDD:  Is off of all of her antidepressants  Has felt much better from a mental health perspective ever since she became more physically active  Monitor clinically for now    FLOYD on CPAP:  Uses her CPAP consistently and finds benefit with it    Right trigger middle finger:  Has started to bother her recently over the last several weeks.  Will have her come back for a trigger finger injection.      Patient has been advised of split billing requirements and indicates understanding: Yes        BMI  Estimated body mass index is 30.25 kg/m  as calculated from the following:    Height as of this encounter: 1.62 m (5' 3.78\").    Weight as of this encounter: 79.4 kg (175 lb).   Weight management plan: Discussed healthy diet and exercise guidelines    Counseling  Appropriate preventive services were discussed with this patient, including applicable screening as appropriate for fall prevention, nutrition, physical activity, Tobacco-use cessation, weight loss and cognition.  Checklist reviewing preventive services available has been given to the patient.  Reviewed patient's diet, addressing concerns and/or questions.   She is at risk for lack of exercise and has been provided with information to increase physical activity for the benefit of her well-being.   The patient was instructed to see the dentist every 6 months.   She is at risk for psychosocial distress and has been provided with information to reduce risk.       Jason Batista is a 63 year old, presenting for the following:  Physical        7/1/2024     2:36 PM   Additional Questions   Roomed by bry        Health Care Directive  Patient does not have a Health Care Directive or Living Will: " Discussed advance care planning with patient; information given to patient to review.    HPI        6/28/2024   General Health   How would you rate your overall physical health? Good   Feel stress (tense, anxious, or unable to sleep) Only a little      (!) STRESS CONCERN      6/28/2024   Nutrition   Three or more servings of calcium each day? Yes   Diet: Carbohydrate counting   How many servings of fruit and vegetables per day? (!) 2-3   How many sweetened beverages each day? 0-1            6/28/2024   Exercise   Days per week of moderate/strenous exercise 2 days   Average minutes spent exercising at this level 30 min      (!) EXERCISE CONCERN      6/28/2024   Social Factors   Frequency of gathering with friends or relatives Three times a week   Worry food won't last until get money to buy more No   Food not last or not have enough money for food? No   Do you have housing? (Housing is defined as stable permanent housing and does not include staying ouside in a car, in a tent, in an abandoned building, in an overnight shelter, or couch-surfing.) No   Are you worried about losing your housing? No   Lack of transportation? No   Unable to get utilities (heat,electricity)? No   Want help with housing or utility concern? No      (!) HOUSING CONCERN PRESENT      6/28/2024   Fall Risk   Fallen 2 or more times in the past year? No   Trouble with walking or balance? No             6/28/2024   Dental   Dentist two times every year? (!) NO            6/28/2024   TB Screening   Were you born outside of the [place]? No            Today's PHQ-9 Score:       7/1/2024     2:27 PM   PHQ-9 SCORE   PHQ-9 Total Score MyChart 0   PHQ-9 Total Score 0           6/28/2024   Substance Use   Alcohol more than 3/day or more than 7/wk No   Do you use any other substances recreationally? No        Social History     Tobacco Use    Smoking status: Former     Passive exposure: Past    Smokeless tobacco: Never   Vaping Use    Vaping status: Never Used    Substance Use Topics    Alcohol use: Yes     Alcohol/week: 1.0 standard drink of alcohol     Comment: Alcoholic Drinks/day: <1 per wk    Drug use: No           4/26/2023   LAST FHS-7 RESULTS   1st degree relative breast or ovarian cancer No   Any relative bilateral breast cancer Yes   Any male have breast cancer No   Any ONE woman have BOTH breast AND ovarian cancer No   Any woman with breast cancer before 50yrs No   2 or more relatives with breast AND/OR ovarian cancer No   2 or more relatives with breast AND/OR bowel cancer No           Mammogram Screening - Mammogram every 1-2 years updated in Health Maintenance based on mutual decision making        6/28/2024   STI Screening   New sexual partner(s) since last STI/HIV test? No        History of abnormal Pap smear: Status post hysterectomy with removal of cervix and no history of CIN2 or greater or cervical cancer. Health Maintenance and Surgical History updated.       ASCVD Risk   The 10-year ASCVD risk score (Cody MOSER, et al., 2019) is: 7.6%    Values used to calculate the score:      Age: 63 years      Sex: Female      Is Non- : No      Diabetic: Yes      Tobacco smoker: No      Systolic Blood Pressure: 110 mmHg      Is BP treated: No      HDL Cholesterol: 39 mg/dL      Total Cholesterol: 196 mg/dL           Reviewed and updated as needed this visit by Provider   Tobacco  Allergies  Meds  Problems  Med Hx  Surg Hx  Fam Hx            Past Medical History:   Diagnosis Date    Arthritis     Diabetes mellitus (H)     History of transfusion     AFTER A MISCARRIAGE    Migraine     Neuropathy      Past Surgical History:   Procedure Laterality Date    BLADDER SUSPENSION  2007    COLONOSCOPY      COLONOSCOPY N/A 3/18/2022    Procedure: COLONOSCOPY, FLEXIBLE, WITH LESION REMOVAL USING SNARE with polypectomies by cold snare and 1 clip applied and cautery to rectum polyp sites to prevent bleeding;  Surgeon: Cristobal Mcclelland MD;   Location: [facility]    DILATION AND CURETTAGE      SUCTION POST MISCARRIAGE    HC TARSAL TUNNEL RELEASE Left 2018    Procedure: DELLON QUADRUPLE NERVE DECOMPRESSION EXCISION PLANTAR FIBROMA ALL LEFT FOOT;  Surgeon: Jules Mao DPM;  Location: [facility] OR;  Service: Podiatry    HYSTERECTOMY      1999    KNEE SURGERY      LAPAROSCOPIC APPENDECTOMY N/A 7/10/2014    Procedure: Laparoscopic Appendectomy;  Surgeon: Germain Howe MD;  Location: [facility] OR;  Service:     [facility] APPENDECTOMY      Description: Appendectomy;  Recorded: 2014;  Comments: 2014    [facility] TOTAL ABDOM HYSTERECTOMY      Description: Hysterectomy;  Recorded: 2012;     OB History    Para Term  AB Living   2 2 2 0 0 0   SAB IAB Ectopic Multiple Live Births   0 0 0 0 0      # Outcome Date GA Lbr Faisal/2nd Weight Sex Type Anes PTL Lv   2 Term            1 Term              Lab work is in process  Labs reviewed in EPIC  BP Readings from Last 3 Encounters:   24 110/58   23 103/68   23 102/66    Wt Readings from Last 3 Encounters:   24 79.4 kg (175 lb)   23 80.6 kg (177 lb 11.2 oz)   23 81.2 kg (179 lb)                  Patient Active Problem List   Diagnosis    Obesity (BMI 30-39.9)    Chronic venous insufficiency    Epidermal Inclusion Cyst    Hyperlipidemia    Vertigo    Type 2 diabetes mellitus with hyperglycemia, without long-term current use of insulin (H)    Eczema    RLQ abdominal pain    Tarsal tunnel syndrome of left side    Compression neuropathy of right lower extremity    Mononeuritis of left lower extremity    Plantar fascial fibromatosis of left foot    Mononeuritis of right lower extremity    Tarsal tunnel syndrome of right side    Foot pain, left    Bilateral lower extremity edema    Pulmonary nodules    Moderate episode of recurrent major depressive disorder (H)     Past Surgical History:   Procedure Laterality Date    BLADDER SUSPENSION      COLONOSCOPY       COLONOSCOPY N/A 3/18/2022    Procedure: COLONOSCOPY, FLEXIBLE, WITH LESION REMOVAL USING SNARE with polypectomies by cold snare and 1 clip applied and cautery to rectum polyp sites to prevent bleeding;  Surgeon: Cristobal Mcclelland MD;  Location:  GI    DILATION AND CURETTAGE      SUCTION POST MISCARRIAGE    HC TARSAL TUNNEL RELEASE Left 9/21/2018    Procedure: DELLON QUADRUPLE NERVE DECOMPRESSION EXCISION PLANTAR FIBROMA ALL LEFT FOOT;  Surgeon: Jules Mao DPM;  Location: [facility];  Service: Podiatry    HYSTERECTOMY      1999    KNEE SURGERY      LAPAROSCOPIC APPENDECTOMY N/A 7/10/2014    Procedure: Laparoscopic Appendectomy;  Surgeon: Germain Howe MD;  Location: [facility];  Service:     [facility] APPENDECTOMY      Description: Appendectomy;  Recorded: 07/17/2014;  Comments: JULY 2014    [facility] TOTAL ABDOM HYSTERECTOMY      Description: Hysterectomy;  Recorded: 01/11/2012;       Social History     Tobacco Use    Smoking status: Former     Passive exposure: Past    Smokeless tobacco: Never   Substance Use Topics    Alcohol use: Yes     Alcohol/week: 1.0 standard drink of alcohol     Comment: Alcoholic Drinks/day: <1 per wk     Family History   Problem Relation Age of Onset    Diabetes Mother     Breast Cancer Maternal Grandmother     Colon Cancer No family hx of          Current Outpatient Medications   Medication Sig Dispense Refill    atorvastatin (LIPITOR) 40 MG tablet Take 1 tablet (40 mg) by mouth daily 90 tablet 1    blood glucose (NO BRAND SPECIFIED) lancets standard Use to test blood sugar 4 times daily or as directed. 100 each 11    blood glucose (NO BRAND SPECIFIED) test strip Use to test blood sugar 2 times daily or as directed. 200 strip 5    blood glucose meter (GLUCOMETER) [BLOOD GLUCOSE METER (GLUCOMETER)] Use 1 each As Directed as needed. Dispense glucometer brand per patient's insurance at pharmacy discretion. 1 each 0    blood glucose monitoring (NO BRAND SPECIFIED) meter  device kit Use to test blood sugar 2 times daily or as directed. 1 kit 0    Insulin Glargine-yfgn (SEMGLEE, YFGN,) 100 UNIT/ML SOPN INJECT 30 UNITS UNDER THE SKIN TWO TIMES A DAY 60 mL 0    insulin pen needle (BD TJ U/F) 32G X 4 MM miscellaneous USE TWO TIMES A DAY WITH INSULIN 200 each 1    JARDIANCE 25 MG TABS tablet TAKE ONE TABLET BY MOUTH ONCE DAILY 90 tablet 0    Semaglutide, 2 MG/DOSE, (OZEMPIC) 8 MG/3ML pen Inject 2 mg Subcutaneous every 7 days 9 mL 3    valACYclovir (VALTREX) 1000 mg tablet TAKE TWO TABLETS BY MOUTH NOW AND TWO TABLETS IN 12 HOURS AS NEEDED 4 tablet 0     Allergies   Allergen Reactions    Bactrim [Sulfamethoxazole-Trimethoprim] Itching    Amoxicillin Swelling and Itching    Metformin Other (See Comments)     GI Upset     Recent Labs   Lab Test 03/11/24  1443 11/28/23  1528 08/22/23  1455 02/10/23  1503 11/04/22  1516 04/26/22  1551 02/18/22  0846 01/26/22  1603 10/25/21  1552 04/14/21  1629 03/31/21  1451 11/20/20  1556 04/12/18  0737 11/06/17  0932 06/15/16  1516   A1C 6.4* 7.6* 7.5*   < > 9.8*   < >  --    < > 9.2*   < > 9.7*  --    < > >14.0* 9.7*   LDL  --   --  127*  --  68  --   --   --  112   < >  --   --    < > 150* 135*   HDL  --   --   --   --   --   --   --   --  39*  --   --   --   --  46* 44*   TRIG  --   --   --   --   --   --   --   --  225*  --   --   --   --  156* 80   CR 0.90 1.00* 0.77   < > 0.85   < > 0.87  --  0.94  --  0.77 0.97   < >  --   --    GFRESTIMATED 71 63 87   < > 77   < > 75  --  66   < > >60 59*   < >  --   --    GFRESTBLACK  --   --   --   --   --   --   --   --   --   --  >60 >60   < >  --   --    POTASSIUM 4.3 4.3 4.3   < > 4.0   < > 4.3  --  4.1  --  4.0 4.4   < >  --   --    TSH  --   --   --   --   --   --  2.47  --   --   --   --   --   --   --   --     < > = values in this interval not displayed.          Review of Systems  Constitutional, neuro, ENT, endocrine, pulmonary, cardiac, gastrointestinal, genitourinary, musculoskeletal, integument and  "psychiatric systems are negative, except as otherwise noted.     Objective    Exam  /58 (BP Location: Right arm, Patient Position: Sitting)   Pulse 96   Temp 97.5  F (36.4  C) (Oral)   Resp 18   Ht 1.62 m (5' 3.78\")   Wt 79.4 kg (175 lb)   SpO2 95%   BMI 30.25 kg/m     Estimated body mass index is 30.25 kg/m  as calculated from the following:    Height as of this encounter: 1.62 m (5' 3.78\").    Weight as of this encounter: 79.4 kg (175 lb).    Physical Exam  GENERAL: alert and no distress  EYES: Eyes grossly normal to inspection, PERRL and conjunctivae and sclerae normal  HENT: ear canals and TM's normal, nose and mouth without ulcers or lesions  NECK: no adenopathy, no asymmetry, masses, or scars  RESP: lungs clear to auscultation - no rales, rhonchi or wheezes  CV: regular rate and rhythm, no murmur, click or rub, no peripheral edema  ABDOMEN: soft, nontender, no hepatosplenomegaly, no masses and bowel sounds normal  MS: no gross musculoskeletal defects noted, no edema  PSYCH: mentation appears normal, affect normal/bright        Signed Electronically by: Val Garza DO      Zoepavel Alvarez, a 63 year old female, is eligible for lung cancer screening    History   Smoking Status    Former   Smokeless Tobacco    Never       I have discussed with patient the risks and benefits of screening for lung cancer with low-dose CT.     The risks include:    radiation exposure: one low dose chest CT has as much ionizing radiation as about 15 chest x-rays, or 6 months of background radiation living in [place]      false positives: most findings/nodules are NOT cancer, but some might still require additional diagnostic evaluation, including biopsy    over-diagnosis: some slow growing cancers that might never have been clinically significant will be detected and treated unnecessarily     The benefit of early detection of lung cancer is contingent upon adherence to annual screening or more frequent follow up " if indicated.     Furthermore, to benefit from screening, Zoe must be willing and able to undergo diagnostic procedures, if indicated. Although no specific guide is available for determining severity of comorbidities, it is reasonable to withhold screening in patients who have greater mortality risk from other diseases.     We did discuss that the best way to prevent lung cancer is to not smoke.    Some patients may value a numeric estimation of lung cancer risk when evaluating if lung cancer screening is right for them, here is one calculator:    ShouldIScreen

## 2024-07-01 NOTE — PATIENT INSTRUCTIONS
"Patient Education   Preventive Care Advice   This is general advice we often give to help people stay healthy. Your care team may have specific advice just for you. Please talk to your care team about your own preventive care needs.  Lifestyle  Exercise at least 150 minutes each week (30 minutes a day, 5 days a week).  Do muscle strengthening activities 2 days a week. These help control your weight and prevent disease.  No smoking.  Wear sunscreen to prevent skin cancer.  Have your home tested for radon every 2 to 5 years. Radon is a colorless, odorless gas that can harm your lungs. To learn more, go to [email or web address].[facility].[place].us and search for \"Radon in Homes.\"  Keep guns unloaded and locked up in a safe place like a safe or gun vault, or, use a gun lock and hide the keys. Always lock away bullets separately. To learn more, visit [place].gov and search for \"safe gun storage.\"  Nutrition  Eat 5 or more servings of fruits and vegetables each day.  Try wheat bread, brown rice and whole grain pasta (instead of white bread, rice, and pasta).  Get enough calcium and vitamin D. Check the label on foods and aim for 100% of the RDA (recommended daily allowance).  Regular exams  Have a dental exam and cleaning every 6 months.  See your health care team every year to talk about:  Any changes in your health.  Any medicines your care team has prescribed.  Preventive care, family planning, and ways to prevent chronic diseases.  Shots (vaccines)   HPV shots (up to age 26), if you've never had them before.  Hepatitis B shots (up to age 59), if you've never had them before.  COVID-19 shot: Get this shot when it's due.  Flu shot: Get a flu shot every year.  Tetanus shot: Get a tetanus shot every 10 years.  Pneumococcal, hepatitis A, and RSV shots: Ask your care team if you need these based on your risk.  Shingles shot (for age 50 and up).  General health tests  Diabetes screening:  Starting at age 35, Get screened for diabetes at least " every 3 years.  If you are younger than age 35, ask your care team if you should be screened for diabetes.  Cholesterol test: At age 39, start having a cholesterol test every 5 years, or more often if advised.  Bone density scan (DEXA): At age 50, ask your care team if you should have this scan for osteoporosis (brittle bones).  Hepatitis C: Get tested at least once in your life.  Abdominal aortic aneurysm screening: Talk to your doctor about having this screening if you:  Have ever smoked; and  Are biologically male; and  Are between the ages of 65 and 75.  STIs (sexually transmitted infections)  Before age 24: Ask your care team if you should be screened for STIs.  After age 24: Get screened for STIs if you're at risk. You are at risk for STIs (including HIV) if:  You are sexually active with more than one person.  You don't use condoms every time.  You or a partner was diagnosed with a sexually transmitted infection.  If you are at risk for HIV, ask about PrEP medicine to prevent HIV.  Get tested for HIV at least once in your life, whether you are at risk for HIV or not.  Cancer screening tests  Cervical cancer screening: If you have a cervix, begin getting regular cervical cancer screening tests at age 21. Most people who have regular screenings with normal results can stop after age 65. Talk about this with your provider.  Breast cancer scan (mammogram): If you've ever had breasts, begin having regular mammograms starting at age 40. This is a scan to check for breast cancer.  Colon cancer screening: It is important to start screening for colon cancer at age 45.  Have a colonoscopy test every 10 years (or more often if you're at risk) Or, ask your provider about stool tests like a FIT test every year or Cologuard test every 3 years.  To learn more about your testing options, visit: www.[employer]/738136.pdf.  For help making a decision, visit: palmira/bg50578.  Prostate cancer screening test: If you have a  prostate and are age 55 to 69, ask your provider if you would benefit from a yearly prostate cancer screening test.  Lung cancer screening: If you are a current or former smoker age 50 to 80, ask your care team if ongoing lung cancer screenings are right for you.  For informational purposes only. Not to replace the advice of your health care provider. Copyright   2023 [facility]. All rights reserved. Clinically reviewed by the [facility]. [place] 186339 - REV 04/24.     Lung Cancer Screening   Frequently Asked Questions  If you are at high-risk for lung cancer, getting screened with low-dose computed tomography (LDCT) every year can help save your life. This handout offers answers to some of the most common questions about lung cancer screening. If you have other questions, please call [phone] ([phone]).     What is it?  Lung cancer screening uses special X-ray technology to create an image of your lung tissue. The exam is quick and easy and takes less than 10 seconds. We don t give you any medicine or use any needles. You can eat before and after the exam. You don t need to change your clothes as long as the clothing on your chest doesn t contain metal. But, you do need to be able to hold your breath for at least 6 seconds during the exam.    What is the goal of lung cancer screening?  The goal of lung cancer screening is to save lives. Many times, lung cancer is not found until a person starts having physical symptoms. Lung cancer screening can help detect lung cancer in the earliest stages when it may be easier to treat.    Who should be screened for lung cancer?  We suggest lung cancer screening for anyone who is at high-risk for lung cancer. You are in the high-risk group if you:      are between the ages of 55 and 79, and    have smoked at least 1 pack of cigarettes a day for 20 or more years, and    still smoke or have quit within the past 15  years.    However, if you have a new cough or shortness of breath, you should talk to your doctor before being screened.    Why does it matter if I have symptoms?  Certain symptoms can be a sign that you have a condition in your lungs that should be checked and treated by your doctor. These symptoms include fever, chest pain, a new or changing cough, shortness of breath that you have never felt before, coughing up blood or unexplained weight loss. Having any of these symptoms can greatly affect the results of lung cancer screening.       Should all smokers get an LDCT lung cancer screening exam?  It depends. Lung cancer screening is for a very specific group of men and women who have a history of heavy smoking over a long period of time (see  Who should be screened for lung cancer  above).  I am in the high-risk group, but have been diagnosed with cancer in the past. Is LDCT lung cancer screening right for me?  In some cases, you should not have LDCT lung screening, such as when your doctor is already following your cancer with CT scan studies. Your doctor will help you decide if LDCT lung screening is right for you.  Do I need to have a screening exam every year?  Yes. If you are in the high-risk group described earlier, you should get an LDCT lung cancer screening exam every year until you are 79, or are no longer willing or able to undergo screening and possible procedures to diagnose and treat lung cancer.  How effective is LDCT at preventing death from lung cancer?  Studies have shown that LDCT lung cancer screening can lower the risk of death from lung cancer by 20 percent in people who are at high-risk.  What are the risks?  There are some risks and limitations of LDCT lung cancer screening. We want to make sure you understand the risks and benefits, so please let us know if you have any questions. Your doctor may want to talk with you more about these risks.    Radiation exposure: As with any exam that uses  radiation, there is a very small increased risk of cancer. The amount of radiation in LDCT is small--about the same amount a person would get from a mammogram. Your doctor orders the exam when he or she feels the potential benefits outweigh the risks.    False negatives: No test is perfect, including LDCT. It is possible that you may have a medical condition, including lung cancer, that is not found during your exam. This is called a false negative result.    False positives and more testing: LDCT very often finds something in the lung that could be cancer, but in fact is not. This is called a false positive result. False positive tests often cause anxiety. To make sure these findings are not cancer, you may need to have more tests. These tests will be done only if you give us permission. Sometimes patients need a treatment that can have side effects, such as a biopsy. For more information on false positives, see  What can I expect from the results?     Findings not related to lung cancer: Your LDCT exam also takes pictures of areas of your body next to your lungs. In a very small number of cases, the CT scan will show an abnormal finding in one of these areas, such as your kidneys, adrenal glands, liver or thyroid. This finding may not be serious, but you may need more tests. Your doctor can help you decide what other tests you may need, if any.  What can I expect from the results?  About 1 out of 4 LDCT exams will find something that may need more tests. Most of the time, these findings are lung nodules. Lung nodules are very small collections of tissue in the lung. These nodules are very common, and the vast majority--more than 97 percent--are not cancer (benign). Most are normal lymph nodes or small areas of scarring from past infections.  But, if a small lung nodule is found to be cancer, the cancer can be cured more than 90 percent of the time. To know if the nodule is cancer, we may need to get more images  before your next yearly screening exam. If the nodule has suspicious features (for example, it is large, has an odd shape or grows over time), we will refer you to a specialist for further testing.  Will my doctor also get the results?  Yes. Your doctor will get a copy of your results.  Is it okay to keep smoking now that there s a cancer screening exam?  No. Tobacco is one of the strongest cancer-causing agents. It causes not only lung cancer, but other cancers and cardiovascular (heart) diseases as well. The damage caused by smoking builds over time. This means that the longer you smoke, the higher your risk of disease. While it is never too late to quit, the sooner you quit, the better.  Where can I find help to quit smoking?  The best way to prevent lung cancer is to stop smoking. If you have already quit smoking, congratulations and keep it up! For help on quitting smoking, please call [employer] at 4-012-QUITNOW (1-905.264.6683) or the American [employer] Society at 1-907.395.3150 to find local resources near you.  One-on-one health coaching:  If you d prefer to work individually with a health care provider on tobacco cessation, we offer:      Medication Therapy Management:  Our specially trained pharmacists work closely with you and your doctor to help you quit smoking.  Call [phone] or [phone] (toll free).

## 2024-07-05 ENCOUNTER — OFFICE VISIT (OUTPATIENT)
Dept: FAMILY MEDICINE | Facility: CLINIC | Age: 64
End: 2024-07-05
Payer: COMMERCIAL

## 2024-07-05 VITALS
SYSTOLIC BLOOD PRESSURE: 110 MMHG | WEIGHT: 174.5 LBS | RESPIRATION RATE: 16 BRPM | HEART RATE: 94 BPM | TEMPERATURE: 98.1 F | OXYGEN SATURATION: 97 % | DIASTOLIC BLOOD PRESSURE: 60 MMHG | BODY MASS INDEX: 30.16 KG/M2

## 2024-07-05 DIAGNOSIS — M65.331 TRIGGER FINGER, RIGHT MIDDLE FINGER: Primary | ICD-10-CM

## 2024-07-05 PROCEDURE — 20550 NJX 1 TENDON SHEATH/LIGAMENT: CPT | Mod: F7 | Performed by: STUDENT IN AN ORGANIZED HEALTH CARE EDUCATION/TRAINING PROGRAM

## 2024-07-05 RX ORDER — TRIAMCINOLONE ACETONIDE 40 MG/ML
20 INJECTION, SUSPENSION INTRA-ARTICULAR; INTRAMUSCULAR ONCE
Status: COMPLETED | OUTPATIENT
Start: 2024-07-05 | End: 2024-07-05

## 2024-07-05 RX ORDER — LIDOCAINE HYDROCHLORIDE 10 MG/ML
1 INJECTION, SOLUTION INFILTRATION; PERINEURAL ONCE
Status: COMPLETED | OUTPATIENT
Start: 2024-07-05 | End: 2024-07-05

## 2024-07-05 RX ADMIN — TRIAMCINOLONE ACETONIDE 20 MG: 40 INJECTION, SUSPENSION INTRA-ARTICULAR; INTRAMUSCULAR at 13:00

## 2024-07-05 RX ADMIN — LIDOCAINE HYDROCHLORIDE 1 ML: 10 INJECTION, SOLUTION INFILTRATION; PERINEURAL at 13:00

## 2024-07-05 NOTE — PROGRESS NOTES
PRE-OP DIAGNOSIS: trigger finger, right middle finger   POST-OP DIAGNOSIS: Same   PROCEDURE: joint injection    Written informed consent was obtained     Dose:  1% Lidocaine without Epi - 0.5 ml  Steroid 40mg/mL Triamcinolone acetonide 0.5 ml          Procedure: The area was prepped in the usual sterile manner. The needle  was inserted into the affected area and the steroid was injected.   There were no complications during this procedure.    Followup: The patient tolerated the procedure well without  complications.  Standard post-procedure care is explained and return  precautions are given.

## 2024-07-09 ENCOUNTER — HOSPITAL ENCOUNTER (OUTPATIENT)
Dept: CT IMAGING | Facility: HOSPITAL | Age: 64
Discharge: HOME | End: 2024-07-09
Attending: STUDENT IN AN ORGANIZED HEALTH CARE EDUCATION/TRAINING PROGRAM | Admitting: STUDENT IN AN ORGANIZED HEALTH CARE EDUCATION/TRAINING PROGRAM
Payer: COMMERCIAL

## 2024-07-09 DIAGNOSIS — Z87.891 PERSONAL HISTORY OF TOBACCO USE: ICD-10-CM

## 2024-07-09 PROCEDURE — 71271 CT THORAX LUNG CANCER SCR C-: CPT

## 2024-07-10 DIAGNOSIS — Z91.89 AT RISK FOR DECREASED BONE DENSITY: ICD-10-CM

## 2024-07-10 DIAGNOSIS — R93.89 ABNORMAL FINDING ON IMAGING: ICD-10-CM

## 2024-07-10 DIAGNOSIS — Z78.0 POST-MENOPAUSAL: Primary | ICD-10-CM

## 2024-07-19 ENCOUNTER — ANCILLARY PROCEDURE (OUTPATIENT)
Dept: MAMMOGRAPHY | Facility: CLINIC | Age: 64
End: 2024-07-19
Attending: STUDENT IN AN ORGANIZED HEALTH CARE EDUCATION/TRAINING PROGRAM
Payer: COMMERCIAL

## 2024-07-19 DIAGNOSIS — Z00.00 ROUTINE GENERAL MEDICAL EXAMINATION AT A HEALTH CARE FACILITY: ICD-10-CM

## 2024-07-19 DIAGNOSIS — B00.9 HERPES SIMPLEX VIRUS INFECTION: ICD-10-CM

## 2024-07-19 PROCEDURE — 77063 BREAST TOMOSYNTHESIS BI: CPT

## 2024-07-24 RX ORDER — VALACYCLOVIR HYDROCHLORIDE 1 G/1
TABLET, FILM COATED ORAL
Qty: 4 TABLET | Refills: 3 | Status: SHIPPED | OUTPATIENT
Start: 2024-07-24

## 2024-07-25 ENCOUNTER — HOSPITAL ENCOUNTER (OUTPATIENT)
Dept: BONE DENSITY | Facility: HOSPITAL | Age: 64
Discharge: HOME | End: 2024-07-25
Attending: STUDENT IN AN ORGANIZED HEALTH CARE EDUCATION/TRAINING PROGRAM | Admitting: STUDENT IN AN ORGANIZED HEALTH CARE EDUCATION/TRAINING PROGRAM
Payer: COMMERCIAL

## 2024-07-25 DIAGNOSIS — R93.89 ABNORMAL FINDING ON IMAGING: ICD-10-CM

## 2024-07-25 DIAGNOSIS — Z91.89 AT RISK FOR DECREASED BONE DENSITY: ICD-10-CM

## 2024-07-25 DIAGNOSIS — Z78.0 POST-MENOPAUSAL: ICD-10-CM

## 2024-07-25 PROCEDURE — 77080 DXA BONE DENSITY AXIAL: CPT

## 2024-07-25 PROCEDURE — 77081 DXA BONE DENSITY APPENDICULR: CPT | Mod: XU

## 2024-08-12 DIAGNOSIS — E11.9 TYPE 2 DIABETES MELLITUS WITHOUT COMPLICATION (H): ICD-10-CM

## 2024-08-12 RX ORDER — INSULIN GLARGINE-YFGN 100 [IU]/ML
INJECTION, SOLUTION SUBCUTANEOUS
Qty: 60 ML | Refills: 0 | Status: SHIPPED | OUTPATIENT
Start: 2024-08-12

## 2024-08-12 NOTE — TELEPHONE ENCOUNTER
Requested Prescriptions   Pending Prescriptions Disp Refills    SEMGLEE (YFGN) 100 UNIT/ML SOPN [Pharmacy Med Name: SEMGLEE (YFGN) 100UNIT/ML SOPN] 60 mL 0     Sig: INJECT 30 UNITS UNDER THE SKIN TWO TIMES A DAY       Insulin Protocol Failed - 8/12/2024 12:20 PM        Failed - Chart Review Required     Review Chart.    Do not approve if insulin is used in a pump.  Instead, direct refill request to the patient's endocrinologist.  If the patient doesn't have an endocrinologist, then send the refill to the patient's PCP for review            Passed - Medication is active on med list        Passed - Has GFR on file in past 12 months and most recent value is normal        Passed - Recent (6 mo) or future (90 days) visit within the authorizing provider's specialty     The patient must have completed an in-person or virtual visit within the past 6 months or has a future visit scheduled within the next 90 days with the authorizing provider s specialty.  Urgent care and e-visits do not quality as an office visit for this protocol.          Passed - Medication indicated for associated diagnosis     Medication is associated with one or more of the following diagnoses:   - Type 1 diabetes mellitus  - Type 2 diabetes mellitus  - Diabetic nephropathy; Prophylaxis  - Neuropathy due to diabetes mellitus; Prophylaxis  - Retinopathy due to diabetes mellitus; Prophylaxis  - Diabetes mellitus associated with cystic fibrosis  - Disorder of cardiovascular system; Prophylaxis - Type 1 diabetes mellitus   - Disorder of cardiovascular system; Prophylaxis - Type 2 diabetes mellitus            Passed - Patient is 18 years of age or older

## 2024-09-14 DIAGNOSIS — E11.9 TYPE 2 DIABETES MELLITUS WITHOUT COMPLICATION, WITH LONG-TERM CURRENT USE OF INSULIN (H): ICD-10-CM

## 2024-09-14 DIAGNOSIS — Z79.4 TYPE 2 DIABETES MELLITUS WITHOUT COMPLICATION, WITH LONG-TERM CURRENT USE OF INSULIN (H): ICD-10-CM

## 2024-09-16 RX ORDER — EMPAGLIFLOZIN 25 MG/1
25 TABLET, FILM COATED ORAL DAILY
Qty: 30 TABLET | Refills: 0 | Status: SHIPPED | OUTPATIENT
Start: 2024-09-16 | End: 2024-10-07

## 2024-09-16 NOTE — TELEPHONE ENCOUNTER
Requested Prescriptions   Pending Prescriptions Disp Refills    JARDIANCE 25 MG TABS tablet [Pharmacy Med Name: JARDIANCE 25MG TABS] 90 tablet 0     Sig: TAKE ONE TABLET BY MOUTH ONCE DAILY       Sodium Glucose Co-Transport Inhibitor Agents Passed - 9/14/2024  9:30 AM        Passed - Patient has documented A1c within the specified period of time.     If HgbA1C is 8 or greater, it needs to be on file within the past 3 months.  If less than 8, must be on file within the past 6 months.     Recent Labs   Lab Test 03/11/24  1443   A1C 6.4*             Passed - Medication is active on med list        Passed - Recent (6 mo) or future (90 days) visit within the authorizing provider's specialty     The patient must have completed an in-person or virtual visit within the past 6 months or has a future visit scheduled within the next 90 days with the authorizing provider s specialty.  Urgent care and e-visits do not quality as an office visit for this protocol.          Passed - Medication indicated for associated diagnosis     Medication is associated with one or more of the following diagnoses:     Diabetic nephropathy, With Albuminuria - Type 2 diabetes mellitus     Disorder of cardiovascular system; Prophylaxis - Type 2 diabetes mellitus     Type 2 diabetes mellitus    Disorder of cardiovascular system; Prophylaxis - Heart failure   Chronic kidney disease, (At risk of progression) to reduce the risk of sustained   estimated GFR decline, end-stage kidney disease, cardiovascular death,   and hospitalization for heart failure     Heart failure, (NYHA class II to IV, reduced ejection fraction) to reduce risk of  cardiovascular death and hospitalization           Passed - Has GFR on file in past 12 months and most recent value is >30        Passed - Patient is age 18 or older        Passed - Patient is not pregnant        Passed - Patient has documented normal Potassium within the last 12 mos.     Recent Labs   Lab Test  07/01/24  1541   POTASSIUM 4.3             Passed - Patient has no positive pregnancy test within the past 12 mos.

## 2024-09-22 ENCOUNTER — HEALTH MAINTENANCE LETTER (OUTPATIENT)
Age: 64
End: 2024-09-22

## 2024-09-25 ENCOUNTER — LAB (OUTPATIENT)
Dept: LAB | Facility: CLINIC | Age: 64
End: 2024-09-25
Payer: COMMERCIAL

## 2024-09-25 DIAGNOSIS — E11.9 TYPE 2 DIABETES MELLITUS WITHOUT COMPLICATION, WITH LONG-TERM CURRENT USE OF INSULIN (H): ICD-10-CM

## 2024-09-25 DIAGNOSIS — Z79.4 TYPE 2 DIABETES MELLITUS WITHOUT COMPLICATION, WITH LONG-TERM CURRENT USE OF INSULIN (H): ICD-10-CM

## 2024-09-25 LAB
ANION GAP SERPL CALCULATED.3IONS-SCNC: 8 MMOL/L (ref 7–15)
BUN SERPL-MCNC: 23 MG/DL (ref 8–23)
CALCIUM SERPL-MCNC: 9.7 MG/DL (ref 8.8–10.4)
CHLORIDE SERPL-SCNC: 104 MMOL/L (ref 98–107)
CREAT SERPL-MCNC: 0.83 MG/DL (ref 0.51–0.95)
EGFRCR SERPLBLD CKD-EPI 2021: 78 ML/MIN/1.73M2
EST. AVERAGE GLUCOSE BLD GHB EST-MCNC: 143 MG/DL
GLUCOSE SERPL-MCNC: 152 MG/DL (ref 70–99)
HBA1C MFR BLD: 6.6 % (ref 0–5.6)
HCO3 SERPL-SCNC: 28 MMOL/L (ref 22–29)
LDLC SERPL DIRECT ASSAY-MCNC: 56 MG/DL
POTASSIUM SERPL-SCNC: 3.9 MMOL/L (ref 3.4–5.3)
SODIUM SERPL-SCNC: 140 MMOL/L (ref 135–145)

## 2024-09-25 PROCEDURE — 83036 HEMOGLOBIN GLYCOSYLATED A1C: CPT

## 2024-09-25 PROCEDURE — 80048 BASIC METABOLIC PNL TOTAL CA: CPT

## 2024-09-25 PROCEDURE — 83721 ASSAY OF BLOOD LIPOPROTEIN: CPT

## 2024-09-25 PROCEDURE — 36415 COLL VENOUS BLD VENIPUNCTURE: CPT

## 2024-09-26 LAB
CREAT UR-MCNC: 95.5 MG/DL
MICROALBUMIN UR-MCNC: <12 MG/L
MICROALBUMIN/CREAT UR: NORMAL MG/G{CREAT}

## 2024-09-26 PROCEDURE — 82043 UR ALBUMIN QUANTITATIVE: CPT

## 2024-09-26 PROCEDURE — 82570 ASSAY OF URINE CREATININE: CPT

## 2024-10-01 ENCOUNTER — VIRTUAL VISIT (OUTPATIENT)
Dept: ENDOCRINOLOGY | Facility: CLINIC | Age: 64
End: 2024-10-01
Payer: COMMERCIAL

## 2024-10-01 DIAGNOSIS — E11.65 TYPE 2 DIABETES MELLITUS WITH HYPERGLYCEMIA, WITHOUT LONG-TERM CURRENT USE OF INSULIN (H): Primary | ICD-10-CM

## 2024-10-01 PROCEDURE — 99214 OFFICE O/P EST MOD 30 MIN: CPT | Mod: 95 | Performed by: NURSE PRACTITIONER

## 2024-10-01 NOTE — Clinical Note
10/1/2024      Zoe Alvarez  [address] [place] [address]      Dear Colleague,    Thank you for referring your patient, Zoe Alvarez, to the [facility]. Please see a copy of my visit note below.    [facility] ENDOCRINOLOGY    Diabetes Note 10/1/2024    Zoe Alvarez, 1960, [id number]          Reason for visit      No diagnosis found.    HPI     Zoe Alvarez is a very pleasant 64 year old old female who presents for follow up.  SUMMARY:  TODAY:  Blood glucose data:      Past Medical History     Patient Active Problem List   Diagnosis    Obesity (BMI 30-39.9)    Chronic venous insufficiency    Epidermal Inclusion Cyst    Hyperlipidemia    Vertigo    Type 2 diabetes mellitus with hyperglycemia, without long-term current use of insulin (H)    Eczema    RLQ abdominal pain    Tarsal tunnel syndrome of left side    Compression neuropathy of right lower extremity    Mononeuritis of left lower extremity    Plantar fascial fibromatosis of left foot    Mononeuritis of right lower extremity    Tarsal tunnel syndrome of right side    Foot pain, left    Bilateral lower extremity edema    Pulmonary nodules    Moderate episode of recurrent major depressive disorder (H)        Family History       family history includes Breast Cancer in her maternal grandmother; Diabetes in her mother.    Social History      reports that she has quit smoking. She has been exposed to tobacco smoke. She has never used smokeless tobacco. She reports current alcohol use of about 1.0 standard drink of alcohol per week. She reports that she does not use drugs.      Review of Systems     Patient has {:[id number]}  Remainder negative except as noted in HPI.    Vital Signs     LMP  (LMP Unknown)   Wt Readings from Last 3 Encounters:   07/05/24 79.2 kg (174 lb 8 oz)   07/01/24 79.4 kg (175 lb)   12/24/23 80.6 kg (177 lb 11.2 oz)       Physical Exam     [unfilled]      Assessment     No diagnosis  found.    Plan       Jayla Hermosillo NP   Endocrinology  10/1/2024  8:04 AM    This note has been dictated using voice recognition software.  Any grammatical or context distortions are unintentional and inherent to the software.       Lab Results     Microalbumin Urine mg/dL   Date Value Ref Range Status   04/26/2022 <0.50 0.00 - 1.99 mg/dL Final       Cholesterol   Date Value Ref Range Status   07/01/2024 116 <200 mg/dL Final     Direct Measure HDL   Date Value Ref Range Status   07/01/2024 36 (L) >=50 mg/dL Final     Triglycerides   Date Value Ref Range Status   07/01/2024 143 <150 mg/dL Final       [unfilled]      Current Medications     Outpatient Medications Prior to Visit   Medication Sig Dispense Refill    atorvastatin (LIPITOR) 40 MG tablet Take 1 tablet (40 mg) by mouth daily 90 tablet 1    blood glucose (NO BRAND SPECIFIED) lancets standard Use to test blood sugar 4 times daily or as directed. 100 each 11    blood glucose (NO BRAND SPECIFIED) test strip Use to test blood sugar 2 times daily or as directed. 200 strip 5    blood glucose meter (GLUCOMETER) [BLOOD GLUCOSE METER (GLUCOMETER)] Use 1 each As Directed as needed. Dispense glucometer brand per patient's insurance at pharmacy discretion. 1 each 0    blood glucose monitoring (NO BRAND SPECIFIED) meter device kit Use to test blood sugar 2 times daily or as directed. 1 kit 0    empagliflozin (JARDIANCE) 25 MG TABS tablet TAKE ONE TABLET BY MOUTH ONCE DAILY 30 tablet 0    insulin pen needle (BD TJ U/F) 32G X 4 MM miscellaneous USE TWO TIMES A DAY WITH INSULIN 200 each 1    Semaglutide, 2 MG/DOSE, (OZEMPIC) 8 MG/3ML pen Inject 2 mg Subcutaneous every 7 days 9 mL 3    SEMGLEE, YFGN, 100 UNIT/ML SOPN INJECT 30 UNITS UNDER THE SKIN TWO TIMES A DAY 60 mL 0    valACYclovir (VALTREX) 1000 mg tablet TAKE TWO TABLETS BY MOUTH NOW AND TWO TABLETS IN 12 HOURS AS NEEDED 4 tablet 3     No facility-administered medications prior to visit.     Video Start  "Time:    Video Stop Time:        Virtual Visit Details    Type of service:  Video Visit     Originating Location (pt. Location): {video visit patient location:370190::\"Home\"}  {PROVIDER LOCATION On-site should be selected for visits conducted from your clinic location or adjoining [facility] hospital, academic office, or other nearby [facility] building. Off-site should be selected for all other provider locations, including home:670709}  Distant Location (provider location):  {virtual location provider:918999}  Platform used for Video Visit: {Virtual Visit Platforms:843417::\"[employer]\"}    Blood glucose Data :       9/24  127 - morning   176 - dinner     9/25  143 -  morning   X -  Dinner     9/26  103 - morning   70 - dinner     9/27  105-  mourning   X -  dinner     9/28  88 - morning   134 - dinner     9/29  77 - morning   93 - dinner     9/30  88 - morning   81 - dinner     10/1   93 - morning                 Again, thank you for allowing me to participate in the care of your patient.        Sincerely,        Jayla Hermosillo NP  "

## 2024-10-01 NOTE — PROGRESS NOTES
"[facility] ENDOCRINOLOGY    Diabetes Note 10/1/2024    Zoe Alvarez, 1960, [id number]          Reason for visit      1. Type 2 diabetes mellitus with hyperglycemia, without long-term current use of insulin (H)        HPI     Zoe Alvarez is a very pleasant 64 year old old female who presents for follow up.  SUMMARY:    Lynne is seen today in follow-up for DM 2. Her current A1c is 6.6 and up slightly from her previous of 6.4.    She has been taking Ozempic 2 mg weekly, Jardiance 25 mg daily and Lantus, 30 units BID.     BG look quite good.     She reports that her weight has started to \"come back up\". She doesn't feel that the Ozempic is as effective that it has been in the past for her. She is interested in starting Mounjaro. Her son is using it and has been quite successful with it.     No Microalbuminuria, LDL is quite good and Renal function is within normal range.     9/24  127 - morning   176 - dinner     9/25  143 -  morning   X -  Dinner     9/26  103 - morning   70 - dinner     9/27  105-  mourning   X -  dinner     9/28  88 - morning   134 - dinner     9/29  77 - morning   93 - dinner     9/30  88 - morning   81 - dinner     10/1   93 - morning         Past Medical History     Patient Active Problem List   Diagnosis    Obesity (BMI 30-39.9)    Chronic venous insufficiency    Epidermal Inclusion Cyst    Hyperlipidemia    Vertigo    Type 2 diabetes mellitus with hyperglycemia, without long-term current use of insulin (H)    Eczema    RLQ abdominal pain    Tarsal tunnel syndrome of left side    Compression neuropathy of right lower extremity    Mononeuritis of left lower extremity    Plantar fascial fibromatosis of left foot    Mononeuritis of right lower extremity    Tarsal tunnel syndrome of right side    Foot pain, left    Bilateral lower extremity edema    Pulmonary nodules    Moderate episode of recurrent major depressive disorder (H)        Family History       family history " includes Breast Cancer in her maternal grandmother; Diabetes in her mother.    Social History      reports that she has quit smoking. She has been exposed to tobacco smoke. She has never used smokeless tobacco. She reports current alcohol use of about 1.0 standard drink of alcohol per week. She reports that she does not use drugs.      Review of Systems     Patient has no polyuria or polydipsia, no chest pain, dyspnea or TIA's, no numbness, tingling or pain in extremities  Remainder negative except as noted in HPI.    Vital Signs     LMP  (LMP Unknown)   Wt Readings from Last 3 Encounters:   07/05/24 79.2 kg (174 lb 8 oz)   07/01/24 79.4 kg (175 lb)   12/24/23 80.6 kg (177 lb 11.2 oz)       Physical Exam     Constitutional:  Well developed, Well nourished  HENT:  Normocephalic,   Neck: normal in appearance  Eyes:  PERRL, Conjunctiva pink  Respiratory:  No respiratory distress  Skin: No acanthosis nigricans, lipoatrophy or lipodystrophy  Neurologic:  Alert & oriented x 3, nonfocal  Psychiatric:  Affect, Mood, Insight appropriate        Assessment     1. Type 2 diabetes mellitus with hyperglycemia, without long-term current use of insulin (H)        Plan     Will try and get Mounjaro for her. I am starting her at 7.5 mg weekly. Likely we will need to up titrate in the near future. She will continue with her other medications at current doses. Will follow-up with me in 3 months.           Jayla Hermosillo NP   Endocrinology  10/1/2024  8:04 AM      Lab Results     Microalbumin Urine mg/dL   Date Value Ref Range Status   04/26/2022 <0.50 0.00 - 1.99 mg/dL Final       Cholesterol   Date Value Ref Range Status   07/01/2024 116 <200 mg/dL Final     Direct Measure HDL   Date Value Ref Range Status   07/01/2024 36 (L) >=50 mg/dL Final     Triglycerides   Date Value Ref Range Status   07/01/2024 143 <150 mg/dL Final       [unfilled]      Current Medications     Outpatient Medications Prior to Visit   Medication Sig Dispense  Refill    atorvastatin (LIPITOR) 40 MG tablet Take 1 tablet (40 mg) by mouth daily 90 tablet 1    blood glucose (NO BRAND SPECIFIED) lancets standard Use to test blood sugar 4 times daily or as directed. 100 each 11    blood glucose (NO BRAND SPECIFIED) test strip Use to test blood sugar 2 times daily or as directed. 200 strip 5    blood glucose meter (GLUCOMETER) [BLOOD GLUCOSE METER (GLUCOMETER)] Use 1 each As Directed as needed. Dispense glucometer brand per patient's insurance at pharmacy discretion. 1 each 0    blood glucose monitoring (NO BRAND SPECIFIED) meter device kit Use to test blood sugar 2 times daily or as directed. 1 kit 0    empagliflozin (JARDIANCE) 25 MG TABS tablet TAKE ONE TABLET BY MOUTH ONCE DAILY 30 tablet 0    insulin pen needle (BD TJ U/F) 32G X 4 MM miscellaneous USE TWO TIMES A DAY WITH INSULIN 200 each 1    Semaglutide, 2 MG/DOSE, (OZEMPIC) 8 MG/3ML pen Inject 2 mg Subcutaneous every 7 days 9 mL 3    SEMGLEE, YFGN, 100 UNIT/ML SOPN INJECT 30 UNITS UNDER THE SKIN TWO TIMES A DAY 60 mL 0    valACYclovir (VALTREX) 1000 mg tablet TAKE TWO TABLETS BY MOUTH NOW AND TWO TABLETS IN 12 HOURS AS NEEDED 4 tablet 3     No facility-administered medications prior to visit.     Video Start Time: 0800    Video Stop Time: 0820        Virtual Visit Details    Type of service:  Video Visit     Originating Location (pt. Location): Home    Distant Location (provider location):  On-site  Platform used for Video Visit: [facility]    Blood glucose Data :

## 2024-10-07 DIAGNOSIS — E11.9 TYPE 2 DIABETES MELLITUS WITHOUT COMPLICATION, WITH LONG-TERM CURRENT USE OF INSULIN (H): ICD-10-CM

## 2024-10-07 DIAGNOSIS — Z79.4 TYPE 2 DIABETES MELLITUS WITHOUT COMPLICATION, WITH LONG-TERM CURRENT USE OF INSULIN (H): ICD-10-CM

## 2024-10-07 RX ORDER — EMPAGLIFLOZIN 25 MG/1
25 TABLET, FILM COATED ORAL DAILY
Qty: 90 TABLET | Refills: 0 | Status: SHIPPED | OUTPATIENT
Start: 2024-10-07

## 2024-10-07 NOTE — TELEPHONE ENCOUNTER
Requested Prescriptions   Pending Prescriptions Disp Refills    JARDIANCE 25 MG TABS tablet [Pharmacy Med Name: JARDIANCE 25MG TABS] 30 tablet 0     Sig: TAKE ONE TABLET BY MOUTH ONCE DAILY       Sodium Glucose Co-Transport Inhibitor Agents Passed - 10/7/2024  8:54 AM        Passed - Patient has documented A1c within the specified period of time.     If HgbA1C is 8 or greater, it needs to be on file within the past 3 months.  If less than 8, must be on file within the past 6 months.     Recent Labs   Lab Test 09/25/24  1435   A1C 6.6*             Passed - Medication is active on med list        Passed - Recent (6 mo) or future (90 days) visit within the authorizing provider's specialty     The patient must have completed an in-person or virtual visit within the past 6 months or has a future visit scheduled within the next 90 days with the authorizing provider s specialty.  Urgent care and e-visits do not quality as an office visit for this protocol.          Passed - Medication indicated for associated diagnosis     Medication is associated with one or more of the following diagnoses:     Diabetic nephropathy, With Albuminuria - Type 2 diabetes mellitus     Disorder of cardiovascular system; Prophylaxis - Type 2 diabetes mellitus     Type 2 diabetes mellitus    Disorder of cardiovascular system; Prophylaxis - Heart failure   Chronic kidney disease, (At risk of progression) to reduce the risk of sustained   estimated GFR decline, end-stage kidney disease, cardiovascular death,   and hospitalization for heart failure     Heart failure, (NYHA class II to IV, reduced ejection fraction) to reduce risk of  cardiovascular death and hospitalization           Passed - Has GFR on file in past 12 months and most recent value is >30        Passed - Patient is age 18 or older        Passed - Patient is not pregnant        Passed - Patient has documented normal Potassium within the last 12 mos.     Recent Labs   Lab Test  09/25/24  1435   POTASSIUM 3.9             Passed - Patient has no positive pregnancy test within the past 12 mos.

## 2024-11-06 DIAGNOSIS — E11.9 TYPE 2 DIABETES MELLITUS WITHOUT COMPLICATION (H): ICD-10-CM

## 2024-11-06 RX ORDER — INSULIN GLARGINE-YFGN 100 [IU]/ML
INJECTION, SOLUTION SUBCUTANEOUS
Qty: 60 ML | Refills: 1 | Status: SHIPPED | OUTPATIENT
Start: 2024-11-06

## 2024-11-06 NOTE — TELEPHONE ENCOUNTER
Requested Prescriptions   Pending Prescriptions Disp Refills    SEMGLEE (YFGN) 100 UNIT/ML SOPN [Pharmacy Med Name: SEMGLEE (YFGN) 100UNIT/ML SOPN] 60 mL 0     Sig: INJECT 30 UNITS UNDER THE SKIN TWO TIMES A DAY       Insulin Protocol Failed - 11/6/2024  7:58 AM        Failed - Chart review required     Review Chart.    Do not approve if insulin is used in a pump.  Instead, direct refill request to the patient's endocrinologist.  If the patient doesn't have an endocrinologist, then send the refill to the patient's PCP for review            Passed - HgbA1C in past 3 or 6 months     If HgbA1C is 8 or greater, it needs to be on file within the past 3 months.  If less than 8, must be on file within the past 6 months.     Recent Labs   Lab Test 09/25/24  1435   A1C 6.6*             Passed - Medication is active on med list        Passed - Has GFR on file in past 12 months and most recent value is normal        Passed - Recent (6 mo) or future (90 days) visit within the authorizing provider's specialty     The patient must have completed an in-person or virtual visit within the past 6 months or has a future visit scheduled within the next 90 days with the authorizing provider s specialty.  Urgent care and e-visits do not quality as an office visit for this protocol.          Passed - Medication indicated for associated diagnosis     Medication is associated with one or more of the following diagnoses:   - Type 1 diabetes mellitus  - Type 2 diabetes mellitus  - Diabetic nephropathy; Prophylaxis  - Neuropathy due to diabetes mellitus; Prophylaxis  - Retinopathy due to diabetes mellitus; Prophylaxis  - Diabetes mellitus associated with cystic fibrosis  - Disorder of cardiovascular system; Prophylaxis - Type 1 diabetes mellitus   - Disorder of cardiovascular system; Prophylaxis - Type 2 diabetes mellitus            Passed - Patient is 18 years of age or older

## 2024-11-11 DIAGNOSIS — E78.2 MIXED HYPERLIPIDEMIA: ICD-10-CM

## 2024-11-11 RX ORDER — ATORVASTATIN CALCIUM 40 MG/1
40 TABLET, FILM COATED ORAL DAILY
Qty: 90 TABLET | Refills: 1 | Status: SHIPPED | OUTPATIENT
Start: 2024-11-11

## 2024-11-19 ENCOUNTER — OFFICE VISIT (OUTPATIENT)
Dept: URGENT CARE | Facility: URGENT CARE | Age: 64
End: 2024-11-19
Payer: COMMERCIAL

## 2024-11-19 VITALS
SYSTOLIC BLOOD PRESSURE: 120 MMHG | DIASTOLIC BLOOD PRESSURE: 78 MMHG | HEART RATE: 78 BPM | TEMPERATURE: 99.7 F | OXYGEN SATURATION: 98 % | RESPIRATION RATE: 20 BRPM

## 2024-11-19 DIAGNOSIS — J02.0 STREPTOCOCCAL PHARYNGITIS: Primary | ICD-10-CM

## 2024-11-19 DIAGNOSIS — J02.9 SORE THROAT: ICD-10-CM

## 2024-11-19 LAB — DEPRECATED S PYO AG THROAT QL EIA: POSITIVE

## 2024-11-19 PROCEDURE — 87880 STREP A ASSAY W/OPTIC: CPT | Performed by: NURSE PRACTITIONER

## 2024-11-19 PROCEDURE — 99213 OFFICE O/P EST LOW 20 MIN: CPT | Performed by: NURSE PRACTITIONER

## 2024-11-19 RX ORDER — AZITHROMYCIN 250 MG/1
TABLET, FILM COATED ORAL
Qty: 6 TABLET | Refills: 0 | Status: SHIPPED | OUTPATIENT
Start: 2024-11-19 | End: 2024-11-24

## 2024-11-19 NOTE — LETTER
November 19, 2024      Zoe Alvarez  [address] [place] [address]        To Whom It May Concern:    Zoe Alvarez  was seen on November 19.  Please excuse her  until November 21 due to strep throat.        Sincerely,        Carley Alaniz, CNP

## 2024-11-20 NOTE — PROGRESS NOTES
Assessment & Plan     Sore throat    - Streptococcus A Rapid Screen w/Reflex to PCR    Streptococcal pharyngitis    - azithromycin (ZITHROMAX) 250 MG tablet  Dispense: 6 tablet; Refill: 0       Strep is positive today.      No in-person work/school for at least 24 hours following start of treatment or until fever less than 100.4.        Push fluids.  Ibuprofen or Tylenol for pain as directed on package as needed for pain or fever.        Return to clinic if not feeling much better in 2 or 3 days or new symptoms develop.              No follow-ups on file.    Carley Alaniz, [facility] TERRI Batista is a 64 year old female who presents to clinic today for the following health issues:  Chief Complaint   Patient presents with    Urgent Care     Throat pain since this morning         HPI    Throat pain starting this morning.  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    Rates the pain 4 out of 10.    Slight congestion.    No fever sweats or chills.  Temp here is 99.7.      Review of Systems    See HPI        Objective    /78   Pulse 78   Temp 99.7  F (37.6  C)   Resp 20   LMP  (LMP Unknown)   SpO2 98%   Physical Exam  Constitutional:       Appearance: Normal appearance.   HENT:      Nose: No congestion.      Mouth/Throat:      Mouth: Mucous membranes are moist.      Pharynx: Posterior oropharyngeal erythema present.      Tonsils: Tonsillar exudate present. 2+ on the right. 2+ on the left.   Pulmonary:      Effort: Pulmonary effort is normal.   Lymphadenopathy:      Cervical: No cervical adenopathy.   Skin:     General: Skin is warm.   Neurological:      Mental Status: She is alert and oriented to person, place, and time.   Psychiatric:         Mood and Affect: Mood normal.            Results for orders placed or performed in visit on 11/19/24 (from the past 24 hours)   Streptococcus A Rapid Screen w/Reflex to PCR    Specimen:  Throat; Swab   Result Value Ref Range    Group A Strep antigen Positive (A) Negative

## 2024-11-21 DIAGNOSIS — E11.9 TYPE 2 DIABETES MELLITUS WITHOUT COMPLICATION (H): ICD-10-CM

## 2024-11-21 RX ORDER — PEN NEEDLE, DIABETIC 32GX 5/32"
NEEDLE, DISPOSABLE MISCELLANEOUS
Qty: 200 EACH | Refills: 1 | Status: SHIPPED | OUTPATIENT
Start: 2024-11-21

## 2024-11-21 NOTE — TELEPHONE ENCOUNTER
Requested Prescriptions   Pending Prescriptions Disp Refills    BD TJ U/F 32G X 4 MM insulin pen needle [Pharmacy Med Name: BD PEN NEEDLE TJ  32G X 4 MM MISC]  1     Sig: USE TWO TIMES A DAY WITH INSULIN       Diabetic Supplies Protocol Passed - 11/21/2024  8:36 AM        Passed - Medication is active on med list        Passed - Recent (12 month) or future (90 days) visit with authorizing provider s specialty     The patient must have completed an in-person or virtual visit within the past 12 months or has a future visit scheduled within the next 90 days with the authorizing provider s specialty.  Urgent care and e-visits do not qualify as an office visit for this protocol.          Passed - Medication indicated for associated diagnosis        Passed - Patient is 18 years of age or older

## 2024-12-12 DIAGNOSIS — Z79.4 TYPE 2 DIABETES MELLITUS WITHOUT COMPLICATION, WITH LONG-TERM CURRENT USE OF INSULIN (H): ICD-10-CM

## 2024-12-12 DIAGNOSIS — E11.9 TYPE 2 DIABETES MELLITUS WITHOUT COMPLICATION, WITH LONG-TERM CURRENT USE OF INSULIN (H): ICD-10-CM

## 2025-01-09 DIAGNOSIS — E11.65 TYPE 2 DIABETES MELLITUS WITH HYPERGLYCEMIA, WITHOUT LONG-TERM CURRENT USE OF INSULIN (H): ICD-10-CM

## 2025-01-09 RX ORDER — TIRZEPATIDE 7.5 MG/.5ML
INJECTION, SOLUTION SUBCUTANEOUS
Qty: 2 ML | Refills: 0 | Status: SHIPPED | OUTPATIENT
Start: 2025-01-09

## 2025-01-09 NOTE — TELEPHONE ENCOUNTER
Requested Prescriptions   Pending Prescriptions Disp Refills    MOUNJARO 7.5 MG/0.5ML SOAJ [Pharmacy Med Name: MOUNJARO 7.5MG/0.5ML SOAJ] 2 mL 3     Sig: INJECT 7.5 MG UNDER THE SKIN EVERY 7 DAYS.       GLP-1 Agonists Protocol Passed - 1/9/2025  9:55 AM        Passed - HgbA1C in past 3 or 6 months     If HgbA1C is 8 or greater, it needs to be on file within the past 3 months.  If less than 8, must be on file within the past 6 months.     Recent Labs   Lab Test 09/25/24  1435   A1C 6.6*             Passed - Medication is active on med list        Passed - Has GFR on file in past 12 months and most recent value is normal        Passed - Recent (6 mo) or future (90 days) visit within the authorizing provider's specialty     The patient must have completed an in-person or virtual visit within the past 6 months or has a future visit scheduled within the next 90 days with the authorizing provider s specialty.  Urgent care and e-visits do not quality as an office visit for this protocol.          Passed - Medication indicated for associated diagnosis     Medication is associated with one or more of the following diagnoses:     Type 2 diabetes mellitus           Passed - Patient is age 18 or older        Passed - No active pregnancy on record        Passed - No positive pregnancy test in past 12 months

## 2025-01-27 ENCOUNTER — LAB (OUTPATIENT)
Dept: LAB | Facility: CLINIC | Age: 65
End: 2025-01-27
Payer: COMMERCIAL

## 2025-01-27 DIAGNOSIS — E11.65 TYPE 2 DIABETES MELLITUS WITH HYPERGLYCEMIA, WITHOUT LONG-TERM CURRENT USE OF INSULIN (H): ICD-10-CM

## 2025-01-27 LAB
ANION GAP SERPL CALCULATED.3IONS-SCNC: 10 MMOL/L (ref 7–15)
BUN SERPL-MCNC: 25.1 MG/DL (ref 8–23)
CALCIUM SERPL-MCNC: 10.4 MG/DL (ref 8.8–10.4)
CHLORIDE SERPL-SCNC: 104 MMOL/L (ref 98–107)
CREAT SERPL-MCNC: 0.87 MG/DL (ref 0.51–0.95)
EGFRCR SERPLBLD CKD-EPI 2021: 74 ML/MIN/1.73M2
EST. AVERAGE GLUCOSE BLD GHB EST-MCNC: 126 MG/DL
GLUCOSE SERPL-MCNC: 111 MG/DL (ref 70–99)
HBA1C MFR BLD: 6 % (ref 0–5.6)
HCO3 SERPL-SCNC: 27 MMOL/L (ref 22–29)
POTASSIUM SERPL-SCNC: 4.7 MMOL/L (ref 3.4–5.3)
SODIUM SERPL-SCNC: 141 MMOL/L (ref 135–145)

## 2025-01-27 PROCEDURE — 80048 BASIC METABOLIC PNL TOTAL CA: CPT

## 2025-01-27 PROCEDURE — 83036 HEMOGLOBIN GLYCOSYLATED A1C: CPT

## 2025-01-27 PROCEDURE — 36415 COLL VENOUS BLD VENIPUNCTURE: CPT

## 2025-02-04 ENCOUNTER — OFFICE VISIT (OUTPATIENT)
Dept: ENDOCRINOLOGY | Facility: CLINIC | Age: 65
End: 2025-02-04
Payer: COMMERCIAL

## 2025-02-04 VITALS
BODY MASS INDEX: 29.83 KG/M2 | HEART RATE: 86 BPM | SYSTOLIC BLOOD PRESSURE: 116 MMHG | DIASTOLIC BLOOD PRESSURE: 65 MMHG | WEIGHT: 172.6 LBS | OXYGEN SATURATION: 100 %

## 2025-02-04 DIAGNOSIS — E11.9 TYPE 2 DIABETES MELLITUS WITHOUT COMPLICATION, WITH LONG-TERM CURRENT USE OF INSULIN (H): Primary | ICD-10-CM

## 2025-02-04 DIAGNOSIS — Z79.4 TYPE 2 DIABETES MELLITUS WITHOUT COMPLICATION, WITH LONG-TERM CURRENT USE OF INSULIN (H): Primary | ICD-10-CM

## 2025-02-04 PROCEDURE — 99214 OFFICE O/P EST MOD 30 MIN: CPT | Performed by: NURSE PRACTITIONER

## 2025-02-04 RX ORDER — INSULIN GLARGINE-YFGN 100 [IU]/ML
20 INJECTION, SOLUTION SUBCUTANEOUS DAILY
Qty: 60 ML | Refills: 1 | Status: SHIPPED | OUTPATIENT
Start: 2025-02-04

## 2025-02-04 RX ORDER — TIRZEPATIDE 10 MG/.5ML
10 INJECTION, SOLUTION SUBCUTANEOUS
Qty: 6 ML | Refills: 3 | Status: SHIPPED | OUTPATIENT
Start: 2025-02-04

## 2025-02-04 NOTE — PROGRESS NOTES
[facility] ENDOCRINOLOGY    Diabetes Note 2/5/2025    Zoe Alvarez, 1960, [id number]          Reason for visit      1. Type 2 diabetes mellitus without complication, with long-term current use of insulin (H)        HPI     Zoe Alvarez is a very pleasant 64 year old old female who presents for follow up.  SUMMARY:    Lynne returns in follow-up for DM 2. Her current A1c is 6.0 and the best it has ever been. She is pretty happy about it.     She remains on Jardiance, 25 mg daily, Semglee, 30 units daily and Mounjaro, 7.5 mg weekly. She is interested in increasing her Mounjaro dose to 10 mg.     Glucometer download shows that she is testing daily with an ave BG of 113.     Renal function is within normal range. No Microalbuminuria and recent LDL was 56. She is taking Atorvastatin daily.     She mentions a recent eye appointment in which she was found with some Retinopathy. She will be seeing a Specialist.            Blood glucose data:      Past Medical History     Patient Active Problem List   Diagnosis    Obesity (BMI 30-39.9)    Chronic venous insufficiency    Epidermal Inclusion Cyst    Hyperlipidemia    Vertigo    Type 2 diabetes mellitus with hyperglycemia, without long-term current use of insulin (H)    Eczema    RLQ abdominal pain    Tarsal tunnel syndrome of left side    Compression neuropathy of right lower extremity    Mononeuritis of left lower extremity    Plantar fascial fibromatosis of left foot    Mononeuritis of right lower extremity    Tarsal tunnel syndrome of right side    Foot pain, left    Bilateral lower extremity edema    Pulmonary nodules    Moderate episode of recurrent major depressive disorder (H)        Family History       family history includes Breast Cancer in her maternal grandmother; Diabetes in her mother.    Social History      reports that she has quit smoking. She has been exposed to tobacco smoke. She has never used smokeless tobacco. She reports current  alcohol use of about 1.0 standard drink of alcohol per week. She reports that she does not use drugs.      Review of Systems     Patient has no polyuria or polydipsia, no chest pain, dyspnea or TIA's, no numbness, tingling or pain in extremities  Remainder negative except as noted in HPI.    Vital Signs     /65 (BP Location: Right arm, Patient Position: Sitting, Cuff Size: Adult Large)   Pulse 86   Wt 78.3 kg (172 lb 9.6 oz)   LMP  (LMP Unknown)   SpO2 100%   BMI 29.83 kg/m    Wt Readings from Last 3 Encounters:   02/04/25 78.3 kg (172 lb 9.6 oz)   07/05/24 79.2 kg (174 lb 8 oz)   07/01/24 79.4 kg (175 lb)       Physical Exam     Constitutional:  Well developed, Well nourished  HENT:  Normocephalic,   Eyes:  PERRL, Conjunctiva pink  Respiratory:  Normal breath sounds, No respiratory distress  Cardiovascular:  Normal heart rate, Normal rhythm, No murmurs  Musculoskeletal:  No gross deformity or lesions, normal dorsalis pedis pulses  Skin: No acanthosis nigricans, lipoatrophy or lipodystrophy  Neurologic:  Alert & oriented x 3, nonfocal  Psychiatric:  Affect, Mood, Insight appropriate  Diabetic foot exam: no ulcers, charcot's or high risk calluses, Normal monofilament exam        Assessment     1. Type 2 diabetes mellitus without complication, with long-term current use of insulin (H)        Plan     Will increase her Mounjaro to 10 mg weekly. We will decrease her Semblee dose to 20 units daily at the same time. She knows to continue to decrease if she is having lows, and to increase it if she loses some control. Follow-up with me in 3 months.          Jayla Hermosillo NP  [facility] Endocrinology  2/5/2025  5:44 AM      Lab Results     Microalbumin Urine mg/dL   Date Value Ref Range Status   04/26/2022 <0.50 0.00 - 1.99 mg/dL Final       Cholesterol   Date Value Ref Range Status   07/01/2024 116 <200 mg/dL Final     Direct Measure HDL   Date Value Ref Range Status   07/01/2024 36 (L) >=50 mg/dL Final      Triglycerides   Date Value Ref Range Status   07/01/2024 143 <150 mg/dL Final       [unfilled]      Current Medications     Outpatient Medications Prior to Visit   Medication Sig Dispense Refill    atorvastatin (LIPITOR) 40 MG tablet Take 1 tablet (40 mg) by mouth daily. 90 tablet 1    blood glucose (NO BRAND SPECIFIED) lancets standard Use to test blood sugar 4 times daily or as directed. 100 each 11    blood glucose (NO BRAND SPECIFIED) test strip Use to test blood sugar 2 times daily or as directed. 200 strip 5    blood glucose meter (GLUCOMETER) [BLOOD GLUCOSE METER (GLUCOMETER)] Use 1 each As Directed as needed. Dispense glucometer brand per patient's insurance at pharmacy discretion. 1 each 0    blood glucose monitoring (NO BRAND SPECIFIED) meter device kit Use to test blood sugar 2 times daily or as directed. 1 kit 0    insulin pen needle (BD TJ U/F) 32G X 4 MM miscellaneous USE TWO TIMES A DAY WITH INSULIN 200 each 1    MOUNJARO 7.5 MG/0.5ML SOAJ INJECT 7.5 MG UNDER THE SKIN EVERY 7 DAYS. 2 mL 0    valACYclovir (VALTREX) 1000 mg tablet TAKE TWO TABLETS BY MOUTH NOW AND TWO TABLETS IN 12 HOURS AS NEEDED 4 tablet 3    empagliflozin (JARDIANCE) 25 MG TABS tablet TAKE ONE TABLET BY MOUTH ONCE DAILY 90 tablet 0    Insulin Glargine-yfgn (SEMGLEE, YFGN,) 100 UNIT/ML SOPN INJECT 30 UNITS UNDER THE SKIN TWO TIMES A DAY 60 mL 1    Semaglutide, 2 MG/DOSE, (OZEMPIC) 8 MG/3ML pen Inject 2 mg Subcutaneous every 7 days 9 mL 3     No facility-administered medications prior to visit.

## 2025-02-04 NOTE — LETTER
2/4/2025      Zoe Alvarez  [address] [facility] [address]      Dear Colleague,    Thank you for referring your patient, Zoe Alvarez, to the [facility]. Please see a copy of my visit note below.    [facility] ENDOCRINOLOGY    Diabetes Note 2/5/2025    Zoe Alvarez, 1960, [id number]          Reason for visit      1. Type 2 diabetes mellitus without complication, with long-term current use of insulin (H)        HPI     Zoe Alvarez is a very pleasant 64 year old old female who presents for follow up.  SUMMARY:    Lynne returns in follow-up for DM 2. Her current A1c is 6.0 and the best it has ever been. She is pretty happy about it.     She remains on Jardiance, 25 mg daily, Semglee, 30 units daily and Mounjaro, 7.5 mg weekly. She is interested in increasing her Mounjaro dose to 10 mg.     Glucometer download shows that she is testing daily with an ave BG of 113.     Renal function is within normal range. No Microalbuminuria and recent LDL was 56. She is taking Atorvastatin daily.     She mentions a recent eye appointment in which she was found with some Retinopathy. She will be seeing a Specialist.            Blood glucose data:      Past Medical History     Patient Active Problem List   Diagnosis     Obesity (BMI 30-39.9)     Chronic venous insufficiency     Epidermal Inclusion Cyst     Hyperlipidemia     Vertigo     Type 2 diabetes mellitus with hyperglycemia, without long-term current use of insulin (H)     Eczema     RLQ abdominal pain     Tarsal tunnel syndrome of left side     Compression neuropathy of right lower extremity     Mononeuritis of left lower extremity     Plantar fascial fibromatosis of left foot     Mononeuritis of right lower extremity     Tarsal tunnel syndrome of right side     Foot pain, left     Bilateral lower extremity edema     Pulmonary nodules     Moderate episode of recurrent major depressive disorder (H)        Family  History       family history includes Breast Cancer in her maternal grandmother; Diabetes in her mother.    Social History      reports that she has quit smoking. She has been exposed to tobacco smoke. She has never used smokeless tobacco. She reports current alcohol use of about 1.0 standard drink of alcohol per week. She reports that she does not use drugs.      Review of Systems     Patient has no polyuria or polydipsia, no chest pain, dyspnea or TIA's, no numbness, tingling or pain in extremities  Remainder negative except as noted in HPI.    Vital Signs     /65 (BP Location: Right arm, Patient Position: Sitting, Cuff Size: Adult Large)   Pulse 86   Wt 78.3 kg (172 lb 9.6 oz)   LMP  (LMP Unknown)   SpO2 100%   BMI 29.83 kg/m    Wt Readings from Last 3 Encounters:   02/04/25 78.3 kg (172 lb 9.6 oz)   07/05/24 79.2 kg (174 lb 8 oz)   07/01/24 79.4 kg (175 lb)       Physical Exam     Constitutional:  Well developed, Well nourished  HENT:  Normocephalic,   Eyes:  PERRL, Conjunctiva pink  Respiratory:  Normal breath sounds, No respiratory distress  Cardiovascular:  Normal heart rate, Normal rhythm, No murmurs  Musculoskeletal:  No gross deformity or lesions, normal dorsalis pedis pulses  Skin: No acanthosis nigricans, lipoatrophy or lipodystrophy  Neurologic:  Alert & oriented x 3, nonfocal  Psychiatric:  Affect, Mood, Insight appropriate  Diabetic foot exam: no ulcers, charcot's or high risk calluses, Normal monofilament exam        Assessment     1. Type 2 diabetes mellitus without complication, with long-term current use of insulin (H)        Plan     Will increase her Mounjaro to 10 mg weekly. We will decrease her Semblee dose to 20 units daily at the same time. She knows to continue to decrease if she is having lows, and to increase it if she loses some control. Follow-up with me in 3 months.          Jayla Hermosillo NP  [facility]  2/5/2025  5:44 AM      Lab Results     Microalbumin Urine mg/dL    Date Value Ref Range Status   04/26/2022 <0.50 0.00 - 1.99 mg/dL Final       Cholesterol   Date Value Ref Range Status   07/01/2024 116 <200 mg/dL Final     Direct Measure HDL   Date Value Ref Range Status   07/01/2024 36 (L) >=50 mg/dL Final     Triglycerides   Date Value Ref Range Status   07/01/2024 143 <150 mg/dL Final       [unfilled]      Current Medications     Outpatient Medications Prior to Visit   Medication Sig Dispense Refill     atorvastatin (LIPITOR) 40 MG tablet Take 1 tablet (40 mg) by mouth daily. 90 tablet 1     blood glucose (NO BRAND SPECIFIED) lancets standard Use to test blood sugar 4 times daily or as directed. 100 each 11     blood glucose (NO BRAND SPECIFIED) test strip Use to test blood sugar 2 times daily or as directed. 200 strip 5     blood glucose meter (GLUCOMETER) [BLOOD GLUCOSE METER (GLUCOMETER)] Use 1 each As Directed as needed. Dispense glucometer brand per patient's insurance at pharmacy discretion. 1 each 0     blood glucose monitoring (NO BRAND SPECIFIED) meter device kit Use to test blood sugar 2 times daily or as directed. 1 kit 0     insulin pen needle (BD TJ U/F) 32G X 4 MM miscellaneous USE TWO TIMES A DAY WITH INSULIN 200 each 1     MOUNJARO 7.5 MG/0.5ML SOAJ INJECT 7.5 MG UNDER THE SKIN EVERY 7 DAYS. 2 mL 0     valACYclovir (VALTREX) 1000 mg tablet TAKE TWO TABLETS BY MOUTH NOW AND TWO TABLETS IN 12 HOURS AS NEEDED 4 tablet 3     empagliflozin (JARDIANCE) 25 MG TABS tablet TAKE ONE TABLET BY MOUTH ONCE DAILY 90 tablet 0     Insulin Glargine-yfgn (SEMGLEE, YFGN,) 100 UNIT/ML SOPN INJECT 30 UNITS UNDER THE SKIN TWO TIMES A DAY 60 mL 1     Semaglutide, 2 MG/DOSE, (OZEMPIC) 8 MG/3ML pen Inject 2 mg Subcutaneous every 7 days 9 mL 3     No facility-administered medications prior to visit.                   Again, thank you for allowing me to participate in the care of your patient.        Sincerely,        Jayla Hermosillo NP    Electronically signed

## 2025-02-05 DIAGNOSIS — E11.65 TYPE 2 DIABETES MELLITUS WITH HYPERGLYCEMIA, WITHOUT LONG-TERM CURRENT USE OF INSULIN (H): ICD-10-CM

## 2025-02-05 RX ORDER — TIRZEPATIDE 7.5 MG/.5ML
INJECTION, SOLUTION SUBCUTANEOUS
Qty: 2 ML | Refills: 0 | OUTPATIENT
Start: 2025-02-05

## 2025-02-05 NOTE — TELEPHONE ENCOUNTER
Requested Prescriptions   Pending Prescriptions Disp Refills    MOUNJARO 7.5 MG/0.5ML SOAJ [Pharmacy Med Name: MOUNJARO 7.5MG/0.5ML SOAJ] 2 mL 0     Sig: INJECT 7.5 MG UNDER THE SKIN EVERY 7 DAYS.       GLP-1 Agonists Protocol Passed - 2/5/2025 10:04 AM        Passed - HgbA1C in past 3 or 6 months     If HgbA1C is 8 or greater, it needs to be on file within the past 3 months.  If less than 8, must be on file within the past 6 months.     Recent Labs   Lab Test 01/27/25  1433   A1C 6.0*             Passed - Medication is active on med list        Passed - Has GFR on file in past 12 months and most recent value is normal        Passed - Recent (6 mo) or future (90 days) visit within the authorizing provider's specialty     The patient must have completed an in-person or virtual visit within the past 6 months or has a future visit scheduled within the next 90 days with the authorizing provider s specialty.  Urgent care and e-visits do not quality as an office visit for this protocol.          Passed - Medication indicated for associated diagnosis     Medication is associated with one or more of the following diagnoses:     Type 2 diabetes mellitus           Passed - Patient is age 18 or older        Passed - No active pregnancy on record        Passed - No positive pregnancy test in past 12 months

## 2025-03-29 ENCOUNTER — TELEPHONE (OUTPATIENT)
Dept: NURSING | Facility: CLINIC | Age: 65
End: 2025-03-29
Payer: COMMERCIAL

## 2025-03-29 NOTE — TELEPHONE ENCOUNTER
Patient realized her insulin prescription written incorrectly, and her refill is unable to be processed due to insurance saying it's too soon- completely out of insulin.      At appointment on 2/4/25, decreased dose of Semglee from 30 units 2 x a day to 20 units daily but had always been 2 x a day per the patient and in previous medication orders, now patient is out of insulin.    Pharmacy called while on the phone with patient, spoke with her again and the pharmacy was able to take care of it for her on their end.     Will route this message to the clinic for FYI as this medication order will need to be addressed. Patient will call the clinic next week if she does not hear back from clinic or provider.    Yesika Atkinson RN on 3/29/2025 at 11:03 AM

## 2025-03-31 DIAGNOSIS — E11.9 TYPE 2 DIABETES MELLITUS WITHOUT COMPLICATION, WITH LONG-TERM CURRENT USE OF INSULIN (H): ICD-10-CM

## 2025-03-31 DIAGNOSIS — Z79.4 TYPE 2 DIABETES MELLITUS WITHOUT COMPLICATION, WITH LONG-TERM CURRENT USE OF INSULIN (H): ICD-10-CM

## 2025-03-31 RX ORDER — INSULIN GLARGINE-YFGN 100 [IU]/ML
20 INJECTION, SOLUTION SUBCUTANEOUS DAILY
Qty: 60 ML | Refills: 1 | Status: SHIPPED | OUTPATIENT
Start: 2025-03-31

## 2025-04-07 DIAGNOSIS — E78.2 MIXED HYPERLIPIDEMIA: ICD-10-CM

## 2025-04-07 RX ORDER — ATORVASTATIN CALCIUM 40 MG/1
40 TABLET, FILM COATED ORAL DAILY
Qty: 90 TABLET | Refills: 0 | Status: SHIPPED | OUTPATIENT
Start: 2025-04-07

## 2025-04-08 ENCOUNTER — TRANSFERRED RECORDS (OUTPATIENT)
Dept: HEALTH INFORMATION MANAGEMENT | Facility: CLINIC | Age: 65
End: 2025-04-08
Payer: COMMERCIAL

## 2025-04-08 DIAGNOSIS — E78.2 MIXED HYPERLIPIDEMIA: ICD-10-CM

## 2025-04-08 RX ORDER — ATORVASTATIN CALCIUM 40 MG/1
40 TABLET, FILM COATED ORAL DAILY
Qty: 90 TABLET | Refills: 0 | OUTPATIENT
Start: 2025-04-08

## 2025-04-08 NOTE — TELEPHONE ENCOUNTER
Duplicate - prescription request refused.   Dosing Month 1 (Required For Cumulative Dosing): 40mg Daily

## 2025-04-10 DIAGNOSIS — Z79.4 TYPE 2 DIABETES MELLITUS WITHOUT COMPLICATION, WITH LONG-TERM CURRENT USE OF INSULIN (H): ICD-10-CM

## 2025-04-10 DIAGNOSIS — E11.9 TYPE 2 DIABETES MELLITUS WITHOUT COMPLICATION, WITH LONG-TERM CURRENT USE OF INSULIN (H): ICD-10-CM

## 2025-04-10 RX ORDER — INSULIN GLARGINE-YFGN 100 [IU]/ML
20 INJECTION, SOLUTION SUBCUTANEOUS DAILY
Qty: 60 ML | Refills: 1 | Status: SHIPPED | OUTPATIENT
Start: 2025-04-10

## 2025-04-10 NOTE — TELEPHONE ENCOUNTER
[facility] Call Center    Phone Message    May a detailed message be left on voicemail: yes     Reason for Call: Medication Refill Request    Has the patient contacted the pharmacy for the refill? Yes   Name of medication being requested: Insulin Glargine-yfgn (SEMGLEE, YFGN,)   Provider who prescribed the medication: Bay  Pharmacy:    [facility] [place] [id number] [facility]  Date medication is needed: 4/10/2025    Pt states there was a mix up with how rx was written previously. Please confirm with pt. Thank you.    Action Taken: Other: Endo    Travel Screening: Not Applicable     Date of Service:

## 2025-04-10 NOTE — TELEPHONE ENCOUNTER
She remains on Jardiance, 25 mg daily, Semglee,  taking 15 - 20 units BID and Mounjaro, 7.5 mg weekly. She is interested in increasing her Mounjaro dose to 10 mg.     We will decrease her Semblee dose to 20 units daily at the same time.

## 2025-04-15 NOTE — TELEPHONE ENCOUNTER
Requested Prescriptions   Pending Prescriptions Disp Refills    JARDIANCE 25 MG TABS tablet [Pharmacy Med Name: JARDIANCE 25MG TABS] 90 tablet 0     Sig: TAKE ONE TABLET BY MOUTH ONCE DAILY       Sodium Glucose Co-Transport Inhibitor Agents Passed - 6/15/2024  5:03 AM        Passed - Patient has documented A1c within the specified period of time.     If HgbA1C is 8 or greater, it needs to be on file within the past 3 months.  If less than 8, must be on file within the past 6 months.     Recent Labs   Lab Test 03/11/24  1443   A1C 6.4*             Passed - Medication is active on med list        Passed - Has GFR on file in past 12 months and most recent value is normal        Passed - Recent (6 mo) or future (90 days) visit within the authorizing provider's specialty     The patient must have completed an in-person or virtual visit within the past 6 months or has a future visit scheduled within the next 90 days with the authorizing provider s specialty.  Urgent care and e-visits do not quality as an office visit for this protocol.          Passed - Medication indicated for associated diagnosis     Medication is associated with one or more of the following diagnoses:     Diabetic nephropathy, With Albuminuria - Type 2 diabetes mellitus     Disorder of cardiovascular system; Prophylaxis - Type 2 diabetes mellitus     Type 2 diabetes mellitus    Disorder of cardiovascular system; Prophylaxis - Heart failure   Chronic kidney disease, (At risk of progression) to reduce the risk of sustained   estimated GFR decline, end-stage kidney disease, cardiovascular death,   and hospitalization for heart failure     Heart failure, (NYHA class II to IV, reduced ejection fraction) to reduce risk of  cardiovascular death and hospitalization           Passed - Patient is age 18 or older        Passed - Patient is not pregnant        Passed - Patient has documented normal Potassium within the last 12 mos.     Recent Labs   Lab Test  Pt called to fu on encounter from 4/14.   03/11/24  1443   POTASSIUM 4.3             Passed - Patient has no positive pregnancy test within the past 12 mos.

## 2025-05-05 ENCOUNTER — LAB (OUTPATIENT)
Dept: LAB | Facility: CLINIC | Age: 65
End: 2025-05-05
Payer: COMMERCIAL

## 2025-05-05 DIAGNOSIS — E11.9 TYPE 2 DIABETES MELLITUS WITHOUT COMPLICATION, WITH LONG-TERM CURRENT USE OF INSULIN (H): ICD-10-CM

## 2025-05-05 DIAGNOSIS — Z79.4 TYPE 2 DIABETES MELLITUS WITHOUT COMPLICATION, WITH LONG-TERM CURRENT USE OF INSULIN (H): ICD-10-CM

## 2025-05-05 LAB
ANION GAP SERPL CALCULATED.3IONS-SCNC: 11 MMOL/L (ref 7–15)
BUN SERPL-MCNC: 21.1 MG/DL (ref 8–23)
CALCIUM SERPL-MCNC: 10.1 MG/DL (ref 8.8–10.4)
CHLORIDE SERPL-SCNC: 105 MMOL/L (ref 98–107)
CREAT SERPL-MCNC: 1.04 MG/DL (ref 0.51–0.95)
EGFRCR SERPLBLD CKD-EPI 2021: 60 ML/MIN/1.73M2
EST. AVERAGE GLUCOSE BLD GHB EST-MCNC: 143 MG/DL
GLUCOSE SERPL-MCNC: 160 MG/DL (ref 70–99)
HBA1C MFR BLD: 6.6 % (ref 0–5.6)
HCO3 SERPL-SCNC: 26 MMOL/L (ref 22–29)
POTASSIUM SERPL-SCNC: 4.8 MMOL/L (ref 3.4–5.3)
SODIUM SERPL-SCNC: 142 MMOL/L (ref 135–145)

## 2025-05-05 PROCEDURE — 83036 HEMOGLOBIN GLYCOSYLATED A1C: CPT

## 2025-05-05 PROCEDURE — 36415 COLL VENOUS BLD VENIPUNCTURE: CPT

## 2025-05-05 PROCEDURE — 80048 BASIC METABOLIC PNL TOTAL CA: CPT

## 2025-05-08 ENCOUNTER — RESULTS FOLLOW-UP (OUTPATIENT)
Dept: RHEUMATOLOGY | Facility: CLINIC | Age: 65
End: 2025-05-08

## 2025-05-13 ENCOUNTER — OFFICE VISIT (OUTPATIENT)
Dept: ENDOCRINOLOGY | Facility: CLINIC | Age: 65
End: 2025-05-13
Payer: COMMERCIAL

## 2025-05-13 VITALS
WEIGHT: 169.2 LBS | HEART RATE: 69 BPM | BODY MASS INDEX: 29.24 KG/M2 | DIASTOLIC BLOOD PRESSURE: 58 MMHG | OXYGEN SATURATION: 98 % | SYSTOLIC BLOOD PRESSURE: 132 MMHG

## 2025-05-13 DIAGNOSIS — E11.65 TYPE 2 DIABETES MELLITUS WITH HYPERGLYCEMIA, WITHOUT LONG-TERM CURRENT USE OF INSULIN (H): Primary | ICD-10-CM

## 2025-05-13 PROCEDURE — 99214 OFFICE O/P EST MOD 30 MIN: CPT | Performed by: NURSE PRACTITIONER

## 2025-05-13 PROCEDURE — G2211 COMPLEX E/M VISIT ADD ON: HCPCS | Performed by: NURSE PRACTITIONER

## 2025-05-13 PROCEDURE — 3078F DIAST BP <80 MM HG: CPT | Performed by: NURSE PRACTITIONER

## 2025-05-13 PROCEDURE — 3075F SYST BP GE 130 - 139MM HG: CPT | Performed by: NURSE PRACTITIONER

## 2025-05-13 NOTE — PROGRESS NOTES
[facility] ENDOCRINOLOGY    Diabetes Note 5/13/2025    Zoe Alvarez, 1960, [id number]          Reason for visit      1. Type 2 diabetes mellitus with hyperglycemia, without long-term current use of insulin (H)        HPI     Zoe Alvarez is a very pleasant 64 year old old female who presents for follow up.  SUMMARY:    Lynne is seen today in follow-up for DM 2. Her current A1c is 6.6 and up a bit from her last of 6.0.     She continues to do very well on her current medication regimen of Mounjaro, 10 mg weekly, Semglee, 20 units in the morning and 20 units in the evening, and Jardiance 25 mg daily. She is interested in increasing the Mounjaro to 12.5 mg weekly.     Renal function was slightly affected with this last draw. Creatinine of 1.04 and GFR down to 60.     Blood glucose data:      Past Medical History     Patient Active Problem List   Diagnosis    Obesity (BMI 30-39.9)    Chronic venous insufficiency    Epidermal Inclusion Cyst    Hyperlipidemia    Vertigo    Type 2 diabetes mellitus with hyperglycemia, without long-term current use of insulin (H)    Eczema    RLQ abdominal pain    Tarsal tunnel syndrome of left side    Compression neuropathy of right lower extremity    Mononeuritis of left lower extremity    Plantar fascial fibromatosis of left foot    Mononeuritis of right lower extremity    Tarsal tunnel syndrome of right side    Foot pain, left    Bilateral lower extremity edema    Pulmonary nodules    Moderate episode of recurrent major depressive disorder (H)        Family History       family history includes Breast Cancer in her maternal grandmother; Diabetes in her mother.    Social History      reports that she has quit smoking. She has been exposed to tobacco smoke. She has never used smokeless tobacco. She reports current alcohol use of about 1.0 standard drink of alcohol per week. She reports that she does not use drugs.      Review of Systems     Patient has no polyuria or  polydipsia, no chest pain, dyspnea or TIA's, no numbness, tingling or pain in extremities  Remainder negative except as noted in HPI.    Vital Signs     /58 (BP Location: Right arm, Patient Position: Sitting, Cuff Size: Adult Regular)   Pulse 69   Wt 76.7 kg (169 lb 3.2 oz)   LMP  (LMP Unknown)   SpO2 98%   BMI 29.24 kg/m    Wt Readings from Last 3 Encounters:   05/13/25 76.7 kg (169 lb 3.2 oz)   02/04/25 78.3 kg (172 lb 9.6 oz)   07/05/24 79.2 kg (174 lb 8 oz)       Physical Exam     Constitutional:  Well developed, Well nourished  HENT:  Normocephalic,   Neck: normal in appearance  Eyes:  PERRL, Conjunctiva pink  Respiratory:  No respiratory distress  Skin: No acanthosis nigricans, lipoatrophy or lipodystrophy  Neurologic:  Alert & oriented x 3, nonfocal  Psychiatric:  Affect, Mood, Insight appropriate        Assessment     1. Type 2 diabetes mellitus with hyperglycemia, without long-term current use of insulin (H)        Plan       Will increase her Mounjaro dose to 12.5. She is going to watch her BG and reduce her insulin dose as needed. She will remain on the Jardiance. Follow-up with me in 3 months.         Jayla Hermosillo NP   Endocrinology  5/13/2025  2:26 PM      Lab Results     Microalbumin Urine mg/dL   Date Value Ref Range Status   04/26/2022 <0.50 0.00 - 1.99 mg/dL Final       Cholesterol   Date Value Ref Range Status   07/01/2024 116 <200 mg/dL Final     Direct Measure HDL   Date Value Ref Range Status   07/01/2024 36 (L) >=50 mg/dL Final     Triglycerides   Date Value Ref Range Status   07/01/2024 143 <150 mg/dL Final       [unfilled]      Current Medications     Outpatient Medications Prior to Visit   Medication Sig Dispense Refill    atorvastatin (LIPITOR) 40 MG tablet Take 1 tablet (40 mg) by mouth daily. 90 tablet 0    blood glucose (NO BRAND SPECIFIED) lancets standard Use to test blood sugar 4 times daily or as directed. 100 each 11    blood glucose (NO BRAND SPECIFIED) test strip Use  to test blood sugar 2 times daily or as directed. 200 strip 5    blood glucose meter (GLUCOMETER) [BLOOD GLUCOSE METER (GLUCOMETER)] Use 1 each As Directed as needed. Dispense glucometer brand per patient's insurance at pharmacy discretion. 1 each 0    blood glucose monitoring (NO BRAND SPECIFIED) meter device kit Use to test blood sugar 2 times daily or as directed. 1 kit 0    empagliflozin (JARDIANCE) 25 MG TABS tablet Take 1 tablet (25 mg) by mouth daily. 90 tablet 3    Insulin Glargine-yfgn (SEMGLEE, YFGN,) 100 UNIT/ML SOPN Inject 20 Units subcutaneously 2 times daily. 30 mL 1    insulin pen needle (BD TJ U/F) 32G X 4 MM miscellaneous USE TWO TIMES A DAY WITH INSULIN 200 each 1    MOUNJARO 10 MG/0.5ML SOAJ Inject 0.5 mLs (10 mg) subcutaneously every 7 days. 6 mL 3    valACYclovir (VALTREX) 1000 mg tablet TAKE TWO TABLETS BY MOUTH NOW AND TWO TABLETS IN 12 HOURS AS NEEDED 4 tablet 3    MOUNJARO 7.5 MG/0.5ML SOAJ INJECT 7.5 MG UNDER THE SKIN EVERY 7 DAYS. 2 mL 0     No facility-administered medications prior to visit.

## 2025-05-13 NOTE — LETTER
5/13/2025      [facility]oe Alvarez  [address] [facility] [address]      Dear Colleague,    Thank you for referring your patient, Zoe Alvarez, to the [facility]. Please see a copy of my visit note below.    [facility] ENDOCRINOLOGY    Diabetes Note 5/13/2025    Zoe Alvarez, 1960, [id number]          Reason for visit      1. Type 2 diabetes mellitus with hyperglycemia, without long-term current use of insulin (H)        HPI     Zoe Alvarez is a very pleasant 64 year old old female who presents for follow up.  SUMMARY:    Lynne is seen today in follow-up for DM 2. Her current A1c is 6.6 and up a bit from her last of 6.0.     She continues to do very well on her current medication regimen of Mounjaro, 10 mg weekly, Semglee, 20 units in the morning and 20 units in the evening, and Jardiance 25 mg daily. She is interested in increasing the Mounjaro to 12.5 mg weekly.     Renal function was slightly affected with this last draw. Creatinine of 1.04 and GFR down to 60.     Blood glucose data:      Past Medical History     Patient Active Problem List   Diagnosis     Obesity (BMI 30-39.9)     Chronic venous insufficiency     Epidermal Inclusion Cyst     Hyperlipidemia     Vertigo     Type 2 diabetes mellitus with hyperglycemia, without long-term current use of insulin (H)     Eczema     RLQ abdominal pain     Tarsal tunnel syndrome of left side     Compression neuropathy of right lower extremity     Mononeuritis of left lower extremity     Plantar fascial fibromatosis of left foot     Mononeuritis of right lower extremity     Tarsal tunnel syndrome of right side     Foot pain, left     Bilateral lower extremity edema     Pulmonary nodules     Moderate episode of recurrent major depressive disorder (H)        Family History       family history includes Breast Cancer in her maternal grandmother; Diabetes in her mother.    Social History      reports that she has quit  smoking. She has been exposed to tobacco smoke. She has never used smokeless tobacco. She reports current alcohol use of about 1.0 standard drink of alcohol per week. She reports that she does not use drugs.      Review of Systems     Patient has no polyuria or polydipsia, no chest pain, dyspnea or TIA's, no numbness, tingling or pain in extremities  Remainder negative except as noted in HPI.    Vital Signs     /58 (BP Location: Right arm, Patient Position: Sitting, Cuff Size: Adult Regular)   Pulse 69   Wt 76.7 kg (169 lb 3.2 oz)   LMP  (LMP Unknown)   SpO2 98%   BMI 29.24 kg/m    Wt Readings from Last 3 Encounters:   05/13/25 76.7 kg (169 lb 3.2 oz)   02/04/25 78.3 kg (172 lb 9.6 oz)   07/05/24 79.2 kg (174 lb 8 oz)       Physical Exam     Constitutional:  Well developed, Well nourished  HENT:  Normocephalic,   Neck: normal in appearance  Eyes:  PERRL, Conjunctiva pink  Respiratory:  No respiratory distress  Skin: No acanthosis nigricans, lipoatrophy or lipodystrophy  Neurologic:  Alert & oriented x 3, nonfocal  Psychiatric:  Affect, Mood, Insight appropriate        Assessment     1. Type 2 diabetes mellitus with hyperglycemia, without long-term current use of insulin (H)        Plan       Will increase her Mounjaro dose to 12.5. She is going to watch her BG and reduce her insulin dose as needed. She will remain on the Jardiance. Follow-up with me in 3 months.         Jayla Hermosillo NP   Endocrinology  5/13/2025  2:26 PM      Lab Results     Microalbumin Urine mg/dL   Date Value Ref Range Status   04/26/2022 <0.50 0.00 - 1.99 mg/dL Final       Cholesterol   Date Value Ref Range Status   07/01/2024 116 <200 mg/dL Final     Direct Measure HDL   Date Value Ref Range Status   07/01/2024 36 (L) >=50 mg/dL Final     Triglycerides   Date Value Ref Range Status   07/01/2024 143 <150 mg/dL Final       [unfilled]      Current Medications     Outpatient Medications Prior to Visit   Medication Sig Dispense Refill      atorvastatin (LIPITOR) 40 MG tablet Take 1 tablet (40 mg) by mouth daily. 90 tablet 0     blood glucose (NO BRAND SPECIFIED) lancets standard Use to test blood sugar 4 times daily or as directed. 100 each 11     blood glucose (NO BRAND SPECIFIED) test strip Use to test blood sugar 2 times daily or as directed. 200 strip 5     blood glucose meter (GLUCOMETER) [BLOOD GLUCOSE METER (GLUCOMETER)] Use 1 each As Directed as needed. Dispense glucometer brand per patient's insurance at pharmacy discretion. 1 each 0     blood glucose monitoring (NO BRAND SPECIFIED) meter device kit Use to test blood sugar 2 times daily or as directed. 1 kit 0     empagliflozin (JARDIANCE) 25 MG TABS tablet Take 1 tablet (25 mg) by mouth daily. 90 tablet 3     Insulin Glargine-yfgn (SEMGLEE, YFGN,) 100 UNIT/ML SOPN Inject 20 Units subcutaneously 2 times daily. 30 mL 1     insulin pen needle (BD TJ U/F) 32G X 4 MM miscellaneous USE TWO TIMES A DAY WITH INSULIN 200 each 1     MOUNJARO 10 MG/0.5ML SOAJ Inject 0.5 mLs (10 mg) subcutaneously every 7 days. 6 mL 3     valACYclovir (VALTREX) 1000 mg tablet TAKE TWO TABLETS BY MOUTH NOW AND TWO TABLETS IN 12 HOURS AS NEEDED 4 tablet 3     MOUNJARO 7.5 MG/0.5ML SOAJ INJECT 7.5 MG UNDER THE SKIN EVERY 7 DAYS. 2 mL 0     No facility-administered medications prior to visit.                       Again, thank you for allowing me to participate in the care of your patient.        Sincerely,        Jayla Hermosillo NP    Electronically signed

## 2025-05-15 DIAGNOSIS — E11.9 TYPE 2 DIABETES MELLITUS WITHOUT COMPLICATION (H): ICD-10-CM

## 2025-05-15 RX ORDER — PEN NEEDLE, DIABETIC 31 GX5/16"
NEEDLE, DISPOSABLE MISCELLANEOUS
Qty: 200 EACH | Refills: 1 | Status: SHIPPED | OUTPATIENT
Start: 2025-05-15

## 2025-05-15 NOTE — TELEPHONE ENCOUNTER
Requested Prescriptions   Pending Prescriptions Disp Refills    BD PEN NEEDLE TJ ULTRAFINE 32G X 4 MM miscellaneous [Pharmacy Med Name: BD PEN NEEDLE TJ  32G X 4 MM MISC]  1     Sig: USE TWO TIMES A DAY WITH INSULIN       Diabetic Supplies Protocol Passed - 5/15/2025  9:43 AM        Passed - Medication is active on med list and the sig matches. RN to manually verify dose and sig if red X/fail.     If the protocol passes (green check), you do not need to verify med dose and sig.    A prescription matches if they are the same clinical intention.    For Example: once daily and every morning are the same.    The protocol can not identify upper and lower case letters as matching and will fail.     For Example: Take 1 tablet (50 mg) by mouth daily     TAKE 1 TABLET (50 MG) BY MOUTH DAILY    For all fails (red x), verify dose and sig.    If the refill does match what is on file, the RN can still proceed to approve the refill request.       If they do not match, route to the appropriate provider.             Passed - Recent (12 month) or future (90 days) visit with authorizing provider s specialty     The patient must have completed an in-person or virtual visit within the past 12 months or has a future visit scheduled within the next 90 days with the authorizing provider s specialty.  Urgent care and e-visits do not qualify as an office visit for this protocol.          Passed - Medication indicated for associated diagnosis        Passed - Patient is 18 years of age or older

## 2025-05-20 DIAGNOSIS — E11.9 TYPE 2 DIABETES MELLITUS WITHOUT COMPLICATION (H): ICD-10-CM

## 2025-05-20 RX ORDER — PEN NEEDLE, DIABETIC 31 GX5/16"
NEEDLE, DISPOSABLE MISCELLANEOUS
Refills: 1 | OUTPATIENT
Start: 2025-05-20

## 2025-06-13 ENCOUNTER — ANCILLARY PROCEDURE (OUTPATIENT)
Dept: GENERAL RADIOLOGY | Facility: CLINIC | Age: 65
End: 2025-06-13
Attending: STUDENT IN AN ORGANIZED HEALTH CARE EDUCATION/TRAINING PROGRAM
Payer: COMMERCIAL

## 2025-06-13 DIAGNOSIS — M79.644 FINGER PAIN, RIGHT: ICD-10-CM

## 2025-06-13 PROCEDURE — 73130 X-RAY EXAM OF HAND: CPT | Mod: TC | Performed by: RADIOLOGY

## 2025-06-30 SDOH — HEALTH STABILITY: PHYSICAL HEALTH: ON AVERAGE, HOW MANY DAYS PER WEEK DO YOU ENGAGE IN MODERATE TO STRENUOUS EXERCISE (LIKE A BRISK WALK)?: 2 DAYS

## 2025-06-30 SDOH — HEALTH STABILITY: PHYSICAL HEALTH: ON AVERAGE, HOW MANY MINUTES DO YOU ENGAGE IN EXERCISE AT THIS LEVEL?: 60 MIN

## 2025-06-30 ASSESSMENT — PATIENT HEALTH QUESTIONNAIRE - PHQ9
10. IF YOU CHECKED OFF ANY PROBLEMS, HOW DIFFICULT HAVE THESE PROBLEMS MADE IT FOR YOU TO DO YOUR WORK, TAKE CARE OF THINGS AT HOME, OR GET ALONG WITH OTHER PEOPLE: SOMEWHAT DIFFICULT
SUM OF ALL RESPONSES TO PHQ QUESTIONS 1-9: 2
SUM OF ALL RESPONSES TO PHQ QUESTIONS 1-9: 2

## 2025-06-30 ASSESSMENT — SOCIAL DETERMINANTS OF HEALTH (SDOH): HOW OFTEN DO YOU GET TOGETHER WITH FRIENDS OR RELATIVES?: TWICE A WEEK

## 2025-07-01 ENCOUNTER — OFFICE VISIT (OUTPATIENT)
Dept: FAMILY MEDICINE | Facility: CLINIC | Age: 65
End: 2025-07-01
Attending: STUDENT IN AN ORGANIZED HEALTH CARE EDUCATION/TRAINING PROGRAM
Payer: COMMERCIAL

## 2025-07-01 VITALS
WEIGHT: 160.7 LBS | TEMPERATURE: 98.3 F | HEIGHT: 64 IN | HEART RATE: 95 BPM | DIASTOLIC BLOOD PRESSURE: 60 MMHG | OXYGEN SATURATION: 97 % | RESPIRATION RATE: 16 BRPM | BODY MASS INDEX: 27.43 KG/M2 | SYSTOLIC BLOOD PRESSURE: 108 MMHG

## 2025-07-01 DIAGNOSIS — E11.65 TYPE 2 DIABETES MELLITUS WITH HYPERGLYCEMIA, WITHOUT LONG-TERM CURRENT USE OF INSULIN (H): ICD-10-CM

## 2025-07-01 DIAGNOSIS — Z90.710 H/O TOTAL HYSTERECTOMY: ICD-10-CM

## 2025-07-01 DIAGNOSIS — B49 FUNGAL INFECTION: ICD-10-CM

## 2025-07-01 DIAGNOSIS — M85.80 OSTEOPENIA, UNSPECIFIED LOCATION: ICD-10-CM

## 2025-07-01 DIAGNOSIS — B00.9 HERPES SIMPLEX VIRUS INFECTION: ICD-10-CM

## 2025-07-01 DIAGNOSIS — Z87.891 HISTORY OF TOBACCO USE, PRESENTING HAZARDS TO HEALTH: ICD-10-CM

## 2025-07-01 DIAGNOSIS — G47.33 OSA (OBSTRUCTIVE SLEEP APNEA): ICD-10-CM

## 2025-07-01 DIAGNOSIS — Z00.00 ROUTINE GENERAL MEDICAL EXAMINATION AT A HEALTH CARE FACILITY: Primary | ICD-10-CM

## 2025-07-01 PROBLEM — F33.1 MODERATE EPISODE OF RECURRENT MAJOR DEPRESSIVE DISORDER (H): Status: RESOLVED | Noted: 2024-07-01 | Resolved: 2025-07-01

## 2025-07-01 LAB
ERYTHROCYTE [DISTWIDTH] IN BLOOD BY AUTOMATED COUNT: 11.8 % (ref 10–15)
EST. AVERAGE GLUCOSE BLD GHB EST-MCNC: 137 MG/DL
HBA1C MFR BLD: 6.4 % (ref 0–5.6)
HCT VFR BLD AUTO: 38.8 % (ref 35–47)
HGB BLD-MCNC: 13.1 G/DL (ref 11.7–15.7)
MCH RBC QN AUTO: 30.7 PG (ref 26.5–33)
MCHC RBC AUTO-ENTMCNC: 33.8 G/DL (ref 31.5–36.5)
MCV RBC AUTO: 91 FL (ref 78–100)
PLATELET # BLD AUTO: 168 10E3/UL (ref 150–450)
RBC # BLD AUTO: 4.27 10E6/UL (ref 3.8–5.2)
WBC # BLD AUTO: 7 10E3/UL (ref 4–11)

## 2025-07-01 PROCEDURE — 99396 PREV VISIT EST AGE 40-64: CPT | Performed by: STUDENT IN AN ORGANIZED HEALTH CARE EDUCATION/TRAINING PROGRAM

## 2025-07-01 PROCEDURE — 1126F AMNT PAIN NOTED NONE PRSNT: CPT | Performed by: STUDENT IN AN ORGANIZED HEALTH CARE EDUCATION/TRAINING PROGRAM

## 2025-07-01 PROCEDURE — 84443 ASSAY THYROID STIM HORMONE: CPT | Performed by: STUDENT IN AN ORGANIZED HEALTH CARE EDUCATION/TRAINING PROGRAM

## 2025-07-01 PROCEDURE — 36415 COLL VENOUS BLD VENIPUNCTURE: CPT | Performed by: STUDENT IN AN ORGANIZED HEALTH CARE EDUCATION/TRAINING PROGRAM

## 2025-07-01 PROCEDURE — 3074F SYST BP LT 130 MM HG: CPT | Performed by: STUDENT IN AN ORGANIZED HEALTH CARE EDUCATION/TRAINING PROGRAM

## 2025-07-01 PROCEDURE — 85027 COMPLETE CBC AUTOMATED: CPT | Performed by: STUDENT IN AN ORGANIZED HEALTH CARE EDUCATION/TRAINING PROGRAM

## 2025-07-01 PROCEDURE — 83036 HEMOGLOBIN GLYCOSYLATED A1C: CPT | Performed by: STUDENT IN AN ORGANIZED HEALTH CARE EDUCATION/TRAINING PROGRAM

## 2025-07-01 PROCEDURE — 80053 COMPREHEN METABOLIC PANEL: CPT | Performed by: STUDENT IN AN ORGANIZED HEALTH CARE EDUCATION/TRAINING PROGRAM

## 2025-07-01 PROCEDURE — G2211 COMPLEX E/M VISIT ADD ON: HCPCS | Performed by: STUDENT IN AN ORGANIZED HEALTH CARE EDUCATION/TRAINING PROGRAM

## 2025-07-01 PROCEDURE — 99213 OFFICE O/P EST LOW 20 MIN: CPT | Mod: 25 | Performed by: STUDENT IN AN ORGANIZED HEALTH CARE EDUCATION/TRAINING PROGRAM

## 2025-07-01 PROCEDURE — 80061 LIPID PANEL: CPT | Performed by: STUDENT IN AN ORGANIZED HEALTH CARE EDUCATION/TRAINING PROGRAM

## 2025-07-01 PROCEDURE — 82306 VITAMIN D 25 HYDROXY: CPT | Performed by: STUDENT IN AN ORGANIZED HEALTH CARE EDUCATION/TRAINING PROGRAM

## 2025-07-01 PROCEDURE — 3078F DIAST BP <80 MM HG: CPT | Performed by: STUDENT IN AN ORGANIZED HEALTH CARE EDUCATION/TRAINING PROGRAM

## 2025-07-01 PROCEDURE — 3044F HG A1C LEVEL LT 7.0%: CPT | Performed by: STUDENT IN AN ORGANIZED HEALTH CARE EDUCATION/TRAINING PROGRAM

## 2025-07-01 RX ORDER — VALACYCLOVIR HYDROCHLORIDE 1 G/1
TABLET, FILM COATED ORAL
Qty: 4 TABLET | Refills: 3 | Status: SHIPPED | OUTPATIENT
Start: 2025-07-01

## 2025-07-01 RX ORDER — INSULIN GLARGINE 100 [IU]/ML
20 INJECTION, SOLUTION SUBCUTANEOUS 2 TIMES DAILY
COMMUNITY
End: 2025-07-01

## 2025-07-01 RX ORDER — KETOCONAZOLE 20 MG/G
CREAM TOPICAL DAILY
Qty: 60 G | Refills: 0 | Status: SHIPPED | OUTPATIENT
Start: 2025-07-01

## 2025-07-01 ASSESSMENT — PAIN SCALES - GENERAL: PAINLEVEL_OUTOF10: NO PAIN (0)

## 2025-07-01 NOTE — PATIENT INSTRUCTIONS
Patient Education   Preventive Care Advice   This is general advice given by our system to help you stay healthy. However, your care team may have specific advice just for you. Please talk to your care team about your preventive care needs.  Nutrition  Eat 5 or more servings of fruits and vegetables each day.  Try wheat bread, brown rice and whole grain pasta (instead of white bread, rice, and pasta).  Get enough calcium and vitamin D. Check the label on foods and aim for 100% of the RDA (recommended daily allowance).  Lifestyle  Exercise at least 150 minutes each week  (30 minutes a day, 5 days a week).  Do muscle strengthening activities 2 days a week. These help control your weight and prevent disease.  No smoking.  Wear sunscreen to prevent skin cancer.  Have a dental exam and cleaning every 6 months.  Yearly exams  See your health care team every year to talk about:  Any changes in your health.  Any medicines your care team has prescribed.  Preventive care, family planning, and ways to prevent chronic diseases.  Shots (vaccines)   HPV shots (up to age 26), if you've never had them before.  Hepatitis B shots (up to age 59), if you've never had them before.  COVID-19 shot: Get this shot when it's due.  Flu shot: Get a flu shot every year.  Tetanus shot: Get a tetanus shot every 10 years.  Pneumococcal, hepatitis A, and RSV shots: Ask your care team if you need these based on your risk.  Shingles shot (for age 50 and up)  General health tests  Diabetes screening:  Starting at age 35, Get screened for diabetes at least every 3 years.  If you are younger than age 35, ask your care team if you should be screened for diabetes.  Cholesterol test: At age 39, start having a cholesterol test every 5 years, or more often if advised.  Bone density scan (DEXA): At age 50, ask your care team if you should have this scan for osteoporosis (brittle bones).  Hepatitis C: Get tested at least once in your life.  STIs (sexually  transmitted infections)  Before age 24: Ask your care team if you should be screened for STIs.  After age 24: Get screened for STIs if you're at risk. You are at risk for STIs (including HIV) if:  You are sexually active with more than one person.  You don't use condoms every time.  You or a partner was diagnosed with a sexually transmitted infection.  If you are at risk for HIV, ask about PrEP medicine to prevent HIV.  Get tested for HIV at least once in your life, whether you are at risk for HIV or not.  Cancer screening tests  Cervical cancer screening: If you have a cervix, begin getting regular cervical cancer screening tests starting at age 21.  Breast cancer scan (mammogram): If you've ever had breasts, begin having regular mammograms starting at age 40. This is a scan to check for breast cancer.  Colon cancer screening: It is important to start screening for colon cancer at age 45.  Have a colonoscopy test every 10 years (or more often if you're at risk) Or, ask your provider about stool tests like a FIT test every year or Cologuard test every 3 years.  To learn more about your testing options, visit:   .  For help making a decision, visit:   https://bit.ly/ar25084.  Prostate cancer screening test: If you have a prostate, ask your care team if a prostate cancer screening test (PSA) at age 55 is right for you.  Lung cancer screening: If you are a current or former smoker ages 50 to 80, ask your care team if ongoing lung cancer screenings are right for you.  For informational purposes only. Not to replace the advice of your health care provider. Copyright   2023 [employer]. All rights reserved. Clinically reviewed by the [facility] Transitions Program. [employer] [id number] - REV 01/24.

## 2025-07-01 NOTE — PROGRESS NOTES
Preventive Care Visit  [facility], [facility]  2025      Assessment & Plan      Diagnosis Comments   1. Routine general medical examination at a health care facility  MA Screen Bilateral w/Demar, TSH with free T4 reflex, CBC with platelets, Comprehensive metabolic panel, Lipid Profile, Hemoglobin A1c, Vitamin D Deficiency       2. Herpes simplex virus infection  valACYclovir (VALTREX) 1000 mg tablet       3. History of tobacco use, presenting hazards to health  CT Chest Lung Cancer Screen Low Dose Without       4. FLOYD (obstructive sleep apnea)  Adult Sleep Eval & Management  Referral       5. Fungal infection  ketoconazole (NIZORAL) 2 % external cream       6. H/O total hysterectomy        7. Osteopenia, unspecified location        8. 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 BMI is now range   Family history of cancers: Sister had breast cancer ( late 60s) requiring lumpectomy + chemo   Eye exam/dental exam: needs dentist appt. UTD eye doctor    Alcohol use:occasionally   Tobacco use/marijuana use/drug use: Quit smoking 1 years. (+) hx of 20 + years of PPD.   Mental health: stable   Vaccines: UTD except COVID.  Blood work: Ordered        Last Pap smear: S/p total hysterectomy.  No Pap needed moving forward.  Mammogram: Due for repeat mammogram -ordered.   Last mammogram  - BIRADS 1 .  Diagnostic mammo was unremarkable.  Colon cancer screenin-polyps were identified.  Pathology was benign.  Repeat in 5 years.  lung cancer screening: Last lung cancer screening was .  Will repeat lung cancer screening today.  This year will be the last year she needs to get lung cancer screening because she will have to quit 15 years ago.  "   DEXA scan: 7/2024-consistent with osteopenia.  Did not meet threshold for starting medications.  On daily vitamin D and calcium.  Check vitamin D levels.  Repeat DEXA scan in 2 years.      Type 2 diabetes:  Follows with endocrinology  Current medications: Mounjaro 12.5 weekly, Semglee 20 units twice daily, Jardiance 25 units daily  Patient has lost a significant amount of weight on the Mounjaro  Is feeling much more energetic, is much more active and has been happy with the outcome  Has been trying to stay consistent with her lifestyle changes.  Most recent A1c was 6.6 5/5/2025  Plan:  - Defer to endocrinology for ongoing management  - Did review with patient that untreated FLOYD could be causing insulin resistance (see discussion below)    FLOYD on CPAP:   Has not been using the CPAP.  The mask is ripped.  Insurance is not covering over him.  Reviewed with patient that untreated sleep apnea can certainly drive up insulin resistance.  Has been several years since she saw sleep medicine.  Updated referral placed.  With the weight loss, she may benefit from updated sleep study and new CPAP settings.  She is amenable.    Right trigger finger:  Did have seeing sports med 6/13/2025 and got a steroid injection done with good results.  They did tell her that if she needed another steroid injection, she may want to consider surgical intervention    Fungal infection:  Started yesterday in the right groin.  Will do ketoconazole cream x 2 weeks.  Advised to keep the area dry    HSV infxn;   Valacyclovir rx refilled       Patient has been advised of split billing requirements and indicates understanding: Yes        BMI  Estimated body mass index is 27.8 kg/m  as calculated from the following:    Height as of this encounter: 1.619 m (5' 3.75\").    Weight as of this encounter: 72.9 kg (160 lb 11.2 oz).   Weight management plan: Discussed healthy diet and exercise guidelines  Reviewed preventive health counseling, as reflected in " patient instructions  Counseling  Appropriate preventive services were addressed with this patient via screening, questionnaire, or discussion as appropriate for fall prevention, nutrition, physical activity, Tobacco-use cessation, social engagement, weight loss and cognition.  Checklist reviewing preventive services available has been given to the patient.  Reviewed patient's diet, addressing concerns and/or questions.   She is at risk for lack of exercise and has been provided with information to increase physical activity for the benefit of her well-being.       Jason Batista is a 64 year old, presenting for the following:  Physical (Physical today. Patient states she gets sores or dry patches on her scalp for about 1-2 years, comes and goes. Wants to know if she needs referral to derm. Lump under eye. Possible yeast infection, she noticed a red patch on the left side of her pelvis that she noticed last night. Referral for hearing test to check if she has hearing loss. She believes she has hearing loss because she has trouble hearing. )        7/1/2025     2:48 PM   Additional Questions   Roomed by Sandra LOPES MA   Accompanied by Self          HPI     Advance Care Planning    Discussed advance care planning with patient; informed AVS has link to [facility].        6/30/2025   General Health   How would you rate your overall physical health? Good   Feel stress (tense, anxious, or unable to sleep) Only a little   (!) STRESS CONCERN      6/30/2025   Nutrition   Three or more servings of calcium each day? Yes   Diet: Regular (no restrictions)   How many servings of fruit and vegetables per day? (!) 2-3   How many sweetened beverages each day? 0-1         6/30/2025   Exercise   Days per week of moderate/strenous exercise 2 days   Average minutes spent exercising at this level 60 min   (!) EXERCISE CONCERN      6/30/2025   Social Factors   Frequency of gathering with friends or relatives Twice a week   Worry  food won't last until get money to buy more No   Food not last or not have enough money for food? No   Do you have housing? (Housing is defined as stable permanent housing and does not include staying outside in a car, in a tent, in an abandoned building, in an overnight shelter, or couch-surfing.) Yes   Are you worried about losing your housing? No   Lack of transportation? No   Unable to get utilities (heat,electricity)? No         6/30/2025   Fall Risk   Fallen 2 or more times in the past year? No   Trouble with walking or balance? No          6/30/2025   Dental   Dentist two times every year? (!) DECLINE       Today's PHQ-9 Score:       6/30/2025     4:02 PM   PHQ-9 SCORE   PHQ-9 Total Score MyChart 2 (Minimal depression)   PHQ-9 Total Score 2        Patient-reported         6/30/2025   Substance Use   Alcohol more than 3/day or more than 7/wk No   Do you use any other substances recreationally? No     Social History     Tobacco Use    Smoking status: Former     Current packs/day: 0.00     Types: Cigarettes     Start date: 1/1/1975     Quit date: 3/10/2010     Years since quitting: 15.3     Passive exposure: Past    Smokeless tobacco: Never   Vaping Use    Vaping status: Never Used   Substance Use Topics    Alcohol use: Yes     Alcohol/week: 1.0 standard drink of alcohol     Comment: Alcoholic Drinks/day: <1 per wk    Drug use: No           7/19/2024   LAST FHS-7 RESULTS   1st degree relative breast or ovarian cancer Yes   Any relative bilateral breast cancer Yes   2 or more relatives with breast AND/OR ovarian cancer Yes   2 or more relatives with breast AND/OR bowel cancer Yes        Mammogram Screening - Mammogram every 1-2 years updated in Health Maintenance based on mutual decision making        6/30/2025   STI Screening   New sexual partner(s) since last STI/HIV test? No     History of abnormal Pap smear: Status post hysterectomy with removal of cervix and no history of CIN2 or greater or cervical cancer.  Health Maintenance and Surgical History updated.       ASCVD Risk   The ASCVD Risk score (Cody DK, et al., 2019) failed to calculate for the following reasons:    The valid total cholesterol range is 130 to 320 mg/dL    Reviewed and updated as needed this visit by Provider                    Past Medical History:   Diagnosis Date    Arthritis     Diabetes mellitus (H)     History of transfusion     AFTER A MISCARRIAGE    Migraine     Neuropathy      Past Surgical History:   Procedure Laterality Date    BLADDER SUSPENSION      COLONOSCOPY      COLONOSCOPY N/A 3/18/2022    Procedure: COLONOSCOPY, FLEXIBLE, WITH LESION REMOVAL USING SNARE with polypectomies by cold snare and 1 clip applied and cautery to rectum polyp sites to prevent bleeding;  Surgeon: Cristobal Mcclelland MD;  Location:  GI    DILATION AND CURETTAGE      SUCTION POST MISCARRIAGE    HC TARSAL TUNNEL RELEASE Left 2018    Procedure: DELLON QUADRUPLE NERVE DECOMPRESSION EXCISION PLANTAR FIBROMA ALL LEFT FOOT;  Surgeon: Jules Mao DPM;  Location: [facility];  Service: Podiatry    HYSTERECTOMY          KNEE SURGERY      LAPAROSCOPIC APPENDECTOMY N/A 7/10/2014    Procedure: Laparoscopic Appendectomy;  Surgeon: Germain Howe MD;  Location: [facility] [place];  Service:     [facility] APPENDECTOMY      Description: Appendectomy;  Recorded: 2014;  Comments: 2014    [facility] TOTAL ABDOM HYSTERECTOMY      Description: Hysterectomy;  Recorded: 2012;     OB History    Para Term  AB Living   2 2 2 0 0 0   SAB IAB Ectopic Multiple Live Births   0 0 0 0 0      # Outcome Date GA Lbr Faisal/2nd Weight Sex Type Anes PTL Lv   2 Term            1 Term              Lab work is in process  Labs reviewed in EPIC  BP Readings from Last 3 Encounters:   25 108/60   25 132/58   25 116/65    Wt Readings from Last 3 Encounters:   25 72.9 kg (160 lb 11.2 oz)   25 76.7 kg (169 lb 3.2 oz)   25  78.3 kg (172 lb 9.6 oz)                  Patient Active Problem List   Diagnosis    Obesity (BMI 30-39.9)    Chronic venous insufficiency    Epidermal Inclusion Cyst    Hyperlipidemia    Vertigo    Type 2 diabetes mellitus with hyperglycemia, without long-term current use of insulin (H)    Eczema    RLQ abdominal pain    Tarsal tunnel syndrome of left side    Compression neuropathy of right lower extremity    Mononeuritis of left lower extremity    Plantar fascial fibromatosis of left foot    Mononeuritis of right lower extremity    Tarsal tunnel syndrome of right side    Foot pain, left    Bilateral lower extremity edema    Pulmonary nodules    Moderate episode of recurrent major depressive disorder (H)    Osteopenia, unspecified location    H/O total hysterectomy     Past Surgical History:   Procedure Laterality Date    BLADDER SUSPENSION  2007    COLONOSCOPY      COLONOSCOPY N/A 3/18/2022    Procedure: COLONOSCOPY, FLEXIBLE, WITH LESION REMOVAL USING SNARE with polypectomies by cold snare and 1 clip applied and cautery to rectum polyp sites to prevent bleeding;  Surgeon: Cristobal Mcclelland MD;  Location:  GI    DILATION AND CURETTAGE      SUCTION POST MISCARRIAGE    HC TARSAL TUNNEL RELEASE Left 9/21/2018    Procedure: DELLON QUADRUPLE NERVE DECOMPRESSION EXCISION PLANTAR FIBROMA ALL LEFT FOOT;  Surgeon: Jules Mao DPM;  Location: [facility] [place];  Service: Podiatry    HYSTERECTOMY      1999    KNEE SURGERY      LAPAROSCOPIC APPENDECTOMY N/A 7/10/2014    Procedure: Laparoscopic Appendectomy;  Surgeon: Germain Howe MD;  Location: [facility];  Service:     [facility] APPENDECTOMY      Description: Appendectomy;  Recorded: 07/17/2014;  Comments: JULY 2014    [facility] TOTAL ABDOM HYSTERECTOMY      Description: Hysterectomy;  Recorded: 01/11/2012;       Social History     Tobacco Use    Smoking status: Former     Current packs/day: 0.00     Types: Cigarettes     Start date: 1/1/1975     Quit date: 3/10/2010      Years since quitting: 15.3     Passive exposure: Past    Smokeless tobacco: Never   Substance Use Topics    Alcohol use: Yes     Alcohol/week: 1.0 standard drink of alcohol     Comment: Alcoholic Drinks/day: <1 per wk     Family History   Problem Relation Age of Onset    Diabetes Mother     Breast Cancer Maternal Grandmother     Diabetes Father     Colon Cancer No family hx of          Current Outpatient Medications   Medication Sig Dispense Refill    atorvastatin (LIPITOR) 40 MG tablet Take 1 tablet (40 mg) by mouth daily. 90 tablet 0    blood glucose (NO BRAND SPECIFIED) lancets standard Use to test blood sugar 4 times daily or as directed. 100 each 11    blood glucose (NO BRAND SPECIFIED) test strip Use to test blood sugar 2 times daily or as directed. 200 strip 5    blood glucose meter (GLUCOMETER) [BLOOD GLUCOSE METER (GLUCOMETER)] Use 1 each As Directed as needed. Dispense glucometer brand per patient's insurance at pharmacy discretion. 1 each 0    blood glucose monitoring (NO BRAND SPECIFIED) meter device kit Use to test blood sugar 2 times daily or as directed. 1 kit 0    Calcium-Vitamin D-Vitamin K (CALCIUM + D PO) Take 1 tablet by mouth daily.      empagliflozin (JARDIANCE) 25 MG TABS tablet Take 1 tablet (25 mg) by mouth daily. 90 tablet 3    Insulin Glargine-yfgn (SEMGLEE, YFGN,) 100 UNIT/ML SOPN Inject 20 Units subcutaneously 2 times daily. 30 mL 1    insulin pen needle (BD PEN NEEDLE TJ ULTRAFINE) 32G X 4 MM miscellaneous USE TWO TIMES A DAY WITH INSULIN 200 each 1    ketoconazole (NIZORAL) 2 % external cream Apply topically daily. 60 g 0    MOUNJARO 10 MG/0.5ML SOAJ Inject 0.5 mLs (10 mg) subcutaneously every 7 days. 6 mL 3    tirzepatide (MOUNJARO) 12.5 MG/0.5ML SOAJ auto-injector pen Inject 0.5 mLs (12.5 mg) subcutaneously once a week. 2 mL 4    valACYclovir (VALTREX) 1000 mg tablet TAKE TWO TABLETS BY MOUTH NOW AND TWO TABLETS IN 12 HOURS AS NEEDED 4 tablet 3    VITAMIN D PO Take 1 tablet by  "mouth daily.       Allergies   Allergen Reactions    Bactrim [Sulfamethoxazole-Trimethoprim] Itching    Amoxicillin Swelling and Itching    Metformin Other (See Comments)     GI Upset     Recent Labs   Lab Test 07/01/25  1551 05/05/25  1422 01/27/25  1433 09/25/24  1435 07/01/24  1541 11/28/23  1528 08/22/23  1455 04/26/22  1551 02/18/22  0846 01/26/22  1603 10/25/21  1552 04/14/21  1629 03/31/21  1451 11/20/20  1556 04/12/18  0737 11/06/17  0932   A1C 6.4* 6.6* 6.0* 6.6*  --    < > 7.5*   < >  --    < > 9.2*   < > 9.7*  --    < > >14.0*   LDL  --   --   --  56 51  --  127*   < >  --   --  112   < >  --   --    < > 150*   HDL  --   --   --   --  36*  --   --   --   --   --  39*  --   --   --   --  46*   TRIG  --   --   --   --  143  --   --   --   --   --  225*  --   --   --   --  156*   ALT  --   --   --   --  31  --   --   --   --   --   --   --   --   --   --   --    CR  --  1.04* 0.87 0.83 0.85   < > 0.77   < > 0.87  --  0.94  --  0.77 0.97   < >  --    GFRESTIMATED  --  60* 74 78 77   < > 87   < > 75  --  66   < > >60 59*   < >  --    GFRESTBLACK  --   --   --   --   --   --   --   --   --   --   --   --  >60 >60   < >  --    POTASSIUM  --  4.8 4.7 3.9 4.3   < > 4.3   < > 4.3  --  4.1  --  4.0 4.4   < >  --    TSH  --   --   --   --  1.44  --   --   --  2.47  --   --   --   --   --   --   --     < > = values in this interval not displayed.               Review of Systems  Constitutional, neuro, ENT, endocrine, pulmonary, cardiac, gastrointestinal, genitourinary, musculoskeletal, integument and psychiatric systems are negative, except as otherwise noted.     Objective    Exam  /60 (BP Location: Right arm, Patient Position: Sitting, Cuff Size: Adult Regular)   Pulse 95   Temp 98.3  F (36.8  C) (Oral)   Resp 16   Ht 1.619 m (5' 3.75\")   Wt 72.9 kg (160 lb 11.2 oz)   LMP  (LMP Unknown)   SpO2 97%   BMI 27.80 kg/m     Estimated body mass index is 27.8 kg/m  as calculated from the following:    Height " "as of this encounter: 1.619 m (5' 3.75\").    Weight as of this encounter: 72.9 kg (160 lb 11.2 oz).    Physical Exam  GENERAL: alert and no distress  EYES: Eyes grossly normal to inspection, and conjunctivae and sclerae normal  HENT: ear canals and TM's normal, nose and mouth without ulcers or lesions  NECK: no adenopathy, no asymmetry, masses, or scars  RESP: lungs clear to auscultation - no rales, rhonchi or wheezes  CV: regular rate and rhythm,no murmur, click or rub, no peripheral edema  ABDOMEN: soft, nontender, no hepatosplenomegaly, no masses and bowel sounds normal  MS: no gross musculoskeletal defects noted, no edema  PSYCH: mentation appears normal, affect normal/bright  SKIN :   Fungal infection of the right groin       Signed Electronically by: Val Garza DO    "

## 2025-07-02 ENCOUNTER — PATIENT OUTREACH (OUTPATIENT)
Dept: CARE COORDINATION | Facility: CLINIC | Age: 65
End: 2025-07-02
Payer: COMMERCIAL

## 2025-07-02 ENCOUNTER — RESULTS FOLLOW-UP (OUTPATIENT)
Dept: PEDIATRICS | Facility: CLINIC | Age: 65
End: 2025-07-02

## 2025-07-02 LAB
ALBUMIN SERPL BCG-MCNC: 4.4 G/DL (ref 3.5–5.2)
ALP SERPL-CCNC: 68 U/L (ref 40–150)
ALT SERPL W P-5'-P-CCNC: 24 U/L (ref 0–50)
ANION GAP SERPL CALCULATED.3IONS-SCNC: 11 MMOL/L (ref 7–15)
AST SERPL W P-5'-P-CCNC: 23 U/L (ref 0–45)
BILIRUB SERPL-MCNC: 0.6 MG/DL
BUN SERPL-MCNC: 24.6 MG/DL (ref 8–23)
CALCIUM SERPL-MCNC: 10 MG/DL (ref 8.8–10.4)
CHLORIDE SERPL-SCNC: 104 MMOL/L (ref 98–107)
CHOLEST SERPL-MCNC: 115 MG/DL
CREAT SERPL-MCNC: 0.92 MG/DL (ref 0.51–0.95)
EGFRCR SERPLBLD CKD-EPI 2021: 69 ML/MIN/1.73M2
FASTING STATUS PATIENT QL REPORTED: NO
FASTING STATUS PATIENT QL REPORTED: NO
GLUCOSE SERPL-MCNC: 101 MG/DL (ref 70–99)
HCO3 SERPL-SCNC: 26 MMOL/L (ref 22–29)
HDLC SERPL-MCNC: 36 MG/DL
LDLC SERPL CALC-MCNC: 62 MG/DL
NONHDLC SERPL-MCNC: 79 MG/DL
POTASSIUM SERPL-SCNC: 4.1 MMOL/L (ref 3.4–5.3)
PROT SERPL-MCNC: 7 G/DL (ref 6.4–8.3)
SODIUM SERPL-SCNC: 141 MMOL/L (ref 135–145)
TRIGL SERPL-MCNC: 85 MG/DL
TSH SERPL DL<=0.005 MIU/L-ACNC: 1.57 UIU/ML (ref 0.3–4.2)
VIT D+METAB SERPL-MCNC: 33 NG/ML (ref 20–50)

## 2025-07-05 ENCOUNTER — PATIENT OUTREACH (OUTPATIENT)
Dept: CARE COORDINATION | Facility: CLINIC | Age: 65
End: 2025-07-05
Payer: COMMERCIAL

## 2025-07-21 ENCOUNTER — HOSPITAL ENCOUNTER (OUTPATIENT)
Dept: CT IMAGING | Facility: HOSPITAL | Age: 65
Discharge: HOME | End: 2025-07-21
Attending: STUDENT IN AN ORGANIZED HEALTH CARE EDUCATION/TRAINING PROGRAM
Payer: COMMERCIAL

## 2025-07-21 ENCOUNTER — ANCILLARY PROCEDURE (OUTPATIENT)
Dept: MAMMOGRAPHY | Facility: HOSPITAL | Age: 65
End: 2025-07-21
Attending: STUDENT IN AN ORGANIZED HEALTH CARE EDUCATION/TRAINING PROGRAM
Payer: COMMERCIAL

## 2025-07-21 DIAGNOSIS — Z00.00 ROUTINE GENERAL MEDICAL EXAMINATION AT A HEALTH CARE FACILITY: ICD-10-CM

## 2025-07-21 DIAGNOSIS — Z87.891 HISTORY OF TOBACCO USE, PRESENTING HAZARDS TO HEALTH: ICD-10-CM

## 2025-07-21 PROCEDURE — 77067 SCR MAMMO BI INCL CAD: CPT

## 2025-07-21 PROCEDURE — 71271 CT THORAX LUNG CANCER SCR C-: CPT

## 2025-07-31 DIAGNOSIS — E78.2 MIXED HYPERLIPIDEMIA: ICD-10-CM

## 2025-07-31 RX ORDER — ATORVASTATIN CALCIUM 40 MG/1
40 TABLET, FILM COATED ORAL DAILY
Qty: 90 TABLET | Refills: 2 | Status: SHIPPED | OUTPATIENT
Start: 2025-07-31

## 2025-08-11 ENCOUNTER — LAB (OUTPATIENT)
Dept: LAB | Facility: CLINIC | Age: 65
End: 2025-08-11
Payer: COMMERCIAL

## 2025-08-11 DIAGNOSIS — E11.65 TYPE 2 DIABETES MELLITUS WITH HYPERGLYCEMIA, WITHOUT LONG-TERM CURRENT USE OF INSULIN (H): ICD-10-CM

## 2025-08-11 LAB
ANION GAP SERPL CALCULATED.3IONS-SCNC: 12 MMOL/L (ref 7–15)
BUN SERPL-MCNC: 19.1 MG/DL (ref 8–23)
CALCIUM SERPL-MCNC: 10 MG/DL (ref 8.8–10.4)
CHLORIDE SERPL-SCNC: 102 MMOL/L (ref 98–107)
CREAT SERPL-MCNC: 0.81 MG/DL (ref 0.51–0.95)
EGFRCR SERPLBLD CKD-EPI 2021: 81 ML/MIN/1.73M2
EST. AVERAGE GLUCOSE BLD GHB EST-MCNC: 134 MG/DL
GLUCOSE SERPL-MCNC: 145 MG/DL (ref 70–99)
HBA1C MFR BLD: 6.3 % (ref 0–5.6)
HCO3 SERPL-SCNC: 27 MMOL/L (ref 22–29)
LDLC SERPL DIRECT ASSAY-MCNC: 53 MG/DL
POTASSIUM SERPL-SCNC: 4.2 MMOL/L (ref 3.4–5.3)
SODIUM SERPL-SCNC: 141 MMOL/L (ref 135–145)

## 2025-08-11 PROCEDURE — 83036 HEMOGLOBIN GLYCOSYLATED A1C: CPT

## 2025-08-11 PROCEDURE — 80048 BASIC METABOLIC PNL TOTAL CA: CPT

## 2025-08-11 PROCEDURE — 36415 COLL VENOUS BLD VENIPUNCTURE: CPT

## 2025-08-11 PROCEDURE — 83721 ASSAY OF BLOOD LIPOPROTEIN: CPT

## 2025-08-19 ENCOUNTER — OFFICE VISIT (OUTPATIENT)
Dept: ENDOCRINOLOGY | Facility: CLINIC | Age: 65
End: 2025-08-19
Payer: COMMERCIAL

## 2025-08-19 VITALS
SYSTOLIC BLOOD PRESSURE: 108 MMHG | OXYGEN SATURATION: 98 % | WEIGHT: 161.6 LBS | DIASTOLIC BLOOD PRESSURE: 60 MMHG | BODY MASS INDEX: 27.96 KG/M2 | HEART RATE: 103 BPM

## 2025-08-19 DIAGNOSIS — E11.65 TYPE 2 DIABETES MELLITUS WITH HYPERGLYCEMIA, WITHOUT LONG-TERM CURRENT USE OF INSULIN (H): Primary | ICD-10-CM

## 2025-08-19 PROCEDURE — 99214 OFFICE O/P EST MOD 30 MIN: CPT | Performed by: NURSE PRACTITIONER

## 2025-08-19 PROCEDURE — 3074F SYST BP LT 130 MM HG: CPT | Performed by: NURSE PRACTITIONER

## 2025-08-19 PROCEDURE — 3078F DIAST BP <80 MM HG: CPT | Performed by: NURSE PRACTITIONER

## 2025-09-04 ENCOUNTER — TELEPHONE (OUTPATIENT)
Dept: ENDOCRINOLOGY | Facility: CLINIC | Age: 65
End: 2025-09-04
Payer: COMMERCIAL

## 2025-09-04 DIAGNOSIS — E11.9 TYPE 2 DIABETES MELLITUS WITHOUT COMPLICATION, WITH LONG-TERM CURRENT USE OF INSULIN (H): ICD-10-CM

## 2025-09-04 DIAGNOSIS — Z79.4 TYPE 2 DIABETES MELLITUS WITHOUT COMPLICATION, WITH LONG-TERM CURRENT USE OF INSULIN (H): ICD-10-CM

## 2025-09-04 RX ORDER — INSULIN GLARGINE-YFGN 100 [IU]/ML
20 INJECTION, SOLUTION SUBCUTANEOUS DAILY
Qty: 30 ML | Refills: 2 | Status: SHIPPED | OUTPATIENT
Start: 2025-09-04

## (undated) DEVICE — ESU GROUND PAD ADULT W/CORD E7507

## (undated) DEVICE — CLIP HEMOCLIP ENDOSCOPIC INSTINCT 2.8X230CM INSC-7-230-SS

## (undated) DEVICE — ENDO TRAP POLYP QUICK CATCH 710201

## (undated) DEVICE — ENDO SNARE EXACTO COLD 9MM LOOP 2.4MMX230CM 00711115

## (undated) DEVICE — KIT ENDO TURNOVER/PROCEDURE W/CLEAN A SCOPE LINERS 103888

## (undated) RX ORDER — FENTANYL CITRATE 0.05 MG/ML
INJECTION, SOLUTION INTRAMUSCULAR; INTRAVENOUS
Status: DISPENSED
Start: 2022-03-18